# Patient Record
Sex: MALE | Race: WHITE | Employment: OTHER | URBAN - METROPOLITAN AREA
[De-identification: names, ages, dates, MRNs, and addresses within clinical notes are randomized per-mention and may not be internally consistent; named-entity substitution may affect disease eponyms.]

---

## 2017-05-05 PROBLEM — J18.9 PNEUMONIA DUE TO INFECTIOUS ORGANISM: Status: ACTIVE | Noted: 2017-05-05

## 2018-05-01 ENCOUNTER — OFFICE VISIT (OUTPATIENT)
Dept: FAMILY MEDICINE CLINIC | Age: 68
End: 2018-05-01
Payer: MEDICARE

## 2018-05-01 VITALS
OXYGEN SATURATION: 98 % | WEIGHT: 224 LBS | HEART RATE: 70 BPM | SYSTOLIC BLOOD PRESSURE: 136 MMHG | BODY MASS INDEX: 33.95 KG/M2 | HEIGHT: 68 IN | TEMPERATURE: 98.7 F | DIASTOLIC BLOOD PRESSURE: 76 MMHG | RESPIRATION RATE: 20 BRPM

## 2018-05-01 DIAGNOSIS — F41.9 ANXIETY: ICD-10-CM

## 2018-05-01 PROCEDURE — 1123F ACP DISCUSS/DSCN MKR DOCD: CPT | Performed by: FAMILY MEDICINE

## 2018-05-01 PROCEDURE — 4040F PNEUMOC VAC/ADMIN/RCVD: CPT | Performed by: FAMILY MEDICINE

## 2018-05-01 PROCEDURE — 3017F COLORECTAL CA SCREEN DOC REV: CPT | Performed by: FAMILY MEDICINE

## 2018-05-01 PROCEDURE — 99213 OFFICE O/P EST LOW 20 MIN: CPT | Performed by: FAMILY MEDICINE

## 2018-05-01 PROCEDURE — G8417 CALC BMI ABV UP PARAM F/U: HCPCS | Performed by: FAMILY MEDICINE

## 2018-05-01 PROCEDURE — 1036F TOBACCO NON-USER: CPT | Performed by: FAMILY MEDICINE

## 2018-05-01 PROCEDURE — G8427 DOCREV CUR MEDS BY ELIG CLIN: HCPCS | Performed by: FAMILY MEDICINE

## 2018-05-01 RX ORDER — LORAZEPAM 0.5 MG/1
0.5 TABLET ORAL 2 TIMES DAILY PRN
Qty: 60 TABLET | Refills: 2 | Status: SHIPPED | OUTPATIENT
Start: 2018-05-01 | End: 2019-10-08 | Stop reason: SDUPTHER

## 2018-05-01 ASSESSMENT — ENCOUNTER SYMPTOMS
COUGH: 0
DOUBLE VISION: 0
HEMOPTYSIS: 0
HEARTBURN: 0
BLURRED VISION: 0
NAUSEA: 0

## 2018-05-02 ENCOUNTER — TELEPHONE (OUTPATIENT)
Dept: FAMILY MEDICINE CLINIC | Age: 68
End: 2018-05-02

## 2018-05-02 DIAGNOSIS — G47.33 OSA (OBSTRUCTIVE SLEEP APNEA): ICD-10-CM

## 2018-05-02 DIAGNOSIS — G47.33 OBSTRUCTIVE SLEEP APNEA OF ADULT: Primary | ICD-10-CM

## 2018-05-08 ENCOUNTER — TELEPHONE (OUTPATIENT)
Dept: FAMILY MEDICINE CLINIC | Age: 68
End: 2018-05-08

## 2018-05-08 DIAGNOSIS — R18.8 OTHER ASCITES: Primary | ICD-10-CM

## 2018-05-11 ENCOUNTER — TELEPHONE (OUTPATIENT)
Dept: FAMILY MEDICINE CLINIC | Age: 68
End: 2018-05-11

## 2018-05-11 DIAGNOSIS — I99.8 OTHER DISORDER OF CIRCULATORY SYSTEM (CODE): ICD-10-CM

## 2018-05-11 DIAGNOSIS — Z01.818 PREOPERATIVE TESTING: Primary | ICD-10-CM

## 2018-05-15 ENCOUNTER — HOSPITAL ENCOUNTER (OUTPATIENT)
Dept: ULTRASOUND IMAGING | Age: 68
Discharge: HOME | End: 2018-05-15
Payer: MEDICARE

## 2018-05-15 DIAGNOSIS — R14.0 ABDOMINAL DISTENTION: ICD-10-CM

## 2018-05-15 PROCEDURE — 76705 ECHO EXAM OF ABDOMEN: CPT

## 2018-06-30 ENCOUNTER — HOSPITAL ENCOUNTER (EMERGENCY)
Age: 68
Discharge: HOME | End: 2018-06-30
Payer: MEDICARE

## 2018-06-30 ENCOUNTER — APPOINTMENT (OUTPATIENT)
Dept: ULTRASOUND IMAGING | Age: 68
End: 2018-06-30
Payer: MEDICARE

## 2018-06-30 VITALS
HEIGHT: 68 IN | SYSTOLIC BLOOD PRESSURE: 153 MMHG | OXYGEN SATURATION: 97 % | WEIGHT: 220 LBS | BODY MASS INDEX: 33.34 KG/M2 | DIASTOLIC BLOOD PRESSURE: 72 MMHG | HEART RATE: 85 BPM | RESPIRATION RATE: 16 BRPM | TEMPERATURE: 98.5 F

## 2018-06-30 DIAGNOSIS — I83.892 VARICOSE VEINS OF LEG WITH SWELLING, LEFT: ICD-10-CM

## 2018-06-30 DIAGNOSIS — L03.116 CELLULITIS OF LEFT LOWER EXTREMITY: Primary | ICD-10-CM

## 2018-06-30 PROCEDURE — 93971 EXTREMITY STUDY: CPT

## 2018-06-30 PROCEDURE — 99283 EMERGENCY DEPT VISIT LOW MDM: CPT

## 2018-06-30 PROCEDURE — 6370000000 HC RX 637 (ALT 250 FOR IP): Performed by: PHYSICIAN ASSISTANT

## 2018-06-30 RX ORDER — CEPHALEXIN 250 MG/1
500 CAPSULE ORAL ONCE
Status: COMPLETED | OUTPATIENT
Start: 2018-06-30 | End: 2018-06-30

## 2018-06-30 RX ORDER — CEPHALEXIN 500 MG/1
500 CAPSULE ORAL 4 TIMES DAILY
Qty: 40 CAPSULE | Refills: 0 | Status: SHIPPED | OUTPATIENT
Start: 2018-06-30 | End: 2019-02-19

## 2018-06-30 RX ADMIN — CEPHALEXIN 500 MG: 250 CAPSULE ORAL at 22:47

## 2018-07-01 NOTE — ED PROVIDER NOTES
Independent:    HPI:  6/30/18, Time: 8:32PM.       Nic Powers is a 79 y.o. male presenting to the ED for left lower leg pain and swelling with some mild local erythema to the anterior tibial region, beginning over the last 24 hours. The patient reports that he has enlarged varicosities to his left lower leg that usually do not bother him. He states that over the last 24 hours, he has had soreness overlying the varicosities of his left medial knee area as well as to his anterior left thigh. He states he has no pain to his left posterior calf or left posterior knee. He denies any falls or injuries. He states that he is having some pain to his left leg with ambulation. 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 He denies any recent chills or fever. He denies any open sores or lesions to his left lower leg. ROS:   Pertinent positives and negatives are stated within the HPI, all other systems reviewed and are negative.    --------------------------------------------- PAST HISTORY ---------------------------------------------  Past Medical History:  has a past medical history of AML (acute myeloblastic leukemia) ([facility] AML (acute myeloblastic leukemia) ([facility] Cancer ([facility] Chronic kidney disease; Diabetes mellitus ([facility] GERD (gastroesophageal reflux disease); Hx of blood clots; Hyperlipidemia; Liver disease; MI, old; Other disorders of kidney and ureter; Other sleep disturbances; Thyroid disease; and Type II or unspecified type diabetes mellitus without mention of complication, not stated as uncontrolled. Past Surgical History:  has a past surgical history that includes Appendectomy; Cholecystectomy; Foot neuroma surgery; Colonoscopy; Endoscopy, colon, diagnostic; Upper gastrointestinal endoscopy; and bone marrow transplant. Social History:  reports that he quit smoking about 42 years ago.  He has a 5.00 pack-year smoking history. He quit smokeless tobacco use about 20 years ago. He reports that he drinks about 0.6 oz of alcohol per week . He reports that he does not use drugs. Family History: family history includes Cancer in his maternal grandmother and other family members; Heart Disease in his father and mother; High Blood Pressure in his father. The patients home medications have been reviewed. Allergies: Pneumococcal vaccines; Ciprofloxacin; Diatrizoate; Dye [iodides]; Epinephrine; Penicillin g; Morphine; Other; and Oxycodone    -------------------------------------------------- RESULTS -------------------------------------------------  All laboratory and radiology results have been personally reviewed by myself   LABS:  No results found for this visit on 06/30/18. RADIOLOGY:  Interpreted by Radiologist.  US DUP LOWER EXTREMITY LEFT KATHERYN   Final Result   No evidence of acute deep venous thrombosis.          ------------------------- NURSING NOTES AND VITALS REVIEWED ---------------------------   The nursing notes within the ED encounter and vital signs as below have been reviewed. BP (!) 153/72   Pulse 85   Temp 98.5 °F (36.9 °C)   Resp 16   Ht 5' 8\" (1.727 m)   Wt 220 lb (99.8 kg)   SpO2 97%   BMI 33.45 kg/m²   Oxygen Saturation Interpretation: Normal    ---------------------------------------------------PHYSICAL EXAM--------------------------------------      Constitutional/General: Alert and oriented x3, well appearing, non toxic in NAD  Head: NC/AT  Eyes: PERRL, EOMI  Neck: Supple, full ROM  Pulmonary: Lungs clear to auscultation bilaterally, no wheezes, rales, or rhonchi. Not in respiratory distress  Cardiovascular:  Regular rate and rhythm,  2+ distal pulses  Extremities: Moves all extremities x 4. Mild local erythema noted to anterior tibial region bilaterally, left >right. Local tenderness to left medial knee overlying area of large high pressure varicosity.  No significant erythema, appears somewhat warm to touch. Some local tenderness to left anterior thigh as well, no palpable cord or visible erythema. Patient able to ambulate, but with discomfort to left anterior lower leg with weight bearing. No posterior left calf tenderness and no tenderness or fullness noted to left posterior knee. Skin: warm and dry without rash  Neurologic: GCS 15  Psych: Normal Affect    ------------------------------ ED COURSE/MEDICAL DECISION MAKING----------------------  Medications   cephALEXin (KEFLEX) capsule 500 mg (not administered)         Medical Decision Making:    Patient to ER with complaints of left lower leg pain and redness and swelling, worse today. Patient advised would recommend US of LLE to rule out DVT. Patient resting comfortably, awaiting US results. Patient Advised of stable ultrasound findings with no evidence of DVT. Patient was given a dose of Keflex orally in the ER. He was given a prescription as well. He does have follow-up scheduled with his PCP next week. He will keep this appointment. I advised patient that he can discuss possible referral to vascular doctor with his PCP for possible treatment of his varicose veins since they are becoming more symptomatic. Recommend to take some Aleve OTC for his inflammation as well as the oral Keflex and elevate his legs as much as able. If he develops worsening pain, high fever, red streaking, worsening swelling, shortness of breath or any changes, recommend reevaluation immediately. Counseling: The emergency provider has spoken with the patient and spouse/SO and discussed todays results, in addition to providing specific details for the plan of care and counseling regarding the diagnosis and prognosis. Questions are answered at this time and they are agreeable with the plan.    --------------------------------- IMPRESSION AND DISPOSITION ---------------------------------    IMPRESSION  1.  Cellulitis of left lower

## 2018-07-05 ENCOUNTER — HOSPITAL ENCOUNTER (OUTPATIENT)
Age: 68
Discharge: HOME | End: 2018-07-07
Payer: MEDICARE

## 2018-07-05 ENCOUNTER — OFFICE VISIT (OUTPATIENT)
Dept: FAMILY MEDICINE CLINIC | Age: 68
End: 2018-07-05
Payer: MEDICARE

## 2018-07-05 VITALS
BODY MASS INDEX: 33.65 KG/M2 | OXYGEN SATURATION: 97 % | TEMPERATURE: 98.8 F | HEART RATE: 99 BPM | WEIGHT: 222 LBS | SYSTOLIC BLOOD PRESSURE: 98 MMHG | RESPIRATION RATE: 16 BRPM | DIASTOLIC BLOOD PRESSURE: 62 MMHG | HEIGHT: 68 IN

## 2018-07-05 DIAGNOSIS — L03.116 CELLULITIS OF LEFT LOWER EXTREMITY: ICD-10-CM

## 2018-07-05 DIAGNOSIS — E11.9 TYPE 2 DIABETES MELLITUS WITHOUT COMPLICATION, WITH LONG-TERM CURRENT USE OF INSULIN (HCC): ICD-10-CM

## 2018-07-05 DIAGNOSIS — Z79.4 TYPE 2 DIABETES MELLITUS WITHOUT COMPLICATION, WITH LONG-TERM CURRENT USE OF INSULIN (HCC): ICD-10-CM

## 2018-07-05 DIAGNOSIS — L03.116 CELLULITIS OF LEFT LOWER EXTREMITY: Primary | ICD-10-CM

## 2018-07-05 DIAGNOSIS — Z00.00 ROUTINE GENERAL MEDICAL EXAMINATION AT A HEALTH CARE FACILITY: ICD-10-CM

## 2018-07-05 DIAGNOSIS — R60.9 EDEMA, UNSPECIFIED TYPE: ICD-10-CM

## 2018-07-05 LAB
ALBUMIN SERPL-MCNC: 4.1 G/DL (ref 3.5–5.2)
ALP BLD-CCNC: 122 U/L (ref 40–129)
ALT SERPL-CCNC: 39 U/L (ref 0–40)
ANION GAP SERPL CALCULATED.3IONS-SCNC: 14 MMOL/L (ref 7–16)
ANISOCYTOSIS: ABNORMAL
AST SERPL-CCNC: 29 U/L (ref 0–39)
BASOPHILS ABSOLUTE: 0.07 E9/L (ref 0–0.2)
BASOPHILS RELATIVE PERCENT: 0.9 % (ref 0–2)
BILIRUB SERPL-MCNC: 0.5 MG/DL (ref 0–1.2)
BUN BLDV-MCNC: 19 MG/DL (ref 8–23)
BURR CELLS: ABNORMAL
CALCIUM SERPL-MCNC: 9.4 MG/DL (ref 8.6–10.2)
CHLORIDE BLD-SCNC: 96 MMOL/L (ref 98–107)
CHOLESTEROL, TOTAL: 130 MG/DL (ref 0–199)
CO2: 28 MMOL/L (ref 22–29)
CREAT SERPL-MCNC: 1 MG/DL (ref 0.7–1.2)
EOSINOPHILS ABSOLUTE: 0.29 E9/L (ref 0.05–0.5)
EOSINOPHILS RELATIVE PERCENT: 3.6 % (ref 0–6)
GFR AFRICAN AMERICAN: >60
GFR NON-AFRICAN AMERICAN: >60 ML/MIN/1.73
GLUCOSE BLD-MCNC: 314 MG/DL (ref 74–109)
HBA1C MFR BLD: 9.6 %
HCT VFR BLD CALC: 39.4 % (ref 37–54)
HDLC SERPL-MCNC: 17 MG/DL
HEMOGLOBIN: 12 G/DL (ref 12.5–16.5)
LDL CHOLESTEROL CALCULATED: 48 MG/DL (ref 0–99)
LYMPHOCYTES ABSOLUTE: 6.48 E9/L (ref 1.5–4)
LYMPHOCYTES RELATIVE PERCENT: 81.3 % (ref 20–42)
MCH RBC QN AUTO: 26.9 PG (ref 26–35)
MCHC RBC AUTO-ENTMCNC: 30.5 % (ref 32–34.5)
MCV RBC AUTO: 88.3 FL (ref 80–99.9)
MONOCYTES ABSOLUTE: 0.32 E9/L (ref 0.1–0.95)
MONOCYTES RELATIVE PERCENT: 3.6 % (ref 2–12)
NEUTROPHILS ABSOLUTE: 0.88 E9/L (ref 1.8–7.3)
NEUTROPHILS RELATIVE PERCENT: 10.7 % (ref 43–80)
OVALOCYTES: ABNORMAL
PDW BLD-RTO: 15.4 FL (ref 11.5–15)
PLATELET # BLD: 66 E9/L (ref 130–450)
PLATELET CONFIRMATION: NORMAL
PMV BLD AUTO: 12.6 FL (ref 7–12)
POIKILOCYTES: ABNORMAL
POLYCHROMASIA: ABNORMAL
POTASSIUM SERPL-SCNC: 5.1 MMOL/L (ref 3.5–5)
RBC # BLD: 4.46 E12/L (ref 3.8–5.8)
SODIUM BLD-SCNC: 138 MMOL/L (ref 132–146)
TOTAL PROTEIN: 6.5 G/DL (ref 6.4–8.3)
TRIGL SERPL-MCNC: 327 MG/DL (ref 0–149)
VLDLC SERPL CALC-MCNC: 65 MG/DL
WBC # BLD: 8 E9/L (ref 4.5–11.5)

## 2018-07-05 PROCEDURE — 85025 COMPLETE CBC W/AUTO DIFF WBC: CPT

## 2018-07-05 PROCEDURE — G0438 PPPS, INITIAL VISIT: HCPCS | Performed by: FAMILY MEDICINE

## 2018-07-05 PROCEDURE — 4040F PNEUMOC VAC/ADMIN/RCVD: CPT | Performed by: FAMILY MEDICINE

## 2018-07-05 PROCEDURE — 83036 HEMOGLOBIN GLYCOSYLATED A1C: CPT | Performed by: FAMILY MEDICINE

## 2018-07-05 PROCEDURE — 80061 LIPID PANEL: CPT

## 2018-07-05 PROCEDURE — 80053 COMPREHEN METABOLIC PANEL: CPT

## 2018-07-05 PROCEDURE — 36415 COLL VENOUS BLD VENIPUNCTURE: CPT | Performed by: FAMILY MEDICINE

## 2018-07-05 PROCEDURE — 3046F HEMOGLOBIN A1C LEVEL >9.0%: CPT | Performed by: FAMILY MEDICINE

## 2018-07-05 RX ORDER — SPIRONOLACTONE 25 MG/1
25 TABLET ORAL DAILY
Qty: 90 TABLET | Refills: 3 | Status: SHIPPED | OUTPATIENT
Start: 2018-07-05 | End: 2019-03-30 | Stop reason: SDUPTHER

## 2018-07-05 RX ORDER — GABAPENTIN 300 MG/1
300 CAPSULE ORAL 3 TIMES DAILY
COMMUNITY
End: 2019-05-01 | Stop reason: ALTCHOICE

## 2018-07-05 RX ORDER — ALFUZOSIN HYDROCHLORIDE 10 MG/1
TABLET, EXTENDED RELEASE ORAL
COMMUNITY
Start: 2018-04-09 | End: 2019-05-01

## 2018-07-05 ASSESSMENT — LIFESTYLE VARIABLES
AUDIT TOTAL SCORE: 2
AUDIT-C TOTAL SCORE: 2
HOW OFTEN DURING THE LAST YEAR HAVE YOU FAILED TO DO WHAT WAS NORMALLY EXPECTED FROM YOU BECAUSE OF DRINKING: 0
HAS A RELATIVE, FRIEND, DOCTOR, OR ANOTHER HEALTH PROFESSIONAL EXPRESSED CONCERN ABOUT YOUR DRINKING OR SUGGESTED YOU CUT DOWN: 0
HOW OFTEN DURING THE LAST YEAR HAVE YOU HAD A FEELING OF GUILT OR REMORSE AFTER DRINKING: 0
HOW OFTEN DURING THE LAST YEAR HAVE YOU BEEN UNABLE TO REMEMBER WHAT HAPPENED THE NIGHT BEFORE BECAUSE YOU HAD BEEN DRINKING: 0
HOW OFTEN DO YOU HAVE SIX OR MORE DRINKS ON ONE OCCASION: 0
HAVE YOU OR SOMEONE ELSE BEEN INJURED AS A RESULT OF YOUR DRINKING: 0
HOW OFTEN DURING THE LAST YEAR HAVE YOU FOUND THAT YOU WERE NOT ABLE TO STOP DRINKING ONCE YOU HAD STARTED: 0
HOW MANY STANDARD DRINKS CONTAINING ALCOHOL DO YOU HAVE ON A TYPICAL DAY: 0
HOW OFTEN DURING THE LAST YEAR HAVE YOU NEEDED AN ALCOHOLIC DRINK FIRST THING IN THE MORNING TO GET YOURSELF GOING AFTER A NIGHT OF HEAVY DRINKING: 0
HOW OFTEN DO YOU HAVE A DRINK CONTAINING ALCOHOL: 2

## 2018-07-05 ASSESSMENT — PATIENT HEALTH QUESTIONNAIRE - PHQ9: SUM OF ALL RESPONSES TO PHQ QUESTIONS 1-9: 0

## 2018-07-05 ASSESSMENT — ANXIETY QUESTIONNAIRES: GAD7 TOTAL SCORE: 0

## 2018-07-05 NOTE — PROGRESS NOTES
Risk:  Timed Up and Go Test > 12 seconds?: no  2 or more falls in past year?: no  Fall with injury in past year?: (!) yes  Fall Risk Assessment[de-identified] (!) Positive Screening for Falls  Fall Risk Interventions:    · None indicated    General Health:  General  In general, how would you say your health is?: Good  In the past 7 days, have you experienced any of the following?: (!) New or Increased Pain (from cellulitis)  Do you get the social and emotional support that you need?: Yes  Do you have a Living Will?: (!) No  General Health Risk Interventions:  · None indicated    Health Habits/Nutrition:  Health Habits/Nutrition  Do you exercise for at least 20 minutes 2-3 times per week?: (!) No  Have you lost any weight without trying in the past 3 months?: No  Do you eat fewer than 2 meals per day?: No  Have you seen a dentist within the past year?: Yes  Body mass index is 33.75 kg/m².   Health Habits/Nutrition Interventions:  · None indicated    Hearing/Vision:  Hearing/Vision  Do you or your family notice any trouble with your hearing?: (!) Yes  Do you have difficulty driving, watching TV, or doing any of your daily activities because of your eyesight?: No  Have you had an eye exam within the past year?: Yes  Hearing/Vision Interventions:  · None indicated    Personalized Preventive Plan   Current Health Maintenance Status  Immunization History   Administered Date(s) Administered    DTaP 01/03/2013, 04/25/2013, 08/22/2013    Hepatitis B (Recombivax HB) 01/03/2013, 04/25/2013, 08/22/2013    Hib, unspecified formulation 01/03/2013, 04/25/2013, 08/22/2013    IPV (Ipol) 01/03/2013, 04/25/2013, 08/22/2013    Influenza, High Dose 09/25/2017    Influenza, Quadv, 3 Years and older, IM 09/18/2014    MMR 09/18/2014, 06/02/2016    Pneumococcal 13-valent Conjugate (Lizzie [place]) 06/18/2017        Health Maintenance   Topic Date Due    Hepatitis C screen  1950    Diabetic foot exam  05/09/1962    Diabetic retinal exam 05/09/1962    Lipid screen  05/09/1962    Shingles Vaccine (1 of 2 - 2 Dose Series) 05/09/2002    Diabetic microalbuminuria test  04/21/2018    Flu vaccine (1) 09/01/2018    A1C test (Diabetic or Prediabetic)  12/27/2018    DTaP/Tdap/Td vaccine (2 - Tdap) 08/22/2023    Colon cancer screen colonoscopy  04/25/2025    AAA screen  Completed     Recommendations for Preventive Services Due: see orders.   Recommended screening schedule for the next 5-10 years is provided to the patient in written form: see Patient Instructions/AVS.

## 2018-07-06 ENCOUNTER — TELEPHONE (OUTPATIENT)
Dept: FAMILY MEDICINE CLINIC | Age: 68
End: 2018-07-06

## 2018-07-09 ENCOUNTER — TELEPHONE (OUTPATIENT)
Dept: FAMILY MEDICINE CLINIC | Age: 68
End: 2018-07-09

## 2018-09-20 RX ORDER — PANTOPRAZOLE SODIUM 40 MG/1
TABLET, DELAYED RELEASE ORAL
Qty: 90 TABLET | Refills: 3 | Status: SHIPPED | OUTPATIENT
Start: 2018-09-20 | End: 2019-12-02 | Stop reason: SDUPTHER

## 2018-11-08 ENCOUNTER — TELEPHONE (OUTPATIENT)
Dept: FAMILY MEDICINE CLINIC | Age: 68
End: 2018-11-08

## 2018-11-08 DIAGNOSIS — E11.9 TYPE 2 DIABETES MELLITUS WITHOUT COMPLICATION, WITH LONG-TERM CURRENT USE OF INSULIN (HCC): ICD-10-CM

## 2018-11-08 DIAGNOSIS — Z79.4 TYPE 2 DIABETES MELLITUS WITHOUT COMPLICATION, WITH LONG-TERM CURRENT USE OF INSULIN (HCC): ICD-10-CM

## 2018-11-08 RX ORDER — INSULIN LISPRO 100 [IU]/ML
INJECTION, SOLUTION INTRAVENOUS; SUBCUTANEOUS
Qty: 15 ML | Refills: 0 | Status: SHIPPED | OUTPATIENT
Start: 2018-11-08 | End: 2019-02-01 | Stop reason: SDUPTHER

## 2018-11-08 RX ORDER — INSULIN GLARGINE 300 U/ML
31 INJECTION, SOLUTION SUBCUTANEOUS NIGHTLY
Qty: 5 PEN | Refills: 0 | Status: SHIPPED | OUTPATIENT
Start: 2018-11-08 | End: 2019-05-16 | Stop reason: DRUGHIGH

## 2019-02-01 DIAGNOSIS — E11.9 TYPE 2 DIABETES MELLITUS WITHOUT COMPLICATION, UNSPECIFIED WHETHER LONG TERM INSULIN USE (HCC): Primary | ICD-10-CM

## 2019-02-01 RX ORDER — INSULIN LISPRO 100 [IU]/ML
INJECTION, SOLUTION INTRAVENOUS; SUBCUTANEOUS
Qty: 27 ML | Refills: 0 | Status: SHIPPED | OUTPATIENT
Start: 2019-02-01 | End: 2019-02-19 | Stop reason: SDUPTHER

## 2019-02-19 ENCOUNTER — OFFICE VISIT (OUTPATIENT)
Dept: FAMILY MEDICINE CLINIC | Age: 69
End: 2019-02-19
Payer: MEDICARE

## 2019-02-19 ENCOUNTER — HOSPITAL ENCOUNTER (OUTPATIENT)
Age: 69
Discharge: HOME | End: 2019-02-21
Payer: MEDICARE

## 2019-02-19 VITALS
OXYGEN SATURATION: 98 % | WEIGHT: 209 LBS | SYSTOLIC BLOOD PRESSURE: 104 MMHG | HEART RATE: 72 BPM | DIASTOLIC BLOOD PRESSURE: 50 MMHG | RESPIRATION RATE: 16 BRPM | TEMPERATURE: 98.3 F | HEIGHT: 68 IN | BODY MASS INDEX: 31.67 KG/M2

## 2019-02-19 DIAGNOSIS — J01.90 ACUTE BACTERIAL SINUSITIS: ICD-10-CM

## 2019-02-19 DIAGNOSIS — E11.9 TYPE 2 DIABETES MELLITUS WITHOUT COMPLICATION, WITH LONG-TERM CURRENT USE OF INSULIN (HCC): ICD-10-CM

## 2019-02-19 DIAGNOSIS — Z79.4 TYPE 2 DIABETES MELLITUS WITHOUT COMPLICATION, WITH LONG-TERM CURRENT USE OF INSULIN (HCC): ICD-10-CM

## 2019-02-19 DIAGNOSIS — Z79.4 TYPE 2 DIABETES MELLITUS WITHOUT COMPLICATION, WITH LONG-TERM CURRENT USE OF INSULIN (HCC): Primary | ICD-10-CM

## 2019-02-19 DIAGNOSIS — B96.89 ACUTE BACTERIAL SINUSITIS: ICD-10-CM

## 2019-02-19 DIAGNOSIS — R06.02 SOB (SHORTNESS OF BREATH): ICD-10-CM

## 2019-02-19 DIAGNOSIS — R05.9 COUGH: ICD-10-CM

## 2019-02-19 DIAGNOSIS — R09.89 BIBASILAR CRACKLES: ICD-10-CM

## 2019-02-19 DIAGNOSIS — E11.9 TYPE 2 DIABETES MELLITUS WITHOUT COMPLICATION, WITH LONG-TERM CURRENT USE OF INSULIN (HCC): Primary | ICD-10-CM

## 2019-02-19 LAB
CREATININE URINE: 137 MG/DL (ref 40–278)
HBA1C MFR BLD: 9.8 %
MICROALBUMIN UR-MCNC: 36 MG/L
MICROALBUMIN/CREAT UR-RTO: 26.3 (ref 0–30)

## 2019-02-19 PROCEDURE — 82044 UR ALBUMIN SEMIQUANTITATIVE: CPT

## 2019-02-19 PROCEDURE — 1101F PT FALLS ASSESS-DOCD LE1/YR: CPT | Performed by: NURSE PRACTITIONER

## 2019-02-19 PROCEDURE — G8417 CALC BMI ABV UP PARAM F/U: HCPCS | Performed by: NURSE PRACTITIONER

## 2019-02-19 PROCEDURE — 83036 HEMOGLOBIN GLYCOSYLATED A1C: CPT | Performed by: NURSE PRACTITIONER

## 2019-02-19 PROCEDURE — 96372 THER/PROPH/DIAG INJ SC/IM: CPT | Performed by: NURSE PRACTITIONER

## 2019-02-19 PROCEDURE — G8510 SCR DEP NEG, NO PLAN REQD: HCPCS | Performed by: NURSE PRACTITIONER

## 2019-02-19 PROCEDURE — 3046F HEMOGLOBIN A1C LEVEL >9.0%: CPT | Performed by: NURSE PRACTITIONER

## 2019-02-19 PROCEDURE — G8427 DOCREV CUR MEDS BY ELIG CLIN: HCPCS | Performed by: NURSE PRACTITIONER

## 2019-02-19 PROCEDURE — 3017F COLORECTAL CA SCREEN DOC REV: CPT | Performed by: NURSE PRACTITIONER

## 2019-02-19 PROCEDURE — G8484 FLU IMMUNIZE NO ADMIN: HCPCS | Performed by: NURSE PRACTITIONER

## 2019-02-19 PROCEDURE — 1036F TOBACCO NON-USER: CPT | Performed by: NURSE PRACTITIONER

## 2019-02-19 PROCEDURE — 1123F ACP DISCUSS/DSCN MKR DOCD: CPT | Performed by: NURSE PRACTITIONER

## 2019-02-19 PROCEDURE — 4040F PNEUMOC VAC/ADMIN/RCVD: CPT | Performed by: NURSE PRACTITIONER

## 2019-02-19 PROCEDURE — 99214 OFFICE O/P EST MOD 30 MIN: CPT | Performed by: NURSE PRACTITIONER

## 2019-02-19 PROCEDURE — 2022F DILAT RTA XM EVC RTNOPTHY: CPT | Performed by: NURSE PRACTITIONER

## 2019-02-19 PROCEDURE — 82570 ASSAY OF URINE CREATININE: CPT

## 2019-02-19 RX ORDER — CEFTRIAXONE 1 G/1
1 INJECTION, POWDER, FOR SOLUTION INTRAMUSCULAR; INTRAVENOUS ONCE
Status: COMPLETED | OUTPATIENT
Start: 2019-02-19 | End: 2019-02-19

## 2019-02-19 RX ORDER — PREDNISONE 10 MG/1
TABLET ORAL
Qty: 21 TABLET | Refills: 0 | Status: SHIPPED | OUTPATIENT
Start: 2019-02-19 | End: 2019-03-01

## 2019-02-19 RX ORDER — ALBUTEROL SULFATE 90 UG/1
2 AEROSOL, METERED RESPIRATORY (INHALATION) EVERY 6 HOURS PRN
Qty: 1 INHALER | Refills: 3 | Status: SHIPPED
Start: 2019-02-19 | End: 2020-03-02

## 2019-02-19 RX ADMIN — CEFTRIAXONE 1 G: 1 INJECTION, POWDER, FOR SOLUTION INTRAMUSCULAR; INTRAVENOUS at 14:37

## 2019-02-19 ASSESSMENT — PATIENT HEALTH QUESTIONNAIRE - PHQ9
2. FEELING DOWN, DEPRESSED OR HOPELESS: 0
1. LITTLE INTEREST OR PLEASURE IN DOING THINGS: 0
SUM OF ALL RESPONSES TO PHQ9 QUESTIONS 1 & 2: 0
SUM OF ALL RESPONSES TO PHQ QUESTIONS 1-9: 0
SUM OF ALL RESPONSES TO PHQ QUESTIONS 1-9: 0

## 2019-02-19 ASSESSMENT — ENCOUNTER SYMPTOMS
SHORTNESS OF BREATH: 1
VOMITING: 0
COUGH: 1
SORE THROAT: 1
WHEEZING: 1
SINUS PRESSURE: 1
NAUSEA: 0
DIARRHEA: 1

## 2019-03-01 ENCOUNTER — OFFICE VISIT (OUTPATIENT)
Dept: FAMILY MEDICINE CLINIC | Age: 69
End: 2019-03-01
Payer: MEDICARE

## 2019-03-01 VITALS
BODY MASS INDEX: 31.31 KG/M2 | TEMPERATURE: 98.3 F | OXYGEN SATURATION: 98 % | WEIGHT: 206.6 LBS | HEIGHT: 68 IN | SYSTOLIC BLOOD PRESSURE: 108 MMHG | DIASTOLIC BLOOD PRESSURE: 62 MMHG | RESPIRATION RATE: 16 BRPM | HEART RATE: 77 BPM

## 2019-03-01 DIAGNOSIS — R53.83 FATIGUE, UNSPECIFIED TYPE: ICD-10-CM

## 2019-03-01 DIAGNOSIS — J40 SINOBRONCHITIS: Primary | ICD-10-CM

## 2019-03-01 DIAGNOSIS — J32.9 SINOBRONCHITIS: Primary | ICD-10-CM

## 2019-03-01 PROCEDURE — 3017F COLORECTAL CA SCREEN DOC REV: CPT | Performed by: NURSE PRACTITIONER

## 2019-03-01 PROCEDURE — G8417 CALC BMI ABV UP PARAM F/U: HCPCS | Performed by: NURSE PRACTITIONER

## 2019-03-01 PROCEDURE — 1101F PT FALLS ASSESS-DOCD LE1/YR: CPT | Performed by: NURSE PRACTITIONER

## 2019-03-01 PROCEDURE — G8484 FLU IMMUNIZE NO ADMIN: HCPCS | Performed by: NURSE PRACTITIONER

## 2019-03-01 PROCEDURE — 1036F TOBACCO NON-USER: CPT | Performed by: NURSE PRACTITIONER

## 2019-03-01 PROCEDURE — 1123F ACP DISCUSS/DSCN MKR DOCD: CPT | Performed by: NURSE PRACTITIONER

## 2019-03-01 PROCEDURE — 4040F PNEUMOC VAC/ADMIN/RCVD: CPT | Performed by: NURSE PRACTITIONER

## 2019-03-01 PROCEDURE — 96372 THER/PROPH/DIAG INJ SC/IM: CPT | Performed by: NURSE PRACTITIONER

## 2019-03-01 PROCEDURE — G8427 DOCREV CUR MEDS BY ELIG CLIN: HCPCS | Performed by: NURSE PRACTITIONER

## 2019-03-01 PROCEDURE — 99213 OFFICE O/P EST LOW 20 MIN: CPT | Performed by: NURSE PRACTITIONER

## 2019-03-01 RX ORDER — CYANOCOBALAMIN 1000 UG/ML
1000 INJECTION INTRAMUSCULAR; SUBCUTANEOUS ONCE
Status: COMPLETED | OUTPATIENT
Start: 2019-03-01 | End: 2019-03-01

## 2019-03-01 RX ORDER — SULFAMETHOXAZOLE AND TRIMETHOPRIM 800; 160 MG/1; MG/1
1 TABLET ORAL 2 TIMES DAILY
Qty: 20 TABLET | Refills: 0 | Status: SHIPPED | OUTPATIENT
Start: 2019-03-01 | End: 2019-03-11

## 2019-03-01 RX ADMIN — CYANOCOBALAMIN 1000 MCG: 1000 INJECTION INTRAMUSCULAR; SUBCUTANEOUS at 15:23

## 2019-03-01 ASSESSMENT — ENCOUNTER SYMPTOMS
CONSTIPATION: 0
SINUS PRESSURE: 1
SHORTNESS OF BREATH: 0
WHEEZING: 0
SORE THROAT: 1
VOMITING: 0
NAUSEA: 0
COUGH: 1
CHEST TIGHTNESS: 0
SINUS PAIN: 1
DIARRHEA: 0

## 2019-03-30 DIAGNOSIS — R60.9 EDEMA, UNSPECIFIED TYPE: ICD-10-CM

## 2019-04-01 RX ORDER — SPIRONOLACTONE 25 MG/1
TABLET ORAL
Qty: 90 TABLET | Refills: 0 | Status: SHIPPED | OUTPATIENT
Start: 2019-04-01 | End: 2019-05-01 | Stop reason: DRUGHIGH

## 2019-04-01 RX ORDER — LEVOTHYROXINE SODIUM 0.2 MG/1
TABLET ORAL
Qty: 90 TABLET | Refills: 0 | Status: SHIPPED | OUTPATIENT
Start: 2019-04-01 | End: 2019-07-02 | Stop reason: SDUPTHER

## 2019-05-01 ENCOUNTER — APPOINTMENT (OUTPATIENT)
Dept: GENERAL RADIOLOGY | Age: 69
End: 2019-05-01
Payer: MEDICARE

## 2019-05-01 ENCOUNTER — HOSPITAL ENCOUNTER (EMERGENCY)
Age: 69
Discharge: HOME | End: 2019-05-01
Attending: EMERGENCY MEDICINE
Payer: MEDICARE

## 2019-05-01 ENCOUNTER — APPOINTMENT (OUTPATIENT)
Dept: CT IMAGING | Age: 69
End: 2019-05-01
Payer: MEDICARE

## 2019-05-01 VITALS
HEIGHT: 68 IN | OXYGEN SATURATION: 97 % | DIASTOLIC BLOOD PRESSURE: 76 MMHG | SYSTOLIC BLOOD PRESSURE: 120 MMHG | HEART RATE: 80 BPM | TEMPERATURE: 98.5 F | WEIGHT: 208 LBS | RESPIRATION RATE: 20 BRPM | BODY MASS INDEX: 31.52 KG/M2

## 2019-05-01 DIAGNOSIS — E87.20 LACTIC ACIDOSIS: ICD-10-CM

## 2019-05-01 DIAGNOSIS — N20.0 NEPHROLITHIASIS: Primary | ICD-10-CM

## 2019-05-01 DIAGNOSIS — R11.2 NON-INTRACTABLE VOMITING WITH NAUSEA, UNSPECIFIED VOMITING TYPE: ICD-10-CM

## 2019-05-01 DIAGNOSIS — E87.5 HYPERKALEMIA: ICD-10-CM

## 2019-05-01 LAB
ALBUMIN SERPL-MCNC: 4.5 G/DL (ref 3.5–5.2)
ALP BLD-CCNC: 140 U/L (ref 40–129)
ALT SERPL-CCNC: 47 U/L (ref 0–40)
ANION GAP SERPL CALCULATED.3IONS-SCNC: 13 MMOL/L (ref 7–16)
ANION GAP SERPL CALCULATED.3IONS-SCNC: 15 MMOL/L (ref 7–16)
AST SERPL-CCNC: 35 U/L (ref 0–39)
BACTERIA: ABNORMAL /HPF
BASOPHILS ABSOLUTE: 0 E9/L (ref 0–0.2)
BASOPHILS RELATIVE PERCENT: 0.2 % (ref 0–2)
BILIRUB SERPL-MCNC: 0.9 MG/DL (ref 0–1.2)
BILIRUBIN URINE: ABNORMAL
BLOOD, URINE: NEGATIVE
BUN BLDV-MCNC: 26 MG/DL (ref 8–23)
BUN BLDV-MCNC: 28 MG/DL (ref 8–23)
BURR CELLS: ABNORMAL
CALCIUM SERPL-MCNC: 7.6 MG/DL (ref 8.6–10.2)
CALCIUM SERPL-MCNC: 9.4 MG/DL (ref 8.6–10.2)
CASTS 2: ABNORMAL /LPF
CHLORIDE BLD-SCNC: 102 MMOL/L (ref 98–107)
CHLORIDE BLD-SCNC: 94 MMOL/L (ref 98–107)
CLARITY: CLEAR
CO2: 19 MMOL/L (ref 22–29)
CO2: 21 MMOL/L (ref 22–29)
CO2: 24 MMOL/L (ref 22–29)
COLOR: YELLOW
CREAT SERPL-MCNC: 1.1 MG/DL (ref 0.7–1.2)
CREAT SERPL-MCNC: 1.2 MG/DL (ref 0.7–1.2)
EOSINOPHILS ABSOLUTE: 0 E9/L (ref 0.05–0.5)
EOSINOPHILS RELATIVE PERCENT: 0.5 % (ref 0–6)
EPITHELIAL CELLS, UA: ABNORMAL /HPF
GFR AFRICAN AMERICAN: >60
GFR NON-AFRICAN AMERICAN: >60 ML/MIN/1.73
GLUCOSE BLD-MCNC: 256 MG/DL (ref 74–99)
GLUCOSE BLD-MCNC: 263 MG/DL (ref 74–99)
GLUCOSE BLD-MCNC: 281 MG/DL (ref 74–99)
GLUCOSE URINE: NEGATIVE MG/DL
HCT VFR BLD CALC: 46.8 % (ref 37–54)
HEMOGLOBIN: 14.9 G/DL (ref 12.5–16.5)
KETONES, URINE: ABNORMAL MG/DL
LACTIC ACID: 2.5 MMOL/L (ref 0.5–2.2)
LACTIC ACID: 2.6 MMOL/L (ref 0.5–2.2)
LACTIC ACID: 2.9 MMOL/L (ref 0.5–2.2)
LEUKOCYTE ESTERASE, URINE: ABNORMAL
LIPASE: 17 U/L (ref 13–60)
LYMPHOCYTES ABSOLUTE: 7.56 E9/L (ref 1.5–4)
LYMPHOCYTES RELATIVE PERCENT: 44.7 % (ref 20–42)
MCH RBC QN AUTO: 27.1 PG (ref 26–35)
MCHC RBC AUTO-ENTMCNC: 31.8 % (ref 32–34.5)
MCV RBC AUTO: 85.2 FL (ref 80–99.9)
MONOCYTES ABSOLUTE: 0.67 E9/L (ref 0.1–0.95)
MONOCYTES RELATIVE PERCENT: 4.4 % (ref 2–12)
NEUTROPHILS ABSOLUTE: 8.57 E9/L (ref 1.8–7.3)
NEUTROPHILS RELATIVE PERCENT: 50.9 % (ref 43–80)
NITRITE, URINE: NEGATIVE
OVALOCYTES: ABNORMAL
PDW BLD-RTO: 15 FL (ref 11.5–15)
PH UA: 5 (ref 5–9)
PLATELET # BLD: 90 E9/L (ref 130–450)
PLATELET CONFIRMATION: NORMAL
PMV BLD AUTO: 11.8 FL (ref 7–12)
POC ANION GAP: 10 MMOL/L (ref 7–16)
POC BUN: 27 MG/DL (ref 8–23)
POC CHLORIDE: 102 MMOL/L (ref 100–108)
POC CREATININE: 1.1 MG/DL (ref 0.7–1.2)
POC POTASSIUM: 6.3 MMOL/L (ref 3.5–5)
POC SODIUM: 133 MMOL/L (ref 132–146)
POIKILOCYTES: ABNORMAL
POTASSIUM SERPL-SCNC: 5.3 MMOL/L (ref 3.5–5)
POTASSIUM SERPL-SCNC: 6.1 MMOL/L (ref 3.5–5)
PROTEIN UA: ABNORMAL MG/DL
RBC # BLD: 5.49 E12/L (ref 3.8–5.8)
RBC UA: ABNORMAL /HPF (ref 0–2)
SODIUM BLD-SCNC: 133 MMOL/L (ref 132–146)
SODIUM BLD-SCNC: 134 MMOL/L (ref 132–146)
SPECIFIC GRAVITY UA: >=1.03 (ref 1–1.03)
TOTAL CK: 40 U/L (ref 20–200)
TOTAL PROTEIN: 6.7 G/DL (ref 6.4–8.3)
TROPONIN: <0.01 NG/ML (ref 0–0.03)
UROBILINOGEN, URINE: 0.2 E.U./DL
WBC # BLD: 16.8 E9/L (ref 4.5–11.5)
WBC UA: ABNORMAL /HPF (ref 0–5)

## 2019-05-01 PROCEDURE — 36415 COLL VENOUS BLD VENIPUNCTURE: CPT

## 2019-05-01 PROCEDURE — 80051 ELECTROLYTE PANEL: CPT

## 2019-05-01 PROCEDURE — 2580000003 HC RX 258: Performed by: EMERGENCY MEDICINE

## 2019-05-01 PROCEDURE — 84484 ASSAY OF TROPONIN QUANT: CPT

## 2019-05-01 PROCEDURE — 84520 ASSAY OF UREA NITROGEN: CPT

## 2019-05-01 PROCEDURE — 80048 BASIC METABOLIC PNL TOTAL CA: CPT

## 2019-05-01 PROCEDURE — 2580000003 HC RX 258: Performed by: STUDENT IN AN ORGANIZED HEALTH CARE EDUCATION/TRAINING PROGRAM

## 2019-05-01 PROCEDURE — 71045 X-RAY EXAM CHEST 1 VIEW: CPT

## 2019-05-01 PROCEDURE — 6370000000 HC RX 637 (ALT 250 FOR IP): Performed by: STUDENT IN AN ORGANIZED HEALTH CARE EDUCATION/TRAINING PROGRAM

## 2019-05-01 PROCEDURE — 99285 EMERGENCY DEPT VISIT HI MDM: CPT

## 2019-05-01 PROCEDURE — 83605 ASSAY OF LACTIC ACID: CPT

## 2019-05-01 PROCEDURE — 83690 ASSAY OF LIPASE: CPT

## 2019-05-01 PROCEDURE — 93005 ELECTROCARDIOGRAM TRACING: CPT | Performed by: EMERGENCY MEDICINE

## 2019-05-01 PROCEDURE — 96375 TX/PRO/DX INJ NEW DRUG ADDON: CPT

## 2019-05-01 PROCEDURE — 81001 URINALYSIS AUTO W/SCOPE: CPT

## 2019-05-01 PROCEDURE — 85025 COMPLETE CBC W/AUTO DIFF WBC: CPT

## 2019-05-01 PROCEDURE — 80053 COMPREHEN METABOLIC PANEL: CPT

## 2019-05-01 PROCEDURE — 74176 CT ABD & PELVIS W/O CONTRAST: CPT

## 2019-05-01 PROCEDURE — 96374 THER/PROPH/DIAG INJ IV PUSH: CPT

## 2019-05-01 PROCEDURE — 82550 ASSAY OF CK (CPK): CPT

## 2019-05-01 PROCEDURE — 82947 ASSAY GLUCOSE BLOOD QUANT: CPT

## 2019-05-01 PROCEDURE — 6360000002 HC RX W HCPCS: Performed by: STUDENT IN AN ORGANIZED HEALTH CARE EDUCATION/TRAINING PROGRAM

## 2019-05-01 PROCEDURE — 82565 ASSAY OF CREATININE: CPT

## 2019-05-01 RX ORDER — 0.9 % SODIUM CHLORIDE 0.9 %
1000 INTRAVENOUS SOLUTION INTRAVENOUS ONCE
Status: COMPLETED | OUTPATIENT
Start: 2019-05-01 | End: 2019-05-01

## 2019-05-01 RX ORDER — MORPHINE SULFATE 4 MG/ML
4 INJECTION, SOLUTION INTRAMUSCULAR; INTRAVENOUS ONCE
Status: DISCONTINUED | OUTPATIENT
Start: 2019-05-01 | End: 2019-05-01

## 2019-05-01 RX ORDER — ONDANSETRON 2 MG/ML
4 INJECTION INTRAMUSCULAR; INTRAVENOUS ONCE
Status: COMPLETED | OUTPATIENT
Start: 2019-05-01 | End: 2019-05-01

## 2019-05-01 RX ORDER — KETOROLAC TROMETHAMINE 15 MG/ML
15 INJECTION, SOLUTION INTRAMUSCULAR; INTRAVENOUS ONCE
Status: COMPLETED | OUTPATIENT
Start: 2019-05-01 | End: 2019-05-01

## 2019-05-01 RX ORDER — FUROSEMIDE 10 MG/ML
20 INJECTION INTRAMUSCULAR; INTRAVENOUS ONCE
Status: COMPLETED | OUTPATIENT
Start: 2019-05-01 | End: 2019-05-01

## 2019-05-01 RX ORDER — 0.9 % SODIUM CHLORIDE 0.9 %
500 INTRAVENOUS SOLUTION INTRAVENOUS ONCE
Status: COMPLETED | OUTPATIENT
Start: 2019-05-01 | End: 2019-05-01

## 2019-05-01 RX ORDER — SPIRONOLACTONE 25 MG/1
25 TABLET ORAL 2 TIMES DAILY
COMMUNITY
End: 2019-05-30 | Stop reason: SDUPTHER

## 2019-05-01 RX ADMIN — FUROSEMIDE 20 MG: 10 INJECTION, SOLUTION INTRAMUSCULAR; INTRAVENOUS at 19:11

## 2019-05-01 RX ADMIN — SODIUM CHLORIDE 500 ML: 9 INJECTION, SOLUTION INTRAVENOUS at 04:00

## 2019-05-01 RX ADMIN — SODIUM CHLORIDE 1000 ML: 9 INJECTION, SOLUTION INTRAVENOUS at 16:47

## 2019-05-01 RX ADMIN — KETOROLAC TROMETHAMINE 15 MG: 15 INJECTION, SOLUTION INTRAMUSCULAR; INTRAVENOUS at 15:28

## 2019-05-01 RX ADMIN — PATIROMER 8.4 G: 8.4 POWDER, FOR SUSPENSION ORAL at 17:19

## 2019-05-01 RX ADMIN — SODIUM CHLORIDE 1000 ML: 9 INJECTION, SOLUTION INTRAVENOUS at 15:23

## 2019-05-01 RX ADMIN — ONDANSETRON 4 MG: 2 INJECTION INTRAMUSCULAR; INTRAVENOUS at 15:24

## 2019-05-01 ASSESSMENT — ENCOUNTER SYMPTOMS
VOMITING: 1
COLOR CHANGE: 0
CONSTIPATION: 0
COUGH: 0
SHORTNESS OF BREATH: 0
WHEEZING: 0
ABDOMINAL PAIN: 1
NAUSEA: 1
DIARRHEA: 1

## 2019-05-01 ASSESSMENT — PAIN DESCRIPTION - PROGRESSION: CLINICAL_PROGRESSION: GRADUALLY IMPROVING

## 2019-05-01 ASSESSMENT — PAIN DESCRIPTION - LOCATION
LOCATION: ABDOMEN
LOCATION: ABDOMEN

## 2019-05-01 ASSESSMENT — PAIN SCALES - GENERAL
PAINLEVEL_OUTOF10: 5
PAINLEVEL_OUTOF10: 10
PAINLEVEL_OUTOF10: 10

## 2019-05-01 ASSESSMENT — PAIN DESCRIPTION - PAIN TYPE: TYPE: ACUTE PAIN

## 2019-05-01 ASSESSMENT — PAIN DESCRIPTION - DESCRIPTORS: DESCRIPTORS: CRAMPING

## 2019-05-01 NOTE — ED PROVIDER NOTES
Patient is a 80-year-old male with history of type II diabetes, AML, with last chemo treatment 6 years ago, who presents to the ED for abdominal pain. Patient states that he began having 10 episodes of vomiting that was nonbloody and nonbilious. The began a few hours ago. He states that he was feeling good this morning, had a piece of toast, and then began having severe abdominal pain that was located some in the epigastric but most just generalized abdominal pain. He states that he was also having several episodes of diarrhea, that was nonbloody. Patient denies any other sick contacts. He states that he had some salad in [place] couple of days ago, nobody else was sick from that. He also stated that he had [place] over the weekend. Cannot think of any other people sick yet. The history is provided by the patient. No  was used. Review of Systems   Constitutional: Negative for chills and fever. Respiratory: Negative for cough, shortness of breath and wheezing. Cardiovascular: Negative for chest pain and palpitations. Gastrointestinal: Positive for abdominal pain, diarrhea, nausea and vomiting. Negative for constipation. Genitourinary: Negative for dysuria and hematuria. Musculoskeletal: Negative for neck pain and neck stiffness. Skin: Negative for color change, pallor, rash and wound. Neurological: Negative for dizziness, syncope, light-headedness, numbness and headaches. Psychiatric/Behavioral: Negative for confusion and decreased concentration. The patient is not nervous/anxious. Physical Exam   Constitutional: He is oriented to person, place, and time. He appears well-developed and well-nourished. No distress. HENT:   Head: Normocephalic and atraumatic. Right Ear: External ear normal.   Left Ear: External ear normal.   Mouth/Throat: No oropharyngeal exudate. Eyes: Pupils are equal, round, and reactive to light.  EOM are normal.   Neck: Normal range of motion. Cardiovascular: Normal rate, regular rhythm, normal heart sounds and intact distal pulses. Exam reveals no gallop and no friction rub. No murmur heard. Pulmonary/Chest: Effort normal and breath sounds normal. No stridor. No respiratory distress. He has no wheezes. He has no rales. He exhibits no tenderness. Abdominal: Soft. Bowel sounds are normal. He exhibits no distension and no mass. There is tenderness (Mild epigastric abdominal tenderness to palpation). There is no rebound and no guarding. No hernia. Musculoskeletal: Normal range of motion. He exhibits no edema, tenderness or deformity. Lymphadenopathy:     He has no cervical adenopathy. Neurological: He is alert and oriented to person, place, and time. No cranial nerve deficit or sensory deficit. He exhibits normal muscle tone. Skin: Skin is warm and dry. Capillary refill takes less than 2 seconds. No rash noted. He is not diaphoretic. No erythema. No pallor. Psychiatric: He has a normal mood and affect. His behavior is normal. Judgment and thought content normal.   Nursing note and vitals reviewed. Procedures  --------------------------------------------- PAST HISTORY ---------------------------------------------  Past Medical History:  has a past medical history of [facility] AML (acute myeloblastic leukemia) ([facility] Cancer ([facility] Chronic kidney disease, Diabetes mellitus ([facility] GERD (gastroesophageal reflux disease), Hx of blood clots, Hyperlipidemia, Liver disease, MI, old, Other disorders of kidney and ureter, Other sleep disturbances, Thyroid disease, and Type II or unspecified type diabetes mellitus without mention of complication, not stated as uncontrolled. Past Surgical History:  has a past surgical history that includes Appendectomy; Cholecystectomy; Foot neuroma surgery; Colonoscopy; Endoscopy, colon, diagnostic; Upper gastrointestinal endoscopy; and bone marrow transplant.     Social History:  reports that he quit smoking about 43 years ago. He has a 5.00 pack-year smoking history. He quit smokeless tobacco use about 21 years ago. He reports that he drinks about 0.6 oz of alcohol per week. He reports that he does not use drugs. Family History: family history includes Cancer in his maternal grandmother and other family members; Heart Disease in his father and mother; High Blood Pressure in his father. The patients home medications have been reviewed. Allergies: Pneumococcal vaccines; Ciprofloxacin; Diatrizoate; Dye [iodides]; Epinephrine; Penicillin g; Morphine;  Other; and Oxycodone    -------------------------------------------------- RESULTS -------------------------------------------------  Labs:  Results for orders placed or performed during the hospital encounter of 05/01/19   CBC auto differential   Result Value Ref Range    WBC 16.8 (H) 4.5 - 11.5 E9/L    RBC 5.49 3.80 - 5.80 E12/L    Hemoglobin 14.9 12.5 - 16.5 g/dL    Hematocrit 46.8 37.0 - 54.0 %    MCV 85.2 80.0 - 99.9 fL    MCH 27.1 26.0 - 35.0 pg    MCHC 31.8 (L) 32.0 - 34.5 %    RDW 15.0 11.5 - 15.0 fL    Platelets 90 (L) 319 - 450 E9/L    MPV 11.8 7.0 - 12.0 fL    Neutrophils % 50.9 43.0 - 80.0 %    Lymphocytes % 44.7 (H) 20.0 - 42.0 %    Monocytes % 4.4 2.0 - 12.0 %    Eosinophils % 0.5 0.0 - 6.0 %    Basophils % 0.2 0.0 - 2.0 %    Neutrophils # 8.57 (H) 1.80 - 7.30 E9/L    Lymphocytes # 7.56 (H) 1.50 - 4.00 E9/L    Monocytes # 0.67 0.10 - 0.95 E9/L    Eosinophils # 0.00 (L) 0.05 - 0.50 E9/L    Basophils # 0.00 0.00 - 0.20 E9/L    Poikilocytes 1+     [place] Cells 1+     Ovalocytes 1+    Comprehensive Metabolic Panel   Result Value Ref Range    Sodium 133 132 - 146 mmol/L    Potassium 6.1 (H) 3.5 - 5.0 mmol/L    Chloride 94 (L) 98 - 107 mmol/L    CO2 24 22 - 29 mmol/L    Anion Gap 15 7 - 16 mmol/L    Glucose 281 (H) 74 - 99 mg/dL    BUN 26 (H) 8 - 23 mg/dL    CREATININE 1.2 0.7 - 1.2 mg/dL    GFR Non-African American >60 >=60 mL/min/1.73    GFR African American >60     Calcium 9.4 8.6 - 10.2 mg/dL    Total Protein 6.7 6.4 - 8.3 g/dL    Alb 4.5 3.5 - 5.2 g/dL    Total Bilirubin 0.9 0.0 - 1.2 mg/dL    Alkaline Phosphatase 140 (H) 40 - 129 U/L    ALT 47 (H) 0 - 40 U/L    AST 35 0 - 39 U/L   Lactic Acid, Plasma   Result Value Ref Range    Lactic Acid 2.6 (H) 0.5 - 2.2 mmol/L   Lactic Acid, Plasma   Result Value Ref Range    Lactic Acid 2.5 (H) 0.5 - 2.2 mmol/L   Lipase   Result Value Ref Range    Lipase 17 13 - 60 U/L   Urinalysis with Microscopic   Result Value Ref Range    Color, UA Yellow Straw/Yellow    Clarity, UA Clear Clear    Glucose, Ur Negative Negative mg/dL    Bilirubin Urine SMALL (A) Negative    Ketones, Urine TRACE (A) Negative mg/dL    Specific Gravity, UA >=1.030 1.005 - 1.030    Blood, Urine Negative Negative    pH, UA 5.0 5.0 - 9.0    Protein, UA TRACE Negative mg/dL    Urobilinogen, Urine 0.2 <2.0 E.U./dL    Nitrite, Urine Negative Negative    Leukocyte Esterase, Urine TRACE (A) Negative    CASTS 2 MODERATE /LPF    WBC, UA 1-3 0 - 5 /HPF    RBC, UA 0-1 0 - 2 /HPF    Epi Cells RARE /HPF    Bacteria, UA RARE (A) /HPF   Troponin   Result Value Ref Range    Troponin <0.01 0.00 - 0.03 ng/mL   Platelet Confirmation   Result Value Ref Range    Platelet Confirmation CONFIRMED    CK   Result Value Ref Range    Total CK 40 20 - 200 U/L   Basic Metabolic Panel   Result Value Ref Range    Sodium 134 132 - 146 mmol/L    Potassium 5.3 (H) 3.5 - 5.0 mmol/L    Chloride 102 98 - 107 mmol/L    CO2 19 (L) 22 - 29 mmol/L    Anion Gap 13 7 - 16 mmol/L    Glucose 263 (H) 74 - 99 mg/dL    BUN 28 (H) 8 - 23 mg/dL    CREATININE 1.1 0.7 - 1.2 mg/dL    GFR Non-African American >60 >=60 mL/min/1.73    GFR African American >60     Calcium 7.6 (L) 8.6 - 10.2 mg/dL   Lactic Acid, Plasma   Result Value Ref Range    Lactic Acid 2.9 (H) 0.5 - 2.2 mmol/L   POCT Venous   Result Value Ref Range    POC Sodium 133 132 - 146 mmol/L    POC Potassium 6.3 (H) 3.5 - 5.0 mmol/L    POC Chloride 102 100 - 108 mmol/L    CO2 21 (L) 22 - 29 mmol/L    POC Anion Gap 10 7 - 16 mmol/L    POC Glucose 256 (H) 74 - 99 mg/dl    POC BUN 27 (H) 8 - 23 mg/dL    POC Creatinine 1.1 0.7 - 1.2 mg/dL    GFR Non-African American >60 >=60 mL/min/1.73    GFR African American >60    EKG 12 Lead   Result Value Ref Range    Ventricular Rate 82 BPM    Atrial Rate 82 BPM    P-R Interval 174 ms    QRS Duration 134 ms    Q-T Interval 400 ms    QTc Calculation (Bazett) 467 ms    P Axis 42 degrees    R Axis -2 degrees    T Axis 45 degrees       Radiology:  CT ABDOMEN PELVIS WO CONTRAST Additional Contrast? None   Final Result   1. Nephrolithiasis. No acute findings      XR CHEST PORTABLE   Final Result   Normal portable chest.             ------------------------- NURSING NOTES AND VITALS REVIEWED ---------------------------  Date / Time Roomed:  5/1/2019  2:39 PM  ED Bed Assignment:  06/06    The nursing notes within the ED encounter and vital signs as below have been reviewed. /76   Pulse 80   Temp 98.5 °F (36.9 °C) (Oral)   Resp 20   Ht 5' 8\" (1.727 m)   Wt 208 lb (94.3 kg)   SpO2 97%   BMI 31.63 kg/m²   Oxygen Saturation Interpretation: Normal      ------------------------------------------ PROGRESS NOTES ------------------------------------------  ED Course as of May 01 2256   Wed May 01, 2019   1646 Patient feeling much better at this time. Patient states that his abdominal pain and nausea and vomiting are much better. Patient has some hyperkalemia. Will be given IV fluids, and repeat POC chem. [KS]   1930 Patient resting comfortably at this time. [KS]      ED Course User Index  [KS] Hal Samson DO         10:56 PM  I have spoken with the patient and discussed todays results, in addition to providing specific details for the plan of care and counseling regarding the diagnosis and prognosis. Their questions are answered at this time and they are agreeable with the plan.  I instructed to come back to the ED if her symptoms worsen. Amount and/or Complexity of Data Reviewed  Clinical lab tests: ordered and reviewed  Tests in the radiology section of CPT®: ordered and reviewed        ED Course as of May 01 2256   Wed May 01, 2019   1646 Patient feeling much better at this time. Patient states that his abdominal pain and nausea and vomiting are much better. Patient has some hyperkalemia. Will be given IV fluids, and repeat POC chem. [KS]   1930 Patient resting comfortably at this time.      [KS]      ED Course User Index  [KS] DO Ronel Hilliard DO  Resident  05/01/19 0261

## 2019-05-02 LAB
EKG ATRIAL RATE: 82 BPM
EKG P AXIS: 42 DEGREES
EKG P-R INTERVAL: 174 MS
EKG Q-T INTERVAL: 400 MS
EKG QRS DURATION: 134 MS
EKG QTC CALCULATION (BAZETT): 467 MS
EKG R AXIS: -2 DEGREES
EKG T AXIS: 45 DEGREES
EKG VENTRICULAR RATE: 82 BPM

## 2019-05-02 PROCEDURE — 93010 ELECTROCARDIOGRAM REPORT: CPT | Performed by: INTERNAL MEDICINE

## 2019-05-05 ENCOUNTER — APPOINTMENT (OUTPATIENT)
Dept: GENERAL RADIOLOGY | Age: 69
DRG: 153 | End: 2019-05-05
Payer: MEDICARE

## 2019-05-05 ENCOUNTER — HOSPITAL ENCOUNTER (INPATIENT)
Age: 69
LOS: 1 days | Discharge: HOME | DRG: 153 | End: 2019-05-06
Attending: EMERGENCY MEDICINE | Admitting: INTERNAL MEDICINE
Payer: MEDICARE

## 2019-05-05 DIAGNOSIS — R10.84 GENERALIZED ABDOMINAL PAIN: ICD-10-CM

## 2019-05-05 DIAGNOSIS — R19.7 DIARRHEA, UNSPECIFIED TYPE: ICD-10-CM

## 2019-05-05 DIAGNOSIS — E87.20 LACTIC ACIDOSIS: ICD-10-CM

## 2019-05-05 DIAGNOSIS — J02.9 ACUTE PHARYNGITIS, UNSPECIFIED ETIOLOGY: ICD-10-CM

## 2019-05-05 DIAGNOSIS — R52 BODY ACHES: Primary | ICD-10-CM

## 2019-05-05 PROBLEM — L03.90 CELLULITIS: Status: ACTIVE | Noted: 2019-05-05

## 2019-05-05 LAB
ALBUMIN SERPL-MCNC: 3.8 G/DL (ref 3.5–5.2)
ALP BLD-CCNC: 128 U/L (ref 40–129)
ALT SERPL-CCNC: 31 U/L (ref 0–40)
ANION GAP SERPL CALCULATED.3IONS-SCNC: 16 MMOL/L (ref 7–16)
ANISOCYTOSIS: ABNORMAL
AST SERPL-CCNC: 22 U/L (ref 0–39)
BACTERIA: ABNORMAL /HPF
BASOPHILS ABSOLUTE: 0 E9/L (ref 0–0.2)
BASOPHILS RELATIVE PERCENT: 0.2 % (ref 0–2)
BILIRUB SERPL-MCNC: 0.8 MG/DL (ref 0–1.2)
BILIRUBIN URINE: NEGATIVE
BLOOD, URINE: NEGATIVE
BUN BLDV-MCNC: 11 MG/DL (ref 8–23)
BURR CELLS: ABNORMAL
CALCIUM SERPL-MCNC: 7.7 MG/DL (ref 8.6–10.2)
CHLORIDE BLD-SCNC: 99 MMOL/L (ref 98–107)
CLARITY: CLEAR
CO2: 18 MMOL/L (ref 22–29)
CO2: 19 MMOL/L (ref 22–29)
COLOR: YELLOW
CREAT SERPL-MCNC: 0.9 MG/DL (ref 0.7–1.2)
EOSINOPHILS ABSOLUTE: 0 E9/L (ref 0.05–0.5)
EOSINOPHILS RELATIVE PERCENT: 0.5 % (ref 0–6)
EPITHELIAL CELLS, UA: ABNORMAL /HPF
GFR AFRICAN AMERICAN: >60
GFR AFRICAN AMERICAN: >60
GFR NON-AFRICAN AMERICAN: >60 ML/MIN/1.73
GFR NON-AFRICAN AMERICAN: >60 ML/MIN/1.73
GLUCOSE BLD-MCNC: 270 MG/DL (ref 74–99)
GLUCOSE BLD-MCNC: 286 MG/DL (ref 74–99)
GLUCOSE URINE: 100 MG/DL
HCT VFR BLD CALC: 42.1 % (ref 37–54)
HEMOGLOBIN: 13.7 G/DL (ref 12.5–16.5)
INFLUENZA A BY PCR: NOT DETECTED
INFLUENZA B BY PCR: NOT DETECTED
KETONES, URINE: NEGATIVE MG/DL
LACTIC ACID: 3.5 MMOL/L (ref 0.5–2.2)
LEUKOCYTE ESTERASE, URINE: NEGATIVE
LYMPHOCYTES ABSOLUTE: 12.28 E9/L (ref 1.5–4)
LYMPHOCYTES RELATIVE PERCENT: 73.9 % (ref 20–42)
MCH RBC QN AUTO: 26.9 PG (ref 26–35)
MCHC RBC AUTO-ENTMCNC: 32.5 % (ref 32–34.5)
MCV RBC AUTO: 82.7 FL (ref 80–99.9)
METER GLUCOSE: 199 MG/DL (ref 74–99)
METER GLUCOSE: 235 MG/DL (ref 74–99)
MONO TEST: NEGATIVE
MONOCYTES ABSOLUTE: 0.66 E9/L (ref 0.1–0.95)
MONOCYTES RELATIVE PERCENT: 4.3 % (ref 2–12)
NEUTROPHILS ABSOLUTE: 3.65 E9/L (ref 1.8–7.3)
NEUTROPHILS RELATIVE PERCENT: 21.7 % (ref 43–80)
NITRITE, URINE: NEGATIVE
OVALOCYTES: ABNORMAL
PDW BLD-RTO: 14.6 FL (ref 11.5–15)
PH UA: 6 (ref 5–9)
PLATELET # BLD: 65 E9/L (ref 130–450)
PLATELET CONFIRMATION: NORMAL
PMV BLD AUTO: 11.2 FL (ref 7–12)
POC ANION GAP: 17 MMOL/L (ref 7–16)
POC BUN: 10 MG/DL (ref 8–23)
POC CHLORIDE: 98 MMOL/L (ref 100–108)
POC CREATININE: 0.9 MG/DL (ref 0.7–1.2)
POC POTASSIUM: 3.5 MMOL/L (ref 3.5–5)
POC SODIUM: 134 MMOL/L (ref 132–146)
POIKILOCYTES: ABNORMAL
POLYCHROMASIA: ABNORMAL
POTASSIUM SERPL-SCNC: 3.6 MMOL/L (ref 3.5–5)
PROTEIN UA: 30 MG/DL
RBC # BLD: 5.09 E12/L (ref 3.8–5.8)
RBC UA: ABNORMAL /HPF (ref 0–2)
SODIUM BLD-SCNC: 133 MMOL/L (ref 132–146)
SPECIFIC GRAVITY UA: 1.02 (ref 1–1.03)
STREP GRP A PCR: NEGATIVE
TOTAL PROTEIN: 6 G/DL (ref 6.4–8.3)
TSH SERPL DL<=0.05 MIU/L-ACNC: 5.31 UIU/ML (ref 0.27–4.2)
UROBILINOGEN, URINE: 1 E.U./DL
WBC # BLD: 16.6 E9/L (ref 4.5–11.5)
WBC UA: ABNORMAL /HPF (ref 0–5)

## 2019-05-05 PROCEDURE — 87581 M.PNEUMON DNA AMP PROBE: CPT

## 2019-05-05 PROCEDURE — 80053 COMPREHEN METABOLIC PANEL: CPT

## 2019-05-05 PROCEDURE — 96375 TX/PRO/DX INJ NEW DRUG ADDON: CPT

## 2019-05-05 PROCEDURE — 87040 BLOOD CULTURE FOR BACTERIA: CPT

## 2019-05-05 PROCEDURE — 84443 ASSAY THYROID STIM HORMONE: CPT

## 2019-05-05 PROCEDURE — 81001 URINALYSIS AUTO W/SCOPE: CPT

## 2019-05-05 PROCEDURE — 93005 ELECTROCARDIOGRAM TRACING: CPT | Performed by: PHYSICIAN ASSISTANT

## 2019-05-05 PROCEDURE — 87088 URINE BACTERIA CULTURE: CPT

## 2019-05-05 PROCEDURE — 87502 INFLUENZA DNA AMP PROBE: CPT

## 2019-05-05 PROCEDURE — 86308 HETEROPHILE ANTIBODY SCREEN: CPT

## 2019-05-05 PROCEDURE — 71045 X-RAY EXAM CHEST 1 VIEW: CPT

## 2019-05-05 PROCEDURE — 99285 EMERGENCY DEPT VISIT HI MDM: CPT

## 2019-05-05 PROCEDURE — 87633 RESP VIRUS 12-25 TARGETS: CPT

## 2019-05-05 PROCEDURE — 84520 ASSAY OF UREA NITROGEN: CPT

## 2019-05-05 PROCEDURE — 82947 ASSAY GLUCOSE BLOOD QUANT: CPT

## 2019-05-05 PROCEDURE — 6360000002 HC RX W HCPCS: Performed by: PHYSICIAN ASSISTANT

## 2019-05-05 PROCEDURE — 93010 ELECTROCARDIOGRAM REPORT: CPT | Performed by: INTERNAL MEDICINE

## 2019-05-05 PROCEDURE — 96374 THER/PROPH/DIAG INJ IV PUSH: CPT

## 2019-05-05 PROCEDURE — 80074 ACUTE HEPATITIS PANEL: CPT

## 2019-05-05 PROCEDURE — 2500000003 HC RX 250 WO HCPCS: Performed by: INTERNAL MEDICINE

## 2019-05-05 PROCEDURE — 82565 ASSAY OF CREATININE: CPT

## 2019-05-05 PROCEDURE — 6370000000 HC RX 637 (ALT 250 FOR IP): Performed by: INTERNAL MEDICINE

## 2019-05-05 PROCEDURE — 87070 CULTURE OTHR SPECIMN AEROBIC: CPT

## 2019-05-05 PROCEDURE — 36415 COLL VENOUS BLD VENIPUNCTURE: CPT

## 2019-05-05 PROCEDURE — 2580000003 HC RX 258: Performed by: INTERNAL MEDICINE

## 2019-05-05 PROCEDURE — 83605 ASSAY OF LACTIC ACID: CPT

## 2019-05-05 PROCEDURE — 2580000003 HC RX 258: Performed by: PHYSICIAN ASSISTANT

## 2019-05-05 PROCEDURE — 85025 COMPLETE CBC W/AUTO DIFF WBC: CPT

## 2019-05-05 PROCEDURE — 82962 GLUCOSE BLOOD TEST: CPT

## 2019-05-05 PROCEDURE — 80051 ELECTROLYTE PANEL: CPT

## 2019-05-05 PROCEDURE — 87798 DETECT AGENT NOS DNA AMP: CPT

## 2019-05-05 PROCEDURE — 87486 CHLMYD PNEUM DNA AMP PROBE: CPT

## 2019-05-05 PROCEDURE — 6370000000 HC RX 637 (ALT 250 FOR IP): Performed by: PHYSICIAN ASSISTANT

## 2019-05-05 PROCEDURE — 87880 STREP A ASSAY W/OPTIC: CPT

## 2019-05-05 PROCEDURE — 1200000000 HC SEMI PRIVATE

## 2019-05-05 RX ORDER — PANTOPRAZOLE SODIUM 40 MG/1
40 TABLET, DELAYED RELEASE ORAL
Status: DISCONTINUED | OUTPATIENT
Start: 2019-05-06 | End: 2019-05-06 | Stop reason: HOSPADM

## 2019-05-05 RX ORDER — LOPERAMIDE HYDROCHLORIDE 2 MG/1
2 CAPSULE ORAL 4 TIMES DAILY PRN
COMMUNITY
End: 2019-10-08

## 2019-05-05 RX ORDER — 0.9 % SODIUM CHLORIDE 0.9 %
1000 INTRAVENOUS SOLUTION INTRAVENOUS ONCE
Status: COMPLETED | OUTPATIENT
Start: 2019-05-05 | End: 2019-05-05

## 2019-05-05 RX ORDER — SODIUM CHLORIDE 9 MG/ML
INJECTION, SOLUTION INTRAVENOUS CONTINUOUS
Status: DISCONTINUED | OUTPATIENT
Start: 2019-05-05 | End: 2019-05-06 | Stop reason: HOSPADM

## 2019-05-05 RX ORDER — LACTOBACILLUS RHAMNOSUS GG 10B CELL
1 CAPSULE ORAL DAILY
Status: DISCONTINUED | OUTPATIENT
Start: 2019-05-05 | End: 2019-05-06 | Stop reason: HOSPADM

## 2019-05-05 RX ORDER — HYDROCODONE BITARTRATE AND ACETAMINOPHEN 5; 325 MG/1; MG/1
1 TABLET ORAL EVERY 6 HOURS PRN
Status: DISCONTINUED | OUTPATIENT
Start: 2019-05-05 | End: 2019-05-06 | Stop reason: HOSPADM

## 2019-05-05 RX ORDER — MORPHINE SULFATE 10 MG/ML
6 INJECTION, SOLUTION INTRAMUSCULAR; INTRAVENOUS ONCE
Status: COMPLETED | OUTPATIENT
Start: 2019-05-05 | End: 2019-05-05

## 2019-05-05 RX ORDER — INSULIN GLARGINE 100 [IU]/ML
25 INJECTION, SOLUTION SUBCUTANEOUS NIGHTLY
Status: DISCONTINUED | OUTPATIENT
Start: 2019-05-05 | End: 2019-05-06 | Stop reason: HOSPADM

## 2019-05-05 RX ORDER — SPIRONOLACTONE 25 MG/1
25 TABLET ORAL 2 TIMES DAILY
Status: DISCONTINUED | OUTPATIENT
Start: 2019-05-05 | End: 2019-05-06 | Stop reason: HOSPADM

## 2019-05-05 RX ORDER — SIMVASTATIN 20 MG
20 TABLET ORAL NIGHTLY
Status: DISCONTINUED | OUTPATIENT
Start: 2019-05-05 | End: 2019-05-06 | Stop reason: HOSPADM

## 2019-05-05 RX ORDER — ONDANSETRON 2 MG/ML
4 INJECTION INTRAMUSCULAR; INTRAVENOUS ONCE
Status: COMPLETED | OUTPATIENT
Start: 2019-05-05 | End: 2019-05-05

## 2019-05-05 RX ORDER — DEXTROSE MONOHYDRATE 50 MG/ML
100 INJECTION, SOLUTION INTRAVENOUS PRN
Status: DISCONTINUED | OUTPATIENT
Start: 2019-05-05 | End: 2019-05-06 | Stop reason: HOSPADM

## 2019-05-05 RX ORDER — PROPRANOLOL HYDROCHLORIDE 40 MG/1
40 TABLET ORAL 2 TIMES DAILY
Status: DISCONTINUED | OUTPATIENT
Start: 2019-05-05 | End: 2019-05-06 | Stop reason: HOSPADM

## 2019-05-05 RX ORDER — NICOTINE POLACRILEX 4 MG
15 LOZENGE BUCCAL PRN
Status: DISCONTINUED | OUTPATIENT
Start: 2019-05-05 | End: 2019-05-06 | Stop reason: HOSPADM

## 2019-05-05 RX ORDER — ASPIRIN 81 MG/1
81 TABLET, CHEWABLE ORAL DAILY
Status: DISCONTINUED | OUTPATIENT
Start: 2019-05-05 | End: 2019-05-06 | Stop reason: HOSPADM

## 2019-05-05 RX ORDER — CLINDAMYCIN PHOSPHATE 600 MG/50ML
600 INJECTION INTRAVENOUS EVERY 8 HOURS
Status: DISCONTINUED | OUTPATIENT
Start: 2019-05-05 | End: 2019-05-06 | Stop reason: HOSPADM

## 2019-05-05 RX ORDER — IBUPROFEN 200 MG
600 TABLET ORAL EVERY 6 HOURS PRN
COMMUNITY
End: 2019-10-08

## 2019-05-05 RX ORDER — ISOSORBIDE MONONITRATE 30 MG/1
15 TABLET, EXTENDED RELEASE ORAL DAILY
Status: DISCONTINUED | OUTPATIENT
Start: 2019-05-05 | End: 2019-05-06 | Stop reason: HOSPADM

## 2019-05-05 RX ORDER — DEXTROSE MONOHYDRATE 25 G/50ML
12.5 INJECTION, SOLUTION INTRAVENOUS PRN
Status: DISCONTINUED | OUTPATIENT
Start: 2019-05-05 | End: 2019-05-06 | Stop reason: HOSPADM

## 2019-05-05 RX ORDER — LEVOTHYROXINE SODIUM 0.1 MG/1
200 TABLET ORAL DAILY
Status: DISCONTINUED | OUTPATIENT
Start: 2019-05-06 | End: 2019-05-06 | Stop reason: HOSPADM

## 2019-05-05 RX ORDER — KETOROLAC TROMETHAMINE 30 MG/ML
30 INJECTION, SOLUTION INTRAMUSCULAR; INTRAVENOUS ONCE
Status: COMPLETED | OUTPATIENT
Start: 2019-05-05 | End: 2019-05-05

## 2019-05-05 RX ORDER — L. ACIDOPHILUS/L.BULGARICUS 1MM CELL
4 TABLET ORAL 3 TIMES DAILY
Status: DISCONTINUED | OUTPATIENT
Start: 2019-05-05 | End: 2019-05-05 | Stop reason: CLARIF

## 2019-05-05 RX ADMIN — NYSTATIN 500000 UNITS: 100000 SUSPENSION ORAL at 13:19

## 2019-05-05 RX ADMIN — MORPHINE SULFATE 6 MG: 10 INJECTION INTRAVENOUS at 11:34

## 2019-05-05 RX ADMIN — SODIUM CHLORIDE: 9 INJECTION, SOLUTION INTRAVENOUS at 17:33

## 2019-05-05 RX ADMIN — INSULIN LISPRO 2 UNITS: 100 INJECTION, SOLUTION INTRAVENOUS; SUBCUTANEOUS at 21:16

## 2019-05-05 RX ADMIN — ISOSORBIDE MONONITRATE 15 MG: 30 TABLET, EXTENDED RELEASE ORAL at 18:52

## 2019-05-05 RX ADMIN — SERTRALINE 25 MG: 50 TABLET, FILM COATED ORAL at 18:52

## 2019-05-05 RX ADMIN — LIDOCAINE HYDROCHLORIDE 15 ML: 20 SOLUTION ORAL; TOPICAL at 23:07

## 2019-05-05 RX ADMIN — INSULIN GLARGINE 25 UNITS: 100 INJECTION, SOLUTION SUBCUTANEOUS at 21:17

## 2019-05-05 RX ADMIN — SPIRONOLACTONE 25 MG: 25 TABLET ORAL at 21:15

## 2019-05-05 RX ADMIN — ONDANSETRON 4 MG: 2 INJECTION INTRAMUSCULAR; INTRAVENOUS at 11:34

## 2019-05-05 RX ADMIN — ASPIRIN 81 MG 81 MG: 81 TABLET ORAL at 18:52

## 2019-05-05 RX ADMIN — SODIUM CHLORIDE 1000 ML: 9 INJECTION, SOLUTION INTRAVENOUS at 11:33

## 2019-05-05 RX ADMIN — Medication 1 CAPSULE: at 18:52

## 2019-05-05 RX ADMIN — KETOROLAC TROMETHAMINE 30 MG: 30 INJECTION, SOLUTION INTRAMUSCULAR; INTRAVENOUS at 13:21

## 2019-05-05 RX ADMIN — SODIUM CHLORIDE 1000 ML: 9 INJECTION, SOLUTION INTRAVENOUS at 12:50

## 2019-05-05 RX ADMIN — LIDOCAINE HYDROCHLORIDE 15 ML: 20 SOLUTION ORAL; TOPICAL at 20:15

## 2019-05-05 RX ADMIN — INSULIN LISPRO 2 UNITS: 100 INJECTION, SOLUTION INTRAVENOUS; SUBCUTANEOUS at 18:37

## 2019-05-05 RX ADMIN — LIDOCAINE HYDROCHLORIDE 15 ML: 20 SOLUTION ORAL; TOPICAL at 17:10

## 2019-05-05 RX ADMIN — CLINDAMYCIN PHOSPHATE 600 MG: 600 INJECTION, SOLUTION INTRAVENOUS at 18:05

## 2019-05-05 RX ADMIN — PROPRANOLOL HYDROCHLORIDE 40 MG: 40 TABLET ORAL at 21:14

## 2019-05-05 ASSESSMENT — PAIN SCALES - GENERAL
PAINLEVEL_OUTOF10: 10
PAINLEVEL_OUTOF10: 7
PAINLEVEL_OUTOF10: 7
PAINLEVEL_OUTOF10: 10
PAINLEVEL_OUTOF10: 7

## 2019-05-05 ASSESSMENT — PAIN DESCRIPTION - DESCRIPTORS
DESCRIPTORS: SHARP;STABBING;BURNING
DESCRIPTORS: ACHING;SORE
DESCRIPTORS: ACHING

## 2019-05-05 ASSESSMENT — PAIN DESCRIPTION - PAIN TYPE: TYPE: ACUTE PAIN

## 2019-05-05 ASSESSMENT — PAIN DESCRIPTION - FREQUENCY: FREQUENCY: CONTINUOUS

## 2019-05-05 ASSESSMENT — PAIN DESCRIPTION - LOCATION
LOCATION: THROAT
LOCATION: GENERALIZED
LOCATION: THROAT

## 2019-05-05 NOTE — H&P
Internal Medicine Resident History & Physical     Chief Complaint: Body aches   Reason for Admission:   Primary Care Physician: Raji Michaels, APRN - CNP  Consultants:None  Code status:Full     History of Present Illness  Veena Rivera is a 70-year-old male with a past history significant for essential hypertension, insulin-dependent diabetes mellitus, hypothyroidism, GERD and depression. Patient since [facility] emergency room with main complaint of body aches and sore throat. Patient's main complaint is of severe sore throat that began 2 days prior. Patient denies sick contacts at home. Patient does state he is a nursing home discharge is father. ER patient was found to be tachycardic with heart rate in the 130s. Patient given IV fluid hydration. Rapid strep was negative along with rapid influenza. Patient admitted to the 4th floor            Last hospital admission:  Reviewed None on file     Last 2D echo:  REviewed None on file     Emergency Department Course  On presentation to the emergency department, the patient had laboratory work as well as imaging studies performed. Vitals in ED-   ED TRIAGE VITALS  BP: 129/74, Temp: 100.4 °F (38 °C), Pulse: 115, Resp: 24, SpO2: 94 %  Labs-   Lab Results   Component Value Date    WBC 16.6 (H) 05/05/2019    HGB 13.7 05/05/2019    HCT 42.1 05/05/2019    PLT 65 (L) 05/05/2019     05/01/2019    K 5.3 (H) 05/01/2019     05/01/2019    CREATININE 0.9 05/05/2019    BUN 28 (H) 05/01/2019    CO2 19 (L) 05/05/2019    GLUCOSE 263 (H) 05/01/2019    ALT 47 (H) 05/01/2019    AST 35 05/01/2019     Lab Results   Component Value Date    CKTOTAL 40 05/01/2019    CKMB 0.4 03/31/2012    TROPONINI <0.01 05/01/2019     No results for input(s): BNP in the last 72 hours.         Radiology-  Ct Abdomen Pelvis Wo Contrast Additional Contrast? None    Result Date: 5/1/2019  LOCATION:200 EXAM: CT ABDOMEN PELVIS WO CONTRAST COMPARISON: 2015 HISTORY: Emesis nausea TECHNIQUE:Noncontrast helical abdomen and pelvis CT was performed. Coronal and sagittal reconstructions also obtained. Automated dose control was used for this exam. CONTRAST: No IV contrast administered. No oral contrast administered. FINDINGS: SUPPORT DEVICES: None LOWER THORAX: Limited evaluation of the lower chest demonstrates clear lung bases bilaterally. SOLID ORGANS: Nodular appearance of liver with splenomegaly consistent with cirrhosis. Richa Jones BILIARY SYSTEM: Status post cholecystectomy. GENITOURINARY: Benign stable low-density cyst left kidney. 5 mm nonobstructing stone mid pole right kidney with additional stones lower pole right kidney relatively similar prior examination. . The bladder is unremarkable. Richa Jones GASTROINTESTINAL: The stomach, small and large bowel are normal in caliber without evidence of focal wall thickening. There is no evidence of bowel obstruction. APPENDIX: Not seen. FLUID COLLECTIONS: None. LYMPH NODES: No adenopathy by size criteria. VASCULAR STRUCTURES: Visualized vascular structures appear normal. ABDOMINAL WALL: Normal with no herniations. OSSEOUS AND SOFT TISSUE STRUCTURES: Bone windows demonstrate no suspicious osteolytic or osteoblastic lesions. No fracture identified. 1. Nephrolithiasis. No acute findings    Xr Chest Portable    Result Date: 5/5/2019  LOCATION: 100 CHEST Clinical indication: Previous smoker, diabetes, weakness, cough, with history of leukemia. Comparison was made with previous study from May 1, 2019. TECHNIQUE; The portable single frontal view of the chest was obtained in upright position. FINDINGS; The image shows lung fields and CP angles are clear with no acute infiltrate or focal consolidation. No congestion or effusion. The trachea is midline. No pneumothorax. No significant focal pleural pathology. The cardiomediastinal shadows show borderline cardiomegaly with no significant hilar adenopathy. 1. No acute cardiopulmonary process radiographically.  2. Borderline cardiomegaly. Xr Chest Portable    Result Date: 5/1/2019  Location:200 Exam: XR CHEST PORTABLE Comparison: February 19, 2019 History:   Fever nausea vomiting diarrhea, AML Findings: A single frontal portable view of the chest shows the mediastinum, great vessels and heart to be unremarkable. No acute pulmonary parenchymal opacity. Normal portable chest.       EKG-   Sinus tachycardia     Therapy in ED-   Medications   0.9 % sodium chloride bolus (0 mLs Intravenous Stopped 5/5/19 1216)   morphine injection 6 mg (6 mg Intravenous Given 5/5/19 1134)   ondansetron (ZOFRAN) injection 4 mg (4 mg Intravenous Given 5/5/19 1134)         Past Medical History:   Diagnosis Date    [facility]     chemotherapy    AML (acute myeloblastic leukemia) ([facility] 2010    Cancer ([facility]     Chronic kidney disease     Diabetes mellitus ([facility]     GERD (gastroesophageal reflux disease)     Hx of blood clots 2012    legs    Hyperlipidemia     Liver disease     MI, old     Other disorders of kidney and ureter     Other sleep disturbances     Thyroid disease     Type II or unspecified type diabetes mellitus without mention of complication, not stated as uncontrolled        Past Surgical History:   Procedure Laterality Date    APPENDECTOMY      BONE MARROW TRANSPLANT      double core transplant    CHOLECYSTECTOMY      COLONOSCOPY      ENDOSCOPY, COLON, DIAGNOSTIC      FOOT NEUROMA SURGERY      UPPER GASTROINTESTINAL ENDOSCOPY         Family History  Family History   Problem Relation Age of Onset    Heart Disease Mother     High Blood Pressure Father     Heart Disease Father     Cancer Maternal Grandmother     Cancer Other     Cancer Other        Social History  Patient lives at home  With wife. Employment: no  Illicit drug use- no  TOBACCO:   reports that he quit smoking about 43 years ago. He has a 5.00 pack-year smoking history.  He quit smokeless tobacco use about 21 years ago.  ETOH:   reports that he drinks about 0.6 oz of alcohol per week. Home Medications  No current facility-administered medications on file prior to encounter.       Current Outpatient Medications on File Prior to Encounter   Medication Sig Dispense Refill    ibuprofen (ADVIL;MOTRIN) 200 MG tablet Take 600 mg by mouth every 6 hours as needed for Pain      loperamide (RA ANTI-DIARRHEAL) 2 MG capsule Take 2 mg by mouth 4 times daily as needed for Diarrhea      spironolactone (ALDACTONE) 25 MG tablet Take 25 mg by mouth 2 times daily      vitamin D (CHOLECALCIFEROL) 1000 UNIT TABS tablet Take 1,000 Units by mouth daily      ONE TOUCH ULTRA TEST strip TEST BLOOD SUGAR EVERY OTHER  strip 0    levothyroxine (SYNTHROID) 200 MCG tablet TAKE 1 TABLET BY MOUTH DAILY 90 tablet 0    simvastatin (ZOCOR) 10 MG tablet TAKE 1 TABLET BY MOUTH EVERY NIGHT 90 tablet 0    albuterol sulfate HFA (VENTOLIN HFA) 108 (90 Base) MCG/ACT inhaler Inhale 2 puffs into the lungs every 6 hours as needed for Wheezing 1 Inhaler 3    insulin lispro (HUMALOG KWIKPEN) 100 UNIT/ML pen Inject 10 Units into the skin 3 times daily (before meals) (Patient taking differently: Inject 0-18 Units into the skin 3 times daily (before meals) *Per Sliding Scale*) 27 mL 0    metFORMIN (GLUCOPHAGE) 500 MG tablet TAKE 1 TABLET BY MOUTH TWICE DAILY WITH MEALS 180 tablet 0    BD PEN NEEDLE JENNIFER U/F 32G X 4 MM MISC USE AS DIRECTED 100 each 0    propranolol (INDERAL) 40 MG tablet TAKE 1 TABLET BY MOUTH THREE TIMES DAILY 270 tablet 1    TOUJEO SOLOSTAR 300 UNIT/ML injection pen INJECT 31 UNITS INTO THE SKIN NIGHTLY 5 pen 0    pantoprazole (PROTONIX) 40 MG tablet TAKE 1 TABLET DAILY 90 tablet 3    sertraline (ZOLOFT) 50 MG tablet TAKE 1 1/2 TABLETS BY MOUTH DAILY (Patient taking differently: TAKE 1 TABLETS BY MOUTH DAILY) 180 tablet 0    isosorbide mononitrate (IMDUR) 30 MG extended release tablet Take 15 mg by mouth daily       aspirin 81 MG chewable tablet Take 81 mg by mouth daily         Allergies  Allergies   Allergen Reactions    Pneumococcal Vaccines Dermatitis    Ciprofloxacin      Shuts kidneys down    Diatrizoate      IS able to have CT scans with premedication    Dye [Iodides]      statrted to go into cardiac arrest    Epinephrine     Penicillin G      Unsure if he is still allergic to PCN, or if he ever was    Morphine Nausea And Vomiting    Other Nausea And Vomiting     fish    Oxycodone Nausea And Vomiting       Review of Systems  Please see HPI above. All bolded are positive. All un-bolded are negative. Gen: fever, chills, fatigue, weakness, diaphoresis, increase in thirst, unintentional weight change, loss of appetite  Head: headache, vision change, hearing loss  Chest: chest pain, chest heaviness, palpitations  Lungs: shortness of breath, wheezing, coughing  Abdomen: abdominal pain, nausea, vomiting, diarrhea, constipation, melena, hematochezia, hematemesis  Extremities: lower extremity edema, myalgias, arthralgias  Urinary: dysuria, hematuria, or increase in frequency  Neurologic: lightheadedness, dizziness, confusion, syncope  Psychiatric: depression, suicidal ideation, or anxiety    Objective  Vitals:    05/05/19 1215   BP: 129/74   Pulse: 115   Resp: 24   Temp: 100.4 °F (38 °C)   SpO2: 94%       Physical Exam:  General: Awake, alert, oriented to person, place, time, and purpose, appears stated age, cooperative, no acute distress, pleasant   Head: Normocephalic, atraumatic  Eyes: Conjunctivae/corneas clear, Sclera non icteric  Mouth: Mucous membranes dry patient with erythema of the throat   Neck: No JVD, no adenopathy, no carotid bruit, neck is supple, trachea is midline  Back: ROM normal, No CVA tenderness. Lungs: Clear to auscultation bilaterally. No retractions or use of accessory muscles. No vocal fremitus. No rhonchi, crackle or rale.   Heart: Regular rate and regular rhythm, no murmur, normal S1, S2  Abdomen: Soft, non-tender; bowel sounds normal; no masses, no organomegaly  Extremities:No lower extremity edema, extremities atraumatic, no cyanosis, no clubbing, 2+ pedal pulses palpated  Skin: Normal color, normal texture, normal turgor, no rashes, no lesions  Neurologic:cranial nerves II_XII grossly inrtact   Osteopathic/Structural Exam: Examined in seated and supine position. Normal thoracic kyphosis. Normal lumbar lordosis. No acute changes. Mircobiology  Viral and blood  cultures have been sent. Assessment  Pharyngitis  Insulin-dependent diabetes mellitus  Hypothyroidism  History of AML          Plan  We will check a throat culture. We'll check a film array. Will continue patient on aggressive IV fluid hydration. We'll start patient on antibiotics to cover for gram positives and gram negatives. Routine laboratory morning. · Continue to manage other medical conditions listed above as previously managed. · Routine labs in am  · DVT prophylaxis. · Please see orders for further management and care. · This patient was discussed with Dr. Jany More. Sean Mcqueen DO, PGY3  5/5/2019  12:16 PM    NOTE: This report was transcribed using voice recognition software. Every effort was made to ensure accuracy; however, inadvertent computerized transcription errors may be present        I discussed the patient's management with the resident. I reviewed the resident's note and agree with the documented findings and plan of care.     Zacarias Siddiqui D.O., [facility]  3:43 PM  5/5/2019

## 2019-05-05 NOTE — ED PROVIDER NOTES
ED Attending  CC: No        HPI:  5/5/19,   Time: 11:45 AM         Rosetta Mack is a 79 y.o. male presenting to the ED for body aches, diarrhea, abdominal pain and sore throat, beginning 5 days ago. The complaint has been persistent, moderate in severity, and worsened by nothing. Patient presents today back to the emergency room with his wife from home. He was seen here on May 1 with complaints of nausea vomiting diarrhea and abdominal pain. The patient was given fluids and meds here in the emergency room and felt much better. CAT scan done without contrast showed no acute abnormalities. He felt well enough to be discharged home but his waist states by the following morning he started again with vomiting and diarrhea. He comes in today with complaints of sore throat, body aches, diffuse abdominal pain, headache and weakness. He does have a mild nonproductive cough. He still has pretty significant diarrhea though he notes that the vomiting is better at this point. He has not been able to eat or drink however because of the severe pain in his throat. The patient is an insulin-dependent diabetic. His sugars have been up slightly. He also has a history of acute myeloblastic leukemia. 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 The patient has done quite well and has been stable. He is not on any current immunosuppression meds.            ROS:     Constitutional: Negative for fever and chills  HENT: See HPI  Eyes: Negative for pain, discharge and redness  Respiratory: See HPI  Cardiovascular: Negative for CP, edema or palpitations  Gastrointestinal: See HPI  Genitourinary: Negative for dysuria and frequency  Musculoskeletal: See HPI  Skin: Negative for rash and wound  Neurological: Negative for weakness and headaches  Hematological: Negative for adenopathy    All other systems reviewed and are negative      -------------------------------- PAST HISTORY ----------------------------------  Past Medical History:  has a past medical history of AML (acute myeloblastic leukemia) ([facility] AML (acute myeloblastic leukemia) ([facility] Cancer ([facility] Chronic kidney disease, Diabetes mellitus ([facility] GERD (gastroesophageal reflux disease), Hx of blood clots, Hyperlipidemia, Liver disease, MI, old, Other disorders of kidney and ureter, Other sleep disturbances, Thyroid disease, and Type II or unspecified type diabetes mellitus without mention of complication, not stated as uncontrolled. Past Surgical History:  has a past surgical history that includes Appendectomy; Cholecystectomy; Foot neuroma surgery; Colonoscopy; Endoscopy, colon, diagnostic; Upper gastrointestinal endoscopy; and bone marrow transplant. Social History:  reports that he quit smoking about 43 years ago. He has a 5.00 pack-year smoking history. He quit smokeless tobacco use about 21 years ago. He reports that he drinks about 0.6 oz of alcohol per week. He reports that he does not use drugs. Family History: family history includes Cancer in his maternal grandmother and other family members; Heart Disease in his father and mother; High Blood Pressure in his father. The patients home medications have been reviewed. Allergies: Pneumococcal vaccines; Ciprofloxacin; Diatrizoate; Dye [iodides]; Epinephrine; Penicillin g; Morphine;  Other; and Oxycodone    --------------------------------- RESULTS ------------------------------------------  All laboratory and radiology results have been personally reviewed by myself   LABS:  Results for orders placed or performed during the hospital encounter of 05/05/19   Strep Screen Group A Throat   Result Value Ref Range    Strep Grp A PCR Negative Negative   Rapid influenza A/B antigens   Result Value Ref Range    Influenza A by PCR Not Detected Not Detected    Influenza B by PCR Not Detected Not Detected   CBC Auto Differential   Result Value Ref Range    WBC 16.6 (H) 4.5 - 11.5 E9/L    RBC 5.09 3.80 - 5.80 E12/L    Hemoglobin 13.7 12.5 - 16.5 g/dL    Hematocrit 42.1 37.0 - 54.0 %    MCV 82.7 80.0 - 99.9 fL    MCH 26.9 26.0 - 35.0 pg    MCHC 32.5 32.0 - 34.5 %    RDW 14.6 11.5 - 15.0 fL    Platelets 65 (L) 513 - 450 E9/L    MPV 11.2 7.0 - 12.0 fL    Neutrophils % 21.7 (L) 43.0 - 80.0 %    Lymphocytes % 73.9 (H) 20.0 - 42.0 %    Monocytes % 4.3 2.0 - 12.0 %    Eosinophils % 0.5 0.0 - 6.0 %    Basophils % 0.2 0.0 - 2.0 %    Neutrophils # 3.65 1.80 - 7.30 E9/L    Lymphocytes # 12.28 (H) 1.50 - 4.00 E9/L    Monocytes # 0.66 0.10 - 0.95 E9/L    Eosinophils # 0.00 (L) 0.05 - 0.50 E9/L    Basophils # 0.00 0.00 - 0.20 E9/L    Anisocytosis 1+     Polychromasia 1+     Poikilocytes 1+     [place] Cells 1+     Ovalocytes 1+    Comprehensive Metabolic Panel   Result Value Ref Range    Sodium 133 132 - 146 mmol/L    Potassium 3.6 3.5 - 5.0 mmol/L    Chloride 99 98 - 107 mmol/L    CO2 18 (L) 22 - 29 mmol/L    Anion Gap 16 7 - 16 mmol/L    Glucose 286 (H) 74 - 99 mg/dL    BUN 11 8 - 23 mg/dL    CREATININE 0.9 0.7 - 1.2 mg/dL    GFR Non-African American >60 >=60 mL/min/1.73    GFR African American >60     Calcium 7.7 (L) 8.6 - 10.2 mg/dL    Total Protein 6.0 (L) 6.4 - 8.3 g/dL    Alb 3.8 3.5 - 5.2 g/dL    Total Bilirubin 0.8 0.0 - 1.2 mg/dL    Alkaline Phosphatase 128 40 - 129 U/L    ALT 31 0 - 40 U/L    AST 22 0 - 39 U/L   Lactic Acid, Plasma   Result Value Ref Range    Lactic Acid 3.5 (H) 0.5 - 2.2 mmol/L   TSH without Reflex   Result Value Ref Range    TSH 5.310 (H) 0.270 - 4.200 uIU/mL   Platelet Confirmation   Result Value Ref Range    Platelet Confirmation CONFIRMED    POCT Venous   Result Value Ref Range    POC Sodium 134 132 - 146 mmol/L    POC Potassium 3.5 3.5 - 5.0 mmol/L    POC Chloride 98 (L) 100 - 108 mmol/L    CO2 19 (L) 22 - 29 mmol/L    POC Anion Gap 17 (H) 7 - 16 mmol/L    POC Glucose 270 (H) 74 - 99 mg/dl    POC BUN 10 8 - 23 mg/dL    POC Creatinine 0.9 0.7 - 1.2 mg/dL    GFR Non-African American >60 >=60 mL/min/1.73    GFR African American >60    EKG 12 Lead   Result Value Ref Range    Ventricular Rate 121 BPM    Atrial Rate 121 BPM    P-R Interval 152 ms    QRS Duration 136 ms    Q-T Interval 348 ms    QTc Calculation (Bazett) 494 ms    R Axis -58 degrees    T Axis 91 degrees       RADIOLOGY:  Interpreted by Radiologist.  XR CHEST PORTABLE   Final Result      1. No acute cardiopulmonary process radiographically. 2. Borderline cardiomegaly. ----------------- NURSING NOTES AND VITALS REVIEWED ---------------   The nursing notes within the ED encounter and vital signs as below have been reviewed. /74   Pulse 115   Temp 100.4 °F (38 °C) (Oral)   Resp 24   Ht 5' 8\" (1.727 m)   Wt 208 lb (94.3 kg)   SpO2 94%   BMI 31.63 kg/m²   Oxygen Saturation Interpretation: Normal      --------------------------------PHYSICAL EXAM------------------------------------      Constitutional/General: Alert and oriented x3. Weak and uncomfortable. C/O sore throat. Head: NC/AT  Eyes: PERRL, EOMI  TM's intact and clear. Posterior pharynx with marked erythema and mild swelling, diffuse. Mouth: Oropharynx clear, handling secretions, no trismus  Neck: Supple, full ROM, no meningeal signs  Pulmonary: Lungs clear to auscultation bilaterally, no wheezes, rales, or rhonchi. Not in respiratory distress  Cardiovascular:  Regular rate and rhythm, no murmurs, gallops, or rubs. 2+ distal pulses  Abdomen: Soft, + BS. No distension. Diffusely tender. No focal pain. No palpable rigidity, rebound or guarding  Extremities: Moves all extremities x 4. Warm and well perfused  Skin: warm and dry without rash  Neurologic: GCS 15,  Intact.   No focal deficits  Psych: Normal Affect      ------------------------ ED COURSE/MEDICAL DECISION MAKING----------------------  Medications   0.9 % sodium chloride bolus (0 mLs Intravenous Stopped 5/5/19 5645)   morphine injection 6 mg (6 mg Intravenous Given 5/5/19 1134)   ondansetron (ZOFRAN) injection 4 mg (4 mg Intravenous Given 5/5/19 1134)   0.9 % sodium chloride bolus (0 mLs Intravenous Stopped 5/5/19 1321)   nystatin (MYCOSTATIN) 920405 UNIT/ML suspension 500,000 Units (500,000 Units Oral Given 5/5/19 1319)   ketorolac (TORADOL) injection 30 mg (30 mg Intravenous Given 5/5/19 1321)         Medical Decision Making:    Pt comes back in with ongoing diarrhea and pain. He is tachycardic with lactic acidosis. LFT's down. WBC is stable. Significant inflammatory changes seen in throat. Negative strep. ? Thrush. Given Nystatin here. Admit for fluids and further work-up. Discussed with Dr. Remy Arnold. Seen and evaluated at [facility] with Dr. Andrew Barr as well. Resident has been down to see patient in ED as well       Counseling: The emergency provider has spoken with the patient and spouse/SO and discussed todays results, in addition to providing specific details for the plan of care and counseling regarding the diagnosis and prognosis. Questions are answered at this time and they are agreeable with the plan.      ------------------------ IMPRESSION AND DISPOSITION -------------------------------    IMPRESSION  1. Body aches    2. Generalized abdominal pain    3. Acute pharyngitis, unspecified etiology    4. Diarrhea, unspecified type    5.  Lactic acidosis        DISPOSITION  Disposition: Admit to telemetry  Patient condition is stable                    Jun Quinn PA-C  05/05/19 6552

## 2019-05-05 NOTE — ED NOTES
Bed: 05  Expected date:   Expected time:   Means of arrival:   Comments:  Hip fctr     Jason Treviño RN  05/05/19 6982

## 2019-05-06 VITALS
OXYGEN SATURATION: 96 % | RESPIRATION RATE: 18 BRPM | BODY MASS INDEX: 31.52 KG/M2 | SYSTOLIC BLOOD PRESSURE: 102 MMHG | HEIGHT: 68 IN | DIASTOLIC BLOOD PRESSURE: 68 MMHG | TEMPERATURE: 98.9 F | WEIGHT: 208 LBS | HEART RATE: 97 BPM

## 2019-05-06 LAB
EKG ATRIAL RATE: 121 BPM
EKG P-R INTERVAL: 152 MS
EKG Q-T INTERVAL: 348 MS
EKG QRS DURATION: 136 MS
EKG QTC CALCULATION (BAZETT): 494 MS
EKG R AXIS: -58 DEGREES
EKG T AXIS: 91 DEGREES
EKG VENTRICULAR RATE: 121 BPM
FILM ARRAY ADENOVIRUS: NORMAL
FILM ARRAY BORDETELLA PERTUSSIS: NORMAL
FILM ARRAY CHLAMYDOPHILIA PNEUMONIAE: NORMAL
FILM ARRAY CORONAVIRUS 229E: NORMAL
FILM ARRAY CORONAVIRUS HKU1: NORMAL
FILM ARRAY CORONAVIRUS NL63: NORMAL
FILM ARRAY CORONAVIRUS OC43: NORMAL
FILM ARRAY INFLUENZA A VIRUS 09H1: NORMAL
FILM ARRAY INFLUENZA A VIRUS H1: NORMAL
FILM ARRAY INFLUENZA A VIRUS H3: NORMAL
FILM ARRAY INFLUENZA A VIRUS: NORMAL
FILM ARRAY INFLUENZA B: NORMAL
FILM ARRAY METAPNEUMOVIRUS: NORMAL
FILM ARRAY MYCOPLASMA PNEUMONIAE: NORMAL
FILM ARRAY PARAINFLUENZA VIRUS 1: NORMAL
FILM ARRAY PARAINFLUENZA VIRUS 2: NORMAL
FILM ARRAY PARAINFLUENZA VIRUS 3: NORMAL
FILM ARRAY PARAINFLUENZA VIRUS 4: NORMAL
FILM ARRAY RESPIRATORY SYNCITIAL VIRUS: NORMAL
FILM ARRAY RHINOVIRUS/ENTEROVIRUS: NORMAL
METER GLUCOSE: 128 MG/DL (ref 74–99)
METER GLUCOSE: 174 MG/DL (ref 74–99)
PATHOLOGIST REVIEW: NORMAL

## 2019-05-06 PROCEDURE — 6370000000 HC RX 637 (ALT 250 FOR IP): Performed by: INTERNAL MEDICINE

## 2019-05-06 PROCEDURE — 2500000003 HC RX 250 WO HCPCS: Performed by: INTERNAL MEDICINE

## 2019-05-06 PROCEDURE — 2580000003 HC RX 258: Performed by: INTERNAL MEDICINE

## 2019-05-06 PROCEDURE — 82962 GLUCOSE BLOOD TEST: CPT

## 2019-05-06 RX ORDER — AZITHROMYCIN 250 MG/1
250 TABLET, FILM COATED ORAL SEE ADMIN INSTRUCTIONS
Qty: 6 TABLET | Refills: 0 | Status: SHIPPED | OUTPATIENT
Start: 2019-05-06 | End: 2019-05-11

## 2019-05-06 RX ORDER — METHYLPREDNISOLONE 4 MG/1
TABLET ORAL
Qty: 1 KIT | Refills: 0 | Status: SHIPPED | OUTPATIENT
Start: 2019-05-06 | End: 2019-05-12

## 2019-05-06 RX ADMIN — Medication 1 CAPSULE: at 08:35

## 2019-05-06 RX ADMIN — ISOSORBIDE MONONITRATE 15 MG: 30 TABLET, EXTENDED RELEASE ORAL at 08:36

## 2019-05-06 RX ADMIN — SERTRALINE 25 MG: 50 TABLET, FILM COATED ORAL at 10:15

## 2019-05-06 RX ADMIN — ASPIRIN 81 MG 81 MG: 81 TABLET ORAL at 08:35

## 2019-05-06 RX ADMIN — SODIUM CHLORIDE: 9 INJECTION, SOLUTION INTRAVENOUS at 02:21

## 2019-05-06 RX ADMIN — CLINDAMYCIN PHOSPHATE 600 MG: 600 INJECTION, SOLUTION INTRAVENOUS at 08:34

## 2019-05-06 RX ADMIN — LEVOTHYROXINE SODIUM 200 MCG: 100 TABLET ORAL at 06:29

## 2019-05-06 RX ADMIN — LIDOCAINE HYDROCHLORIDE 15 ML: 20 SOLUTION ORAL; TOPICAL at 10:11

## 2019-05-06 RX ADMIN — INSULIN LISPRO 1 UNITS: 100 INJECTION, SOLUTION INTRAVENOUS; SUBCUTANEOUS at 12:32

## 2019-05-06 RX ADMIN — HYDROCODONE BITARTRATE AND ACETAMINOPHEN 1 TABLET: 5; 325 TABLET ORAL at 00:19

## 2019-05-06 RX ADMIN — SPIRONOLACTONE 25 MG: 25 TABLET ORAL at 08:35

## 2019-05-06 RX ADMIN — PANTOPRAZOLE SODIUM 40 MG: 40 TABLET, DELAYED RELEASE ORAL at 06:29

## 2019-05-06 RX ADMIN — PROPRANOLOL HYDROCHLORIDE 40 MG: 40 TABLET ORAL at 08:36

## 2019-05-06 RX ADMIN — CLINDAMYCIN PHOSPHATE 600 MG: 600 INJECTION, SOLUTION INTRAVENOUS at 00:20

## 2019-05-06 ASSESSMENT — PAIN SCALES - GENERAL
PAINLEVEL_OUTOF10: 5
PAINLEVEL_OUTOF10: 5
PAINLEVEL_OUTOF10: 0
PAINLEVEL_OUTOF10: 7

## 2019-05-06 NOTE — PLAN OF CARE
Problem: Pain:  Goal: Pain level will decrease  Description  Pain level will decrease  5/6/2019 0255 by Lyle Monson, RN  Outcome: Met This Shift  5/5/2019 1938 by Bob Ortiz RN  Outcome: Met This Shift   Norco effective with throat pain

## 2019-05-06 NOTE — PROGRESS NOTES
P Quality Flow/Interdisciplinary Rounds Progress Note        Quality Flow Rounds held on May 6, 2019    Disciplines Attending:  Bedside Nurse, ,  and Nursing Unit Leadership    Emma Jeffers was admitted on 5/5/2019 11:09 AM    Anticipated Discharge Date:  Expected Discharge Date: 05/08/19    Disposition:    Joaquin Score:  Joaquin Scale Score: 20    Readmission Risk              Risk of Unplanned Readmission:        11           Discussed patient goal for the day, patient clinical progression, and barriers to discharge.   The following Goal(s) of the Day/Commitment(s) have been identified:  Need code, IV F, From H, NIGHAT Mclain  May 6, 2019

## 2019-05-06 NOTE — DISCHARGE SUMMARY
Internal Medicine  Discharge Summary    NAME: Keyur Vela  :  1950  MRN:  [id number]  [address] , APRN - CNP  ADMITTED: 2019      DISCHARGED: 19    ADMITTING PHYSICIAN: Mitali Menendez DO    CONSULTANT(S):   None     ADMITTING DIAGNOSIS:   Cellulitis [L03.90]  Pharyngitis, acute [J02.9]     DISCHARGE DIAGNOSES:   1. Acute pharyngitis  2. Insulin-dependent diabetes mellitus type II  3. Hypothyroidism  4. History of AML    BRIEF HISTORY OF PRESENT ILLNESS:   Honey Castleman is a 72-year-old male with a past history significant for essential hypertension, insulin-dependent diabetes mellitus, hypothyroidism, GERD and depression. Patient since [address] [facility] emergency room with main complaint of body aches and sore throat. Patient's main complaint is of severe sore throat that began 2 days prior. Patient denies sick contacts at home. Patient does state he is a nursing home discharge is father.     ER patient was found to be tachycardic with heart rate in the 130s. Patient given IV fluid hydration. Rapid strep was negative along with rapid influenza.  Patient admitted to the 4th floor    LABS[de-identified]  Lab Results   Component Value Date    WBC 16.6 (H) 2019    HGB 13.7 2019    HCT 42.1 2019    PLT 65 (L) 2019     2019    K 3.6 2019    CL 99 2019    CREATININE 0.9 2019    BUN 11 2019    CO2 19 (L) 2019    GLUCOSE 286 (H) 2019    ALT 31 2019    AST 22 2019     No results found for: INR, PROTIME   Lab Results   Component Value Date    TSH 5.310 (H) 2019     Lab Results   Component Value Date    TRIG 327 (H) 2018     Lab Results   Component Value Date    HDL 17 2018     Lab Results   Component Value Date    LDLCALC 48 2018     Lab Results   Component Value Date    LABA1C 9.8 2019       IMAGING:  Ct Abdomen Pelvis Wo Contrast Additional Contrast? None    Result Date: 2019  LOCATION:[facility] objective evidence of an acute process requiring continuing hospitalization or inpatient management. They are stable for discharge with outpatient follow-up. I have spoken with the patient and discussed the results of the current hospitalization, in addition to providing specific details for the plan of care and counseling regarding the diagnosis and prognosis. The plan has been discussed in detail and they are aware of the specific conditions for emergent return, as well as the importance of follow-up. Their questions are answered at this time and they are agreeable with the plan for discharge to home    DISCHARGE MEDICATIONS:    Master Liu   Home Medication Instructions PTO:[id number]    Printed on:05/06/19 8528   Medication Information                      albuterol sulfate HFA (VENTOLIN HFA) 108 (90 Base) MCG/ACT inhaler  Inhale 2 puffs into the lungs every 6 hours as needed for Wheezing             aspirin 81 MG chewable tablet  Take 81 mg by mouth daily             azithromycin (ZITHROMAX) 250 MG tablet  Take 1 tablet by mouth See Admin Instructions for 5 days 500mg on day 1 followed by 250mg on days 2 - 5             BD PEN NEEDLE JENNIFER U/F 32G X 4 MM MISC  USE AS DIRECTED             ibuprofen (ADVIL;MOTRIN) 200 MG tablet  Take 600 mg by mouth every 6 hours as needed for Pain             insulin lispro (HUMALOG KWIKPEN) 100 UNIT/ML pen  Inject 10 Units into the skin 3 times daily (before meals)             isosorbide mononitrate (IMDUR) 30 MG extended release tablet  Take 15 mg by mouth daily              levothyroxine (SYNTHROID) 200 MCG tablet  TAKE 1 TABLET BY MOUTH DAILY             loperamide (RA ANTI-DIARRHEAL) 2 MG capsule  Take 2 mg by mouth 4 times daily as needed for Diarrhea             metFORMIN (GLUCOPHAGE) 500 MG tablet  TAKE 1 TABLET BY MOUTH TWICE DAILY WITH MEALS             methylPREDNISolone (MEDROL DOSEPACK) 4 MG tablet  Take by mouth.              ONE TOUCH ULTRA TEST strip  TEST BLOOD SUGAR EVERY OTHER DAY             pantoprazole (PROTONIX) 40 MG tablet  TAKE 1 TABLET DAILY             propranolol (INDERAL) 40 MG tablet  TAKE 1 TABLET BY MOUTH THREE TIMES DAILY             sertraline (ZOLOFT) 50 MG tablet  TAKE 1 1/2 TABLETS BY MOUTH DAILY             simvastatin (ZOCOR) 10 MG tablet  TAKE 1 TABLET BY MOUTH EVERY NIGHT             spironolactone (ALDACTONE) 25 MG tablet  Take 25 mg by mouth 2 times daily             TOUJEO SOLOSTAR 300 UNIT/ML injection pen  INJECT 31 UNITS INTO THE SKIN NIGHTLY             vitamin D (CHOLECALCIFEROL) 1000 UNIT TABS tablet  Take 1,000 Units by mouth daily                 FOLLOW UP/INSTRUCTIONS:  · This patient is instructed to follow-up with his primary care physician. · Patient is instructed to follow-up with the consults listed above as directed by them. · he is instructed to resume home medications and take new medications as indicated in the list above. · If the patient has a recurrence of symptoms, he is instructed to go to the ED. Preparing for this patient's discharge, including paperwork, orders, instructions, and meeting with patient did require > 30 minutes.     Norberto Warren DO     5/6/2019  11:15 AM

## 2019-05-07 ENCOUNTER — APPOINTMENT (OUTPATIENT)
Dept: GENERAL RADIOLOGY | Age: 69
End: 2019-05-07
Payer: MEDICARE

## 2019-05-07 ENCOUNTER — HOSPITAL ENCOUNTER (EMERGENCY)
Age: 69
Discharge: HOME | End: 2019-05-07
Attending: EMERGENCY MEDICINE
Payer: MEDICARE

## 2019-05-07 ENCOUNTER — CARE COORDINATION (OUTPATIENT)
Dept: CASE MANAGEMENT | Age: 69
End: 2019-05-07

## 2019-05-07 VITALS
WEIGHT: 208 LBS | OXYGEN SATURATION: 96 % | HEART RATE: 75 BPM | DIASTOLIC BLOOD PRESSURE: 69 MMHG | HEIGHT: 68 IN | BODY MASS INDEX: 31.52 KG/M2 | SYSTOLIC BLOOD PRESSURE: 107 MMHG | TEMPERATURE: 98 F | RESPIRATION RATE: 16 BRPM

## 2019-05-07 DIAGNOSIS — D69.6 THROMBOCYTOPENIA (HCC): ICD-10-CM

## 2019-05-07 DIAGNOSIS — R07.9 CHEST PAIN, UNSPECIFIED TYPE: Primary | ICD-10-CM

## 2019-05-07 LAB
ALBUMIN SERPL-MCNC: 3.3 G/DL (ref 3.5–5.2)
ALP BLD-CCNC: 107 U/L (ref 40–129)
ALT SERPL-CCNC: 22 U/L (ref 0–40)
ANION GAP SERPL CALCULATED.3IONS-SCNC: 15 MMOL/L (ref 7–16)
ANISOCYTOSIS: ABNORMAL
AST SERPL-CCNC: 15 U/L (ref 0–39)
BASOPHILS ABSOLUTE: 0.01 E9/L (ref 0–0.2)
BASOPHILS RELATIVE PERCENT: 0.2 % (ref 0–2)
BILIRUB SERPL-MCNC: 0.4 MG/DL (ref 0–1.2)
BUN BLDV-MCNC: 19 MG/DL (ref 8–23)
BURR CELLS: ABNORMAL
CALCIUM SERPL-MCNC: 7 MG/DL (ref 8.6–10.2)
CHLORIDE BLD-SCNC: 104 MMOL/L (ref 98–107)
CO2: 18 MMOL/L (ref 22–29)
CREAT SERPL-MCNC: 0.9 MG/DL (ref 0.7–1.2)
EKG ATRIAL RATE: 76 BPM
EKG P AXIS: 62 DEGREES
EKG P-R INTERVAL: 174 MS
EKG Q-T INTERVAL: 440 MS
EKG QRS DURATION: 142 MS
EKG QTC CALCULATION (BAZETT): 495 MS
EKG R AXIS: 39 DEGREES
EKG T AXIS: 50 DEGREES
EKG VENTRICULAR RATE: 76 BPM
EOSINOPHILS ABSOLUTE: 0 E9/L (ref 0.05–0.5)
EOSINOPHILS RELATIVE PERCENT: 0 % (ref 0–6)
GFR AFRICAN AMERICAN: >60
GFR NON-AFRICAN AMERICAN: >60 ML/MIN/1.73
GLUCOSE BLD-MCNC: 287 MG/DL (ref 74–99)
HAV IGM SER IA-ACNC: NORMAL
HCT VFR BLD CALC: 36.2 % (ref 37–54)
HEMOGLOBIN: 11.7 G/DL (ref 12.5–16.5)
HEPATITIS B CORE IGM ANTIBODY: NORMAL
HEPATITIS B SURFACE ANTIGEN INTERPRETATION: NORMAL
HEPATITIS C ANTIBODY INTERPRETATION: NORMAL
IMMATURE GRANULOCYTES #: 0.11 E9/L
IMMATURE GRANULOCYTES %: 1.8 % (ref 0–5)
INFLUENZA A BY PCR: NOT DETECTED
INFLUENZA B BY PCR: NOT DETECTED
LYMPHOCYTES ABSOLUTE: 3.56 E9/L (ref 1.5–4)
LYMPHOCYTES RELATIVE PERCENT: 56.9 % (ref 20–42)
MCH RBC QN AUTO: 26.8 PG (ref 26–35)
MCHC RBC AUTO-ENTMCNC: 32.3 % (ref 32–34.5)
MCV RBC AUTO: 82.8 FL (ref 80–99.9)
MONOCYTES ABSOLUTE: 0.42 E9/L (ref 0.1–0.95)
MONOCYTES RELATIVE PERCENT: 6.7 % (ref 2–12)
NEUTROPHILS ABSOLUTE: 2.16 E9/L (ref 1.8–7.3)
NEUTROPHILS RELATIVE PERCENT: 34.4 % (ref 43–80)
OVALOCYTES: ABNORMAL
PDW BLD-RTO: 15 FL (ref 11.5–15)
PLATELET # BLD: 68 E9/L (ref 130–450)
PLATELET CONFIRMATION: NORMAL
PMV BLD AUTO: 12.1 FL (ref 7–12)
POIKILOCYTES: ABNORMAL
POTASSIUM REFLEX MAGNESIUM: 3.8 MMOL/L (ref 3.5–5)
PRO-BNP: 961 PG/ML (ref 0–125)
RBC # BLD: 4.37 E12/L (ref 3.8–5.8)
SODIUM BLD-SCNC: 137 MMOL/L (ref 132–146)
TOTAL PROTEIN: 5.4 G/DL (ref 6.4–8.3)
TROPONIN: <0.01 NG/ML (ref 0–0.03)
WBC # BLD: 6.3 E9/L (ref 4.5–11.5)

## 2019-05-07 PROCEDURE — 93005 ELECTROCARDIOGRAM TRACING: CPT | Performed by: STUDENT IN AN ORGANIZED HEALTH CARE EDUCATION/TRAINING PROGRAM

## 2019-05-07 PROCEDURE — 2580000003 HC RX 258: Performed by: EMERGENCY MEDICINE

## 2019-05-07 PROCEDURE — 6370000000 HC RX 637 (ALT 250 FOR IP): Performed by: STUDENT IN AN ORGANIZED HEALTH CARE EDUCATION/TRAINING PROGRAM

## 2019-05-07 PROCEDURE — 85025 COMPLETE CBC W/AUTO DIFF WBC: CPT

## 2019-05-07 PROCEDURE — 36415 COLL VENOUS BLD VENIPUNCTURE: CPT

## 2019-05-07 PROCEDURE — 84484 ASSAY OF TROPONIN QUANT: CPT

## 2019-05-07 PROCEDURE — 93010 ELECTROCARDIOGRAM REPORT: CPT | Performed by: INTERNAL MEDICINE

## 2019-05-07 PROCEDURE — 99284 EMERGENCY DEPT VISIT MOD MDM: CPT

## 2019-05-07 PROCEDURE — 71046 X-RAY EXAM CHEST 2 VIEWS: CPT

## 2019-05-07 PROCEDURE — 87502 INFLUENZA DNA AMP PROBE: CPT

## 2019-05-07 PROCEDURE — 80053 COMPREHEN METABOLIC PANEL: CPT

## 2019-05-07 PROCEDURE — 83880 ASSAY OF NATRIURETIC PEPTIDE: CPT

## 2019-05-07 RX ORDER — BENZONATATE 100 MG/1
100 CAPSULE ORAL ONCE
Status: COMPLETED | OUTPATIENT
Start: 2019-05-07 | End: 2019-05-07

## 2019-05-07 RX ORDER — 0.9 % SODIUM CHLORIDE 0.9 %
1000 INTRAVENOUS SOLUTION INTRAVENOUS ONCE
Status: COMPLETED | OUTPATIENT
Start: 2019-05-07 | End: 2019-05-07

## 2019-05-07 RX ORDER — BENZONATATE 100 MG/1
100 CAPSULE ORAL 3 TIMES DAILY PRN
Qty: 21 CAPSULE | Refills: 0 | Status: SHIPPED | OUTPATIENT
Start: 2019-05-07 | End: 2019-05-14

## 2019-05-07 RX ORDER — ASPIRIN 81 MG/1
324 TABLET, CHEWABLE ORAL ONCE
Status: COMPLETED | OUTPATIENT
Start: 2019-05-07 | End: 2019-05-07

## 2019-05-07 RX ADMIN — ASPIRIN 81 MG 324 MG: 81 TABLET ORAL at 13:30

## 2019-05-07 RX ADMIN — SODIUM CHLORIDE 1000 ML: 9 INJECTION, SOLUTION INTRAVENOUS at 16:37

## 2019-05-07 RX ADMIN — BENZONATATE 100 MG: 100 CAPSULE ORAL at 13:53

## 2019-05-07 ASSESSMENT — ENCOUNTER SYMPTOMS
EYE REDNESS: 0
SINUS PRESSURE: 0
COUGH: 1
SHORTNESS OF BREATH: 0
EYE DISCHARGE: 0
EYE PAIN: 0
ABDOMINAL PAIN: 0
DIARRHEA: 0
NAUSEA: 0
BACK PAIN: 0
VOMITING: 0
SORE THROAT: 0
WHEEZING: 0

## 2019-05-07 NOTE — CARE COORDINATION
No call made today 5/7/19 for initial TCM/hospital discharge follow up for acute pharyngitis as patient is currently being evaluated at [facility] ED for fatigue. CTC will continue to follow for Care Transition.

## 2019-05-07 NOTE — CARE COORDINATION
Emergency Department Social Work Assessment:    Pt presents to the ED with fatigue. Per progress notes, pt discharged home from the hospital yesterday. SW met with pt in room to discuss transition of care. Pt stated that he returned to the hospital today because he thought he was \"having a heart attack\". He stated that he picked up all of his prescriptions yesterday after he was discharged and \"felt good until this morning\". Pt lives with his wife and was independent PTA, does not own/use any DME. Denies hx of [facility] or [facility]. PCP is YEIMY Brewster and he uses [facility] on [phone] and [id number] [facility]. Pt stated that his wife is supportive and he plans on returning home at discharge with no needs.     Electronically signed by LEON Peraza, JORGE LUISW on 5/7/2019 at 3:14 PM

## 2019-05-07 NOTE — ED PROVIDER NOTES
The patient is a 45-year-old male with a history of AML who presents the emergency department complaining of chest heaviness and fatigue. The patient was admitted here 2 days ago for dysphagia, and had another admission earlier in the week. The patient reports that he woke up at approximately 2:00 AM with heaviness in his chest, he then fell back asleep. The patient then states earlier this morning he felt a heaviness in his chest again, took a sublingual nitroglycerin, which provided him with some relief. The patient also complains of an ongoing dry cough over the past couple weeks. The patient states that his history of a diagnostic cardiac catheterization approximately 2 years ago by his cardiologist, , which was evident of 30% stenosis of the vessel, but the patient does not remember which vessel it was. The patient denies any fever, chills, nausea, vomiting, diarrhea, syncope, leg swelling, history of blood clots, history of bleeding disorders, palpitations, lightheadedness, or other acute symptoms or concerns. He is currently not having any pain at this time. The history is provided by the patient.    Chest Pain   Pain location:  Substernal area  Pain quality: crushing    Pain radiates to:  Does not radiate  Pain severity:  No pain  Onset quality:  Sudden  Timing:  Sporadic  Progression:  Improving  Chronicity:  New  Context: at rest    Context: not breathing, not lifting, not movement, not stress and not trauma    Relieved by:  Nitroglycerin  Worsened by:  Nothing  Ineffective treatments:  None tried  Associated symptoms: cough and fatigue    Associated symptoms: no abdominal pain, no back pain, no fever, no headache, no nausea, no shortness of breath, no vomiting and no weakness    Risk factors: coronary artery disease, diabetes mellitus, high cholesterol, hypertension, male sex and obesity    Risk factors: no prior DVT/PE and no surgery        Review of Systems   Constitutional: Positive for fatigue. Negative for chills and fever. HENT: Negative for ear pain, sinus pressure and sore throat. Eyes: Negative for pain, discharge and redness. Respiratory: Positive for cough. Negative for shortness of breath and wheezing. Cardiovascular: Positive for chest pain. Gastrointestinal: Negative for abdominal pain, diarrhea, nausea and vomiting. Genitourinary: Negative for dysuria and frequency. Musculoskeletal: Negative for arthralgias and back pain. Skin: Negative for rash and wound. Neurological: Negative for weakness and headaches. Hematological: Negative for adenopathy. All other systems reviewed and are negative. Physical Exam   Constitutional: He is oriented to person, place, and time. He appears well-developed and well-nourished. HENT:   Head: Normocephalic and atraumatic. Eyes: Conjunctivae and EOM are normal.   Neck: Normal range of motion. Neck supple. Cardiovascular: Normal rate, regular rhythm and normal heart sounds. No murmur heard. Pulmonary/Chest: Effort normal. No respiratory distress. He has decreased breath sounds (Bilateral bases). He has no wheezes. He has no rhonchi. He has no rales. He exhibits no tenderness. Dry raspy cough   Abdominal: Soft. Bowel sounds are normal. There is no tenderness. There is no rebound and no guarding. A hernia (Reducible umbilical hernia) is present. Musculoskeletal: He exhibits no edema. Neurological: He is alert and oriented to person, place, and time. No cranial nerve deficit. Coordination normal.   Skin: Skin is warm and dry. Capillary refill takes less than 2 seconds. Psychiatric: He has a normal mood and affect. His behavior is normal.   Nursing note and vitals reviewed. Procedures    MDM  Number of Diagnoses or Management Options  Chest pain, unspecified type: Thrombocytopenia [facility]):   Diagnosis management comments:  The patient is a 79year old male presents to the emergency department complaining of chest heaviness, fatigue, and cough. Patient's EKG is not evident of any acute changes, serial troponins were negative, CBC evident of thrombocytopenia, rapid influenza negative, BNP slightly elevated at 961, and no leukocytosis. The patient was also given 324 mg of chewable aspirin in the emergency department along with IV fluid bolus. The patient did not have any chest pain during his stay in the emergency department, nitroglycerin was not given. Consulted with Dr. Luke Senior, who was concerned about readmitting the patient after he was just discharged yesterday, and he wanted us to talk with the patient's cardiologist.  Jacey Dillon with Dr. Colleen Haro, cardiology, who agrees to see the patient on an outpatient basis in the office. They do not feel patient needs to be admitted at this time. Discussed in depth the patient's risk factors with the consultants, but with the patient's EKG not having any changes and the troponin being negative I feel the patient's chest pain can be managed on an outpatient basis. Discussed plan in depth with the patient's wife in the patient, and recommended him to return to the emergency department immediately if the patient develops any chest pain, shortness of breath, palpitations, or other acute symptoms or concerns. Also recommended the wife to provide sublingual nitroglycerin and 324 mg of chewable aspirin if patient develops the symptoms. The patient and the patient's wife verbalize understanding agreement to plan of care discharge instructions. They will call Dr. Miller Salvage office tomorrow to set up an appointment for Thursday. All questions and concerns were addressed and answered. ED Course as of May 09 0830   Tue May 07, 2019   9052 Consulted with Dr. Luke Senior, hospitalist, who would like us to touch base with patient's cardiologist, Dr. Colleen Haro. He has concerns that the patient has been admitted twice in the past week for different complaints.  However, we are concerned with patient's cardiac symptoms and would like him to be admitted for a cardiac work up. He has multiple risk factors, and his last catherization was over 2 years ago, which was evident of 30% stenosis per patient. [KG]   3063 I spoke with Dr. Denisa Castro discussed the case. He does have concerns with the patient's recent admission and repeat visits. I explained that this is a new complaint for the patient. Patient does have multiple risk factors for coronary artery disease. He requests I do speak with the patient's cardiologist.    [MT]   9468 Dr. Danilo Weiner consulted with Dr. Maryam Kemp, cardiologist, who would like the patient to follow up in the office on Thursday. He states he is comfortably with the patient being discharged home and following up on an outpatient basis. Will repeat the patient's troponin and reassess. [KG]      ED Course User Index  [KG] Luke Tena,   [MT] Sana Narvaez DO         EKG Interpretation. EKG: This EKG is signed and interpreted by me. Rate: 76  Rhythm: Sinus  Interpretation: left bundle branch block   Comparison: stable as compared to patient's most recent EKG 05/05/19      ED Course as of May 09 0830   Tue May 07, 2019   1446 Consulted with Dr. Denisa Castro, hospitalist, who would like us to touch base with patient's cardiologist, Dr. Maryam Kemp. He has concerns that the patient has been admitted twice in the past week for different complaints. However, we are concerned with patient's cardiac symptoms and would like him to be admitted for a cardiac work up. He has multiple risk factors, and his last catherization was over 2 years ago, which was evident of 30% stenosis per patient. [KG]   7378 I spoke with Dr. Denisa Castro discussed the case. He does have concerns with the patient's recent admission and repeat visits. I explained that this is a new complaint for the patient. Patient does have multiple risk factors for coronary artery disease.  He requests I do speak -------------------------------------------------  Labs:  Results for orders placed or performed during the hospital encounter of 05/07/19   Rapid influenza A/B antigens   Result Value Ref Range    Influenza A by PCR Not Detected Not Detected    Influenza B by PCR Not Detected Not Detected   Troponin   Result Value Ref Range    Troponin <0.01 0.00 - 0.03 ng/mL   CBC auto differential   Result Value Ref Range    WBC 6.3 4.5 - 11.5 E9/L    RBC 4.37 3.80 - 5.80 E12/L    Hemoglobin 11.7 (L) 12.5 - 16.5 g/dL    Hematocrit 36.2 (L) 37.0 - 54.0 %    MCV 82.8 80.0 - 99.9 fL    MCH 26.8 26.0 - 35.0 pg    MCHC 32.3 32.0 - 34.5 %    RDW 15.0 11.5 - 15.0 fL    Platelets 68 (L) 982 - 450 E9/L    MPV 12.1 (H) 7.0 - 12.0 fL    Neutrophils % 34.4 (L) 43.0 - 80.0 %    Immature Granulocytes % 1.8 0.0 - 5.0 %    Lymphocytes % 56.9 (H) 20.0 - 42.0 %    Monocytes % 6.7 2.0 - 12.0 %    Eosinophils % 0.0 0.0 - 6.0 %    Basophils % 0.2 0.0 - 2.0 %    Neutrophils # 2.16 1.80 - 7.30 E9/L    Immature Granulocytes # 0.11 E9/L    Lymphocytes # 3.56 1.50 - 4.00 E9/L    Monocytes # 0.42 0.10 - 0.95 E9/L    Eosinophils # 0.00 (L) 0.05 - 0.50 E9/L    Basophils # 0.01 0.00 - 0.20 E9/L    Anisocytosis 1+     Poikilocytes 1+     Metter Cells 1+     Ovalocytes 1+    Comprehensive Metabolic Panel w/ Reflex to MG   Result Value Ref Range    Sodium 137 132 - 146 mmol/L    Potassium reflex Magnesium 3.8 3.5 - 5.0 mmol/L    Chloride 104 98 - 107 mmol/L    CO2 18 (L) 22 - 29 mmol/L    Anion Gap 15 7 - 16 mmol/L    Glucose 287 (H) 74 - 99 mg/dL    BUN 19 8 - 23 mg/dL    CREATININE 0.9 0.7 - 1.2 mg/dL    GFR Non-African American >60 >=60 mL/min/1.73    GFR African American >60     Calcium 7.0 (L) 8.6 - 10.2 mg/dL    Total Protein 5.4 (L) 6.4 - 8.3 g/dL    Alb 3.3 (L) 3.5 - 5.2 g/dL    Total Bilirubin 0.4 0.0 - 1.2 mg/dL    Alkaline Phosphatase 107 40 - 129 U/L    ALT 22 0 - 40 U/L    AST 15 0 - 39 U/L   Brain Natriuretic Peptide   Result Value Ref Range Pro- (H) 0 - 125 pg/mL   Platelet Confirmation   Result Value Ref Range    Platelet Confirmation CONFIRMED    Troponin   Result Value Ref Range    Troponin <0.01 0.00 - 0.03 ng/mL   Troponin   Result Value Ref Range    Troponin <0.01 0.00 - 0.03 ng/mL   EKG 12 Lead   Result Value Ref Range    Ventricular Rate 76 BPM    Atrial Rate 76 BPM    P-R Interval 174 ms    QRS Duration 142 ms    Q-T Interval 440 ms    QTc Calculation (Bazett) 495 ms    P Axis 62 degrees    R Axis 39 degrees    T Axis 50 degrees       Radiology:  XR CHEST STANDARD (2 VW)   Final Result   No acute cardiopulmonary disease.             ------------------------- NURSING NOTES AND VITALS REVIEWED ---------------------------  Date / Time Roomed:  5/7/2019 12:53 PM  ED Bed Assignment:  02/02    The nursing notes within the ED encounter and vital signs as below have been reviewed. /69   Pulse 75   Temp 98 °F (36.7 °C)   Resp 16   Ht 5' 8\" (1.727 m)   Wt 208 lb (94.3 kg)   SpO2 96%   BMI 31.63 kg/m²   Oxygen Saturation Interpretation: Normal      ------------------------------------------ PROGRESS NOTES ------------------------------------------  5:26 PM  I have spoken with the patient and discussed todays results, in addition to providing specific details for the plan of care and counseling regarding the diagnosis and prognosis. Their questions are answered at this time and they are agreeable with the plan. I discussed at length with them reasons for immediate return here for re evaluation. They will followup with their primary care physician by calling their office tomorrow. --------------------------------- ADDITIONAL PROVIDER NOTES ---------------------------------  At this time the patient is without objective evidence of an acute process requiring hospitalization or inpatient management. They have remained hemodynamically stable throughout their entire ED visit and are stable for discharge with outpatient follow-up. The plan has been discussed in detail and they are aware of the specific conditions for emergent return, as well as the importance of follow-up. Discharge Medication List as of 5/7/2019  5:27 PM      START taking these medications    Details   benzonatate (TESSALON PERLES) 100 MG capsule Take 1 capsule by mouth 3 times daily as needed for Cough, Disp-21 capsule, R-0Print             Diagnosis:  1. Chest pain, unspecified type    2. Thrombocytopenia ([address]        Disposition:  Patient's disposition: Discharge to home  Patient's condition is stable. William Arzate DO  Resident  05/07/19 0626    ATTENDING PROVIDER ATTESTATION:     Adrianne Lane presented to the emergency department for evaluation of Fatigue and Other (pressure into his neck, chest heaviness, patient was admitted here on sunday)    I have reviewed and discussed the case, including pertinent history (medical, surgical, family and social) and exam findings with the Resident and the Nurse assigned to Adrianne Domingo. I have personally performed and/or participated in the history, exam, medical decision making, and procedures and agree with all pertinent clinical information. I have reviewed my findings and recommendations with Adrianne Lane and members of family present at the time of disposition. MDM: The patient is a 29-year-old male presenting to the emergency department with a chief complaint of chest pain. Patient did have labs and imaging which were reviewed. Patient does have multiple risk factors for coronary artery disease. Call was placed to Dr. Fierro for admission. He does have concern that the patient is potentially malingering secondary to his repeat visits. I did explain this is a new complaint. Dr. Fierro at this time will not accept admission and would like me to call the patient's cardiologist and he feels the patient is appropriate for outpatient follow-up.  I did discuss the concern and this is a new complaint for the patient. I did speak with the patient's cardiologist who feels he is comfortable with the patient being discharged and following up outpatient. I did repeat the patient's troponin which was negative. Patient with  no chest pain while in the emergency department. The patient has no new changes on his EKG. After the multiple consultations with internal medicine as well as the patient's cardiologist the patient will be discharged per their recommendation and the patient not having any chest pain in the emergency department. Patient was given strict return precautions should his symptoms return to come back to the emergency department. The patient will be discharged home and will follow up outpatient. My findings/plan: The primary encounter diagnosis was Chest pain, unspecified type. A diagnosis of Thrombocytopenia (HCC) was also pertinent to this visit.   Discharge Medication List as of 5/7/2019  5:27 PM      START taking these medications    Details   benzonatate (TESSALON PERLES) 100 MG capsule Take 1 capsule by mouth 3 times daily as needed for Cough, Disp-21 capsule, R-0Print           [facility], [address] [place] [address],   05/09/19 6862

## 2019-05-08 LAB
THROAT CULTURE: NORMAL
URINE CULTURE, ROUTINE: NORMAL

## 2019-05-08 NOTE — CARE COORDINATION
[facility] Transitions Initial Follow Up Call    Call within 2 business days of discharge: Yes    Patient: Yecenia Prado Patient : 1950   MRN: [id number]  Reason for Admission: Acute Pharyngitis  Discharge Date: 19 RARS: Readmission Risk Score: 0      Last Discharge [address] [address]       Complaint Diagnosis Description Type Department Provider    19 Fatigue; Other Chest pain, unspecified type . .. ED (DISCHARGE) [address] [facility] ED Susana Correia DO        Attempted to contact patient today 19 for initial TCM/hospital discharge for acute pharyngitis. Left messsage on both home/mobile numbers requesting a return call back to [facility] and provided contact information. [facility] will continue with patient outreach for Care Transition. Follow Up  No future appointments.     Angel Monsalve, APRN

## 2019-05-09 NOTE — CARE COORDINATION
[facility] Transitions Initial Follow Up Call    Call within 2 business days of discharge: No    Patient: Sarmad Cui Patient : 1950   MRN: [id number]  Reason for Admission: Acute Pharyngitis  Discharge Date: 19 RARS: Readmission Risk Score: 0      Last Discharge  [address] [address]       Complaint Diagnosis Description Type Department Provider    19 Fatigue; Other Chest pain, unspecified type . .. ED (DISCHARGE) [address] [facility] ED Anais Hernandez DO        Third and final attempt made today 19 to contact patient for initial TCM/hospital discharge follow up for Acute Pharyngitis. Left message on both home/mobile numbers advising of last call and requesting a return call back to CTC for any concerns or issues. Provided CTC contact number. CTC signing off for Care Transition. Follow Up  No future appointments.     Micha Stahl, APRN

## 2019-05-10 LAB
BLOOD CULTURE, ROUTINE: NORMAL
CULTURE, BLOOD 2: NORMAL

## 2019-05-16 ENCOUNTER — OFFICE VISIT (OUTPATIENT)
Dept: FAMILY MEDICINE CLINIC | Age: 69
End: 2019-05-16
Payer: MEDICARE

## 2019-05-16 VITALS
RESPIRATION RATE: 16 BRPM | OXYGEN SATURATION: 98 % | HEIGHT: 68 IN | DIASTOLIC BLOOD PRESSURE: 62 MMHG | BODY MASS INDEX: 30.31 KG/M2 | TEMPERATURE: 98.3 F | WEIGHT: 200 LBS | SYSTOLIC BLOOD PRESSURE: 110 MMHG | HEART RATE: 84 BPM

## 2019-05-16 DIAGNOSIS — J32.9 SINOBRONCHITIS: ICD-10-CM

## 2019-05-16 DIAGNOSIS — J40 SINOBRONCHITIS: ICD-10-CM

## 2019-05-16 DIAGNOSIS — E11.9 TYPE 2 DIABETES MELLITUS WITHOUT COMPLICATION, WITH LONG-TERM CURRENT USE OF INSULIN (HCC): Primary | ICD-10-CM

## 2019-05-16 DIAGNOSIS — F41.9 ANXIETY: ICD-10-CM

## 2019-05-16 DIAGNOSIS — Z79.4 TYPE 2 DIABETES MELLITUS WITHOUT COMPLICATION, WITH LONG-TERM CURRENT USE OF INSULIN (HCC): Primary | ICD-10-CM

## 2019-05-16 LAB — HBA1C MFR BLD: 11 %

## 2019-05-16 PROCEDURE — 99214 OFFICE O/P EST MOD 30 MIN: CPT | Performed by: FAMILY MEDICINE

## 2019-05-16 PROCEDURE — 3017F COLORECTAL CA SCREEN DOC REV: CPT | Performed by: FAMILY MEDICINE

## 2019-05-16 PROCEDURE — 1111F DSCHRG MED/CURRENT MED MERGE: CPT | Performed by: FAMILY MEDICINE

## 2019-05-16 PROCEDURE — 1036F TOBACCO NON-USER: CPT | Performed by: FAMILY MEDICINE

## 2019-05-16 PROCEDURE — 2022F DILAT RTA XM EVC RTNOPTHY: CPT | Performed by: FAMILY MEDICINE

## 2019-05-16 PROCEDURE — 83036 HEMOGLOBIN GLYCOSYLATED A1C: CPT | Performed by: FAMILY MEDICINE

## 2019-05-16 PROCEDURE — G8417 CALC BMI ABV UP PARAM F/U: HCPCS | Performed by: FAMILY MEDICINE

## 2019-05-16 PROCEDURE — 4040F PNEUMOC VAC/ADMIN/RCVD: CPT | Performed by: FAMILY MEDICINE

## 2019-05-16 PROCEDURE — 1123F ACP DISCUSS/DSCN MKR DOCD: CPT | Performed by: FAMILY MEDICINE

## 2019-05-16 PROCEDURE — 3046F HEMOGLOBIN A1C LEVEL >9.0%: CPT | Performed by: FAMILY MEDICINE

## 2019-05-16 PROCEDURE — G8427 DOCREV CUR MEDS BY ELIG CLIN: HCPCS | Performed by: FAMILY MEDICINE

## 2019-05-16 RX ORDER — CEFTRIAXONE 1 G/1
1 INJECTION, POWDER, FOR SOLUTION INTRAMUSCULAR; INTRAVENOUS ONCE
Qty: 1 G | Refills: 0
Start: 2019-05-16 | End: 2019-05-16

## 2019-05-16 ASSESSMENT — ENCOUNTER SYMPTOMS
BACK PAIN: 1
BLOOD IN STOOL: 0
CONSTIPATION: 0
CHEST TIGHTNESS: 0
EYE DISCHARGE: 0
WHEEZING: 0
DIARRHEA: 0
APNEA: 0

## 2019-05-16 NOTE — PROGRESS NOTES
Exam reveals no gallop. No murmur heard. Pulmonary/Chest: Effort normal. No respiratory distress. He has no wheezes. He exhibits no tenderness. Abdominal: Soft. He exhibits no mass. There is no tenderness. There is no rebound. Musculoskeletal: Normal range of motion. He exhibits no edema or tenderness. Neurological: He is alert. He displays normal reflexes. No cranial nerve deficit. Coordination normal.          Skin: Skin is warm. Capillary refill takes less than 2 seconds. No rash noted. No erythema. Psychiatric: He has a normal mood and affect. ASSESSMENT/PLAN:    1. Type 2 diabetes mellitus without complication, with long-term current use of insulin (HCC)  - insulin glargine (TOUJEO SOLOSTAR) 300 UNIT/ML injection pen; Inject 35 Units into the skin nightly  Dispense: 5 pen; Refill: 0  - POCT glycosylated hemoglobin (Hb A1C)    2. Sinobronchitis  - cefTRIAXone (ROCEPHIN) 1 g injection; Inject 1 g into the muscle once for 1 dose  Dispense: 1 g; Refill: 0    3. Anxiety  - sertraline (ZOLOFT) 50 MG tablet; TAKE 1 1/2 TABLETS BY MOUTH DAILY  Dispense: 180 tablet; Refill: ROMY Marquez

## 2019-05-29 ENCOUNTER — TELEPHONE (OUTPATIENT)
Dept: FAMILY MEDICINE CLINIC | Age: 69
End: 2019-05-29

## 2019-05-29 NOTE — TELEPHONE ENCOUNTER
Veena Rivera called because he's very tired, has no energy. He's asking if a B12 shot would be beneficial to him ?     # [phone]

## 2019-05-30 ENCOUNTER — NURSE ONLY (OUTPATIENT)
Dept: FAMILY MEDICINE CLINIC | Age: 69
End: 2019-05-30
Payer: MEDICARE

## 2019-05-30 DIAGNOSIS — R53.83 FATIGUE, UNSPECIFIED TYPE: Primary | ICD-10-CM

## 2019-05-30 PROCEDURE — 96372 THER/PROPH/DIAG INJ SC/IM: CPT | Performed by: NURSE PRACTITIONER

## 2019-05-30 RX ORDER — CYANOCOBALAMIN 1000 UG/ML
1000 INJECTION INTRAMUSCULAR; SUBCUTANEOUS ONCE
Qty: 1 ML | Refills: 0 | Status: SHIPPED | OUTPATIENT
Start: 2019-05-30 | End: 2019-05-30 | Stop reason: CLARIF

## 2019-05-30 RX ORDER — SPIRONOLACTONE 25 MG/1
25 TABLET ORAL 2 TIMES DAILY
Qty: 60 TABLET | Refills: 2 | Status: SHIPPED | OUTPATIENT
Start: 2019-05-30 | End: 2019-05-30 | Stop reason: SDUPTHER

## 2019-05-30 RX ORDER — CYANOCOBALAMIN 1000 UG/ML
1000 INJECTION INTRAMUSCULAR; SUBCUTANEOUS ONCE
Status: COMPLETED | OUTPATIENT
Start: 2019-05-30 | End: 2019-05-30

## 2019-05-30 RX ADMIN — CYANOCOBALAMIN 1000 MCG: 1000 INJECTION INTRAMUSCULAR; SUBCUTANEOUS at 13:51

## 2019-05-30 NOTE — TELEPHONE ENCOUNTER
Requested Prescriptions     Pending Prescriptions Disp Refills    spironolactone (ALDACTONE) 25 MG tablet 60 tablet 2     Sig: Take 1 tablet by mouth 2 times daily       Next appt is   Last appt was 5/16/2019

## 2019-05-31 RX ORDER — SPIRONOLACTONE 25 MG/1
TABLET ORAL
Qty: 180 TABLET | Refills: 2 | Status: SHIPPED | OUTPATIENT
Start: 2019-05-31 | End: 2019-09-12 | Stop reason: SDUPTHER

## 2019-06-24 ENCOUNTER — OFFICE VISIT (OUTPATIENT)
Dept: FAMILY MEDICINE CLINIC | Age: 69
End: 2019-06-24
Payer: MEDICARE

## 2019-06-24 VITALS
SYSTOLIC BLOOD PRESSURE: 132 MMHG | DIASTOLIC BLOOD PRESSURE: 70 MMHG | HEIGHT: 68 IN | RESPIRATION RATE: 14 BRPM | TEMPERATURE: 98.4 F | HEART RATE: 77 BPM | BODY MASS INDEX: 30.31 KG/M2 | WEIGHT: 200 LBS | OXYGEN SATURATION: 94 %

## 2019-06-24 DIAGNOSIS — B07.0 PLANTAR WART OF RIGHT FOOT: Primary | ICD-10-CM

## 2019-06-24 PROCEDURE — 17004 DESTROY PREMAL LESIONS 15/>: CPT | Performed by: FAMILY MEDICINE

## 2019-07-02 RX ORDER — LEVOTHYROXINE SODIUM 0.2 MG/1
TABLET ORAL
Qty: 90 TABLET | Refills: 0 | Status: SHIPPED | OUTPATIENT
Start: 2019-07-02 | End: 2019-09-30 | Stop reason: SDUPTHER

## 2019-07-03 DIAGNOSIS — E11.9 TYPE 2 DIABETES MELLITUS WITHOUT COMPLICATION, WITH LONG-TERM CURRENT USE OF INSULIN (HCC): ICD-10-CM

## 2019-07-03 DIAGNOSIS — Z79.4 TYPE 2 DIABETES MELLITUS WITHOUT COMPLICATION, WITH LONG-TERM CURRENT USE OF INSULIN (HCC): ICD-10-CM

## 2019-07-03 NOTE — TELEPHONE ENCOUNTER
Requested Prescriptions     Pending Prescriptions Disp Refills    insulin lispro (HUMALOG KWIKPEN) 100 UNIT/ML pen 27 mL 1     Sig: Inject 0-18 Units into the skin 3 times daily (before meals)       Next appt is Visit date not found  Last appt was 5/16/2019

## 2019-07-05 RX ORDER — INSULIN LISPRO 100 [IU]/ML
INJECTION, SOLUTION INTRAVENOUS; SUBCUTANEOUS
Qty: 48 ML | Refills: 1 | Status: SHIPPED | OUTPATIENT
Start: 2019-07-05 | End: 2020-01-23 | Stop reason: SDUPTHER

## 2019-07-10 DIAGNOSIS — Z79.4 TYPE 2 DIABETES MELLITUS WITHOUT COMPLICATION, WITH LONG-TERM CURRENT USE OF INSULIN (HCC): ICD-10-CM

## 2019-07-10 DIAGNOSIS — E11.9 TYPE 2 DIABETES MELLITUS WITHOUT COMPLICATION, WITH LONG-TERM CURRENT USE OF INSULIN (HCC): ICD-10-CM

## 2019-09-12 RX ORDER — SPIRONOLACTONE 25 MG/1
TABLET ORAL
Qty: 180 TABLET | Refills: 2 | Status: SHIPPED
Start: 2019-09-12 | End: 2020-11-24 | Stop reason: SDUPTHER

## 2019-09-30 DIAGNOSIS — E11.9 TYPE 2 DIABETES MELLITUS WITHOUT COMPLICATION, WITH LONG-TERM CURRENT USE OF INSULIN (HCC): ICD-10-CM

## 2019-09-30 DIAGNOSIS — Z79.4 TYPE 2 DIABETES MELLITUS WITHOUT COMPLICATION, WITH LONG-TERM CURRENT USE OF INSULIN (HCC): ICD-10-CM

## 2019-09-30 RX ORDER — LEVOTHYROXINE SODIUM 0.2 MG/1
TABLET ORAL
Qty: 7 TABLET | Refills: 0 | Status: SHIPPED | OUTPATIENT
Start: 2019-09-30 | End: 2019-10-08 | Stop reason: SDUPTHER

## 2019-10-08 ENCOUNTER — OFFICE VISIT (OUTPATIENT)
Dept: FAMILY MEDICINE CLINIC | Age: 69
End: 2019-10-08
Payer: MEDICARE

## 2019-10-08 VITALS
SYSTOLIC BLOOD PRESSURE: 122 MMHG | HEIGHT: 68 IN | RESPIRATION RATE: 14 BRPM | DIASTOLIC BLOOD PRESSURE: 82 MMHG | TEMPERATURE: 98.2 F | HEART RATE: 94 BPM | WEIGHT: 212 LBS | OXYGEN SATURATION: 99 % | BODY MASS INDEX: 32.13 KG/M2

## 2019-10-08 DIAGNOSIS — J01.90 ACUTE BACTERIAL SINUSITIS: Primary | ICD-10-CM

## 2019-10-08 DIAGNOSIS — E11.9 TYPE 2 DIABETES MELLITUS WITHOUT COMPLICATION, WITH LONG-TERM CURRENT USE OF INSULIN (HCC): ICD-10-CM

## 2019-10-08 DIAGNOSIS — E03.9 HYPOTHYROIDISM, UNSPECIFIED TYPE: ICD-10-CM

## 2019-10-08 DIAGNOSIS — F41.9 ANXIETY: ICD-10-CM

## 2019-10-08 DIAGNOSIS — Z79.4 TYPE 2 DIABETES MELLITUS WITHOUT COMPLICATION, WITH LONG-TERM CURRENT USE OF INSULIN (HCC): ICD-10-CM

## 2019-10-08 DIAGNOSIS — B96.89 ACUTE BACTERIAL SINUSITIS: Primary | ICD-10-CM

## 2019-10-08 DIAGNOSIS — R25.1 TREMOR: ICD-10-CM

## 2019-10-08 LAB — HBA1C MFR BLD: 10.5 %

## 2019-10-08 PROCEDURE — G8417 CALC BMI ABV UP PARAM F/U: HCPCS | Performed by: NURSE PRACTITIONER

## 2019-10-08 PROCEDURE — 3017F COLORECTAL CA SCREEN DOC REV: CPT | Performed by: NURSE PRACTITIONER

## 2019-10-08 PROCEDURE — 3046F HEMOGLOBIN A1C LEVEL >9.0%: CPT | Performed by: NURSE PRACTITIONER

## 2019-10-08 PROCEDURE — 83036 HEMOGLOBIN GLYCOSYLATED A1C: CPT | Performed by: NURSE PRACTITIONER

## 2019-10-08 PROCEDURE — 1123F ACP DISCUSS/DSCN MKR DOCD: CPT | Performed by: NURSE PRACTITIONER

## 2019-10-08 PROCEDURE — G8484 FLU IMMUNIZE NO ADMIN: HCPCS | Performed by: NURSE PRACTITIONER

## 2019-10-08 PROCEDURE — 2022F DILAT RTA XM EVC RTNOPTHY: CPT | Performed by: NURSE PRACTITIONER

## 2019-10-08 PROCEDURE — 4040F PNEUMOC VAC/ADMIN/RCVD: CPT | Performed by: NURSE PRACTITIONER

## 2019-10-08 PROCEDURE — 1036F TOBACCO NON-USER: CPT | Performed by: NURSE PRACTITIONER

## 2019-10-08 PROCEDURE — 99214 OFFICE O/P EST MOD 30 MIN: CPT | Performed by: NURSE PRACTITIONER

## 2019-10-08 PROCEDURE — G8427 DOCREV CUR MEDS BY ELIG CLIN: HCPCS | Performed by: NURSE PRACTITIONER

## 2019-10-08 RX ORDER — BENZONATATE 100 MG/1
100 CAPSULE ORAL 3 TIMES DAILY PRN
Qty: 30 CAPSULE | Refills: 0 | Status: SHIPPED | OUTPATIENT
Start: 2019-10-08 | End: 2019-10-15

## 2019-10-08 RX ORDER — LEVOTHYROXINE SODIUM 0.2 MG/1
TABLET ORAL
Qty: 90 TABLET | Refills: 0 | Status: SHIPPED
Start: 2019-10-08 | End: 2020-04-02

## 2019-10-08 RX ORDER — LORAZEPAM 0.5 MG/1
0.5 TABLET ORAL 2 TIMES DAILY PRN
Qty: 60 TABLET | Refills: 0 | Status: SHIPPED | OUTPATIENT
Start: 2019-10-08 | End: 2020-04-28 | Stop reason: SDUPTHER

## 2019-10-08 RX ORDER — LORAZEPAM 0.5 MG/1
0.5 TABLET ORAL 2 TIMES DAILY PRN
COMMUNITY
End: 2019-10-08

## 2019-10-08 RX ORDER — SULFAMETHOXAZOLE AND TRIMETHOPRIM 800; 160 MG/1; MG/1
1 TABLET ORAL 2 TIMES DAILY
Qty: 20 TABLET | Refills: 0 | Status: SHIPPED | OUTPATIENT
Start: 2019-10-08 | End: 2019-10-18

## 2019-10-08 RX ORDER — PROPRANOLOL HYDROCHLORIDE 40 MG/1
TABLET ORAL
Qty: 90 TABLET | Refills: 1 | Status: SHIPPED | OUTPATIENT
Start: 2019-10-08 | End: 2020-01-13

## 2019-10-08 ASSESSMENT — ENCOUNTER SYMPTOMS
COUGH: 1
SHORTNESS OF BREATH: 1
CONSTIPATION: 0
DIARRHEA: 0
NAUSEA: 0
WHEEZING: 0
VOMITING: 0
SINUS PRESSURE: 1
SORE THROAT: 0

## 2019-12-02 DIAGNOSIS — R25.1 TREMOR: ICD-10-CM

## 2019-12-02 RX ORDER — PANTOPRAZOLE SODIUM 40 MG/1
TABLET, DELAYED RELEASE ORAL
Qty: 90 TABLET | Refills: 3 | Status: SHIPPED
Start: 2019-12-02 | End: 2021-05-27 | Stop reason: SDUPTHER

## 2019-12-30 DIAGNOSIS — Z79.4 TYPE 2 DIABETES MELLITUS WITHOUT COMPLICATION, WITH LONG-TERM CURRENT USE OF INSULIN (HCC): ICD-10-CM

## 2019-12-30 DIAGNOSIS — E11.9 TYPE 2 DIABETES MELLITUS WITHOUT COMPLICATION, WITH LONG-TERM CURRENT USE OF INSULIN (HCC): ICD-10-CM

## 2019-12-30 DIAGNOSIS — F41.9 ANXIETY: ICD-10-CM

## 2019-12-30 RX ORDER — SIMVASTATIN 10 MG
TABLET ORAL
Qty: 90 TABLET | Refills: 0 | Status: SHIPPED
Start: 2019-12-30 | End: 2020-04-02

## 2020-01-13 RX ORDER — PROPRANOLOL HYDROCHLORIDE 40 MG/1
TABLET ORAL
Qty: 90 TABLET | Refills: 0 | Status: SHIPPED
Start: 2020-01-13 | End: 2020-03-02

## 2020-01-13 NOTE — TELEPHONE ENCOUNTER
[facility] called regarding his One Touch Ultra test strips. He is testing 5 times a day. Is that the correct amount of testing ? If so, can you send a new script for that amount of testing ?     Thank you

## 2020-01-23 RX ORDER — INSULIN LISPRO 100 [IU]/ML
INJECTION, SOLUTION INTRAVENOUS; SUBCUTANEOUS
Qty: 48 ML | Refills: 1 | Status: SHIPPED | OUTPATIENT
Start: 2020-01-23 | End: 2020-01-28 | Stop reason: SDUPTHER

## 2020-01-28 ENCOUNTER — OFFICE VISIT (OUTPATIENT)
Dept: FAMILY MEDICINE CLINIC | Age: 70
End: 2020-01-28
Payer: MEDICARE

## 2020-01-28 VITALS
WEIGHT: 215 LBS | OXYGEN SATURATION: 95 % | SYSTOLIC BLOOD PRESSURE: 136 MMHG | DIASTOLIC BLOOD PRESSURE: 80 MMHG | TEMPERATURE: 97.3 F | BODY MASS INDEX: 32.58 KG/M2 | HEART RATE: 80 BPM | HEIGHT: 68 IN

## 2020-01-28 LAB — HBA1C MFR BLD: 11.7 %

## 2020-01-28 PROCEDURE — G8484 FLU IMMUNIZE NO ADMIN: HCPCS | Performed by: NURSE PRACTITIONER

## 2020-01-28 PROCEDURE — 99214 OFFICE O/P EST MOD 30 MIN: CPT | Performed by: NURSE PRACTITIONER

## 2020-01-28 PROCEDURE — G8427 DOCREV CUR MEDS BY ELIG CLIN: HCPCS | Performed by: NURSE PRACTITIONER

## 2020-01-28 PROCEDURE — 1036F TOBACCO NON-USER: CPT | Performed by: NURSE PRACTITIONER

## 2020-01-28 PROCEDURE — 4040F PNEUMOC VAC/ADMIN/RCVD: CPT | Performed by: NURSE PRACTITIONER

## 2020-01-28 PROCEDURE — 2022F DILAT RTA XM EVC RTNOPTHY: CPT | Performed by: NURSE PRACTITIONER

## 2020-01-28 PROCEDURE — 3046F HEMOGLOBIN A1C LEVEL >9.0%: CPT | Performed by: NURSE PRACTITIONER

## 2020-01-28 PROCEDURE — G8417 CALC BMI ABV UP PARAM F/U: HCPCS | Performed by: NURSE PRACTITIONER

## 2020-01-28 PROCEDURE — 3017F COLORECTAL CA SCREEN DOC REV: CPT | Performed by: NURSE PRACTITIONER

## 2020-01-28 PROCEDURE — G8510 SCR DEP NEG, NO PLAN REQD: HCPCS | Performed by: NURSE PRACTITIONER

## 2020-01-28 PROCEDURE — 96372 THER/PROPH/DIAG INJ SC/IM: CPT | Performed by: NURSE PRACTITIONER

## 2020-01-28 PROCEDURE — 1123F ACP DISCUSS/DSCN MKR DOCD: CPT | Performed by: NURSE PRACTITIONER

## 2020-01-28 PROCEDURE — 83036 HEMOGLOBIN GLYCOSYLATED A1C: CPT | Performed by: NURSE PRACTITIONER

## 2020-01-28 RX ORDER — INSULIN LISPRO 100 [IU]/ML
INJECTION, SOLUTION INTRAVENOUS; SUBCUTANEOUS
Qty: 54 ML | Refills: 1 | Status: SHIPPED
Start: 2020-01-28 | End: 2020-03-02

## 2020-01-28 RX ORDER — BENZONATATE 100 MG/1
100 CAPSULE ORAL 3 TIMES DAILY PRN
Qty: 30 CAPSULE | Refills: 0 | Status: SHIPPED | OUTPATIENT
Start: 2020-01-28 | End: 2020-02-04

## 2020-01-28 RX ORDER — INSULIN LISPRO 100 [IU]/ML
INJECTION, SOLUTION INTRAVENOUS; SUBCUTANEOUS
Qty: 48 ML | Refills: 1 | Status: SHIPPED | OUTPATIENT
Start: 2020-01-28 | End: 2020-01-28

## 2020-01-28 RX ORDER — AZITHROMYCIN 250 MG/1
TABLET, FILM COATED ORAL
Qty: 1 PACKET | Refills: 0 | Status: SHIPPED
Start: 2020-01-28 | End: 2020-08-14 | Stop reason: ALTCHOICE

## 2020-01-28 RX ORDER — CYANOCOBALAMIN 1000 UG/ML
1000 INJECTION INTRAMUSCULAR; SUBCUTANEOUS ONCE
Status: COMPLETED | OUTPATIENT
Start: 2020-01-28 | End: 2020-01-28

## 2020-01-28 RX ADMIN — CYANOCOBALAMIN 1000 MCG: 1000 INJECTION INTRAMUSCULAR; SUBCUTANEOUS at 10:24

## 2020-01-28 ASSESSMENT — ENCOUNTER SYMPTOMS
SHORTNESS OF BREATH: 0
SINUS PRESSURE: 1
VOMITING: 0
NAUSEA: 0
CONSTIPATION: 0
WHEEZING: 0
COUGH: 1
DIARRHEA: 0
SORE THROAT: 0

## 2020-01-28 ASSESSMENT — PATIENT HEALTH QUESTIONNAIRE - PHQ9
SUM OF ALL RESPONSES TO PHQ QUESTIONS 1-9: 0
SUM OF ALL RESPONSES TO PHQ9 QUESTIONS 1 & 2: 0
2. FEELING DOWN, DEPRESSED OR HOPELESS: 0
SUM OF ALL RESPONSES TO PHQ QUESTIONS 1-9: 0
1. LITTLE INTEREST OR PLEASURE IN DOING THINGS: 0

## 2020-02-07 ENCOUNTER — TELEPHONE (OUTPATIENT)
Dept: FAMILY MEDICINE CLINIC | Age: 70
End: 2020-02-07

## 2020-03-02 RX ORDER — PROPRANOLOL HYDROCHLORIDE 40 MG/1
TABLET ORAL
Qty: 90 TABLET | Refills: 0 | Status: SHIPPED
Start: 2020-03-02 | End: 2020-07-31 | Stop reason: SDUPTHER

## 2020-03-17 ENCOUNTER — TELEPHONE (OUTPATIENT)
Dept: FAMILY MEDICINE CLINIC | Age: 70
End: 2020-03-17

## 2020-04-02 RX ORDER — SIMVASTATIN 10 MG
TABLET ORAL
Qty: 90 TABLET | Refills: 0 | Status: SHIPPED
Start: 2020-04-02 | End: 2020-07-13

## 2020-04-02 RX ORDER — LEVOTHYROXINE SODIUM 0.2 MG/1
TABLET ORAL
Qty: 90 TABLET | Refills: 0 | Status: SHIPPED
Start: 2020-04-02 | End: 2020-06-30

## 2020-04-08 RX ORDER — INSULIN GLARGINE 300 U/ML
35 INJECTION, SOLUTION SUBCUTANEOUS NIGHTLY
COMMUNITY
Start: 2020-03-18 | End: 2020-04-08 | Stop reason: SDUPTHER

## 2020-04-08 RX ORDER — INSULIN GLARGINE 300 U/ML
35 INJECTION, SOLUTION SUBCUTANEOUS NIGHTLY
Qty: 5 PEN | Refills: 1 | Status: SHIPPED
Start: 2020-04-08 | End: 2020-04-28 | Stop reason: DRUGHIGH

## 2020-04-28 ENCOUNTER — HOSPITAL ENCOUNTER (OUTPATIENT)
Age: 70
Discharge: HOME | End: 2020-04-30
Payer: MEDICARE

## 2020-04-28 ENCOUNTER — OFFICE VISIT (OUTPATIENT)
Dept: FAMILY MEDICINE CLINIC | Age: 70
End: 2020-04-28
Payer: MEDICARE

## 2020-04-28 VITALS
OXYGEN SATURATION: 98 % | DIASTOLIC BLOOD PRESSURE: 80 MMHG | HEART RATE: 73 BPM | SYSTOLIC BLOOD PRESSURE: 128 MMHG | HEIGHT: 68 IN | WEIGHT: 217.9 LBS | BODY MASS INDEX: 33.02 KG/M2 | RESPIRATION RATE: 14 BRPM | TEMPERATURE: 98.3 F

## 2020-04-28 LAB
ALBUMIN SERPL-MCNC: 4.3 G/DL (ref 3.5–5.2)
ALP BLD-CCNC: 112 U/L (ref 40–129)
ALT SERPL-CCNC: 36 U/L (ref 0–40)
ANION GAP SERPL CALCULATED.3IONS-SCNC: 14 MMOL/L (ref 7–16)
ANISOCYTOSIS: ABNORMAL
AST SERPL-CCNC: 30 U/L (ref 0–39)
BASOPHILS ABSOLUTE: 0.01 E9/L (ref 0–0.2)
BASOPHILS RELATIVE PERCENT: 0.2 % (ref 0–2)
BILIRUB SERPL-MCNC: 0.5 MG/DL (ref 0–1.2)
BUN BLDV-MCNC: 28 MG/DL (ref 8–23)
BURR CELLS: ABNORMAL
CALCIUM SERPL-MCNC: 9.3 MG/DL (ref 8.6–10.2)
CHLORIDE BLD-SCNC: 99 MMOL/L (ref 98–107)
CHOLESTEROL, TOTAL: 131 MG/DL (ref 0–199)
CO2: 26 MMOL/L (ref 22–29)
CREAT SERPL-MCNC: 1 MG/DL (ref 0.7–1.2)
CREATININE URINE POCT: 200
EOSINOPHILS ABSOLUTE: 0.17 E9/L (ref 0.05–0.5)
EOSINOPHILS RELATIVE PERCENT: 2.8 % (ref 0–6)
GFR AFRICAN AMERICAN: >60
GFR NON-AFRICAN AMERICAN: >60 ML/MIN/1.73
GLUCOSE BLD-MCNC: 269 MG/DL (ref 74–99)
HBA1C MFR BLD: 10.7 %
HCT VFR BLD CALC: 39.8 % (ref 37–54)
HDLC SERPL-MCNC: 22 MG/DL
HEMOGLOBIN: 12.1 G/DL (ref 12.5–16.5)
IMMATURE GRANULOCYTES #: 0.01 E9/L
IMMATURE GRANULOCYTES %: 0.2 % (ref 0–5)
LDL CHOLESTEROL CALCULATED: 63 MG/DL (ref 0–99)
LYMPHOCYTES ABSOLUTE: 4.52 E9/L (ref 1.5–4)
LYMPHOCYTES RELATIVE PERCENT: 74 % (ref 20–42)
MCH RBC QN AUTO: 26.1 PG (ref 26–35)
MCHC RBC AUTO-ENTMCNC: 30.4 % (ref 32–34.5)
MCV RBC AUTO: 86 FL (ref 80–99.9)
MICROALBUMIN/CREAT 24H UR: 30 MG/G{CREAT}
MICROALBUMIN/CREAT UR-RTO: <30
MONOCYTES ABSOLUTE: 0.42 E9/L (ref 0.1–0.95)
MONOCYTES RELATIVE PERCENT: 6.9 % (ref 2–12)
NEUTROPHILS ABSOLUTE: 0.98 E9/L (ref 1.8–7.3)
NEUTROPHILS RELATIVE PERCENT: 15.9 % (ref 43–80)
OVALOCYTES: ABNORMAL
PDW BLD-RTO: 15.9 FL (ref 11.5–15)
PLATELET # BLD: 89 E9/L (ref 130–450)
PLATELET CONFIRMATION: NORMAL
PMV BLD AUTO: 12 FL (ref 7–12)
POIKILOCYTES: ABNORMAL
POLYCHROMASIA: ABNORMAL
POTASSIUM SERPL-SCNC: 5.4 MMOL/L (ref 3.5–5)
PROSTATE SPECIFIC ANTIGEN: 0.6 NG/ML (ref 0–4)
RBC # BLD: 4.63 E12/L (ref 3.8–5.8)
SODIUM BLD-SCNC: 139 MMOL/L (ref 132–146)
TOTAL PROTEIN: 6.4 G/DL (ref 6.4–8.3)
TRIGL SERPL-MCNC: 230 MG/DL (ref 0–149)
VLDLC SERPL CALC-MCNC: 46 MG/DL
WBC # BLD: 6.1 E9/L (ref 4.5–11.5)

## 2020-04-28 PROCEDURE — 83036 HEMOGLOBIN GLYCOSYLATED A1C: CPT | Performed by: FAMILY MEDICINE

## 2020-04-28 PROCEDURE — 80061 LIPID PANEL: CPT

## 2020-04-28 PROCEDURE — 3046F HEMOGLOBIN A1C LEVEL >9.0%: CPT | Performed by: FAMILY MEDICINE

## 2020-04-28 PROCEDURE — 4040F PNEUMOC VAC/ADMIN/RCVD: CPT | Performed by: FAMILY MEDICINE

## 2020-04-28 PROCEDURE — 85025 COMPLETE CBC W/AUTO DIFF WBC: CPT

## 2020-04-28 PROCEDURE — 80053 COMPREHEN METABOLIC PANEL: CPT

## 2020-04-28 PROCEDURE — G0103 PSA SCREENING: HCPCS

## 2020-04-28 PROCEDURE — 1123F ACP DISCUSS/DSCN MKR DOCD: CPT | Performed by: FAMILY MEDICINE

## 2020-04-28 PROCEDURE — G0439 PPPS, SUBSEQ VISIT: HCPCS | Performed by: FAMILY MEDICINE

## 2020-04-28 PROCEDURE — 3017F COLORECTAL CA SCREEN DOC REV: CPT | Performed by: FAMILY MEDICINE

## 2020-04-28 PROCEDURE — 36415 COLL VENOUS BLD VENIPUNCTURE: CPT

## 2020-04-28 PROCEDURE — 82044 UR ALBUMIN SEMIQUANTITATIVE: CPT | Performed by: FAMILY MEDICINE

## 2020-04-28 RX ORDER — LORAZEPAM 0.5 MG/1
0.5 TABLET ORAL 2 TIMES DAILY PRN
Qty: 60 TABLET | Refills: 0 | Status: SHIPPED | OUTPATIENT
Start: 2020-04-28 | End: 2020-11-05 | Stop reason: SDUPTHER

## 2020-04-28 RX ORDER — INSULIN LISPRO 100 [IU]/ML
INJECTION, SOLUTION INTRAVENOUS; SUBCUTANEOUS
Qty: 48 ML | Refills: 1 | Status: SHIPPED
Start: 2020-04-28 | End: 2021-02-01 | Stop reason: SDUPTHER

## 2020-04-28 RX ORDER — INSULIN GLARGINE 300 U/ML
45 INJECTION, SOLUTION SUBCUTANEOUS NIGHTLY
Qty: 5 PEN | Refills: 2 | Status: SHIPPED
Start: 2020-04-28 | End: 2020-09-08

## 2020-04-28 ASSESSMENT — PATIENT HEALTH QUESTIONNAIRE - PHQ9
SUM OF ALL RESPONSES TO PHQ QUESTIONS 1-9: 0
SUM OF ALL RESPONSES TO PHQ QUESTIONS 1-9: 0

## 2020-04-28 NOTE — PROGRESS NOTES
Medicare Annual Wellness Visit  Name: Tamica Moraes Date: 2020   MRN: [id number] Sex: Male   Age: 81 y.o. Ethnicity: Non-/Non    : 1950 Race: Brianna Kitchen is here for Medicare AWV (here for AWV) and Diabetes (FBS this morning 250. \"always 250, 300, i just keep giving myself the humalog\". Due for a1c, microalbumin)    Screenings for behavioral, psychosocial and functional/safety risks, and cognitive dysfunction are all negative except as indicated below. These results, as well as other patient data from the [address] form, are documented in Flowsheets linked to this Encounter. Allergies   Allergen Reactions    Pneumococcal Vaccines Dermatitis    Ciprofloxacin      Shuts kidneys down    Diatrizoate      IS able to have CT scans with premedication    Dye [Iodides]      statrted to go into cardiac arrest    Epinephrine     Iodine     Penicillin G      Unsure if he is still allergic to PCN, or if he ever was    Morphine Nausea And Vomiting    Other Nausea And Vomiting     fish    Oxycodone Nausea And Vomiting         Prior to Visit Medications    Medication Sig Taking? Authorizing Provider   LORazepam (ATIVAN) 0.5 MG tablet Take 1 tablet by mouth 2 times daily as needed for Anxiety.  Yes Liza Bennett,    sertraline (ZOLOFT) 50 MG tablet TAKE 1 1/2 TABLETS BY MOUTH DAILY Yes Liza Bennett DO   Dulaglutide 0.75 MG/0.5ML SOPN Inject 0.75 mg into the skin once a week Yes Liza Bennett DO   insulin lispro, 1 Unit Dial, (HUMALOG KWIKPEN) 100 UNIT/ML SOPN INJECT 0-20 UNITS INTO THE SKIN THREE TIMES DAILY(BEFORE MEALS) Yes Liza Bennett DO   Insulin Glargine, 1 Unit Dial, (TOUJEO SOLOSTAR) 300 UNIT/ML SOPN Inject 45 Units into the skin nightly Yes Liza Bennett DO   simvastatin (ZOCOR) 10 MG tablet TAKE 1 TABLET BY MOUTH EVERY NIGHT Yes JYOTI Monteiro CNP   levothyroxine (SYNTHROID) 200 MCG tablet TAKE 1 01/03/2013, 04/25/2013, 08/22/2013    Hib, unspecified 01/03/2013, 04/25/2013, 08/22/2013    Influenza, High Dose (Fluzone 65 yrs and older) 09/25/2017    Influenza, Epi Ashley, IM, (6 mo and older Fluzone, Flulaval, Fluarix and 3 yrs and older Afluria) 09/18/2014    MMR 09/18/2014, 06/02/2016    Pneumococcal Conjugate 13-valent (Jessica Kasal) 06/18/2017    Polio IPV (IPOL) 01/03/2013, 04/25/2013, 08/22/2013        Health Maintenance   Topic Date Due    Diabetic foot exam  05/09/1962    Diabetic retinal exam  05/09/1962    Shingles Vaccine (1 of 2) 05/09/2002    Annual Wellness Visit (AWV)  05/29/2019    Lipid screen  07/05/2019    Diabetic microalbuminuria test  02/19/2020    A1C test (Diabetic or Prediabetic)  04/28/2020    Potassium monitoring  05/07/2020    Creatinine monitoring  05/07/2020    DTaP/Tdap/Td vaccine (4 - Tdap) 08/22/2023    Colon cancer screen colonoscopy  04/25/2025    Flu vaccine  Completed    AAA screen  Completed    Hepatitis C screen  Completed    Hepatitis A vaccine  Aged Out    Hib vaccine  Aged Out    Meningococcal (ACWY) vaccine  Aged Out     Recommendations for [employer] Due: see orders and patient instructions/AVS.  . Recommended screening schedule for the next 5-10 years is provided to the patient in written form: see Patient Álvaro Swan was seen today for medicare awv and diabetes. Diagnoses and all orders for this visit:    Anxiety  -     LORazepam (ATIVAN) 0.5 MG tablet; Take 1 tablet by mouth 2 times daily as needed for Anxiety.   -     sertraline (ZOLOFT) 50 MG tablet; TAKE 1 1/2 TABLETS BY MOUTH DAILY    Type 2 diabetes mellitus without complication, with long-term current use of insulin (HCC)  -     POCT glycosylated hemoglobin (Hb A1C)  -     POCT Microalbumin  -     Dulaglutide 0.75 MG/0.5ML SOPN; Inject 0.75 mg into the skin once a week    Uncontrolled type 2 diabetes mellitus with hyperglycemia (HCC)  -     insulin lispro, 1 Unit Dial, (HUMALOG KWIKPEN) 100 UNIT/ML SOPN; INJECT 0-20 UNITS INTO THE SKIN THREE TIMES DAILY(BEFORE MEALS)    Other orders  -     Insulin Glargine, 1 Unit Dial, (TOUJEO SOLOSTAR) 300 UNIT/ML SOPN; Inject 45 Units into the skin nightly          The current medical regimen is effective;  continue present plan and medications. Controlled substances monitoring: no signs of potential drug abuse or diversion identified and [place] report reviewed today- activity consistent with treatment plan.

## 2020-05-28 ENCOUNTER — TELEPHONE (OUTPATIENT)
Dept: FAMILY MEDICINE CLINIC | Age: 70
End: 2020-05-28

## 2020-05-28 RX ORDER — ICOSAPENT ETHYL 1000 MG/1
2 CAPSULE ORAL 2 TIMES DAILY
Qty: 120 CAPSULE | Refills: 3 | Status: SHIPPED
Start: 2020-05-28 | End: 2022-02-24

## 2020-05-28 NOTE — TELEPHONE ENCOUNTER
Had labs drawn 4/28/20 dr. Efrain Park reviewed but no notes put in for patient.  Pt wants to know how labs were

## 2020-06-19 ENCOUNTER — TELEPHONE (OUTPATIENT)
Dept: FAMILY MEDICINE CLINIC | Age: 70
End: 2020-06-19

## 2020-06-30 RX ORDER — LEVOTHYROXINE SODIUM 0.2 MG/1
TABLET ORAL
Qty: 90 TABLET | Refills: 0 | Status: SHIPPED
Start: 2020-06-30 | End: 2020-09-22 | Stop reason: SDUPTHER

## 2020-06-30 RX ORDER — PEN NEEDLE, DIABETIC 32GX 5/32"
NEEDLE, DISPOSABLE MISCELLANEOUS
Qty: 100 EACH | Refills: 3 | Status: SHIPPED
Start: 2020-06-30 | End: 2021-04-05 | Stop reason: SDUPTHER

## 2020-07-07 ENCOUNTER — HOSPITAL ENCOUNTER (OUTPATIENT)
Age: 70
Discharge: HOME | End: 2020-07-09
Payer: MEDICARE

## 2020-07-07 LAB — TSH SERPL DL<=0.05 MIU/L-ACNC: 3.21 UIU/ML (ref 0.27–4.2)

## 2020-07-07 PROCEDURE — 36415 COLL VENOUS BLD VENIPUNCTURE: CPT

## 2020-07-07 PROCEDURE — 84443 ASSAY THYROID STIM HORMONE: CPT

## 2020-07-13 RX ORDER — SIMVASTATIN 10 MG
TABLET ORAL
Qty: 90 TABLET | Refills: 0 | Status: SHIPPED
Start: 2020-07-13 | End: 2020-11-02 | Stop reason: SDUPTHER

## 2020-07-31 RX ORDER — PROPRANOLOL HYDROCHLORIDE 40 MG/1
TABLET ORAL
Qty: 90 TABLET | Refills: 2 | Status: SHIPPED
Start: 2020-07-31 | End: 2020-11-05 | Stop reason: SDUPTHER

## 2020-08-14 ENCOUNTER — OFFICE VISIT (OUTPATIENT)
Dept: FAMILY MEDICINE CLINIC | Age: 70
End: 2020-08-14
Payer: MEDICARE

## 2020-08-14 VITALS
RESPIRATION RATE: 14 BRPM | OXYGEN SATURATION: 97 % | HEART RATE: 73 BPM | SYSTOLIC BLOOD PRESSURE: 116 MMHG | HEIGHT: 68 IN | DIASTOLIC BLOOD PRESSURE: 74 MMHG | WEIGHT: 219 LBS | BODY MASS INDEX: 33.19 KG/M2 | TEMPERATURE: 97.3 F

## 2020-08-14 PROBLEM — D69.6 THROMBOCYTOPENIA (HCC): Status: ACTIVE | Noted: 2020-08-14

## 2020-08-14 LAB — HBA1C MFR BLD: 9.4 %

## 2020-08-14 PROCEDURE — 99213 OFFICE O/P EST LOW 20 MIN: CPT | Performed by: NURSE PRACTITIONER

## 2020-08-14 PROCEDURE — 3046F HEMOGLOBIN A1C LEVEL >9.0%: CPT | Performed by: NURSE PRACTITIONER

## 2020-08-14 PROCEDURE — 83036 HEMOGLOBIN GLYCOSYLATED A1C: CPT | Performed by: NURSE PRACTITIONER

## 2020-08-14 PROCEDURE — 1036F TOBACCO NON-USER: CPT | Performed by: NURSE PRACTITIONER

## 2020-08-14 PROCEDURE — G8417 CALC BMI ABV UP PARAM F/U: HCPCS | Performed by: NURSE PRACTITIONER

## 2020-08-14 PROCEDURE — 4040F PNEUMOC VAC/ADMIN/RCVD: CPT | Performed by: NURSE PRACTITIONER

## 2020-08-14 PROCEDURE — 1123F ACP DISCUSS/DSCN MKR DOCD: CPT | Performed by: NURSE PRACTITIONER

## 2020-08-14 PROCEDURE — G8427 DOCREV CUR MEDS BY ELIG CLIN: HCPCS | Performed by: NURSE PRACTITIONER

## 2020-08-14 PROCEDURE — 3017F COLORECTAL CA SCREEN DOC REV: CPT | Performed by: NURSE PRACTITIONER

## 2020-08-14 PROCEDURE — 2022F DILAT RTA XM EVC RTNOPTHY: CPT | Performed by: NURSE PRACTITIONER

## 2020-08-14 ASSESSMENT — ENCOUNTER SYMPTOMS
NAUSEA: 0
COUGH: 0
CONSTIPATION: 0
DIARRHEA: 0
SHORTNESS OF BREATH: 1
WHEEZING: 0
VOMITING: 0

## 2020-08-14 NOTE — PROGRESS NOTES
HPI:  Patient comes intoday for   Chief Complaint   Patient presents with    Diabetes     a1c was 10.7 in april. pt does check blood sugars. pt states they \"were good\" at 150s/180s and now states they have been at 280s-300s and it was 400 today after a glass of milk.  Discuss Medications     not taking ozempic due to price. pt admits to not notifying office to try and find alternative/coupon   . Patient is compliant with medications but not with diet and has no regular exercise regimen. He does try to limit bread, pasta and crackers. He does monitor his FBS and states his readings range from 100-200s. Has not had diabetic eye exam this year. Patient follows with [facility] for history of leukemia and thrombocytopenia. Appointments have been rescheduled due to Ad[place]    Prior to Visit Medications    Medication Sig Taking? Authorizing Provider   propranolol (INDERAL) 40 MG tablet TAKE 1 TABLET BY MOUTH THREE TIMES DAILY Yes Brett Aragon APRN - CNP   simvastatin (ZOCOR) 10 MG tablet TAKE 1 TABLET BY MOUTH EVERY NIGHT Yes Antonio Page, DO   Insulin Pen Needle (BD PEN NEEDLE JENNIFER U/F) 32G X 4 MM MISC USE AS DIRECTED Yes JYOTI Stone - CNP   levothyroxine (SYNTHROID) 200 MCG tablet TAKE 1 TABLET BY MOUTH DAILY Yes JYOTI Stone CNP   blood glucose test strips (ONE TOUCH ULTRA TEST) strip Test up to 5 times daily As needed. Yes JYOTI Stone - CNP   metFORMIN (GLUCOPHAGE) 500 MG tablet Take 1 tablet by mouth 2 times daily (with meals) Yes Antonio Page DO   Icosapent Ethyl (VASCEPA) 1 g CAPS capsule Take 2 capsules by mouth 2 times daily Yes Lary Lutz, APRN - CNP   LORazepam (ATIVAN) 0.5 MG tablet Take 1 tablet by mouth 2 times daily as needed for Anxiety.  Yes Antonio Page DO   sertraline (ZOLOFT) 50 MG tablet TAKE 1 1/2 TABLETS BY MOUTH DAILY Yes Atnonio Page, DO   insulin lispro, 1 Unit Dial, (HUMALOG KWIKPEN) 100 UNIT/ML SOPN INJECT 0-20 UNITS INTO THE SKIN THREE TIMES DAILY(BEFORE MEALS) Yes Vega Tucker, DO   Insulin Glargine, 1 Unit Dial, (TOUJEO SOLOSTAR) 300 UNIT/ML SOPN Inject 45 Units into the skin nightly Yes Vega Tucker, DO   pantoprazole (PROTONIX) 40 MG tablet TAKE 1 TABLET DAILY Yes Ulysses Kempf, APRN - CNP   spironolactone (ALDACTONE) 25 MG tablet TAKE 1 TABLET BY MOUTH TWICE DAILY Yes Lary Lutz APRN - CNP   vitamin D (CHOLECALCIFEROL) 1000 UNIT TABS tablet Take 1,000 Units by mouth daily Yes Historical Provider, MD   isosorbide mononitrate (IMDUR) 30 MG extended release tablet Take 15 mg by mouth daily  Yes Historical Provider, MD   aspirin 81 MG chewable tablet Take 81 mg by mouth daily Yes Historical Provider, MD         Allergies   Allergen Reactions    Pneumococcal Vaccines Dermatitis    Ciprofloxacin      Shuts kidneys down    Diatrizoate      IS able to have CT scans with premedication    Dye [Iodides]      statrted to go into cardiac arrest    Epinephrine     Iodine     Penicillin G      Unsure if he is still allergic to PCN, or if he ever was    Morphine Nausea And Vomiting    Other Nausea And Vomiting     fish    Oxycodone Nausea And Vomiting         Review of Systems  Review of Systems   Constitutional: Negative for activity change, appetite change and unexpected weight change. Respiratory: Positive for shortness of breath. Negative for cough and wheezing. Cardiovascular: Negative for chest pain, palpitations and leg swelling. Gastrointestinal: Negative for constipation, diarrhea, nausea and vomiting. Endocrine: Positive for polydipsia and polyuria. Neurological: Negative for weakness, light-headedness, numbness and headaches. VS:  /74   Pulse 73   Temp 97.3 °F (36.3 °C) (Temporal)   Resp 14   Ht 5' 8\" (1.727 m)   Wt 219 lb (99.3 kg)   SpO2 97%   BMI 33.30 kg/m²     Physical Exam  Physical Exam  Constitutional:       General: He is not in acute distress.      Appearance: He is well-developed. HENT:      Head: Normocephalic and atraumatic. Neck:      Thyroid: No thyromegaly. Trachea: No tracheal deviation. Cardiovascular:      Rate and Rhythm: Normal rate and regular rhythm. Pulses: Normal pulses. Heart sounds: No murmur. Comments: Varicose veins on left leg  Pulmonary:      Effort: Pulmonary effort is normal. No respiratory distress. Breath sounds: Normal breath sounds. No wheezing or rales. Chest:      Chest wall: No tenderness. Abdominal:      General: Bowel sounds are normal.      Palpations: Abdomen is soft. Tenderness: There is no abdominal tenderness. Musculoskeletal:      Right lower leg: No edema. Left lower leg: No edema. Lymphadenopathy:      Cervical: No cervical adenopathy. Skin:     General: Skin is warm and dry. Neurological:      Mental Status: He is alert and oriented to person, place, and time. Psychiatric:         Mood and Affect: Mood normal.         Behavior: Behavior normal.           Assessment/Plan:  Susan Landin was seen today for diabetes and discuss medications. Diagnoses and all orders for this visit:    Type 2 diabetes mellitus without complication, with long-term current use of insulin ([facility])  -     POCT glycosylated hemoglobin (Hb A1C)  -uncontrolled, improved from last visit but still not controlled  -instructed on titration of basal insulin, increase one unit for 3 consecutive fasting BS > 150  -encouraged to forward readings to office weekly    Thrombocytopenia ([facility] [place] [address]  -stable, The current medical regimen is effective;  continue present plan and medications.   -recheck in one year        Greater than 15  Minutes was spent with patient and more than 50% of the time was spent face to facecounseling and educating regarding diagnoses

## 2020-09-08 RX ORDER — INSULIN GLARGINE 300 U/ML
INJECTION, SOLUTION SUBCUTANEOUS
Qty: 7.5 ML | OUTPATIENT
Start: 2020-09-08

## 2020-09-08 RX ORDER — INSULIN GLARGINE 300 U/ML
INJECTION, SOLUTION SUBCUTANEOUS
Qty: 7.5 ML | Refills: 2 | Status: SHIPPED
Start: 2020-09-08 | End: 2020-09-21 | Stop reason: SDUPTHER

## 2020-09-21 RX ORDER — INSULIN GLARGINE 300 U/ML
INJECTION, SOLUTION SUBCUTANEOUS
Qty: 7.5 ML | Refills: 2 | Status: SHIPPED
Start: 2020-09-21 | End: 2020-10-07

## 2020-09-22 RX ORDER — LEVOTHYROXINE SODIUM 0.2 MG/1
TABLET ORAL
Qty: 90 TABLET | Refills: 0 | Status: SHIPPED
Start: 2020-09-22 | End: 2020-12-30 | Stop reason: SDUPTHER

## 2020-10-07 ENCOUNTER — OFFICE VISIT (OUTPATIENT)
Dept: FAMILY MEDICINE CLINIC | Age: 70
End: 2020-10-07
Payer: MEDICARE

## 2020-10-07 VITALS
HEIGHT: 68 IN | TEMPERATURE: 97 F | HEART RATE: 70 BPM | WEIGHT: 223 LBS | SYSTOLIC BLOOD PRESSURE: 130 MMHG | DIASTOLIC BLOOD PRESSURE: 78 MMHG | BODY MASS INDEX: 33.8 KG/M2 | OXYGEN SATURATION: 97 %

## 2020-10-07 PROCEDURE — 3046F HEMOGLOBIN A1C LEVEL >9.0%: CPT | Performed by: FAMILY MEDICINE

## 2020-10-07 PROCEDURE — 1036F TOBACCO NON-USER: CPT | Performed by: FAMILY MEDICINE

## 2020-10-07 PROCEDURE — 99213 OFFICE O/P EST LOW 20 MIN: CPT | Performed by: FAMILY MEDICINE

## 2020-10-07 PROCEDURE — G8427 DOCREV CUR MEDS BY ELIG CLIN: HCPCS | Performed by: FAMILY MEDICINE

## 2020-10-07 PROCEDURE — G8484 FLU IMMUNIZE NO ADMIN: HCPCS | Performed by: FAMILY MEDICINE

## 2020-10-07 PROCEDURE — G8417 CALC BMI ABV UP PARAM F/U: HCPCS | Performed by: FAMILY MEDICINE

## 2020-10-07 PROCEDURE — 1123F ACP DISCUSS/DSCN MKR DOCD: CPT | Performed by: FAMILY MEDICINE

## 2020-10-07 PROCEDURE — 3017F COLORECTAL CA SCREEN DOC REV: CPT | Performed by: FAMILY MEDICINE

## 2020-10-07 PROCEDURE — 4040F PNEUMOC VAC/ADMIN/RCVD: CPT | Performed by: FAMILY MEDICINE

## 2020-10-07 PROCEDURE — 2022F DILAT RTA XM EVC RTNOPTHY: CPT | Performed by: FAMILY MEDICINE

## 2020-10-07 ASSESSMENT — ENCOUNTER SYMPTOMS
DIARRHEA: 0
CONSTIPATION: 0
WHEEZING: 0
BLOOD IN STOOL: 0

## 2020-10-07 NOTE — PROGRESS NOTES
HPI:  Patient comes in today for   Chief Complaint   Patient presents with    Diabetes     sugar still running high stopped taking [address] Maintenance     had flu shot           Review of Systems  Review of Systems   Constitutional: Negative for chills and diaphoresis. HENT: Negative for ear discharge, ear pain, hearing loss, nosebleeds and tinnitus. Respiratory: Negative for wheezing. Cardiovascular: Negative for chest pain. Gastrointestinal: Negative for blood in stool, constipation and diarrhea. Genitourinary: Negative for dysuria, flank pain and hematuria. Skin: Negative for rash. Neurological: Negative for headaches. Hematological: Does not bruise/bleed easily. PE[de-identified]  /78   Pulse 70   Temp 97 °F (36.1 °C)   Ht 5' 8\" (1.727 m)   Wt 223 lb (101.2 kg)   SpO2 97%   BMI 33.91 kg/m²       Physical Exam  Constitutional:       Appearance: Normal appearance. He is well-developed. HENT:      Head: Normocephalic and atraumatic. Eyes:      General: No scleral icterus. Conjunctiva/sclera: Conjunctivae normal.      Pupils: Pupils are equal, round, and reactive to light. Neck:      Musculoskeletal: Neck supple. Thyroid: No thyromegaly. Vascular: No JVD. Trachea: No tracheal deviation. Cardiovascular:      Rate and Rhythm: Normal rate and regular rhythm. Heart sounds: Normal heart sounds. No murmur. No gallop. Pulmonary:      Effort: Pulmonary effort is normal. No respiratory distress. Breath sounds: Normal breath sounds. No wheezing. Abdominal:      Palpations: Abdomen is soft. Musculoskeletal: Normal range of motion. General: No tenderness. Skin:     General: Skin is warm. Capillary Refill: Capillary refill takes less than 2 seconds. Findings: No erythema or rash. Neurological:      General: No focal deficit present. Mental Status: He is alert and oriented to person, place, and time.       Deep Tendon Reflexes: Reflexes normal.      Comments:                 ASSESSMENT/PLAN:    1. Type 2 diabetes mellitus without complication, with long-term current use of insulin (HCC)  - Braulio Baldwin MD, Endocrinology, [place]  - metFORMIN (GLUCOPHAGE) 1000 MG tablet; Take 1 tablet by mouth 2 times daily (with meals)  Dispense: 60 tablet; Refill: 3         Greater than 15 minutes was spent during this visit counseling this patient. ROMY London.

## 2020-11-02 RX ORDER — SIMVASTATIN 10 MG
TABLET ORAL
Qty: 90 TABLET | Refills: 0 | Status: SHIPPED
Start: 2020-11-02 | End: 2020-12-21

## 2020-11-05 ENCOUNTER — OFFICE VISIT (OUTPATIENT)
Dept: FAMILY MEDICINE CLINIC | Age: 70
End: 2020-11-05
Payer: MEDICARE

## 2020-11-05 VITALS
DIASTOLIC BLOOD PRESSURE: 60 MMHG | HEART RATE: 76 BPM | BODY MASS INDEX: 31.55 KG/M2 | TEMPERATURE: 98 F | SYSTOLIC BLOOD PRESSURE: 100 MMHG | HEIGHT: 69 IN | OXYGEN SATURATION: 96 % | WEIGHT: 213 LBS

## 2020-11-05 PROCEDURE — G8427 DOCREV CUR MEDS BY ELIG CLIN: HCPCS | Performed by: FAMILY MEDICINE

## 2020-11-05 PROCEDURE — 96372 THER/PROPH/DIAG INJ SC/IM: CPT | Performed by: FAMILY MEDICINE

## 2020-11-05 PROCEDURE — 1036F TOBACCO NON-USER: CPT | Performed by: FAMILY MEDICINE

## 2020-11-05 PROCEDURE — G8484 FLU IMMUNIZE NO ADMIN: HCPCS | Performed by: FAMILY MEDICINE

## 2020-11-05 PROCEDURE — 1123F ACP DISCUSS/DSCN MKR DOCD: CPT | Performed by: FAMILY MEDICINE

## 2020-11-05 PROCEDURE — 99213 OFFICE O/P EST LOW 20 MIN: CPT | Performed by: FAMILY MEDICINE

## 2020-11-05 PROCEDURE — G8417 CALC BMI ABV UP PARAM F/U: HCPCS | Performed by: FAMILY MEDICINE

## 2020-11-05 PROCEDURE — 4040F PNEUMOC VAC/ADMIN/RCVD: CPT | Performed by: FAMILY MEDICINE

## 2020-11-05 PROCEDURE — 3017F COLORECTAL CA SCREEN DOC REV: CPT | Performed by: FAMILY MEDICINE

## 2020-11-05 RX ORDER — PROPRANOLOL HYDROCHLORIDE 40 MG/1
40 TABLET ORAL 2 TIMES DAILY
Qty: 30 TABLET | Refills: 3 | Status: SHIPPED
Start: 2020-11-05 | End: 2020-11-09 | Stop reason: SDUPTHER

## 2020-11-05 RX ORDER — CEFTRIAXONE 500 MG/1
500 INJECTION, POWDER, FOR SOLUTION INTRAMUSCULAR; INTRAVENOUS ONCE
Qty: 500 MG | Refills: 0
Start: 2020-11-05 | End: 2020-11-05 | Stop reason: CLARIF

## 2020-11-05 RX ORDER — CEFTRIAXONE 500 MG/1
500 INJECTION, POWDER, FOR SOLUTION INTRAMUSCULAR; INTRAVENOUS ONCE
Qty: 500 MG | Refills: 0 | Status: CANCELLED
Start: 2020-11-05 | End: 2020-11-05

## 2020-11-05 RX ORDER — LORAZEPAM 0.5 MG/1
0.5 TABLET ORAL 2 TIMES DAILY PRN
Qty: 60 TABLET | Refills: 0 | Status: SHIPPED | OUTPATIENT
Start: 2020-11-05 | End: 2021-11-05

## 2020-11-05 RX ORDER — CEFTRIAXONE 500 MG/1
500 INJECTION, POWDER, FOR SOLUTION INTRAMUSCULAR; INTRAVENOUS ONCE
Status: COMPLETED | OUTPATIENT
Start: 2020-11-05 | End: 2020-11-05

## 2020-11-05 RX ADMIN — CEFTRIAXONE 500 MG: 500 INJECTION, POWDER, FOR SOLUTION INTRAMUSCULAR; INTRAVENOUS at 16:43

## 2020-11-05 ASSESSMENT — ENCOUNTER SYMPTOMS
BLOOD IN STOOL: 0
CONSTIPATION: 0
DIARRHEA: 0
WHEEZING: 0

## 2020-11-05 NOTE — PROGRESS NOTES
HPI:  Patient comes in today for   Chief Complaint   Patient presents with    Cough    Chest Congestion           Review of Systems  Review of Systems   Constitutional: Negative for chills and diaphoresis. HENT: Negative for ear discharge, ear pain, hearing loss, nosebleeds and tinnitus. Respiratory: Negative for wheezing. Cardiovascular: Negative for chest pain. Gastrointestinal: Negative for blood in stool, constipation and diarrhea. Genitourinary: Negative for dysuria, flank pain and hematuria. Skin: Negative for rash. Neurological: Negative for headaches. Hematological: Does not bruise/bleed easily. PE[de-identified]  /60   Pulse 76   Temp 98 °F (36.7 °C)   Ht 5' 9\" (1.753 m)   Wt 213 lb (96.6 kg)   SpO2 96%   BMI 31.45 kg/m²       Physical Exam  Constitutional:       Appearance: Normal appearance. He is well-developed. HENT:      Head: Normocephalic and atraumatic. Eyes:      General: No scleral icterus. Conjunctiva/sclera: Conjunctivae normal.      Pupils: Pupils are equal, round, and reactive to light. Neck:      Musculoskeletal: Neck supple. Thyroid: No thyromegaly. Vascular: No JVD. Trachea: No tracheal deviation. Cardiovascular:      Rate and Rhythm: Normal rate and regular rhythm. Heart sounds: Normal heart sounds. No murmur. No gallop. Pulmonary:      Effort: Pulmonary effort is normal. No respiratory distress. Breath sounds: Normal breath sounds. No wheezing. Abdominal:      Palpations: Abdomen is soft. Musculoskeletal: Normal range of motion. General: No tenderness. Skin:     General: Skin is warm. Capillary Refill: Capillary refill takes less than 2 seconds. Findings: No erythema or rash. Neurological:      General: No focal deficit present. Mental Status: He is alert and oriented to person, place, and time.       Deep Tendon Reflexes: Reflexes normal.      Comments:        Psychiatric:         Mood and Affect: Mood normal.           Assessment/Plan:  Jonah Daughters was seen today for cough and chest congestion. Diagnoses and all orders for this visit:    Bronchitis with bronchospasm    Anxiety  -     LORazepam (ATIVAN) 0.5 MG tablet; Take 1 tablet by mouth 2 times daily as needed for Anxiety. Tremor  -     propranolol (INDERAL) 40 MG tablet; Take 1 tablet by mouth 2 times daily TAKE 1 TABLET BY MOUTH THREE TIMES DAILY          ROMY Cummings.

## 2020-11-06 ENCOUNTER — TELEPHONE (OUTPATIENT)
Dept: FAMILY MEDICINE CLINIC | Age: 70
End: 2020-11-06

## 2020-11-09 RX ORDER — PROPRANOLOL HYDROCHLORIDE 40 MG/1
40 TABLET ORAL 3 TIMES DAILY
Qty: 270 TABLET | Refills: 0 | Status: SHIPPED
Start: 2020-11-09 | End: 2021-01-18

## 2020-11-12 ENCOUNTER — OFFICE VISIT (OUTPATIENT)
Dept: FAMILY MEDICINE CLINIC | Age: 70
End: 2020-11-12
Payer: MEDICARE

## 2020-11-12 VITALS
TEMPERATURE: 97.2 F | RESPIRATION RATE: 16 BRPM | HEART RATE: 76 BPM | OXYGEN SATURATION: 97 % | WEIGHT: 221 LBS | BODY MASS INDEX: 32.64 KG/M2 | DIASTOLIC BLOOD PRESSURE: 60 MMHG | SYSTOLIC BLOOD PRESSURE: 104 MMHG

## 2020-11-12 PROCEDURE — 4040F PNEUMOC VAC/ADMIN/RCVD: CPT | Performed by: FAMILY MEDICINE

## 2020-11-12 PROCEDURE — 99213 OFFICE O/P EST LOW 20 MIN: CPT | Performed by: FAMILY MEDICINE

## 2020-11-12 PROCEDURE — G8484 FLU IMMUNIZE NO ADMIN: HCPCS | Performed by: FAMILY MEDICINE

## 2020-11-12 PROCEDURE — G8417 CALC BMI ABV UP PARAM F/U: HCPCS | Performed by: FAMILY MEDICINE

## 2020-11-12 PROCEDURE — G8427 DOCREV CUR MEDS BY ELIG CLIN: HCPCS | Performed by: FAMILY MEDICINE

## 2020-11-12 PROCEDURE — 1123F ACP DISCUSS/DSCN MKR DOCD: CPT | Performed by: FAMILY MEDICINE

## 2020-11-12 PROCEDURE — 1036F TOBACCO NON-USER: CPT | Performed by: FAMILY MEDICINE

## 2020-11-12 PROCEDURE — 3017F COLORECTAL CA SCREEN DOC REV: CPT | Performed by: FAMILY MEDICINE

## 2020-11-12 RX ORDER — PREDNISONE 5 MG/1
TABLET ORAL
Qty: 21 EACH | Refills: 0 | Status: SHIPPED
Start: 2020-11-12 | End: 2021-02-25

## 2020-11-12 RX ORDER — BENZONATATE 100 MG/1
100 CAPSULE ORAL 2 TIMES DAILY PRN
Qty: 20 CAPSULE | Refills: 1 | Status: SHIPPED
Start: 2020-11-12 | End: 2020-11-12

## 2020-11-12 RX ORDER — BENZONATATE 100 MG/1
100 CAPSULE ORAL 2 TIMES DAILY PRN
Qty: 20 CAPSULE | Refills: 0 | Status: SHIPPED | OUTPATIENT
Start: 2020-11-12 | End: 2020-11-19

## 2020-11-12 ASSESSMENT — ENCOUNTER SYMPTOMS
WHEEZING: 0
DIARRHEA: 0
CONSTIPATION: 0
BLOOD IN STOOL: 0

## 2020-11-12 NOTE — PROGRESS NOTES
HPI:  Patient comes in today for   Chief Complaint   Patient presents with    Cough     symptoms for 1 week.  Nasal Congestion    Chest Congestion           Review of Systems  Review of Systems   Constitutional: Negative for chills and diaphoresis. HENT: Negative for ear discharge, ear pain, hearing loss, nosebleeds and tinnitus. Respiratory: Negative for wheezing. Cardiovascular: Negative for chest pain. Gastrointestinal: Negative for blood in stool, constipation and diarrhea. Genitourinary: Negative for dysuria, flank pain and hematuria. Skin: Negative for rash. Neurological: Negative for headaches. Hematological: Does not bruise/bleed easily. PE[de-identified]  /60   Pulse 76   Temp 97.2 °F (36.2 °C) (Temporal)   Resp 16   Wt 221 lb (100.2 kg)   SpO2 97%   BMI 32.64 kg/m²       Physical Exam  Constitutional:       Appearance: Normal appearance. He is well-developed. HENT:      Head: Normocephalic and atraumatic. Eyes:      General: No scleral icterus. Conjunctiva/sclera: Conjunctivae normal.      Pupils: Pupils are equal, round, and reactive to light. Neck:      Musculoskeletal: Neck supple. Thyroid: No thyromegaly. Vascular: No JVD. Trachea: No tracheal deviation. Cardiovascular:      Rate and Rhythm: Normal rate and regular rhythm. Heart sounds: Normal heart sounds. No murmur. No gallop. Pulmonary:      Effort: Pulmonary effort is normal. No respiratory distress. Breath sounds: Normal breath sounds. No wheezing. Abdominal:      Palpations: Abdomen is soft. Musculoskeletal:         General: No tenderness. Skin:     Findings: No erythema or rash. Neurological:      General: No focal deficit present. Mental Status: He is alert and oriented to person, place, and time. Deep Tendon Reflexes: Reflexes normal.      Comments:                    ASSESSMENT/PLAN:    1.  Bronchitis with bronchospasm  - benzonatate (TESSALON) 100 MG capsule; Take 1 capsule by mouth 2 times daily as needed for Cough  Dispense: 20 capsule; Refill: 0  - predniSONE 5 MG (21) TBPK; Take 6 pills on day 1, 5 pills on day 2,4 pills on day 3, 3 pills on day 4, 2 pills on day 5, 1 pill on day 6. Dispense: 21 each; Refill: [address] ROMY Frank

## 2020-11-24 ENCOUNTER — OFFICE VISIT (OUTPATIENT)
Dept: FAMILY MEDICINE CLINIC | Age: 70
End: 2020-11-24
Payer: MEDICARE

## 2020-11-24 VITALS
RESPIRATION RATE: 16 BRPM | TEMPERATURE: 96.5 F | BODY MASS INDEX: 33.18 KG/M2 | HEIGHT: 69 IN | SYSTOLIC BLOOD PRESSURE: 112 MMHG | DIASTOLIC BLOOD PRESSURE: 60 MMHG | OXYGEN SATURATION: 98 % | WEIGHT: 224 LBS | HEART RATE: 81 BPM

## 2020-11-24 PROBLEM — D89.813 GVHD (GRAFT VERSUS HOST DISEASE) (HCC): Status: ACTIVE | Noted: 2020-11-24

## 2020-11-24 LAB — HBA1C MFR BLD: 10.3 %

## 2020-11-24 PROCEDURE — 4040F PNEUMOC VAC/ADMIN/RCVD: CPT | Performed by: FAMILY MEDICINE

## 2020-11-24 PROCEDURE — 1123F ACP DISCUSS/DSCN MKR DOCD: CPT | Performed by: FAMILY MEDICINE

## 2020-11-24 PROCEDURE — 1036F TOBACCO NON-USER: CPT | Performed by: FAMILY MEDICINE

## 2020-11-24 PROCEDURE — G8427 DOCREV CUR MEDS BY ELIG CLIN: HCPCS | Performed by: FAMILY MEDICINE

## 2020-11-24 PROCEDURE — 3017F COLORECTAL CA SCREEN DOC REV: CPT | Performed by: FAMILY MEDICINE

## 2020-11-24 PROCEDURE — 83036 HEMOGLOBIN GLYCOSYLATED A1C: CPT | Performed by: FAMILY MEDICINE

## 2020-11-24 PROCEDURE — G8417 CALC BMI ABV UP PARAM F/U: HCPCS | Performed by: FAMILY MEDICINE

## 2020-11-24 PROCEDURE — G8484 FLU IMMUNIZE NO ADMIN: HCPCS | Performed by: FAMILY MEDICINE

## 2020-11-24 PROCEDURE — 3046F HEMOGLOBIN A1C LEVEL >9.0%: CPT | Performed by: FAMILY MEDICINE

## 2020-11-24 PROCEDURE — 2022F DILAT RTA XM EVC RTNOPTHY: CPT | Performed by: FAMILY MEDICINE

## 2020-11-24 PROCEDURE — 99214 OFFICE O/P EST MOD 30 MIN: CPT | Performed by: FAMILY MEDICINE

## 2020-11-24 RX ORDER — SPIRONOLACTONE 25 MG/1
TABLET ORAL
Qty: 180 TABLET | Refills: 2 | Status: SHIPPED
Start: 2020-11-24 | End: 2022-02-24

## 2020-11-24 ASSESSMENT — ENCOUNTER SYMPTOMS
DIARRHEA: 0
WHEEZING: 0
CONSTIPATION: 0
BLOOD IN STOOL: 0

## 2020-11-24 NOTE — PROGRESS NOTES
HPI:  Patient comes in today for   Chief Complaint   Patient presents with    Varicose Veins     Patient reports stinging in vericose veins.  Hernia     Pt. reports hernia in abdomen that he'd like to talk with  about.  Diabetes     a1c is 10.3 today           Review of Systems  Review of Systems   Constitutional: Negative for chills and diaphoresis. HENT: Negative for ear discharge, ear pain, hearing loss, nosebleeds and tinnitus. Respiratory: Negative for wheezing. Cardiovascular: Negative for chest pain. Gastrointestinal: Negative for blood in stool, constipation and diarrhea. Genitourinary: Negative for dysuria, flank pain and hematuria. Skin: Negative for rash. Neurological: Negative for headaches. Hematological: Does not bruise/bleed easily. PE[de-identified]  /60   Pulse 81   Temp 96.5 °F (35.8 °C) (Temporal)   Resp 16   Ht 5' 9\" (1.753 m)   Wt 224 lb (101.6 kg)   SpO2 98%   BMI 33.08 kg/m²       Physical Exam  Constitutional:       Appearance: Normal appearance. He is well-developed. Comments: SOB and gross ascites   HENT:      Head: Normocephalic and atraumatic. Nose: Nose normal.   Eyes:      General: No scleral icterus. Conjunctiva/sclera: Conjunctivae normal.      Pupils: Pupils are equal, round, and reactive to light. Neck:      Musculoskeletal: Neck supple. Thyroid: No thyromegaly. Vascular: No JVD. Trachea: No tracheal deviation. Cardiovascular:      Rate and Rhythm: Normal rate and regular rhythm. Heart sounds: Normal heart sounds. No murmur. No gallop. Pulmonary:      Effort: No respiratory distress. Breath sounds: Normal breath sounds. No wheezing. Comments: Effort excessive  Abdominal:      Palpations: Abdomen is soft. Comments: Marked ascites     Genitourinary:     Comments: Prostate normal size and contour. No nodules no tumor  Musculoskeletal:         General: No tenderness.    Skin:     General: Skin is warm.      Capillary Refill: Capillary refill takes less than 2 seconds. Findings: No erythema or rash. Comments: No jaundice. Neurological:      General: No focal deficit present. Mental Status: He is alert and oriented to person, place, and time. Deep Tendon Reflexes: Reflexes normal.      Comments:            Greater than 20 minutes was spent during this visit counseling this patient. Assessment/Plan:  Ray Masterson was seen today for varicose veins, hernia and diabetes. Diagnoses and all orders for this visit:    Type 2 diabetes mellitus without complication, with long-term current use of insulin (HCC)  -     POCT glycosylated hemoglobin (Hb A1C)    Malignant ascites  -     Comprehensive Metabolic Panel; Future  -     US ABDOMEN LIMITED; Future    GVHD (graft versus host disease) ([facility] [place] [address]  -     US ABDOMEN LIMITED; Future    Acute myeloid leukemia in remission ([facility] [place] [address]    Screening for prostate cancer  -     Psa screening; Future    Encounter for prostate cancer screening  -     Psa screening; Future          ROMY Lamb.

## 2020-12-10 ENCOUNTER — HOSPITAL ENCOUNTER (OUTPATIENT)
Dept: ULTRASOUND IMAGING | Age: 70
Discharge: HOME | End: 2020-12-10
Payer: MEDICARE

## 2020-12-10 ENCOUNTER — HOSPITAL ENCOUNTER (OUTPATIENT)
Age: 70
Discharge: HOME | End: 2020-12-10
Payer: MEDICARE

## 2020-12-10 LAB
ALBUMIN SERPL-MCNC: 4 G/DL (ref 3.5–5.2)
ALP BLD-CCNC: 138 U/L (ref 40–129)
ALT SERPL-CCNC: 36 U/L (ref 0–40)
ANION GAP SERPL CALCULATED.3IONS-SCNC: 10 MMOL/L (ref 7–16)
AST SERPL-CCNC: 28 U/L (ref 0–39)
BILIRUB SERPL-MCNC: 0.3 MG/DL (ref 0–1.2)
BUN BLDV-MCNC: 21 MG/DL (ref 8–23)
CALCIUM SERPL-MCNC: 9.6 MG/DL (ref 8.6–10.2)
CHLORIDE BLD-SCNC: 99 MMOL/L (ref 98–107)
CO2: 27 MMOL/L (ref 22–29)
CREAT SERPL-MCNC: 1.1 MG/DL (ref 0.7–1.2)
GFR AFRICAN AMERICAN: >60
GFR NON-AFRICAN AMERICAN: >60 ML/MIN/1.73
GLUCOSE BLD-MCNC: 317 MG/DL (ref 74–99)
POTASSIUM SERPL-SCNC: 5.1 MMOL/L (ref 3.5–5)
PROSTATE SPECIFIC ANTIGEN: 0.72 NG/ML (ref 0–4)
SODIUM BLD-SCNC: 136 MMOL/L (ref 132–146)
TOTAL PROTEIN: 6.3 G/DL (ref 6.4–8.3)

## 2020-12-10 PROCEDURE — G0103 PSA SCREENING: HCPCS

## 2020-12-10 PROCEDURE — 76705 ECHO EXAM OF ABDOMEN: CPT

## 2020-12-10 PROCEDURE — 36415 COLL VENOUS BLD VENIPUNCTURE: CPT

## 2020-12-10 PROCEDURE — 80053 COMPREHEN METABOLIC PANEL: CPT

## 2020-12-17 ENCOUNTER — OFFICE VISIT (OUTPATIENT)
Dept: FAMILY MEDICINE CLINIC | Age: 70
End: 2020-12-17
Payer: MEDICARE

## 2020-12-17 VITALS
WEIGHT: 227 LBS | DIASTOLIC BLOOD PRESSURE: 62 MMHG | HEIGHT: 69 IN | SYSTOLIC BLOOD PRESSURE: 100 MMHG | BODY MASS INDEX: 33.62 KG/M2 | OXYGEN SATURATION: 93 % | TEMPERATURE: 97.1 F | RESPIRATION RATE: 16 BRPM | HEART RATE: 77 BPM

## 2020-12-17 PROCEDURE — G8417 CALC BMI ABV UP PARAM F/U: HCPCS | Performed by: FAMILY MEDICINE

## 2020-12-17 PROCEDURE — 3017F COLORECTAL CA SCREEN DOC REV: CPT | Performed by: FAMILY MEDICINE

## 2020-12-17 PROCEDURE — G8484 FLU IMMUNIZE NO ADMIN: HCPCS | Performed by: FAMILY MEDICINE

## 2020-12-17 PROCEDURE — 4040F PNEUMOC VAC/ADMIN/RCVD: CPT | Performed by: FAMILY MEDICINE

## 2020-12-17 PROCEDURE — 1036F TOBACCO NON-USER: CPT | Performed by: FAMILY MEDICINE

## 2020-12-17 PROCEDURE — 1123F ACP DISCUSS/DSCN MKR DOCD: CPT | Performed by: FAMILY MEDICINE

## 2020-12-17 PROCEDURE — 99213 OFFICE O/P EST LOW 20 MIN: CPT | Performed by: FAMILY MEDICINE

## 2020-12-17 PROCEDURE — G8427 DOCREV CUR MEDS BY ELIG CLIN: HCPCS | Performed by: FAMILY MEDICINE

## 2020-12-17 RX ORDER — PREDNISONE 5 MG/1
TABLET ORAL
Qty: 21 EACH | Refills: 0 | Status: SHIPPED
Start: 2020-12-17 | End: 2021-02-25

## 2020-12-17 ASSESSMENT — ENCOUNTER SYMPTOMS
DIARRHEA: 0
BACK PAIN: 0
COUGH: 1
WHEEZING: 0
CONSTIPATION: 0
BLOOD IN STOOL: 0

## 2020-12-17 NOTE — PROGRESS NOTES
Capillary Refill: Capillary refill takes less than 2 seconds. Findings: No erythema or rash. Neurological:      General: No focal deficit present. Mental Status: He is alert and oriented to person, place, and time. Deep Tendon Reflexes: Reflexes normal.      Comments:              ASSESSMENT/PLAN:    1. Bronchitis with bronchospasm  - predniSONE 5 MG (21) TBPK; Take 6 pills on day 1, 5 pills on day 2,4 pills on day 3, 3 pills on day 4, 2 pills on day 5, 1 pill on day 6. Dispense: 21 each; Refill: 0    2. Oral phase dysphagia  - Amb External Referral To Gastroenterology  - predniSONE 5 MG (21) TBPK; Take 6 pills on day 1, 5 pills on day 2,4 pills on day 3, 3 pills on day 4, 2 pills on day 5, 1 pill on day 6. Dispense: 21 each; Refill: [address] [employer] ROMY Frank

## 2020-12-21 RX ORDER — SIMVASTATIN 10 MG
TABLET ORAL
Qty: 90 TABLET | Refills: 0 | Status: SHIPPED
Start: 2020-12-21 | End: 2021-07-14

## 2020-12-30 RX ORDER — LEVOTHYROXINE SODIUM 0.2 MG/1
TABLET ORAL
Qty: 90 TABLET | Refills: 0 | Status: SHIPPED
Start: 2020-12-30 | End: 2021-04-05 | Stop reason: SDUPTHER

## 2021-01-18 DIAGNOSIS — R25.1 TREMOR: ICD-10-CM

## 2021-01-18 RX ORDER — PROPRANOLOL HYDROCHLORIDE 40 MG/1
40 TABLET ORAL 3 TIMES DAILY
Qty: 270 TABLET | Refills: 0 | Status: SHIPPED
Start: 2021-01-18 | End: 2021-04-28

## 2021-01-30 DIAGNOSIS — E11.65 UNCONTROLLED TYPE 2 DIABETES MELLITUS WITH HYPERGLYCEMIA (HCC): ICD-10-CM

## 2021-02-01 DIAGNOSIS — E11.65 UNCONTROLLED TYPE 2 DIABETES MELLITUS WITH HYPERGLYCEMIA (HCC): ICD-10-CM

## 2021-02-01 RX ORDER — INSULIN LISPRO 100 [IU]/ML
INJECTION, SOLUTION INTRAVENOUS; SUBCUTANEOUS
Qty: 48 ML | Refills: 0 | Status: ON HOLD
Start: 2021-02-01 | End: 2022-03-17 | Stop reason: HOSPADM

## 2021-02-01 RX ORDER — INSULIN LISPRO 100 [IU]/ML
INJECTION, SOLUTION INTRAVENOUS; SUBCUTANEOUS
Qty: 48 ML | Refills: 1 | OUTPATIENT
Start: 2021-02-01

## 2021-02-04 ENCOUNTER — HOSPITAL ENCOUNTER (OUTPATIENT)
Age: 71
Discharge: HOME | End: 2021-02-04
Payer: MEDICARE

## 2021-02-04 LAB
ANISOCYTOSIS: ABNORMAL
BASOPHILS ABSOLUTE: 0 E9/L (ref 0–0.2)
BASOPHILS RELATIVE PERCENT: 0.2 % (ref 0–2)
EOSINOPHILS ABSOLUTE: 0.27 E9/L (ref 0.05–0.5)
EOSINOPHILS RELATIVE PERCENT: 4.3 % (ref 0–6)
HCT VFR BLD CALC: 38.5 % (ref 37–54)
HEMOGLOBIN: 11.4 G/DL (ref 12.5–16.5)
INR BLD: 1.1
LYMPHOCYTES ABSOLUTE: 3.78 E9/L (ref 1.5–4)
LYMPHOCYTES RELATIVE PERCENT: 60.9 % (ref 20–42)
MCH RBC QN AUTO: 24.4 PG (ref 26–35)
MCHC RBC AUTO-ENTMCNC: 29.6 % (ref 32–34.5)
MCV RBC AUTO: 82.4 FL (ref 80–99.9)
METAMYELOCYTES RELATIVE PERCENT: 4.3 % (ref 0–1)
MONOCYTES ABSOLUTE: 0.31 E9/L (ref 0.1–0.95)
MONOCYTES RELATIVE PERCENT: 5.2 % (ref 2–12)
MYELOCYTE PERCENT: 0.9 % (ref 0–0)
NEUTROPHILS ABSOLUTE: 1.86 E9/L (ref 1.8–7.3)
NEUTROPHILS RELATIVE PERCENT: 24.3 % (ref 43–80)
OVALOCYTES: ABNORMAL
PDW BLD-RTO: 17.2 FL (ref 11.5–15)
PLATELET # BLD: 99 E9/L (ref 130–450)
PLATELET CONFIRMATION: NORMAL
PMV BLD AUTO: 11.3 FL (ref 7–12)
POIKILOCYTES: ABNORMAL
POLYCHROMASIA: ABNORMAL
PROTHROMBIN TIME: 12.5 SEC (ref 9.3–12.4)
RBC # BLD: 4.67 E12/L (ref 3.8–5.8)
WBC # BLD: 6.2 E9/L (ref 4.5–11.5)

## 2021-02-04 PROCEDURE — 85025 COMPLETE CBC W/AUTO DIFF WBC: CPT

## 2021-02-04 PROCEDURE — 85610 PROTHROMBIN TIME: CPT

## 2021-02-04 PROCEDURE — 36415 COLL VENOUS BLD VENIPUNCTURE: CPT

## 2021-02-10 ENCOUNTER — OFFICE VISIT (OUTPATIENT)
Dept: FAMILY MEDICINE CLINIC | Age: 71
End: 2021-02-10
Payer: MEDICARE

## 2021-02-10 VITALS
DIASTOLIC BLOOD PRESSURE: 72 MMHG | HEART RATE: 78 BPM | WEIGHT: 221 LBS | TEMPERATURE: 96.6 F | OXYGEN SATURATION: 96 % | HEIGHT: 69 IN | BODY MASS INDEX: 32.73 KG/M2 | RESPIRATION RATE: 16 BRPM | SYSTOLIC BLOOD PRESSURE: 130 MMHG

## 2021-02-10 DIAGNOSIS — E11.9 TYPE 2 DIABETES MELLITUS WITHOUT COMPLICATION, WITH LONG-TERM CURRENT USE OF INSULIN (HCC): Primary | ICD-10-CM

## 2021-02-10 DIAGNOSIS — K76.6 PORTAL HYPERTENSION (HCC): ICD-10-CM

## 2021-02-10 DIAGNOSIS — Z79.4 TYPE 2 DIABETES MELLITUS WITHOUT COMPLICATION, WITH LONG-TERM CURRENT USE OF INSULIN (HCC): Primary | ICD-10-CM

## 2021-02-10 DIAGNOSIS — E11.65 UNCONTROLLED TYPE 2 DIABETES MELLITUS WITH HYPERGLYCEMIA (HCC): ICD-10-CM

## 2021-02-10 PROCEDURE — 99214 OFFICE O/P EST MOD 30 MIN: CPT | Performed by: FAMILY MEDICINE

## 2021-02-10 PROCEDURE — G8427 DOCREV CUR MEDS BY ELIG CLIN: HCPCS | Performed by: FAMILY MEDICINE

## 2021-02-10 PROCEDURE — 2022F DILAT RTA XM EVC RTNOPTHY: CPT | Performed by: FAMILY MEDICINE

## 2021-02-10 PROCEDURE — 4040F PNEUMOC VAC/ADMIN/RCVD: CPT | Performed by: FAMILY MEDICINE

## 2021-02-10 PROCEDURE — 1036F TOBACCO NON-USER: CPT | Performed by: FAMILY MEDICINE

## 2021-02-10 PROCEDURE — G8417 CALC BMI ABV UP PARAM F/U: HCPCS | Performed by: FAMILY MEDICINE

## 2021-02-10 PROCEDURE — 3017F COLORECTAL CA SCREEN DOC REV: CPT | Performed by: FAMILY MEDICINE

## 2021-02-10 PROCEDURE — 3046F HEMOGLOBIN A1C LEVEL >9.0%: CPT | Performed by: FAMILY MEDICINE

## 2021-02-10 PROCEDURE — 1123F ACP DISCUSS/DSCN MKR DOCD: CPT | Performed by: FAMILY MEDICINE

## 2021-02-10 PROCEDURE — G8484 FLU IMMUNIZE NO ADMIN: HCPCS | Performed by: FAMILY MEDICINE

## 2021-02-10 ASSESSMENT — ENCOUNTER SYMPTOMS
CONSTIPATION: 0
BACK PAIN: 1
APNEA: 0
WHEEZING: 0
ABDOMINAL PAIN: 1
EYE DISCHARGE: 0
DIARRHEA: 0
BLOOD IN STOOL: 0
SHORTNESS OF BREATH: 1

## 2021-02-10 ASSESSMENT — PATIENT HEALTH QUESTIONNAIRE - PHQ9
SUM OF ALL RESPONSES TO PHQ QUESTIONS 1-9: 0
SUM OF ALL RESPONSES TO PHQ QUESTIONS 1-9: 0

## 2021-02-10 NOTE — PROGRESS NOTES
Findings: No bruising. Neurological:      Mental Status: He is alert. Psychiatric:         Mood and Affect: Mood normal.       ASSESSMENT/PLAN:    1. Uncontrolled type 2 diabetes mellitus with hyperglycemia ([facility] [address]    2. Type 2 diabetes mellitus without complication, with long-term current use of insulin ([facility] [address]    3. Portal hypertension ([facility] [address]        On this date 02/10/21 I have spent 20 minutes reviewing previous notes, test results and face to face with the patient discussing the diagnosis and importance of compliance with the treatment plan as well as documenting on the day of the visit. An electronic signature was used to authenticate this note.     --Carmella Bennett, DO

## 2021-02-15 ENCOUNTER — HOSPITAL ENCOUNTER (EMERGENCY)
Age: 71
Discharge: HOME | End: 2021-02-15
Payer: MEDICARE

## 2021-02-15 ENCOUNTER — APPOINTMENT (OUTPATIENT)
Dept: GENERAL RADIOLOGY | Age: 71
End: 2021-02-15
Payer: MEDICARE

## 2021-02-15 VITALS
WEIGHT: 220 LBS | DIASTOLIC BLOOD PRESSURE: 75 MMHG | TEMPERATURE: 98 F | RESPIRATION RATE: 18 BRPM | SYSTOLIC BLOOD PRESSURE: 129 MMHG | BODY MASS INDEX: 32.49 KG/M2 | OXYGEN SATURATION: 98 % | HEART RATE: 88 BPM

## 2021-02-15 DIAGNOSIS — S60.229A CONTUSION OF HAND, UNSPECIFIED LATERALITY, INITIAL ENCOUNTER: ICD-10-CM

## 2021-02-15 DIAGNOSIS — J01.90 ACUTE SINUSITIS, RECURRENCE NOT SPECIFIED, UNSPECIFIED LOCATION: Primary | ICD-10-CM

## 2021-02-15 PROCEDURE — 6360000002 HC RX W HCPCS: Performed by: NURSE PRACTITIONER

## 2021-02-15 PROCEDURE — 73130 X-RAY EXAM OF HAND: CPT

## 2021-02-15 PROCEDURE — 99211 OFF/OP EST MAY X REQ PHY/QHP: CPT

## 2021-02-15 RX ORDER — DEXAMETHASONE SODIUM PHOSPHATE 10 MG/ML
10 INJECTION, SOLUTION INTRAMUSCULAR; INTRAVENOUS ONCE
Status: COMPLETED | OUTPATIENT
Start: 2021-02-15 | End: 2021-02-15

## 2021-02-15 RX ORDER — AZITHROMYCIN 250 MG/1
TABLET, FILM COATED ORAL
Qty: 6 TABLET | Refills: 0 | Status: SHIPPED | OUTPATIENT
Start: 2021-02-15 | End: 2021-02-25

## 2021-02-15 RX ADMIN — DEXAMETHASONE SODIUM PHOSPHATE 10 MG: 10 INJECTION, SOLUTION INTRAMUSCULAR; INTRAVENOUS at 11:05

## 2021-02-15 NOTE — ED PROVIDER NOTES
Department of Emergency Medicine   [address] [place] [address]  Provider Note  Admit Date/RoomTime: 2/15/2021 10:11 AM  Room:   NAME: José Elizalde  : 1950  MRN: [id number]     Chief Complaint:  Sinusitis (Pt c/o sinus congestion and drainage, coughing up green sputum for 3 days, pt also c/o L hand swelling and bruising, from falling a few days ago)    History of Present Illness       José Elizalde is a 78 y.o. old male who presents to the emergency department by private vehicle, for sinus congestion and pain and pressure. He also has a green postnasal drip green sputum that he is bringing up. Denies chest pain or shortness of breath denies fever or chills or body aches denies loss of taste or smell. He also fell 3 days ago his left hand is still swollen and bruised. ROS    Pertinent positives and negatives are stated within HPI, all other systems reviewed and are negative. Past Surgical History:   Procedure Laterality Date    APPENDECTOMY      BONE MARROW TRANSPLANT      double core transplant    CHOLECYSTECTOMY      COLONOSCOPY      ENDOSCOPY, COLON, DIAGNOSTIC      FOOT NEUROMA SURGERY      UPPER GASTROINTESTINAL ENDOSCOPY     Social History:  reports that he quit smoking about 44 years ago. He has a 5.00 pack-year smoking history. He quit smokeless tobacco use about 22 years ago. He reports current alcohol use of about 1.0 standard drinks of alcohol per week. He reports that he does not use drugs. Family History: family history includes Cancer in his maternal grandmother and other family members; Heart Disease in his father and mother; High Blood Pressure in his father.    Allergies: Pneumococcal vaccines, Ciprofloxacin, Diatrizoate, Dye [iodides], Epinephrine, Iodine, Penicillin g, Morphine, Other, and Oxycodone    Physical Exam            ED Triage Vitals [02/15/21 1012]   BP Temp Temp Source Pulse Resp SpO2 Height Weight   129/75 98 °F (36.7 °C) Infrared 88 18 98 % -- 220 lb (99.8 kg)      Oxygen Saturation Interpretation: Normal.    Constitutional:  Alert, development consistent with age. Ears:  External Ears: Bilateral normal.               TM's & External Canals: normal appearance, normal TMs bilaterally. Nose:   There is no discharge, swelling or lesions noted. Sinuses: mild Bilateral maxillary sinus tenderness. mild Bilateral frontal sinus tenderness. Neck/Lymphatics:  Neck Supple. Respiratory:   Breath sounds: Bilateral normal.  Lung sounds: normal.   CV:  Regular rate and rhythm, normal heart sounds, without pathological murmurs, ectopy, gallops, or rubs. GI:  Abdomen Soft, nontender, good bowel sounds. No firm or pulsatile mass. Musculoskelatal: He has edema of the left hand and ecchymosis he has full range of motion of his fingers and wrist without difficulty has a good radial pulse and good capillary refill. Integument:  Normal turgor. Warm, dry, without visible rash. Neurological:  Oriented. Motor functions intact. Lab / Imaging Results   (All laboratory and radiology results have been personally reviewed by myself)  Labs:  No results found for this visit on 02/15/21. Imaging: All Radiology results interpreted by Radiologist unless otherwise noted. XR HAND LEFT (MIN 3 VIEWS)   Final Result   Mild diffuse left hand edema. No acute fractures or dislocations. ED Course / Medical Decision Making     Medications   dexamethasone (PF) (DECADRON) injection 10 mg (10 mg Intramuscular Given 2/15/21 1105)          MDM:     Patient has sinusitis. He said he usually gets a shot of steroids and a Z-Ambrocio and he can help seeking that he gets this about once a year he was given Decadron IM and I did put him on a Z-Ambrocio. He fell 3 days ago and injured his left hand is swollen and ecchymotic I did obtain an x-ray and it was negative for fracture. I advised him to follow-up with his doctor if he has any further problems.     Plan of Care: Normal progression of disease discussed. All questions answered. Explained the rationale for symptomatic treatment  Instruction provided in the use of fluids, vaporizer, acetaminophen, and other OTC medication for symptom control. Extra fluids  Analgesics as needed, dose reviewed. Follow up as needed should symptoms fail to improve. Assessment      1. Acute sinusitis, recurrence not specified, unspecified location    2. Contusion of hand, unspecified laterality, initial encounter      Plan   Discharge to home and advised to contact JYOTI Knowles CNP  [address] [place] [address] [phone]      As needed   Patient condition is good    New Medications     New Prescriptions    AZITHROMYCIN (ZITHROMAX Z-ELIECER) 250 MG TABLET    Take 2 tabs on day one, followed by 1 tablet daily for 4 days. Electronically signed by JYOTI De La Vega CNP   DD: 2/15/21  **This report was transcribed using voice recognition software. Every effort was made to ensure accuracy; however, inadvertent computerized transcription errors may be present.   END OF ED PROVIDER NOTE     JYOTI De La Vega CNP  02/15/21 [id number]

## 2021-02-17 ENCOUNTER — OFFICE VISIT (OUTPATIENT)
Dept: PRIMARY CARE CLINIC | Age: 71
End: 2021-02-17
Payer: MEDICARE

## 2021-02-17 VITALS
HEIGHT: 69 IN | TEMPERATURE: 97.8 F | SYSTOLIC BLOOD PRESSURE: 128 MMHG | OXYGEN SATURATION: 96 % | DIASTOLIC BLOOD PRESSURE: 78 MMHG | WEIGHT: 220 LBS | BODY MASS INDEX: 32.58 KG/M2 | HEART RATE: 81 BPM

## 2021-02-17 DIAGNOSIS — R11.0 NAUSEA: ICD-10-CM

## 2021-02-17 DIAGNOSIS — R53.83 FATIGUE, UNSPECIFIED TYPE: ICD-10-CM

## 2021-02-17 DIAGNOSIS — R19.7 DIARRHEA, UNSPECIFIED TYPE: Primary | ICD-10-CM

## 2021-02-17 DIAGNOSIS — R68.83 CHILLS: ICD-10-CM

## 2021-02-17 DIAGNOSIS — R19.7 DIARRHEA, UNSPECIFIED TYPE: ICD-10-CM

## 2021-02-17 LAB
Lab: NORMAL
QC PASS/FAIL: NORMAL
SARS-COV-2, POC: NORMAL

## 2021-02-17 PROCEDURE — G8427 DOCREV CUR MEDS BY ELIG CLIN: HCPCS | Performed by: NURSE PRACTITIONER

## 2021-02-17 PROCEDURE — G8484 FLU IMMUNIZE NO ADMIN: HCPCS | Performed by: NURSE PRACTITIONER

## 2021-02-17 PROCEDURE — 1123F ACP DISCUSS/DSCN MKR DOCD: CPT | Performed by: NURSE PRACTITIONER

## 2021-02-17 PROCEDURE — G8417 CALC BMI ABV UP PARAM F/U: HCPCS | Performed by: NURSE PRACTITIONER

## 2021-02-17 PROCEDURE — 87426 SARSCOV CORONAVIRUS AG IA: CPT | Performed by: NURSE PRACTITIONER

## 2021-02-17 PROCEDURE — 1036F TOBACCO NON-USER: CPT | Performed by: NURSE PRACTITIONER

## 2021-02-17 PROCEDURE — 99213 OFFICE O/P EST LOW 20 MIN: CPT | Performed by: NURSE PRACTITIONER

## 2021-02-17 PROCEDURE — 4040F PNEUMOC VAC/ADMIN/RCVD: CPT | Performed by: NURSE PRACTITIONER

## 2021-02-17 PROCEDURE — 3017F COLORECTAL CA SCREEN DOC REV: CPT | Performed by: NURSE PRACTITIONER

## 2021-02-17 RX ORDER — ONDANSETRON 4 MG/1
4 TABLET, FILM COATED ORAL 3 TIMES DAILY PRN
Qty: 15 TABLET | Refills: 0 | Status: SHIPPED
Start: 2021-02-17 | End: 2021-02-25

## 2021-02-17 NOTE — PROGRESS NOTES
Chief Complaint   Emesis (x 2 days), Diarrhea, and Generalized Body Aches      History of Present Illness   Source of history provided by:  patient. Arabella Gross is a 78 y.o. old male who presents to the flu clinic with complaints of Vomiting and Diarrhea  x 2 days. States symptoms have stayed the same since onset. Has been taking antibiotic: Zithromax  without symptomatic relief. Denies any Shortness of breath. Denies any hx of no history of pneumonia or bronchitis. ROS   Pertinent positives and negatives are stated within HPI, all other systems reviewed and are negative. Past Medical History:  has a past medical history of AML (acute myeloblastic leukemia) ([facility] [address] AML (acute myeloblastic leukemia) ([facility] [address] Cancer ([facility] [address] Chronic kidney disease, Diabetes mellitus ([facility] [address] GERD (gastroesophageal reflux disease), Hx of blood clots, Hyperlipidemia, Liver disease, MI, old, Other disorders of kidney and ureter, Other sleep disturbances, Thyroid disease, and Type II or unspecified type diabetes mellitus without mention of complication, not stated as uncontrolled. Past Surgical History:  has a past surgical history that includes Appendectomy; Cholecystectomy; Foot neuroma surgery; Colonoscopy; Endoscopy, colon, diagnostic; Upper gastrointestinal endoscopy; and bone marrow transplant. Social History:  reports that he quit smoking about 44 years ago. He has a 5.00 pack-year smoking history. He quit smokeless tobacco use about 22 years ago. He reports current alcohol use of about 1.0 standard drinks of alcohol per week. He reports that he does not use drugs. Family History: family history includes Cancer in his maternal grandmother and other family members; Heart Disease in his father and mother; High Blood Pressure in his father.   Allergies: Pneumococcal vaccines, Ciprofloxacin, Diatrizoate, Dye [iodides], Epinephrine, Iodine, Penicillin g, Morphine, Other, and Oxycodone    Physical Exam Vital Signs:  /78   Pulse 81   Temp 97.8 °F (36.6 °C)   Ht 5' 9\" (1.753 m)   Wt 220 lb (99.8 kg)   SpO2 96%   BMI 32.49 kg/m²    Oxygen Saturation Interpretation: Normal.    Constitutional:  Alert, development consistent with age. NAD. Head:  NC/NT. Airway patent. Ears: TMs Clear bilaterally. Canals without exudate or swelling bilaterally. Mouth: Posterior pharynx with mild erythema and clear postnasal drip. no tonsillar hypertrophy or exudate. Neck:  Normal ROM. Supple. no anterior cervical adenopathy noted. Lungs: CTAB without wheezes, rales, or rhonchi. CV:  Regular rate and rhythm, normal heart sounds, without pathological murmurs, ectopy, gallops, or rubs. Skin:  Normal turgor. Warm, dry, without visible rash. Lymphatic: No lymphangitis or adenopathy noted. Neurological:  Oriented. Motor functions intact. Lab / Imaging Results   (All laboratory and radiology results have been personally reviewed by myself)  Labs:  No results found for this visit on 02/17/21. Imaging: All Radiology results interpreted by Radiologist unless otherwise noted. No results found. Medical Decision Making   Pt non-toxic, in no apparent distress and stable at time of discharge. Assessment/Plan   Edward De La Rosa was seen today for emesis, diarrhea and generalized body aches.     Diagnoses and all orders for this visit:    Diarrhea, unspecified type  -     POCT COVID-19, Antigen    Nausea    Fatigue, unspecified type    Chills    Patient is on Antibiotics Rapid COVID-19 negative in office, patient advised results. Confirmatory PCR COVID-19 swab obtained and pending, will call with results once available. Advised self-quarantine at home in the interim. Increase fluids and rest. Symptomatic relief discussed including Tylenol prn pain/fever. Schedule f/u with PCP in 7-10 days if symptoms persist. ED sooner if symptoms worsen or change. ED immediately with high or refractory fever, progressive SOB, dyspnea, CP, calf pain/swelling, shaking chills, vomiting, abdominal pain, lethargy, flank pain, or decreased urinary output. Pt verbalizes understanding and is in agreement with plan of care. All questions answered. Mamadou Lindsey, APRN - CNP    This visit was provided as a focused evaluation during the COVID -19 pandemic/national emergency. A comprehensive review of all previous patient history and testing was not conducted. Pertinent findings were elicited during the visit. *NOTE: This report was transcribed using voice recognition software. Every effort was made to ensure accuracy; however, inadvertent computerized transcription errors may be present.

## 2021-02-17 NOTE — PATIENT INSTRUCTIONS
Patient Education        Diarrhea: Care Instructions  Your Care Instructions     Diarrhea is loose, watery stools (bowel movements). The exact cause is often hard to find. Sometimes diarrhea is your body's way of getting rid of what caused an upset stomach. Viruses, food poisoning, and many medicines can cause diarrhea. Some people get diarrhea in response to emotional stress, anxiety, or certain foods. Almost everyone has diarrhea now and then. It usually isn't serious, and your stools will return to normal soon. The important thing to do is replace the fluids you have lost, so you can prevent dehydration. The doctor has checked you carefully, but problems can develop later. If you notice any problems or new symptoms, get medical treatment right away. Follow-up care is a key part of your treatment and safety. Be sure to make and go to all appointments, and call your doctor if you are having problems. It's also a good idea to know your test results and keep a list of the medicines you take. How can you care for yourself at home? · Watch for signs of dehydration, which means your body has lost too much water. Dehydration is a serious condition and should be treated right away. Signs of dehydration are:  ? Increasing thirst and dry eyes and mouth. ? Feeling faint or lightheaded. ? A smaller amount of urine than normal.  · To prevent dehydration, drink plenty of fluids. Choose water and other caffeine-free clear liquids until you feel better. If you have kidney, heart, or liver disease and have to limit fluids, talk with your doctor before you increase the amount of fluids you drink. · Begin eating small amounts of mild foods the next day, if you feel like it. ? Try yogurt that has live cultures of Lactobacillus. (Check the label.)  ? Avoid spicy foods, fruits, alcohol, and caffeine until 48 hours after all symptoms are gone. ? Avoid chewing gum that contains sorbitol. ? Avoid dairy products (except for yogurt with Lactobacillus) while you have diarrhea and for 3 days after symptoms are gone. · The doctor may recommend that you take over-the-counter medicine, such as loperamide (Imodium), if you still have diarrhea after 6 hours. Read and follow all instructions on the label. Do not use this medicine if you have bloody diarrhea, a high fever, or other signs of serious illness. Call your doctor if you think you are having a problem with your medicine. When should you call for help? Call 911 anytime you think you may need emergency care. For example, call if:    · You passed out (lost consciousness).     · Your stools are maroon or very bloody. Call your doctor now or seek immediate medical care if:    · You are dizzy or lightheaded, or you feel like you may faint.     · Your stools are black and look like tar, or they have streaks of blood.     · You have new or worse belly pain.     · You have symptoms of dehydration, such as:  ? Dry eyes and a dry mouth. ? Passing only a little dark urine. ? Feeling thirstier than usual.     · You have a new or higher fever. Watch closely for changes in your health, and be sure to contact your doctor if:    · Your diarrhea is getting worse.     · You see pus in the diarrhea.     · You are not getting better after 2 days (48 hours). Where can you learn more? Go to https://Teamer.netpesamanthaMoneyManeb.[employer]. org and sign in to your Opax account. Enter T616 in the [facility] box to learn more about \"Diarrhea: Care Instructions. \"     If you do not have an account, please click on the \"Sign Up Now\" link. Current as of: June 26, 2019               Content Version: 12.6  © 1714-9223 [employer], [employer]. Care instructions adapted under license by [facility] ([facility]). If you have questions about a medical condition or this instruction, always ask your healthcare professional. [address] any warranty or liability for your use of this information. Patient Education        Fatigue: Care Instructions  Your Care Instructions     Fatigue is a feeling of tiredness, exhaustion, or lack of energy. You may feel fatigue because of too much or not enough activity. It can also come from stress, lack of sleep, boredom, and poor diet. Many medical problems, such as viral infections, can cause fatigue. Emotional problems, especially depression, are often the cause of fatigue. Fatigue is most often a symptom of another problem. Treatment for fatigue depends on the cause. For example, if you have fatigue because you have a certain health problem, treating this problem also treats your fatigue. If depression or anxiety is the cause, treatment may help. Follow-up care is a key part of your treatment and safety. Be sure to make and go to all appointments, and call your doctor if you are having problems. It's also a good idea to know your test results and keep a list of the medicines you take. How can you care for yourself at home? · Get regular exercise. But don't overdo it. Go back and forth between rest and exercise. · Get plenty of rest.  · Eat a healthy diet. Do not skip meals, especially breakfast.  · Reduce your use of caffeine, tobacco, and alcohol. Caffeine is most often found in coffee, tea, cola drinks, and chocolate. · Limit medicines that can cause fatigue. This includes tranquilizers and cold and allergy medicines. When should you call for help? Watch closely for changes in your health, and be sure to contact your doctor if:    · You have new symptoms such as fever or a rash.     · Your fatigue gets worse.   · You have been feeling down, depressed, or hopeless. Or you may have lost interest in things that you usually enjoy.     · You are not getting better as expected. Where can you learn more? Go to https://CE Info Systemsjoseluis.[employer]. org and sign in to your Bravofly account. Enter J772 in the [facility] box to learn more about \"Fatigue: Care Instructions. \"     If you do not have an account, please click on the \"Sign Up Now\" link. Current as of: June 26, 2019               Content Version: 12.6  © 7887-0351 compareit4me, [employer]. Care instructions adapted under license by [facility] ([facility]). If you have questions about a medical condition or this instruction, always ask your healthcare professional. [address] any warranty or liability for your use of this information. Patient Education        Nausea and Vomiting: Care Instructions  Your Care Instructions     When you are nauseated, you may feel weak and sweaty and notice a lot of saliva in your mouth. Nausea often leads to vomiting. Most of the time you do not need to worry about nausea and vomiting, but they can be signs of other illnesses. Two common causes of nausea and vomiting are stomach flu and food poisoning. Nausea and vomiting from viral stomach flu will usually start to improve within 24 hours. Nausea and vomiting from food poisoning may last from 12 to 48 hours. The doctor has checked you carefully, but problems can develop later. If you notice any problems or new symptoms, get medical treatment right away. Follow-up care is a key part of your treatment and safety. Be sure to make and go to all appointments, and call your doctor if you are having problems. It's also a good idea to know your test results and keep a list of the medicines you take. How can you care for yourself at home? · To prevent dehydration, drink plenty of fluids, enough so that your urine is light yellow or clear like water. Choose water and other caffeine-free clear liquids until you feel better. If you have kidney, heart, or liver disease and have to limit fluids, talk with your doctor before you increase the amount of fluids you drink. · Rest in bed until you feel better. · When you are able to eat, try clear soups, mild foods, and liquids until all symptoms are gone for 12 to 48 hours. Other good choices include dry toast, crackers, cooked cereal, and gelatin dessert, such as Jell-O. When should you call for help? Call 911 anytime you think you may need emergency care. For example, call if:    · You passed out (lost consciousness). Call your doctor now or seek immediate medical care if:    · You have symptoms of dehydration, such as:  ? Dry eyes and a dry mouth. ? Passing only a little dark urine. ? Feeling thirstier than usual.     · You have new or worsening belly pain.     · You have a new or higher fever.     · You vomit blood or what looks like coffee grounds. Watch closely for changes in your health, and be sure to contact your doctor if:    · You have ongoing nausea and vomiting.     · Your vomiting is getting worse.     · Your vomiting lasts longer than 2 days.     · You are not getting better as expected. Where can you learn more? Go to https://BioSigniaarnoleb.[employer]. org and sign in to your YouFastUnlock account. Enter [id number] in the [facility] box to learn more about \"Nausea and Vomiting: Care Instructions. \"     If you do not have an account, please click on the \"Sign Up Now\" link. Current as of: June 26, 2019               Content Version: 12.6  © 9529-9497 [employer], Incorporated. Care instructions adapted under license by [facility] ([facility]). If you have questions about a medical condition or this instruction, always ask your healthcare professional. [address] any warranty or liability for your use of this information.

## 2021-02-19 LAB
SARS-COV-2: NOT DETECTED
SOURCE: NORMAL

## 2021-02-25 ENCOUNTER — OFFICE VISIT (OUTPATIENT)
Dept: FAMILY MEDICINE CLINIC | Age: 71
End: 2021-02-25
Payer: MEDICARE

## 2021-02-25 VITALS
SYSTOLIC BLOOD PRESSURE: 112 MMHG | OXYGEN SATURATION: 98 % | HEIGHT: 69 IN | TEMPERATURE: 96.4 F | WEIGHT: 216.8 LBS | RESPIRATION RATE: 20 BRPM | HEART RATE: 94 BPM | BODY MASS INDEX: 32.11 KG/M2 | DIASTOLIC BLOOD PRESSURE: 62 MMHG

## 2021-02-25 DIAGNOSIS — E11.9 TYPE 2 DIABETES MELLITUS WITHOUT COMPLICATION, WITH LONG-TERM CURRENT USE OF INSULIN (HCC): Primary | ICD-10-CM

## 2021-02-25 DIAGNOSIS — J20.9 BRONCHITIS WITH BRONCHOSPASM: ICD-10-CM

## 2021-02-25 DIAGNOSIS — H61.23 BILATERAL IMPACTED CERUMEN: ICD-10-CM

## 2021-02-25 DIAGNOSIS — Z79.4 TYPE 2 DIABETES MELLITUS WITHOUT COMPLICATION, WITH LONG-TERM CURRENT USE OF INSULIN (HCC): Primary | ICD-10-CM

## 2021-02-25 LAB — HBA1C MFR BLD: 10.3 %

## 2021-02-25 PROCEDURE — 4040F PNEUMOC VAC/ADMIN/RCVD: CPT | Performed by: FAMILY MEDICINE

## 2021-02-25 PROCEDURE — 3046F HEMOGLOBIN A1C LEVEL >9.0%: CPT | Performed by: FAMILY MEDICINE

## 2021-02-25 PROCEDURE — G8427 DOCREV CUR MEDS BY ELIG CLIN: HCPCS | Performed by: FAMILY MEDICINE

## 2021-02-25 PROCEDURE — 69210 REMOVE IMPACTED EAR WAX UNI: CPT | Performed by: FAMILY MEDICINE

## 2021-02-25 PROCEDURE — G8417 CALC BMI ABV UP PARAM F/U: HCPCS | Performed by: FAMILY MEDICINE

## 2021-02-25 PROCEDURE — 3017F COLORECTAL CA SCREEN DOC REV: CPT | Performed by: FAMILY MEDICINE

## 2021-02-25 PROCEDURE — 1123F ACP DISCUSS/DSCN MKR DOCD: CPT | Performed by: FAMILY MEDICINE

## 2021-02-25 PROCEDURE — G8484 FLU IMMUNIZE NO ADMIN: HCPCS | Performed by: FAMILY MEDICINE

## 2021-02-25 PROCEDURE — 1036F TOBACCO NON-USER: CPT | Performed by: FAMILY MEDICINE

## 2021-02-25 PROCEDURE — 2022F DILAT RTA XM EVC RTNOPTHY: CPT | Performed by: FAMILY MEDICINE

## 2021-02-25 PROCEDURE — 83036 HEMOGLOBIN GLYCOSYLATED A1C: CPT | Performed by: FAMILY MEDICINE

## 2021-02-25 PROCEDURE — 99213 OFFICE O/P EST LOW 20 MIN: CPT | Performed by: FAMILY MEDICINE

## 2021-02-25 RX ORDER — PREDNISONE 5 MG/1
TABLET ORAL
Qty: 21 EACH | Refills: 0 | Status: SHIPPED
Start: 2021-02-25 | End: 2021-02-25

## 2021-02-25 RX ORDER — PREDNISONE 5 MG/1
TABLET ORAL
Qty: 21 EACH | Refills: 0 | Status: SHIPPED
Start: 2021-02-25 | End: 2021-09-28 | Stop reason: ALTCHOICE

## 2021-02-25 RX ORDER — DIMENHYDRINATE 50 MG
TABLET ORAL
Status: ON HOLD | COMMUNITY
End: 2022-02-28 | Stop reason: HOSPADM

## 2021-02-25 ASSESSMENT — ENCOUNTER SYMPTOMS
BLOOD IN STOOL: 0
WHEEZING: 0
DIARRHEA: 0
CHOKING: 1
COUGH: 1
CONSTIPATION: 0

## 2021-02-25 NOTE — PROGRESS NOTES
Gosia Landa (:  1950) is a 78 y.o. male,Established patient, here for evaluation of the following chief complaint(s):  Cough, Chest Congestion (yellow mucus), Diarrhea, Head Congestion, and Diabetes  He has a tremor of intention, occasionally    ASSESSMENT/PLAN:  1. Type 2 diabetes mellitus without complication, with long-term current use of insulin (HCC)  -     POCT glycosylated hemoglobin (Hb A1C)  2. Bronchitis with bronchospasm  -     XR CHEST (2 VW); Future  -     predniSONE 5 MG (21) TBPK; Take 6 pills on day 1, 5 pills on day 2,4 pills on day 3, 3 pills on day 4, 2 pills on day 5, 1 pill on day 6., Disp-21 each, R-0Normal  3. Bilateral impacted cerumen  -     24400 - NH REMOVE IMPACTED EAR WAX        SUBJECTIVE/OBJECTIVE:  here for evaluation of the following chief complaint(s):  Cough, Chest Congestion (yellow mucus), Diarrhea, Head Congestion, and Diabetes  He has a tremor of intention, occasionally        Review of Systems   Constitutional: Negative for chills and diaphoresis. HENT: Negative for ear discharge, ear pain, hearing loss, nosebleeds and tinnitus. Ears blocked with wax   Respiratory: Positive for cough and choking (some times when he cough). Negative for wheezing. Cardiovascular: Negative for chest pain. Gastrointestinal: Negative for blood in stool, constipation and diarrhea. Genitourinary: Negative for dysuria, flank pain and hematuria. Musculoskeletal: Positive for arthralgias. Skin: Negative for rash. Neurological: Negative for dizziness and headaches. Hematological: Does not bruise/bleed easily. Psychiatric/Behavioral: Negative for agitation. Physical Exam  HENT:      Head: Normocephalic. Ears:      Comments: EAC's blocked with wax  Eyes:      General:         Right eye: No discharge. Left eye: No discharge. Neck:      Musculoskeletal: No neck rigidity. Cardiovascular:      Rate and Rhythm: Normal rate and regular rhythm. Heart sounds: No murmur. Pulmonary:      Effort: No respiratory distress. Breath sounds: No rhonchi or rales. Abdominal:      Tenderness: There is no abdominal tenderness. Skin:     General: Skin is warm. Capillary Refill: Capillary refill takes less than 2 seconds. Coloration: Skin is not pale. Findings: No bruising. Neurological:      Mental Status: He is alert. Psychiatric:         Mood and Affect: Mood normal.       (Patient is seated position bilateral ear examination was performed and identified ceruminous impaction. The canals were lavaged multiple times using a PISTON SYRINGE  both sides were lavaged free of impacted wax. Confirmatory examination preformed. The Cerumenous debris was discarded. EACs were patent after procedure and tympanic membranes were intact. Patient tolerated the procedure well and hearing was evaluated at the end of the procedure. Gurwinder Sandoval D.O. On this date 02/25/21 I have spent 15 minutes reviewing previous notes, test results and face to face with the patient discussing the diagnosis and importance of compliance with the treatment plan as well as documenting on the day of the visit. An electronic signature was used to authenticate this note.     --Edna Salas DO

## 2021-03-05 ENCOUNTER — HOSPITAL ENCOUNTER (OUTPATIENT)
Age: 71
Discharge: HOME | End: 2021-03-05
Payer: MEDICARE

## 2021-03-05 LAB
BUN BLDV-MCNC: 20 MG/DL (ref 8–23)
CREAT SERPL-MCNC: 1 MG/DL (ref 0.7–1.2)
GFR AFRICAN AMERICAN: >60
GFR NON-AFRICAN AMERICAN: >60 ML/MIN/1.73

## 2021-03-05 PROCEDURE — 82565 ASSAY OF CREATININE: CPT

## 2021-03-05 PROCEDURE — 36415 COLL VENOUS BLD VENIPUNCTURE: CPT

## 2021-03-05 PROCEDURE — 84520 ASSAY OF UREA NITROGEN: CPT

## 2021-03-06 LAB
LEFT VENTRICULAR EJECTION FRACTION MODE: NORMAL
LV EF: NORMAL %

## 2021-03-24 DIAGNOSIS — F41.9 ANXIETY: ICD-10-CM

## 2021-03-24 DIAGNOSIS — Z79.4 TYPE 2 DIABETES MELLITUS WITHOUT COMPLICATION, WITH LONG-TERM CURRENT USE OF INSULIN (HCC): ICD-10-CM

## 2021-03-24 DIAGNOSIS — E11.9 TYPE 2 DIABETES MELLITUS WITHOUT COMPLICATION, WITH LONG-TERM CURRENT USE OF INSULIN (HCC): ICD-10-CM

## 2021-03-31 LAB
BUN BLDV-MCNC: 15 MG/DL
CALCIUM SERPL-MCNC: 8.9 MG/DL
CHLORIDE BLD-SCNC: 106 MMOL/L
CO2: 29 MMOL/L
CREAT SERPL-MCNC: 0.87 MG/DL
GFR CALCULATED: >60
GLUCOSE BLD-MCNC: 115 MG/DL
POTASSIUM SERPL-SCNC: 4.3 MMOL/L
SODIUM BLD-SCNC: 140 MMOL/L

## 2021-04-05 DIAGNOSIS — E03.9 HYPOTHYROIDISM, UNSPECIFIED TYPE: ICD-10-CM

## 2021-04-05 RX ORDER — LEVOTHYROXINE SODIUM 0.2 MG/1
TABLET ORAL
Qty: 90 TABLET | Refills: 0 | Status: SHIPPED
Start: 2021-04-05 | End: 2021-06-29

## 2021-04-05 RX ORDER — PEN NEEDLE, DIABETIC 32GX 5/32"
NEEDLE, DISPOSABLE MISCELLANEOUS
Qty: 100 EACH | Refills: 3 | Status: SHIPPED | OUTPATIENT
Start: 2021-04-05

## 2021-04-26 DIAGNOSIS — E11.9 TYPE 2 DIABETES MELLITUS WITHOUT COMPLICATION, WITH LONG-TERM CURRENT USE OF INSULIN (HCC): Primary | ICD-10-CM

## 2021-04-26 DIAGNOSIS — Z79.4 TYPE 2 DIABETES MELLITUS WITHOUT COMPLICATION, WITH LONG-TERM CURRENT USE OF INSULIN (HCC): Primary | ICD-10-CM

## 2021-04-26 RX ORDER — SYRINGE-NEEDLE,INSULIN,0.5 ML 28GX1/2"
SYRINGE, EMPTY DISPOSABLE MISCELLANEOUS
Qty: 100 EACH | Refills: 3 | Status: SHIPPED | OUTPATIENT
Start: 2021-04-26

## 2021-04-28 DIAGNOSIS — R25.1 TREMOR: ICD-10-CM

## 2021-04-28 RX ORDER — PROPRANOLOL HYDROCHLORIDE 40 MG/1
TABLET ORAL
Qty: 270 TABLET | Refills: 0 | Status: SHIPPED
Start: 2021-04-28 | End: 2021-08-23 | Stop reason: SDUPTHER

## 2021-05-27 ENCOUNTER — OFFICE VISIT (OUTPATIENT)
Dept: FAMILY MEDICINE CLINIC | Age: 71
End: 2021-05-27
Payer: MEDICARE

## 2021-05-27 VITALS
DIASTOLIC BLOOD PRESSURE: 72 MMHG | HEART RATE: 88 BPM | SYSTOLIC BLOOD PRESSURE: 122 MMHG | TEMPERATURE: 97.2 F | RESPIRATION RATE: 17 BRPM | OXYGEN SATURATION: 98 % | WEIGHT: 202 LBS | BODY MASS INDEX: 29.92 KG/M2 | HEIGHT: 69 IN

## 2021-05-27 DIAGNOSIS — Z79.4 TYPE 2 DIABETES MELLITUS WITHOUT COMPLICATION, WITH LONG-TERM CURRENT USE OF INSULIN (HCC): Primary | ICD-10-CM

## 2021-05-27 DIAGNOSIS — E87.5 HYPERKALEMIA: ICD-10-CM

## 2021-05-27 DIAGNOSIS — C92.01 ACUTE MYELOID LEUKEMIA IN REMISSION (HCC): ICD-10-CM

## 2021-05-27 DIAGNOSIS — K21.9 GASTROESOPHAGEAL REFLUX DISEASE WITHOUT ESOPHAGITIS: ICD-10-CM

## 2021-05-27 DIAGNOSIS — D89.813 GVHD (GRAFT VERSUS HOST DISEASE) (HCC): ICD-10-CM

## 2021-05-27 DIAGNOSIS — D69.6 THROMBOCYTOPENIA (HCC): ICD-10-CM

## 2021-05-27 DIAGNOSIS — E11.9 TYPE 2 DIABETES MELLITUS WITHOUT COMPLICATION, WITH LONG-TERM CURRENT USE OF INSULIN (HCC): Primary | ICD-10-CM

## 2021-05-27 DIAGNOSIS — Z00.00 ROUTINE GENERAL MEDICAL EXAMINATION AT A HEALTH CARE FACILITY: ICD-10-CM

## 2021-05-27 LAB — HBA1C MFR BLD: 9.2 %

## 2021-05-27 PROCEDURE — G0402 INITIAL PREVENTIVE EXAM: HCPCS | Performed by: FAMILY MEDICINE

## 2021-05-27 PROCEDURE — 3046F HEMOGLOBIN A1C LEVEL >9.0%: CPT | Performed by: FAMILY MEDICINE

## 2021-05-27 PROCEDURE — 4040F PNEUMOC VAC/ADMIN/RCVD: CPT | Performed by: FAMILY MEDICINE

## 2021-05-27 PROCEDURE — 3017F COLORECTAL CA SCREEN DOC REV: CPT | Performed by: FAMILY MEDICINE

## 2021-05-27 PROCEDURE — 83036 HEMOGLOBIN GLYCOSYLATED A1C: CPT | Performed by: FAMILY MEDICINE

## 2021-05-27 PROCEDURE — 1123F ACP DISCUSS/DSCN MKR DOCD: CPT | Performed by: FAMILY MEDICINE

## 2021-05-27 RX ORDER — INSULIN GLARGINE 100 [IU]/ML
20 INJECTION, SOLUTION SUBCUTANEOUS NIGHTLY
Qty: 5 PEN | Refills: 3 | Status: ON HOLD
Start: 2021-05-27 | End: 2022-03-23 | Stop reason: HOSPADM

## 2021-05-27 RX ORDER — PANTOPRAZOLE SODIUM 40 MG/1
TABLET, DELAYED RELEASE ORAL
Qty: 90 TABLET | Refills: 3 | Status: ON HOLD
Start: 2021-05-27 | End: 2022-03-23 | Stop reason: HOSPADM

## 2021-05-27 ASSESSMENT — LIFESTYLE VARIABLES
AUDIT-C TOTAL SCORE: 0
HAS A RELATIVE, FRIEND, DOCTOR, OR ANOTHER HEALTH PROFESSIONAL EXPRESSED CONCERN ABOUT YOUR DRINKING OR SUGGESTED YOU CUT DOWN: NO
HAS A RELATIVE, FRIEND, DOCTOR, OR ANOTHER HEALTH PROFESSIONAL EXPRESSED CONCERN ABOUT YOUR DRINKING OR SUGGESTED YOU CUT DOWN: 0
HOW MANY STANDARD DRINKS CONTAINING ALCOHOL DO YOU HAVE ON A TYPICAL DAY: ONE OR TWO
HAVE YOU OR SOMEONE ELSE BEEN INJURED AS A RESULT OF YOUR DRINKING: NO
HOW OFTEN DO YOU HAVE SIX OR MORE DRINKS ON ONE OCCASION: 1
HOW OFTEN DURING THE LAST YEAR HAVE YOU HAD A FEELING OF GUILT OR REMORSE AFTER DRINKING: NEVER
HOW OFTEN DURING THE LAST YEAR HAVE YOU FOUND THAT YOU WERE NOT ABLE TO STOP DRINKING ONCE YOU HAD STARTED: NEVER
HOW OFTEN DURING THE LAST YEAR HAVE YOU BEEN UNABLE TO REMEMBER WHAT HAPPENED THE NIGHT BEFORE BECAUSE YOU HAD BEEN DRINKING: NEVER
HOW OFTEN DO YOU HAVE A DRINK CONTAINING ALCOHOL: 2
HOW OFTEN DURING THE LAST YEAR HAVE YOU FAILED TO DO WHAT WAS NORMALLY EXPECTED FROM YOU BECAUSE OF DRINKING: NEVER
HOW OFTEN DURING THE LAST YEAR HAVE YOU BEEN UNABLE TO REMEMBER WHAT HAPPENED THE NIGHT BEFORE BECAUSE YOU HAD BEEN DRINKING: 0
HOW OFTEN DO YOU HAVE A DRINK CONTAINING ALCOHOL: TWO TO FOUR TIMES A MONTH
HOW OFTEN DURING THE LAST YEAR HAVE YOU NEEDED AN ALCOHOLIC DRINK FIRST THING IN THE MORNING TO GET YOURSELF GOING AFTER A NIGHT OF HEAVY DRINKING: 0
HOW OFTEN DURING THE LAST YEAR HAVE YOU NEEDED AN ALCOHOLIC DRINK FIRST THING IN THE MORNING TO GET YOURSELF GOING AFTER A NIGHT OF HEAVY DRINKING: NEVER
HOW OFTEN DO YOU HAVE SIX OR MORE DRINKS ON ONE OCCASION: LESS THAN MONTHLY
AUDIT TOTAL SCORE: 0
AUDIT-C TOTAL SCORE: 3

## 2021-05-27 ASSESSMENT — PATIENT HEALTH QUESTIONNAIRE - PHQ9
2. FEELING DOWN, DEPRESSED OR HOPELESS: 0
1. LITTLE INTEREST OR PLEASURE IN DOING THINGS: 0
SUM OF ALL RESPONSES TO PHQ QUESTIONS 1-9: 0

## 2021-05-27 ASSESSMENT — SOCIAL DETERMINANTS OF HEALTH (SDOH): HOW HARD IS IT FOR YOU TO PAY FOR THE VERY BASICS LIKE FOOD, HOUSING, MEDICAL CARE, AND HEATING?: NOT VERY HARD

## 2021-05-27 NOTE — PROGRESS NOTES
day 4, 2 pills on day 5, 1 pill on day 6. Yes Yoly Langley, DO   insulin lispro, 1 Unit Dial, (HUMALOG KWIKPEN) 100 UNIT/ML SOPN INJECT 0-20 UNITS INTO THE SKIN THREE TIMES DAILY(BEFORE MEALS) Yes JYOTI Dixon CNP   simvastatin (ZOCOR) 10 MG tablet TAKE 1 TABLET BY MOUTH EVERY NIGHT Yes JYOTI Dixon CNP   spironolactone (ALDACTONE) 25 MG tablet TAKE 1 TABLET BY MOUTH TWICE DAILY Yes JYOTI Dixon CNP   LORazepam (ATIVAN) 0.5 MG tablet Take 1 tablet by mouth 2 times daily as needed for Anxiety. Yes Yoly Langley, DO   blood glucose test strips (ONE TOUCH ULTRA TEST) strip Test up to 5 times daily As needed.  Yes JYOTI Dixon CNP   Icosapent Ethyl (VASCEPA) 1 g CAPS capsule Take 2 capsules by mouth 2 times daily Yes JYOTI Tony CNP   pantoprazole (PROTONIX) 40 MG tablet TAKE 1 TABLET DAILY Yes JYOTI Dixon CNP   vitamin D (CHOLECALCIFEROL) 1000 UNIT TABS tablet Take 1,000 Units by mouth daily Yes Historical Provider, MD   isosorbide mononitrate (IMDUR) 30 MG extended release tablet Take 15 mg by mouth daily  Yes Historical Provider, MD   aspirin 81 MG chewable tablet Take 81 mg by mouth daily Yes Historical Provider, MD       Past Medical History:   Diagnosis Date    AML (acute myeloblastic leukemia) ([facility] [address]     chemotherapy    AML (acute myeloblastic leukemia) ([facility] [address] 2010    Cancer ([facility] [address]     Chronic kidney disease     Diabetes mellitus ([facility] [address]     GERD (gastroesophageal reflux disease)     Hx of blood clots 2012    legs    Hyperlipidemia     Liver disease     MI, old    Tivis Sheryl Other disorders of kidney and ureter     Other sleep disturbances     Thyroid disease     Type II or unspecified type diabetes mellitus without mention of complication, not stated as uncontrolled        Past Surgical History:   Procedure Laterality Date    APPENDECTOMY      BONE MARROW TRANSPLANT      double core transplant    CHOLECYSTECTOMY      COLONOSCOPY  ENDOSCOPY, COLON, DIAGNOSTIC      FOOT NEUROMA SURGERY      UPPER GASTROINTESTINAL ENDOSCOPY         Family History   Problem Relation Age of Onset    Heart Disease Mother     High Blood Pressure Father     Heart Disease Father     Cancer Maternal Grandmother     Cancer Other     Cancer Other        CareTeam (Including outside providers/suppliers regularly involved in providing care):   Patient Care Team:  Juan Pacheco DO as PCP - General (Family Medicine)  Juan Pacheco DO as PCP - [facility] Empaneled Provider    Wt Readings from Last 3 Encounters:   05/27/21 202 lb (91.6 kg)   02/25/21 216 lb 12.8 oz (98.3 kg)   02/17/21 220 lb (99.8 kg)     Vitals:    05/27/21 0943   BP: 122/72   Pulse: 88   Resp: 17   Temp: 97.2 °F (36.2 °C)   TempSrc: Temporal   SpO2: 98%   Weight: 202 lb (91.6 kg)   Height: 5' 9\" (1.753 m)     Body mass index is 29.83 kg/m². Based upon direct observation of the patient, evaluation of cognition reveals recent and remote memory intact.     General Appearance: alert and oriented to person, place and time, well developed and well- nourished, in no acute distress  Skin: warm and dry, no rash or erythema  Head: normocephalic and atraumatic  Eyes: pupils equal, round, and reactive to light, extraocular eye movements intact, conjunctivae normal  ENT: tympanic membrane, external ear and ear canal normal bilaterally, nose without deformity, nasal mucosa and turbinates normal without polyps  Neck: supple and non-tender without mass, no thyromegaly or thyroid nodules, no cervical lymphadenopathy  Pulmonary/Chest: clear to auscultation bilaterally- no wheezes, rales or rhonchi, normal air movement, no respiratory distress  Cardiovascular: normal rate, regular rhythm, normal S1 and S2, no murmurs, rubs, clicks, or gallops, distal pulses intact, no carotid bruits  Abdomen: soft, non-tender, non-distended, normal bowel sounds, no masses or organomegaly  Extremities: no cyanosis, clubbing or edema  Musculoskeletal: normal range of motion, no joint swelling, deformity or tenderness  Neurologic: reflexes normal and symmetric, no cranial nerve deficit, gait, coordination and speech normal  none    Patient's complete Health Risk Assessment and screening values have been reviewed and are found in Flowsheets. The following problems were reviewed today and where indicated follow up appointments were made and/or referrals ordered. Positive Risk Factor Screenings with Interventions:          General Health and ACP:  General  In general, how would you say your health is?: Fair  In the past 7 days, have you experienced any of the following?  New or Increased Pain, New or Increased Fatigue, Loneliness, Social Isolation, Stress or Anger?: (!) New or Increased Pain  Do you get the social and emotional support that you need?: Yes  Do you have a Living Will?: Yes  Advance Directives     Power of  Living Will ACP-Advance Directive ACP-Power of     Not on File Filed on 04/02/12 Filed Not on File      General Health Risk Interventions:  · none    Health Habits/Nutrition:  Health Habits/Nutrition  Do you exercise for at least 20 minutes 2-3 times per week?: (!) No  Have you lost any weight without trying in the past 3 months?: (!) Yes  Do you eat only one meal per day?: No  Have you seen the dentist within the past year?: Yes  Body mass index: (!) 29.83  Health Habits/Nutrition Interventions:  · none    Hearing/Vision:  No exam data present  Hearing/Vision  Do you or your family notice any trouble with your hearing that hasn't been managed with hearing aids?: (!) Yes  Do you have difficulty driving, watching TV, or doing any of your daily activities because of your eyesight?: No  Have you had an eye exam within the past year?: (!) No  Hearing/Vision Interventions:  · none    Safety:  Safety  Do you have working smoke detectors?: Yes  Have all throw rugs been removed or fastened?: Yes  Do you  Hepatitis C screen  Completed    Hepatitis A vaccine  Aged Out    Hib vaccine  Aged Out    Meningococcal (ACWY) vaccine  Aged Out     Recommendations for [facility] Due: see orders and patient instructions/AVS.  . Recommended screening schedule for the next 5-10 years is provided to the patient in written form: see Patient Rory Wayne was seen today for medicare awv and discuss medications.     Diagnoses and all orders for this visit:    Type 2 diabetes mellitus without complication, with long-term current use of insulin (HCC)  -     POCT glycosylated hemoglobin (Hb A1C)

## 2021-05-27 NOTE — PATIENT INSTRUCTIONS
Personalized Preventive Plan for Dois Severiano - 5/27/2021  Medicare offers a range of preventive health benefits. Some of the tests and screenings are paid in full while other may be subject to a deductible, co-insurance, and/or copay. Some of these benefits include a comprehensive review of your medical history including lifestyle, illnesses that may run in your family, and various assessments and screenings as appropriate. After reviewing your medical record and screening and assessments performed today your provider may have ordered immunizations, labs, imaging, and/or referrals for you. A list of these orders (if applicable) as well as your Preventive Care list are included within your After Visit Summary for your review. Other Preventive Recommendations:    · A preventive eye exam performed by an eye specialist is recommended every 1-2 years to screen for glaucoma; cataracts, macular degeneration, and other eye disorders. · A preventive dental visit is recommended every 6 months. · Try to get at least 150 minutes of exercise per week or 10,000 steps per day on a pedometer . · Order or download the FREE \"Exercise & Physical Activity: Your Everyday Guide\" from [employer]. Call 3-156.674.2168 or search [employer] online. · You need 4941-2187 mg of calcium and 9465-1267 IU of vitamin D per day. It is possible to meet your calcium requirement with diet alone, but a vitamin D supplement is usually necessary to meet this goal.  · When exposed to the sun, use a sunscreen that protects against both UVA and UVB radiation with an SPF of 30 or greater. Reapply every 2 to 3 hours or after sweating, drying off with a towel, or swimming. · Always wear a seat belt when traveling in a car. Always wear a helmet when riding a bicycle or motorcycle.

## 2021-06-07 RX ORDER — GLUCOSAMINE HCL/CHONDROITIN SU 500-400 MG
100 CAPSULE ORAL 4 TIMES DAILY
Qty: 100 STRIP | Refills: 3 | Status: SHIPPED
Start: 2021-06-07 | End: 2022-01-03

## 2021-06-16 DIAGNOSIS — Z79.4 TYPE 2 DIABETES MELLITUS WITHOUT COMPLICATION, WITH LONG-TERM CURRENT USE OF INSULIN (HCC): ICD-10-CM

## 2021-06-16 DIAGNOSIS — E11.9 TYPE 2 DIABETES MELLITUS WITHOUT COMPLICATION, WITH LONG-TERM CURRENT USE OF INSULIN (HCC): ICD-10-CM

## 2021-06-29 DIAGNOSIS — E03.9 HYPOTHYROIDISM, UNSPECIFIED TYPE: ICD-10-CM

## 2021-06-29 RX ORDER — LEVOTHYROXINE SODIUM 0.2 MG/1
TABLET ORAL
Qty: 90 TABLET | Refills: 0 | Status: SHIPPED
Start: 2021-06-29 | End: 2021-09-22

## 2021-07-09 DIAGNOSIS — E87.5 HYPERKALEMIA: ICD-10-CM

## 2021-07-09 LAB
BUN BLDV-MCNC: NORMAL MG/DL
CALCIUM SERPL-MCNC: NORMAL MG/DL
CHLORIDE BLD-SCNC: NORMAL MMOL/L
CO2: NORMAL
CREAT SERPL-MCNC: NORMAL MG/DL
GFR CALCULATED: NORMAL
GLUCOSE BLD-MCNC: NORMAL MG/DL
POTASSIUM SERPL-SCNC: NORMAL MMOL/L
SODIUM BLD-SCNC: NORMAL MMOL/L

## 2021-07-14 DIAGNOSIS — Z79.4 TYPE 2 DIABETES MELLITUS WITHOUT COMPLICATION, WITH LONG-TERM CURRENT USE OF INSULIN (HCC): ICD-10-CM

## 2021-07-14 DIAGNOSIS — E11.9 TYPE 2 DIABETES MELLITUS WITHOUT COMPLICATION, WITH LONG-TERM CURRENT USE OF INSULIN (HCC): ICD-10-CM

## 2021-07-14 RX ORDER — SIMVASTATIN 10 MG
TABLET ORAL
Qty: 90 TABLET | Refills: 0 | Status: SHIPPED
Start: 2021-07-14 | End: 2021-10-12

## 2021-08-03 LAB — DIABETIC RETINOPATHY: POSITIVE

## 2021-08-23 DIAGNOSIS — R25.1 TREMOR: ICD-10-CM

## 2021-08-23 RX ORDER — PROPRANOLOL HYDROCHLORIDE 40 MG/1
TABLET ORAL
Qty: 270 TABLET | Refills: 1 | Status: SHIPPED
Start: 2021-08-23 | End: 2022-02-22

## 2021-09-22 DIAGNOSIS — E03.9 HYPOTHYROIDISM, UNSPECIFIED TYPE: ICD-10-CM

## 2021-09-22 RX ORDER — LEVOTHYROXINE SODIUM 0.2 MG/1
TABLET ORAL
Qty: 90 TABLET | Refills: 0 | Status: SHIPPED
Start: 2021-09-22 | End: 2021-12-28 | Stop reason: SDUPTHER

## 2021-09-23 DIAGNOSIS — F41.9 ANXIETY: ICD-10-CM

## 2021-09-28 ENCOUNTER — OFFICE VISIT (OUTPATIENT)
Dept: FAMILY MEDICINE CLINIC | Age: 71
End: 2021-09-28
Payer: MEDICARE

## 2021-09-28 VITALS
HEART RATE: 80 BPM | SYSTOLIC BLOOD PRESSURE: 138 MMHG | DIASTOLIC BLOOD PRESSURE: 70 MMHG | RESPIRATION RATE: 18 BRPM | OXYGEN SATURATION: 97 % | TEMPERATURE: 98.6 F

## 2021-09-28 DIAGNOSIS — R50.9 FEVER, UNSPECIFIED FEVER CAUSE: ICD-10-CM

## 2021-09-28 DIAGNOSIS — J02.0 ACUTE STREPTOCOCCAL PHARYNGITIS: ICD-10-CM

## 2021-09-28 DIAGNOSIS — R05.9 COUGH: Primary | ICD-10-CM

## 2021-09-28 DIAGNOSIS — R53.83 FATIGUE, UNSPECIFIED TYPE: ICD-10-CM

## 2021-09-28 PROCEDURE — 3017F COLORECTAL CA SCREEN DOC REV: CPT | Performed by: NURSE PRACTITIONER

## 2021-09-28 PROCEDURE — 4040F PNEUMOC VAC/ADMIN/RCVD: CPT | Performed by: NURSE PRACTITIONER

## 2021-09-28 PROCEDURE — 1036F TOBACCO NON-USER: CPT | Performed by: NURSE PRACTITIONER

## 2021-09-28 PROCEDURE — 1123F ACP DISCUSS/DSCN MKR DOCD: CPT | Performed by: NURSE PRACTITIONER

## 2021-09-28 PROCEDURE — G8417 CALC BMI ABV UP PARAM F/U: HCPCS | Performed by: NURSE PRACTITIONER

## 2021-09-28 PROCEDURE — 99213 OFFICE O/P EST LOW 20 MIN: CPT | Performed by: NURSE PRACTITIONER

## 2021-09-28 PROCEDURE — G8427 DOCREV CUR MEDS BY ELIG CLIN: HCPCS | Performed by: NURSE PRACTITIONER

## 2021-09-28 NOTE — PROGRESS NOTES
Chief Complaint   Cough (lack of taste and smell ) and Fatigue      History of Present Illness   Source of history provided by:  patient. Makayla Del Cid is a 73 y.o. old male who presents to the office with complaints of Fever, Chills, Pharyngitis, productive Cough, Chest congestion, Loss of Appetite and Fatigue x 11 days. States symptoms have better since onset. Has been taking Mucinex for the symptoms with some relief. Denies any Vomiting, Chest Pain, LE Edema or Rash. Denies any hx of no history of pneumonia or bronchitis. No tobacco use. ROS   Pertinent positives and negatives are stated within HPI, all other systems reviewed and are negative. Past Medical History:  has a past medical history of AML (acute myeloblastic leukemia) ([facility] [address] AML (acute myeloblastic leukemia) ([facility] [address] Cancer ([facility] [address] Chronic kidney disease, Diabetes mellitus ([facility] [address] GERD (gastroesophageal reflux disease), Hx of blood clots, Hyperlipidemia, Liver disease, MI, old, Other disorders of kidney and ureter, Other sleep disturbances, Thyroid disease, and Type II or unspecified type diabetes mellitus without mention of complication, not stated as uncontrolled. Past Surgical History:  has a past surgical history that includes Appendectomy; Cholecystectomy; Foot neuroma surgery; Colonoscopy; Endoscopy, colon, diagnostic; Upper gastrointestinal endoscopy; and bone marrow transplant. Social History:  reports that he quit smoking about 45 years ago. He has a 5.00 pack-year smoking history. He quit smokeless tobacco use about 23 years ago. He reports current alcohol use of about 1.0 standard drinks of alcohol per week. He reports that he does not use drugs. Family History: family history includes Cancer in his maternal grandmother and other family members; Heart Disease in his father and mother; High Blood Pressure in his father.   Allergies: Pneumococcal vaccines, Ciprofloxacin, Diatrizoate, Dye [iodides], Epinephrine, Iodine, Penicillin g, Morphine, Other, and Oxycodone    Physical Exam   Vital Signs:  /70   Pulse 80   Temp 98.6 °F (37 °C)   Resp 18   SpO2 97%    Oxygen Saturation Interpretation: Normal.    Constitutional:  Alert, development consistent with age. NAD. Head:  NC/NT. Airway patent. Ears: TMs with light reflex bilaterally. Canals without exudate or swelling bilaterally. Mouth: Posterior pharynx with mild erythema and clear postnasal drip. No tonsillar hypertrophy or exudate. Neck:  Normal ROM. Supple. No anterior cervical adenopathy noted. Lungs: crackles heard to bilateral lung bases   CV:  Regular rate and rhythm, normal heart sounds, without pathological murmurs, ectopy, gallops, or rubs. Skin:  Normal turgor. Warm, dry, without visible rash. Neurological:  Oriented. Motor functions intact. Lab / Imaging Results   (All laboratory and radiology results have been personally reviewed by myself)  Labs:  Results for orders placed or performed in visit on 09/28/21   Covid-19 Ambulatory    Specimen: Nasopharyngeal Swab   Result Value Ref Range    SARS-CoV-2 Detected (A) Not Detected    Source OP swab        Imaging: All Radiology results interpreted by Radiologist unless otherwise noted. No results found. Medical Decision Making   Pt non-toxic, in no apparent distress and stable at time of discharge. Assessment/Plan   Tahir Rios was seen today for cough and fatigue. Diagnoses and all orders for this visit:    Cough  -     Covid-19 Ambulatory  -     XR CHEST (2 VW); Future    Acute streptococcal pharyngitis    Fever, unspecified fever cause    Fatigue, unspecified type        COVID-19 swab obtained and pending, will call with results once available. Advised self-quarantine at home in the interim. Increase fluids and rest. Symptomatic relief discussed including Tylenol prn pain/fever. Schedule f/u in 7-10 days if symptoms persist. ED sooner if symptoms worsen or change.  ED immediately with high or

## 2021-09-30 LAB
SARS-COV-2: DETECTED
SOURCE: ABNORMAL

## 2021-10-01 ENCOUNTER — TELEPHONE (OUTPATIENT)
Dept: FAMILY MEDICINE CLINIC | Age: 71
End: 2021-10-01

## 2021-10-01 DIAGNOSIS — J18.9 PNEUMONIA OF BOTH LUNGS DUE TO INFECTIOUS ORGANISM, UNSPECIFIED PART OF LUNG: ICD-10-CM

## 2021-10-01 DIAGNOSIS — R05.9 COUGH: ICD-10-CM

## 2021-10-01 RX ORDER — AZITHROMYCIN 250 MG/1
TABLET, FILM COATED ORAL
Qty: 1 PACKET | Refills: 0 | Status: SHIPPED
Start: 2021-10-01 | End: 2021-12-28 | Stop reason: ALTCHOICE

## 2021-10-01 RX ORDER — AZITHROMYCIN 250 MG/1
250 TABLET, FILM COATED ORAL SEE ADMIN INSTRUCTIONS
Qty: 6 TABLET | Refills: 0 | Status: CANCELLED | OUTPATIENT
Start: 2021-10-01 | End: 2021-10-06

## 2021-10-01 RX ORDER — AZITHROMYCIN 250 MG/1
250 TABLET, FILM COATED ORAL SEE ADMIN INSTRUCTIONS
Qty: 6 TABLET | Refills: 0 | OUTPATIENT
Start: 2021-10-01 | End: 2021-10-06

## 2021-10-12 DIAGNOSIS — E11.9 TYPE 2 DIABETES MELLITUS WITHOUT COMPLICATION, WITH LONG-TERM CURRENT USE OF INSULIN (HCC): ICD-10-CM

## 2021-10-12 DIAGNOSIS — Z79.4 TYPE 2 DIABETES MELLITUS WITHOUT COMPLICATION, WITH LONG-TERM CURRENT USE OF INSULIN (HCC): ICD-10-CM

## 2021-10-12 RX ORDER — SIMVASTATIN 10 MG
TABLET ORAL
Qty: 90 TABLET | Refills: 0 | Status: SHIPPED
Start: 2021-10-12 | End: 2022-02-24

## 2021-11-02 NOTE — TELEPHONE ENCOUNTER
Lyndle Hashimoto states his wife takes care of his meds. He didn't know he was taking it. He was asked to have his wife check the bottle for the prescriber name and date and then call us back.

## 2021-11-02 NOTE — TELEPHONE ENCOUNTER
----- Message from Annalisa Valencia sent at 11/2/2021 11:46 AM EDT -----  Subject: Message to Provider    QUESTIONS  Information for Provider? Patient recently seen in office and today   requests refill on Zoloft Sertraline, but no medication showing (Rx   ordered by Farhad Scott). Please call to confirm. Pharmacy? [facility] (location   [address] Patient gave verbal permission to speak with wife,   who is not on HIPAA. Patient advised to complete form in office. ---------------------------------------------------------------------------  --------------  Pratima MCMANUS  What is the best way for the office to contact you? OK to leave message on   voicemail  Preferred Call Back Phone Number? [phone]  ---------------------------------------------------------------------------  --------------  SCRIPT ANSWERS  Relationship to Patient?  Self

## 2021-11-02 NOTE — TELEPHONE ENCOUNTER
Refill request refused 10/18 - was advised to contact us first  Chart shows we d/c zoloft 50 mg in September  It also shows zoloft 50 mg entered by beatriz but reported by [facility] taking 75 mg daily    Will need to call and see who is prescribing him zoloft

## 2021-11-09 ENCOUNTER — OFFICE VISIT (OUTPATIENT)
Dept: FAMILY MEDICINE CLINIC | Age: 71
End: 2021-11-09
Payer: MEDICARE

## 2021-11-09 ENCOUNTER — TELEPHONE (OUTPATIENT)
Dept: FAMILY MEDICINE CLINIC | Age: 71
End: 2021-11-09

## 2021-11-09 VITALS
BODY MASS INDEX: 29.83 KG/M2 | TEMPERATURE: 96.4 F | DIASTOLIC BLOOD PRESSURE: 70 MMHG | RESPIRATION RATE: 16 BRPM | SYSTOLIC BLOOD PRESSURE: 144 MMHG | OXYGEN SATURATION: 98 % | HEIGHT: 69 IN | HEART RATE: 100 BPM

## 2021-11-09 DIAGNOSIS — R68.89 FLU-LIKE SYMPTOMS: ICD-10-CM

## 2021-11-09 DIAGNOSIS — R05.9 COUGH: Primary | ICD-10-CM

## 2021-11-09 DIAGNOSIS — F41.9 ANXIETY: ICD-10-CM

## 2021-11-09 LAB
INFLUENZA A ANTIBODY: NORMAL
INFLUENZA B ANTIBODY: NORMAL

## 2021-11-09 PROCEDURE — 1036F TOBACCO NON-USER: CPT | Performed by: FAMILY MEDICINE

## 2021-11-09 PROCEDURE — 3017F COLORECTAL CA SCREEN DOC REV: CPT | Performed by: FAMILY MEDICINE

## 2021-11-09 PROCEDURE — 1123F ACP DISCUSS/DSCN MKR DOCD: CPT | Performed by: FAMILY MEDICINE

## 2021-11-09 PROCEDURE — G8427 DOCREV CUR MEDS BY ELIG CLIN: HCPCS | Performed by: FAMILY MEDICINE

## 2021-11-09 PROCEDURE — G8417 CALC BMI ABV UP PARAM F/U: HCPCS | Performed by: FAMILY MEDICINE

## 2021-11-09 PROCEDURE — 99213 OFFICE O/P EST LOW 20 MIN: CPT | Performed by: FAMILY MEDICINE

## 2021-11-09 PROCEDURE — 87804 INFLUENZA ASSAY W/OPTIC: CPT | Performed by: FAMILY MEDICINE

## 2021-11-09 PROCEDURE — 4040F PNEUMOC VAC/ADMIN/RCVD: CPT | Performed by: FAMILY MEDICINE

## 2021-11-09 PROCEDURE — G8484 FLU IMMUNIZE NO ADMIN: HCPCS | Performed by: FAMILY MEDICINE

## 2021-11-09 RX ORDER — CEFTRIAXONE 500 MG/1
500 INJECTION, POWDER, FOR SOLUTION INTRAMUSCULAR; INTRAVENOUS ONCE
Status: DISCONTINUED | OUTPATIENT
Start: 2021-11-09 | End: 2021-11-09

## 2021-11-09 RX ORDER — BENZONATATE 100 MG/1
100 CAPSULE ORAL 2 TIMES DAILY PRN
Qty: 24 CAPSULE | Refills: 1 | Status: SHIPPED | OUTPATIENT
Start: 2021-11-09 | End: 2021-11-16

## 2021-11-09 RX ORDER — LORAZEPAM 0.5 MG/1
0.5 TABLET ORAL 2 TIMES DAILY PRN
Qty: 60 TABLET | Refills: 0 | Status: SHIPPED
Start: 2021-11-09 | End: 2022-02-24 | Stop reason: SDUPTHER

## 2021-11-09 RX ORDER — CEFTRIAXONE 1 G/1
500 INJECTION, POWDER, FOR SOLUTION INTRAMUSCULAR; INTRAVENOUS ONCE
Status: DISCONTINUED | OUTPATIENT
Start: 2021-11-09 | End: 2022-02-28 | Stop reason: HOSPADM

## 2021-11-09 ASSESSMENT — ENCOUNTER SYMPTOMS
DIARRHEA: 0
ABDOMINAL PAIN: 0
NAUSEA: 0
WHEEZING: 0
VOMITING: 0
CONSTIPATION: 0
SHORTNESS OF BREATH: 0
BLOOD IN STOOL: 0
COUGH: 0

## 2021-11-09 NOTE — LETTER
[facility]  [address]  Phone: [phone]  Fax: [phone], [place]        November 9, 2021     Patient: Trey Quinn   YOB: 1950   Date of Visit: 11/9/2021       To Whom It May Concern:    Shaista Gil was seen in my office today. If you have any questions or concerns, please don't hesitate to call.     Sincerely,          Rabia Cruz, DO

## 2021-11-09 NOTE — PROGRESS NOTES
Tresa Fisher (:  1950) is a 73 y.o. male,Established patient, here for evaluation of the following chief complaint(s):  URI (head congestion, cough, mucus, runny nose. sweats. ) and Anxiety (ativan)         ASSESSMENT/PLAN:  1. Cough  -     benzonatate (TESSALON PERLES) 100 MG capsule; Take 1 capsule by mouth 2 times daily as needed for Cough, Disp-24 capsule, R-1Normal  -     cefTRIAXone (ROCEPHIN) injection 500 mg; 500 mg, IntraMUSCular, ONCE, On 21 at 1245, For 1 dose  2. Flu-like symptoms  -     POCT Influenza A/B  3. Anxiety  -     sertraline (ZOLOFT) 50 MG tablet; Take 1.5 tablets by mouth daily, Disp-30 tablet, R-3Normal  -     LORazepam (ATIVAN) 0.5 MG tablet; Take 1 tablet by mouth 2 times daily as needed for Anxiety. , Disp-60 tablet, R-0Normal      No follow-ups on file. Subjective   SUBJECTIVE/OBJECTIVE:  URI (head congestion, cough, mucus, runny nose. sweats. ) and Anxiety (ativan)        Review of Systems   Constitutional: Negative for chills, diaphoresis and fever. HENT: Negative for ear discharge, ear pain, hearing loss, nosebleeds and tinnitus. Respiratory: Negative for cough, shortness of breath and wheezing. Cardiovascular: Negative for chest pain. Gastrointestinal: Negative for abdominal pain, blood in stool, constipation, diarrhea, nausea and vomiting. Genitourinary: Negative for dysuria, flank pain and hematuria. Musculoskeletal: Negative for myalgias. Skin: Negative for rash. Neurological: Negative for headaches. Hematological: Does not bruise/bleed easily. Psychiatric/Behavioral: Negative for hallucinations and suicidal ideas.           Objective   BP (!) 144/70   Pulse 100   Temp 96.4 °F (35.8 °C) (Temporal)   Resp 16   Ht 5' 9\" (1.753 m)   SpO2 98%   BMI 29.83 kg/m²   Lab Results   Component Value Date    LABA1C 9.2 2021    LABA1C 10.3 2021    LABA1C 10.3 2020     Physical Exam  HENT:      Nose: Nose normal.   Eyes: General:         Right eye: No discharge. Left eye: No discharge. Cardiovascular:      Rate and Rhythm: Normal rate and regular rhythm. Heart sounds: No murmur heard. Pulmonary:      Effort: No respiratory distress. Breath sounds: No rhonchi or rales. Abdominal:      Tenderness: There is no abdominal tenderness. Musculoskeletal:      Cervical back: No rigidity. Skin:     General: Skin is warm. Capillary Refill: Capillary refill takes less than 2 seconds. Coloration: Skin is not pale. Findings: No bruising. Neurological:      Mental Status: He is alert. Psychiatric:         Mood and Affect: Mood normal.            On this date 11/9/2021 I have spent 21 minutes reviewing previous notes, test results and face to face with the patient discussing the diagnosis and importance of compliance with the treatment plan as well as documenting on the day of the visit. An electronic signature was used to authenticate this note.     --Sandra Patel,

## 2021-11-09 NOTE — TELEPHONE ENCOUNTER
Please change rocephin order to 1 Gram - we do not have the 500 mg in stock, I did administer 500 mg and wasted the rest. Unable to chart due to incorrect NDC

## 2021-11-17 ENCOUNTER — TELEPHONE (OUTPATIENT)
Dept: FAMILY MEDICINE CLINIC | Age: 71
End: 2021-11-17

## 2021-11-17 NOTE — TELEPHONE ENCOUNTER
Patients spouse called stating patient has been out of work since his last visit on 11/9/2021 he was under the impression he had the flu. Patient is requesting a letter to return to work on 11/22/2021.

## 2021-11-29 ENCOUNTER — TELEPHONE (OUTPATIENT)
Dept: FAMILY MEDICINE CLINIC | Age: 71
End: 2021-11-29

## 2021-11-29 ENCOUNTER — NURSE TRIAGE (OUTPATIENT)
Dept: OTHER | Facility: CLINIC | Age: 71
End: 2021-11-29

## 2021-11-29 NOTE — TELEPHONE ENCOUNTER
Reason for Disposition   Pale skin (pallor)    Answer Assessment - Initial Assessment Questions  1. DESCRIPTION: \"Describe how you are feeling. \"      He's sleeping,  Has had fatigue for about a week,, has weakness, he's pale, coughing, fever, lower back pain    2. SEVERITY: \"How bad is it? \"  \"Can you stand and walk? \"    - MILD - Feels weak or tired, but does not interfere with work, school or normal activities    - [facility] to stand and walk; weakness interferes with work, school, or normal activities    - SEVERE - Unable to stand or walk      Moderate    3. ONSET:  \"When did the weakness begin? \"      About a week ago, maybe longer    4. CAUSE: \"What do you think is causing the weakness? \"      I don't know unless he's not getting over what he had. 5. MEDICINES: Rodena Lasso you recently started a new medicine or had a change in the amount of a medicine? \"      No    6. OTHER SYMPTOMS: \"Do you have any other symptoms? \" (e.g., chest pain, fever, cough, SOB, vomiting, diarrhea, bleeding, other areas of pain)      Coughing, SOB with coughing, head congestion, fever, pale, a little off balance. 7. PREGNANCY: \"Is there any chance you are pregnant? \" \"When was your last menstrual period? \"      No      Wanting to get some blood work done(platelets, WBC, RBC) and kidney functions. Protocols used: WEAKNESS (GENERALIZED) AND FATIGUE-ADULT-OH    Received call from [facility] with [employer]. Brief description of triage: weakness, extreme fatigue, pale, coughing, fever    Triage indicates for patient to be seen today. Walk in clinic if no appts. Care advice provided, patient verbalizes understanding; denies any other questions or concerns; instructed to call back for any new or worsening symptoms. Writer provided warm transfer to [facility] for appointment scheduling. Attention Provider: Thank you for allowing me to participate in the care of your patient.   The patient was connected to triage in response to information provided to the ECC/PSC. Please do not respond through this encounter as the response is not directed to a shared pool.

## 2021-11-29 NOTE — TELEPHONE ENCOUNTER
Spoke with patient after receiving call from [facility] and triage. Patient states he is feeling extremely fatigued and does not want to get out of bed, still has no appetite, and continuing to have head congestion like sinus infections. Denies cough or SOB.

## 2021-11-30 ENCOUNTER — OFFICE VISIT (OUTPATIENT)
Dept: FAMILY MEDICINE CLINIC | Age: 71
End: 2021-11-30
Payer: MEDICARE

## 2021-11-30 VITALS
SYSTOLIC BLOOD PRESSURE: 138 MMHG | HEART RATE: 83 BPM | RESPIRATION RATE: 16 BRPM | DIASTOLIC BLOOD PRESSURE: 80 MMHG | TEMPERATURE: 99.2 F | OXYGEN SATURATION: 97 %

## 2021-11-30 DIAGNOSIS — J12.82 PNEUMONIA DUE TO COVID-19 VIRUS: ICD-10-CM

## 2021-11-30 DIAGNOSIS — U07.1 PNEUMONIA DUE TO COVID-19 VIRUS: ICD-10-CM

## 2021-11-30 DIAGNOSIS — U07.1 COVID-19: ICD-10-CM

## 2021-11-30 DIAGNOSIS — U07.1 COVID-19: Primary | ICD-10-CM

## 2021-11-30 LAB
ALBUMIN SERPL-MCNC: 3.4 G/DL (ref 3.5–5.2)
ALP BLD-CCNC: 359 U/L (ref 40–129)
ALT SERPL-CCNC: 19 U/L (ref 0–40)
ANION GAP SERPL CALCULATED.3IONS-SCNC: 11 MMOL/L (ref 7–16)
ANISOCYTOSIS: ABNORMAL
AST SERPL-CCNC: 25 U/L (ref 0–39)
BASOPHILS ABSOLUTE: 0 E9/L (ref 0–0.2)
BASOPHILS RELATIVE PERCENT: 0.2 % (ref 0–2)
BILIRUB SERPL-MCNC: 0.7 MG/DL (ref 0–1.2)
BUN BLDV-MCNC: 15 MG/DL (ref 6–23)
CALCIUM SERPL-MCNC: 8.7 MG/DL (ref 8.6–10.2)
CHLORIDE BLD-SCNC: 92 MMOL/L (ref 98–107)
CO2: 27 MMOL/L (ref 22–29)
CREAT SERPL-MCNC: 0.7 MG/DL (ref 0.7–1.2)
EOSINOPHILS ABSOLUTE: 0.16 E9/L (ref 0.05–0.5)
EOSINOPHILS RELATIVE PERCENT: 2.6 % (ref 0–6)
GFR AFRICAN AMERICAN: >60
GFR NON-AFRICAN AMERICAN: >60 ML/MIN/1.73
GLUCOSE BLD-MCNC: 181 MG/DL (ref 74–99)
HCT VFR BLD CALC: 32.3 % (ref 37–54)
HEMOGLOBIN: 10 G/DL (ref 12.5–16.5)
HYPOCHROMIA: ABNORMAL
LYMPHOCYTES ABSOLUTE: 2.76 E9/L (ref 1.5–4)
LYMPHOCYTES RELATIVE PERCENT: 46.1 % (ref 20–42)
MCH RBC QN AUTO: 24.4 PG (ref 26–35)
MCHC RBC AUTO-ENTMCNC: 31 % (ref 32–34.5)
MCV RBC AUTO: 78.8 FL (ref 80–99.9)
MONOCYTES ABSOLUTE: 0.78 E9/L (ref 0.1–0.95)
MONOCYTES RELATIVE PERCENT: 13 % (ref 2–12)
NEUTROPHILS ABSOLUTE: 2.28 E9/L (ref 1.8–7.3)
NEUTROPHILS RELATIVE PERCENT: 38.3 % (ref 43–80)
OVALOCYTES: ABNORMAL
PDW BLD-RTO: 17.1 FL (ref 11.5–15)
PLATELET # BLD: 104 E9/L (ref 130–450)
PMV BLD AUTO: 11.3 FL (ref 7–12)
POIKILOCYTES: ABNORMAL
POLYCHROMASIA: ABNORMAL
POTASSIUM SERPL-SCNC: 4.1 MMOL/L (ref 3.5–5)
RBC # BLD: 4.1 E12/L (ref 3.8–5.8)
SODIUM BLD-SCNC: 130 MMOL/L (ref 132–146)
TOTAL PROTEIN: 5.7 G/DL (ref 6.4–8.3)
WBC # BLD: 6 E9/L (ref 4.5–11.5)

## 2021-11-30 PROCEDURE — 1123F ACP DISCUSS/DSCN MKR DOCD: CPT | Performed by: FAMILY MEDICINE

## 2021-11-30 PROCEDURE — 3017F COLORECTAL CA SCREEN DOC REV: CPT | Performed by: FAMILY MEDICINE

## 2021-11-30 PROCEDURE — G8484 FLU IMMUNIZE NO ADMIN: HCPCS | Performed by: FAMILY MEDICINE

## 2021-11-30 PROCEDURE — G8427 DOCREV CUR MEDS BY ELIG CLIN: HCPCS | Performed by: FAMILY MEDICINE

## 2021-11-30 PROCEDURE — 1036F TOBACCO NON-USER: CPT | Performed by: FAMILY MEDICINE

## 2021-11-30 PROCEDURE — G8417 CALC BMI ABV UP PARAM F/U: HCPCS | Performed by: FAMILY MEDICINE

## 2021-11-30 PROCEDURE — 4040F PNEUMOC VAC/ADMIN/RCVD: CPT | Performed by: FAMILY MEDICINE

## 2021-11-30 PROCEDURE — 99213 OFFICE O/P EST LOW 20 MIN: CPT | Performed by: FAMILY MEDICINE

## 2021-11-30 RX ORDER — DOXYCYCLINE HYCLATE 100 MG
100 TABLET ORAL 2 TIMES DAILY
Qty: 14 TABLET | Refills: 0 | Status: SHIPPED
Start: 2021-11-30 | End: 2021-12-28 | Stop reason: ALTCHOICE

## 2021-11-30 ASSESSMENT — ENCOUNTER SYMPTOMS
COUGH: 0
NAUSEA: 0
DIARRHEA: 0
BLOOD IN STOOL: 0
SHORTNESS OF BREATH: 0
CONSTIPATION: 0
WHEEZING: 0
ABDOMINAL PAIN: 0
VOMITING: 0

## 2021-11-30 NOTE — PROGRESS NOTES
Liz Villasenor (:  1950) is a 73 y.o. male,Established patient, here for evaluation of the following chief complaint(s):  Cough (fever 99.2 oral), Nasal Congestion (clear ), and Dizziness (room feels like it is spinning )         ASSESSMENT/PLAN:  1. COVID-19  -     XR CHEST (2 VW); Future  -     doxycycline hyclate (VIBRA-TABS) 100 MG tablet; Take 1 tablet by mouth 2 times daily, Disp-14 tablet, R-0Normal  -     CBC Auto Differential; Future  -     Comprehensive Metabolic Panel; Future  2. Pneumonia due to COVID-19 virus  -     XR CHEST (2 VW); Future  -     doxycycline hyclate (VIBRA-TABS) 100 MG tablet; Take 1 tablet by mouth 2 times daily, Disp-14 tablet, R-0Normal  -     CBC Auto Differential; Future  -     Comprehensive Metabolic Panel; Future      No follow-ups on file. Subjective   SUBJECTIVE/OBJECTIVE:  Cough (fever 99.2 oral), Nasal Congestion (clear ), and Dizziness (room feels like it is spinning )        Review of Systems   Constitutional: Negative for chills, diaphoresis and fever. HENT: Negative for ear discharge, ear pain, hearing loss, nosebleeds and tinnitus. Respiratory: Negative for cough, shortness of breath (at exertion) and wheezing. Cardiovascular: Negative for chest pain. Gastrointestinal: Negative for abdominal pain, blood in stool, constipation, diarrhea, nausea and vomiting. Genitourinary: Negative for dysuria, flank pain and hematuria. Musculoskeletal: Positive for arthralgias. Negative for myalgias. Skin: Negative for rash. Neurological: Positive for weakness (general mailaise). Negative for headaches. Hematological: Does not bruise/bleed easily. Psychiatric/Behavioral: Negative for hallucinations and suicidal ideas.           Objective   /80   Pulse 83   Temp 99.2 °F (37.3 °C)   Resp 16   SpO2 97%   Lab Results   Component Value Date    LABA1C 9.2 2021    LABA1C 10.3 2021    LABA1C 10.3 2020     Physical Exam  Eyes: General:         Right eye: No discharge. Left eye: No discharge. Cardiovascular:      Rate and Rhythm: Normal rate and regular rhythm. Heart sounds: No murmur heard. Pulmonary:      Effort: No respiratory distress. Breath sounds: No rhonchi or rales. Abdominal:      Tenderness: There is no abdominal tenderness. Musculoskeletal:      Cervical back: No rigidity. Skin:     General: Skin is warm. Capillary Refill: Capillary refill takes less than 2 seconds. Coloration: Skin is not pale. Findings: No bruising. Neurological:      Mental Status: He is alert. Psychiatric:         Mood and Affect: Mood normal.            On this date 11/30/2021 I have spent 23 minutes reviewing previous notes, test results and face to face with the patient discussing the diagnosis and importance of compliance with the treatment plan as well as documenting on the day of the visit. An electronic signature was used to authenticate this note.     --Fernando Rowe, DO

## 2021-12-08 ENCOUNTER — OFFICE VISIT (OUTPATIENT)
Dept: FAMILY MEDICINE CLINIC | Age: 71
End: 2021-12-08
Payer: MEDICARE

## 2021-12-08 VITALS
BODY MASS INDEX: 28.73 KG/M2 | TEMPERATURE: 97.6 F | DIASTOLIC BLOOD PRESSURE: 60 MMHG | RESPIRATION RATE: 16 BRPM | OXYGEN SATURATION: 100 % | HEART RATE: 77 BPM | SYSTOLIC BLOOD PRESSURE: 106 MMHG | WEIGHT: 194 LBS | HEIGHT: 69 IN

## 2021-12-08 DIAGNOSIS — Z79.4 TYPE 2 DIABETES MELLITUS WITHOUT COMPLICATION, WITH LONG-TERM CURRENT USE OF INSULIN (HCC): Primary | ICD-10-CM

## 2021-12-08 DIAGNOSIS — R74.8 ELEVATED ALKALINE PHOSPHATASE LEVEL: ICD-10-CM

## 2021-12-08 DIAGNOSIS — E11.9 TYPE 2 DIABETES MELLITUS WITHOUT COMPLICATION, WITH LONG-TERM CURRENT USE OF INSULIN (HCC): Primary | ICD-10-CM

## 2021-12-08 DIAGNOSIS — H81.13 BENIGN PAROXYSMAL POSITIONAL VERTIGO DUE TO BILATERAL VESTIBULAR DISORDER: ICD-10-CM

## 2021-12-08 LAB
ALBUMIN SERPL-MCNC: 3 G/DL (ref 3.5–5.2)
ALP BLD-CCNC: 361 U/L (ref 40–129)
ALT SERPL-CCNC: 20 U/L (ref 0–40)
AST SERPL-CCNC: 25 U/L (ref 0–39)
BILIRUB SERPL-MCNC: 0.6 MG/DL (ref 0–1.2)
BILIRUBIN DIRECT: 0.3 MG/DL (ref 0–0.3)
BILIRUBIN, INDIRECT: 0.3 MG/DL (ref 0–1)
HBA1C MFR BLD: 8.4 %
TOTAL PROTEIN: 5.3 G/DL (ref 6.4–8.3)

## 2021-12-08 PROCEDURE — G8484 FLU IMMUNIZE NO ADMIN: HCPCS | Performed by: FAMILY MEDICINE

## 2021-12-08 PROCEDURE — 99213 OFFICE O/P EST LOW 20 MIN: CPT | Performed by: FAMILY MEDICINE

## 2021-12-08 PROCEDURE — 83036 HEMOGLOBIN GLYCOSYLATED A1C: CPT | Performed by: FAMILY MEDICINE

## 2021-12-08 PROCEDURE — 1123F ACP DISCUSS/DSCN MKR DOCD: CPT | Performed by: FAMILY MEDICINE

## 2021-12-08 PROCEDURE — G8427 DOCREV CUR MEDS BY ELIG CLIN: HCPCS | Performed by: FAMILY MEDICINE

## 2021-12-08 PROCEDURE — 3017F COLORECTAL CA SCREEN DOC REV: CPT | Performed by: FAMILY MEDICINE

## 2021-12-08 PROCEDURE — G8417 CALC BMI ABV UP PARAM F/U: HCPCS | Performed by: FAMILY MEDICINE

## 2021-12-08 PROCEDURE — 4040F PNEUMOC VAC/ADMIN/RCVD: CPT | Performed by: FAMILY MEDICINE

## 2021-12-08 PROCEDURE — 1036F TOBACCO NON-USER: CPT | Performed by: FAMILY MEDICINE

## 2021-12-08 PROCEDURE — 2022F DILAT RTA XM EVC RTNOPTHY: CPT | Performed by: FAMILY MEDICINE

## 2021-12-08 PROCEDURE — 3052F HG A1C>EQUAL 8.0%<EQUAL 9.0%: CPT | Performed by: FAMILY MEDICINE

## 2021-12-08 RX ORDER — MECLIZINE HYDROCHLORIDE 25 MG/1
25 TABLET ORAL 3 TIMES DAILY PRN
Qty: 30 TABLET | Refills: 0 | Status: SHIPPED | OUTPATIENT
Start: 2021-12-08 | End: 2021-12-18

## 2021-12-08 RX ORDER — BENZONATATE 100 MG/1
CAPSULE ORAL
COMMUNITY
Start: 2021-11-16 | End: 2021-12-28 | Stop reason: ALTCHOICE

## 2021-12-08 ASSESSMENT — ENCOUNTER SYMPTOMS
VOMITING: 0
CONSTIPATION: 0
SHORTNESS OF BREATH: 0
NAUSEA: 0
COUGH: 0
ABDOMINAL PAIN: 0
BLOOD IN STOOL: 0
WHEEZING: 0
DIARRHEA: 0

## 2021-12-08 NOTE — PROGRESS NOTES
Tina Prajapati (:  1950) is a 73 y.o. male,Established patient, here for evaluation of the following chief complaint(s):  Follow-up (Patient is here for follow up visit.)         ASSESSMENT/PLAN:  1. Type 2 diabetes mellitus without complication, with long-term current use of insulin (HCC)  -     POCT glycosylated hemoglobin (Hb A1C)  2. Elevated alkaline phosphatase level  -     HEPATIC FUNCTION PANEL; Future  3. Benign paroxysmal positional vertigo due to bilateral vestibular disorder  -     meclizine (ANTIVERT) 25 MG tablet; Take 1 tablet by mouth 3 times daily as needed for Dizziness, Disp-30 tablet, R-0Normal      Return in about 3 months (around 3/8/2022). Subjective   SUBJECTIVE/OBJECTIVE:  Follow-up (Patient is here for follow up visit.)      Review of Systems   Constitutional: Negative for chills, diaphoresis and fever. HENT: Negative for ear discharge, ear pain, hearing loss, nosebleeds and tinnitus. Respiratory: Negative for cough, shortness of breath and wheezing. Cardiovascular: Negative for chest pain. Gastrointestinal: Negative for abdominal pain, blood in stool, constipation, diarrhea, nausea and vomiting. Genitourinary: Negative for dysuria, flank pain and hematuria. Musculoskeletal: Negative for myalgias. Skin: Negative for rash. Neurological: Negative for headaches. Hematological: Does not bruise/bleed easily. Psychiatric/Behavioral: Negative for hallucinations and suicidal ideas. Objective   /60   Pulse 77   Temp 97.6 °F (36.4 °C)   Resp 16   Ht 5' 9\" (1.753 m)   Wt 194 lb (88 kg)   SpO2 100%   BMI 28.65 kg/m²   Lab Results   Component Value Date    LABA1C 8.4 2021    LABA1C 9.2 2021    LABA1C 10.3 2021     Physical Exam  Eyes:      General:         Right eye: No discharge. Left eye: No discharge. Cardiovascular:      Rate and Rhythm: Normal rate and regular rhythm.       Heart sounds: No murmur

## 2021-12-15 ENCOUNTER — TELEPHONE (OUTPATIENT)
Dept: FAMILY MEDICINE CLINIC | Age: 71
End: 2021-12-15

## 2021-12-15 DIAGNOSIS — E11.9 TYPE 2 DIABETES MELLITUS WITHOUT COMPLICATION, WITH LONG-TERM CURRENT USE OF INSULIN (HCC): ICD-10-CM

## 2021-12-15 DIAGNOSIS — Z79.4 TYPE 2 DIABETES MELLITUS WITHOUT COMPLICATION, WITH LONG-TERM CURRENT USE OF INSULIN (HCC): ICD-10-CM

## 2021-12-15 NOTE — TELEPHONE ENCOUNTER
----- Message from Vanessa Hamm sent at 12/15/2021 11:01 AM EST -----  Subject: Refill Request    QUESTIONS  Name of Medication? metFORMIN (GLUCOPHAGE) 1000 MG tablet  Patient-reported dosage and instructions? 1000 MG 2 x daily  How many days do you have left? 7  Preferred Pharmacy? [id number]  Pharmacy phone number (if available)? [phone]  ---------------------------------------------------------------------------  --------------  CALL BACK INFO  What is the best way for the office to contact you? OK to leave message on   voicemail  Preferred Call Back Phone Number?  [phone]

## 2021-12-15 NOTE — TELEPHONE ENCOUNTER
----- Message from Tashi Leal sent at 12/15/2021 10:59 AM EST -----  Subject: Message to Provider    QUESTIONS  Information for Provider? Patient's wife is requesting patient be given a   prescription for Iron 325 MG to be given 2 x daily as per care providers   for patient at [facility].   ---------------------------------------------------------------------------  --------------  [address]  What is the best way for the office to contact you? OK to leave message on   voicemail  Preferred Call Back Phone Number? [phone]  ---------------------------------------------------------------------------  --------------  SCRIPT ANSWERS  Relationship to Patient? Other  Representative Name? Burtis Phoenix  Is the Representative on the appropriate HIPAA document in Epic?  Yes

## 2021-12-28 ENCOUNTER — OFFICE VISIT (OUTPATIENT)
Dept: FAMILY MEDICINE CLINIC | Age: 71
End: 2021-12-28
Payer: MEDICARE

## 2021-12-28 VITALS
BODY MASS INDEX: 29.53 KG/M2 | SYSTOLIC BLOOD PRESSURE: 102 MMHG | RESPIRATION RATE: 16 BRPM | TEMPERATURE: 98 F | WEIGHT: 199.4 LBS | HEIGHT: 69 IN | DIASTOLIC BLOOD PRESSURE: 60 MMHG | OXYGEN SATURATION: 98 % | HEART RATE: 104 BPM

## 2021-12-28 DIAGNOSIS — G93.32 POST-COVID CHRONIC FATIGUE: Primary | ICD-10-CM

## 2021-12-28 DIAGNOSIS — E03.9 HYPOTHYROIDISM, UNSPECIFIED TYPE: ICD-10-CM

## 2021-12-28 DIAGNOSIS — U09.9 POST-COVID CHRONIC FATIGUE: Primary | ICD-10-CM

## 2021-12-28 DIAGNOSIS — Z79.4 TYPE 2 DIABETES MELLITUS WITHOUT COMPLICATION, WITH LONG-TERM CURRENT USE OF INSULIN (HCC): ICD-10-CM

## 2021-12-28 DIAGNOSIS — R11.0 NAUSEA: ICD-10-CM

## 2021-12-28 DIAGNOSIS — E11.9 TYPE 2 DIABETES MELLITUS WITHOUT COMPLICATION, WITH LONG-TERM CURRENT USE OF INSULIN (HCC): ICD-10-CM

## 2021-12-28 DIAGNOSIS — E61.1 IRON DEFICIENCY: ICD-10-CM

## 2021-12-28 PROCEDURE — 3052F HG A1C>EQUAL 8.0%<EQUAL 9.0%: CPT | Performed by: NURSE PRACTITIONER

## 2021-12-28 PROCEDURE — G8427 DOCREV CUR MEDS BY ELIG CLIN: HCPCS | Performed by: NURSE PRACTITIONER

## 2021-12-28 PROCEDURE — G8417 CALC BMI ABV UP PARAM F/U: HCPCS | Performed by: NURSE PRACTITIONER

## 2021-12-28 PROCEDURE — 1036F TOBACCO NON-USER: CPT | Performed by: NURSE PRACTITIONER

## 2021-12-28 PROCEDURE — 3017F COLORECTAL CA SCREEN DOC REV: CPT | Performed by: NURSE PRACTITIONER

## 2021-12-28 PROCEDURE — 2022F DILAT RTA XM EVC RTNOPTHY: CPT | Performed by: NURSE PRACTITIONER

## 2021-12-28 PROCEDURE — 4040F PNEUMOC VAC/ADMIN/RCVD: CPT | Performed by: NURSE PRACTITIONER

## 2021-12-28 PROCEDURE — 1123F ACP DISCUSS/DSCN MKR DOCD: CPT | Performed by: NURSE PRACTITIONER

## 2021-12-28 PROCEDURE — 99213 OFFICE O/P EST LOW 20 MIN: CPT | Performed by: NURSE PRACTITIONER

## 2021-12-28 PROCEDURE — G8484 FLU IMMUNIZE NO ADMIN: HCPCS | Performed by: NURSE PRACTITIONER

## 2021-12-28 RX ORDER — FERROUS SULFATE 325(65) MG
TABLET ORAL
COMMUNITY
Start: 2021-12-22 | End: 2021-12-28 | Stop reason: SDUPTHER

## 2021-12-28 RX ORDER — ONDANSETRON 4 MG/1
4 TABLET, ORALLY DISINTEGRATING ORAL 3 TIMES DAILY PRN
Qty: 21 TABLET | Refills: 0 | Status: SHIPPED
Start: 2021-12-28 | End: 2022-02-24 | Stop reason: SDUPTHER

## 2021-12-28 RX ORDER — FERROUS SULFATE 325(65) MG
325 TABLET ORAL 2 TIMES DAILY WITH MEALS
Qty: 60 TABLET | Refills: 1 | Status: SHIPPED
Start: 2021-12-28 | End: 2022-01-31 | Stop reason: SDUPTHER

## 2021-12-28 RX ORDER — LEVOTHYROXINE SODIUM 0.2 MG/1
200 TABLET ORAL DAILY
Qty: 90 TABLET | Refills: 1 | Status: ON HOLD
Start: 2021-12-28 | End: 2022-03-23 | Stop reason: HOSPADM

## 2021-12-28 NOTE — PATIENT INSTRUCTIONS
Patient Education        Epley Maneuver at Home for Vertigo: Exercises  Introduction  Vertigo is a spinning or whirling sensation when you move your head. Your doctor may have moved you in different positions to help your vertigo get better faster. This is called the Epley maneuver. Your doctor also may have asked you to do these exercises at home. Do the exercises as often as your doctor recommends. If your vertigo is getting worse, your doctor may have you change the exercise or stop it. Step 1  Step 1    1. Sit on the edge of a bed or sofa. Step 2    1. Turn your head 45 degrees in the direction your doctor told you to. This should be toward the ear that causes the most vertigo for you. In this picture, the woman is turning toward her left ear. Step 3    1. Tilt yourself backward until you are lying on your back. Your head should still be at a 45-degree turn. Your head should be about midway between looking straight ahead and looking out to your side. Hold for 30 seconds. If you have vertigo, stay in this position until it stops. Step 4    1. Turn your head 90 degrees toward the ear that has the least vertigo. In this picture, the woman is turning to the right because she has vertigo on her left side. The point of your chin should be raised and over your shoulder. Hold for 30 seconds. Step 5    1. Roll onto the side with the least vertigo. You should now be looking at the floor. Hold for 30 seconds. Follow-up care is a key part of your treatment and safety. Be sure to make and go to all appointments, and call your doctor if you are having problems. It's also a good idea to know your test results and keep a list of the medicines you take. Where can you learn more? Go to [email or web address] and sign in to your MyQuoteApp account. Enter J536 in the [place] box to learn more about \"Epley Maneuver at Home for Vertigo: Exercises. \"     If you do not have an account, please click on the \"Sign Up Now\" link. Current as of: April 8, 2021               Content Version: 13.1  © 2006-2021 Healthwise, Incorporated. Care instructions adapted under license by [facility] ([facility]). If you have questions about a medical condition or this instruction, always ask your healthcare professional. [place] [address] any warranty or liability for your use of this information.

## 2021-12-28 NOTE — PROGRESS NOTES
Renaee Rinne (:  1950) is a 73 y.o. male,Established patient, here for evaluation of the following chief complaint(s):  Dizziness (off and on for the past month and very weak with no energy )         ASSESSMENT/PLAN:  1. Post-COVID chronic fatigue  -  Start Vit D   2. Type 2 diabetes mellitus without complication, with long-term current use of insulin (HCC)  -     metFORMIN (GLUCOPHAGE) 1000 MG tablet; TAKE 1 TABLET BY MOUTH TWICE A DAY WITH MEALS, Disp-180 tablet, R-0Normal  - The current medical regimen is effective;  continue present plan and medications. 3. Hypothyroidism, unspecified type  -     levothyroxine (SYNTHROID) 200 MCG tablet; Take 1 tablet by mouth Daily, Disp-90 tablet, R-1Normal  - The current medical regimen is effective;  continue present plan and medications. 4. Iron deficiency  -     ferrous sulfate (IRON 325) 325 (65 Fe) MG tablet; Take 1 tablet by mouth 2 times daily (with meals), Disp-60 tablet, R-1Normal  - The current medical regimen is effective;  continue present plan and medications. - Will recheck CBC and iron studies in 3 months  5. Nausea  -     ondansetron (ZOFRAN-ODT) 4 MG disintegrating tablet; Take 1 tablet by mouth 3 times daily as needed for Nausea or Vomiting, Disp-21 tablet, R-0Normal  - Reviewed side effects of medication and patient verbalizes understanding.   - Advised to call back directly if there are further questions, or if these symptoms fail to improve as anticipated or worsen. Return if symptoms worsen or fail to improve. Subjective   SUBJECTIVE/OBJECTIVE:  HPI  Here today for dizziness, nausea and fatigue. He was previously diagnosed with BPPV but tried taking the meclizine and it made him too tired to function. He has been dizzy that last few days. He has not tried Epley Maneuvers. He has been nauseous since the dizziness started. He also complains of fatigue.  He was diagnosed with Covid in September and reports brain fog and fatigue since. He recently had BW done, Hgb 9.6, started on iron supplement. Also reports he does not think he has been taking his Vit D.    /60   Pulse 104   Temp 98 °F (36.7 °C)   Resp 16   Ht 5' 9\" (1.753 m)   Wt 199 lb 6.4 oz (90.4 kg)   SpO2 98%   BMI 29.45 kg/m²     Review of Systems   Constitutional: Positive for activity change, appetite change and fatigue. Negative for chills, diaphoresis, fever and unexpected weight change. Respiratory: Positive for shortness of breath (with activity). Negative for cough and wheezing. Cardiovascular: Negative for chest pain and palpitations. Gastrointestinal: Negative for constipation, diarrhea, nausea and vomiting. Genitourinary: Negative for difficulty urinating. Musculoskeletal: Negative for arthralgias, back pain and neck pain. Neurological: Positive for dizziness. Negative for weakness and headaches. Psychiatric/Behavioral: Negative for dysphoric mood and sleep disturbance. The patient is not nervous/anxious. Objective   Physical Exam  Constitutional:       Appearance: He is well-developed. HENT:      Head: Normocephalic and atraumatic. Neck:      Thyroid: No thyromegaly. Trachea: No tracheal deviation. Cardiovascular:      Rate and Rhythm: Normal rate and regular rhythm. Heart sounds: No murmur heard. Pulmonary:      Effort: Pulmonary effort is normal. No respiratory distress. Breath sounds: Normal breath sounds. Abdominal:      General: Bowel sounds are normal.      Palpations: Abdomen is soft. Tenderness: There is no abdominal tenderness. Musculoskeletal:         General: Normal range of motion. Lymphadenopathy:      Cervical: No cervical adenopathy. Skin:     General: Skin is warm and dry. Neurological:      Mental Status: He is alert and oriented to person, place, and time.    Psychiatric:         Behavior: Behavior normal.            MDM Low      An electronic signature was used to authenticate this note.     --Arielle Carbajal, APRN - CNP

## 2021-12-29 PROBLEM — R53.82 CHRONIC FATIGUE: Status: ACTIVE | Noted: 2021-12-29

## 2021-12-29 PROBLEM — G93.32 POST-COVID CHRONIC FATIGUE: Status: ACTIVE | Noted: 2021-12-29

## 2021-12-29 PROBLEM — U09.9 POST-COVID CHRONIC FATIGUE: Status: ACTIVE | Noted: 2021-12-29

## 2021-12-29 ASSESSMENT — ENCOUNTER SYMPTOMS
SHORTNESS OF BREATH: 1
NAUSEA: 0
DIARRHEA: 0
VOMITING: 0
BACK PAIN: 0
WHEEZING: 0
CONSTIPATION: 0
COUGH: 0

## 2022-01-03 RX ORDER — BLOOD SUGAR DIAGNOSTIC
STRIP MISCELLANEOUS
Qty: 100 STRIP | Refills: 0 | Status: SHIPPED
Start: 2022-01-03 | End: 2022-02-02 | Stop reason: SDUPTHER

## 2022-01-06 ENCOUNTER — NURSE TRIAGE (OUTPATIENT)
Dept: OTHER | Facility: CLINIC | Age: 72
End: 2022-01-06

## 2022-01-06 NOTE — TELEPHONE ENCOUNTER
Received call from Joseph at [facility], caller not on line. Complaint: vertigo, lightheaded and dizziness    [address]: [address] Name: [facility] telephone number verified as Wife Milly Pond: [phone]    Connected with caller via phone, please see below triage    ----      Subjective: Caller states \"He had COVID and he developed veritgo awhile back. He was told to do exercises and told if they don't help he would have to see a specialist. It has been weeks it got worse. He took medicine and doesn't work. \"     Current Symptoms: chronic dizziness, chronic no appetite, chronic low appetite. Few days ago had episode where at the end of urinary stream had drop of blood. States able to drink plenty of fluids and stay hydrated. Dark urine. Onset: several weeks ago; unchanged    Associated Symptoms: chronic dizziness    Pain Severity: 0/10; N/A; none    Temperature: denies    What has been tried: Antivert. States made him sleepy. LMP: NA Pregnant: NA    Recommended disposition: go to office now. Pt instructed if unable to get an appointment to go to urgent care, walk in clinic, or Emergency Department. Agreeable. Care advice provided, patient verbalizes understanding; denies any other questions or concerns; instructed to call back for any new or worsening symptoms. Pt disconnected while attempted to connect to ECC. Scheduling dept to call pt and schedule appt. [id number]: Rubén Ibrahim with scheduling acknowledged. Attention Provider: Thank you for allowing me to participate in the care of your patient. The patient was connected to triage in response to information provided to the ECC/PSC. Please do not respond through this encounter as the response is not directed to a shared pool.                   Reason for Disposition   Lightheadedness (dizziness) present now, after 2 hours of rest and fluids    Protocols used: DIZZINESS-ADULT-OH

## 2022-01-07 ENCOUNTER — TELEPHONE (OUTPATIENT)
Dept: FAMILY MEDICINE CLINIC | Age: 72
End: 2022-01-07

## 2022-01-07 DIAGNOSIS — C92.01 ACUTE MYELOID LEUKEMIA IN REMISSION (HCC): Primary | ICD-10-CM

## 2022-01-07 DIAGNOSIS — D69.6 THROMBOCYTOPENIA (HCC): ICD-10-CM

## 2022-01-07 DIAGNOSIS — R10.30 LOWER ABDOMINAL PAIN: ICD-10-CM

## 2022-01-07 NOTE — TELEPHONE ENCOUNTER
Carmel Sternon called regarding phone call with you and would like to get the CAT scan of stomach scheduled. She is also requesting blood work be ordered for him because he noticed a dribble of blood in his urine and he is unable to see the prostate dr until February. Medication you told patient to start taking last night is making him very light headed and dizzy, He has no appetite and is nauseated     Please place orders.

## 2022-01-10 ENCOUNTER — NURSE TRIAGE (OUTPATIENT)
Dept: OTHER | Facility: CLINIC | Age: 72
End: 2022-01-10

## 2022-01-10 NOTE — TELEPHONE ENCOUNTER
Received call from Chetna Lopez at [facility] with [employer]. Subjective: Caller states \"going on weeks. Head spinning dizzy, pale. Shakes real bad. off balance . Very weak. No appetite. Cream of wheat, Pedialyte. No good. \"     hc DM, transplant    Current Symptoms:  Dizziness, head spins,   Pt reports pretty good day yesterday, this morning worse. Pt denies any chest pains. Shortness of breath at baseline. Pt called in to nurse triage 1/6/2021 see triage notes. Plan was go to the office now- pt reports did not get a call to schedule     Onset: couple of weeks  worsening    Associated Symptoms: reduced appetite    Pain Severity: not reported     Temperature: not reported     What has been tried: n/a    LMP: NA Pregnant: NA    Recommended disposition: go to ED/ UCC now (or to office with approval) called Kathy Fang- spoke with Manish Jackson who transferred call to  [facility]" was placed on hold , connected with [facility] to resume care    Care advice provided, patient verbalizes understanding; denies any other questions or concerns; instructed to call back for any new or worsening symptoms. Writer provided warm transfer to [facility] for 2nd level and resume of patient care for further assessment      Attention Provider: Thank you for allowing me to participate in the care of your patient. The patient was connected to triage in response to information provided to the ECC/PSC. Please do not respond through this encounter as the response is not directed to a shared pool.           Reason for Disposition   SEVERE dizziness (e.g., unable to stand, requires support to walk, feels like passing out now)    Protocols used: DIZZINESS-ADULT-OH

## 2022-01-10 NOTE — TELEPHONE ENCOUNTER
Still having dizziness, weakness, no appetite. Having to use walker. Wife holding metformin, he has been drinking Pedialyte, Gatorade. He has been taking meclizine as directed on 1/6 with no relief. He has been taking 1/2 in the morning and 1/2 an hour later. Fasting blood sugar this morning was 285. Thru the weekend he said it was \"not really high\".

## 2022-01-11 NOTE — TELEPHONE ENCOUNTER
Pt's wife called again. Shayna Apple is still weak, dizzy, shaky, incontinence, feet swelling. All worsening. He is voiding while trying to ambulate to the bathroom. His stomach is enlarged and they are waiting for the CT. Blood in urine. I advised her to take Shayna Apple to the emergency room. She's going to call her son to take him to [facility] since he was there before for his heart.

## 2022-01-19 ENCOUNTER — TELEPHONE (OUTPATIENT)
Dept: FAMILY MEDICINE CLINIC | Age: 72
End: 2022-01-19

## 2022-01-19 NOTE — TELEPHONE ENCOUNTER
----- Message from Unique Omega sent at 1/19/2022 10:01 AM EST -----  Subject: Message to Provider    QUESTIONS  Information for Provider? Please call Bharath Vera to discuss patient's   condition. He is in [facility] again. [phone]  ---------------------------------------------------------------------------  --------------  Rj MCMANUS  What is the best way for the office to contact you? OK to leave message on   voicemail  Preferred Call Back Phone Number? [phone]  ---------------------------------------------------------------------------  --------------  SCRIPT ANSWERS  Relationship to Patient? Other  Representative Name? Wife, Bharath Vera  Is the Representative on the appropriate HIPAA document in Epic?  Yes

## 2022-01-28 ENCOUNTER — TELEPHONE (OUTPATIENT)
Dept: FAMILY MEDICINE CLINIC | Age: 72
End: 2022-01-28

## 2022-01-28 NOTE — TELEPHONE ENCOUNTER
Wife is requesting a call from you to discuss Shayna Apple - he is in [facility] and she states they did a biopsy of liver and he has cancer but states they said they don't think it is spreading. She is concerned. Has blanche del cid/ [facility].      [phone]

## 2022-01-28 NOTE — TELEPHONE ENCOUNTER
Tried to call the patient to get him scheduled with Dr. Guru Sheridan but his voice mailbox is full and cannot take messages.

## 2022-01-28 NOTE — TELEPHONE ENCOUNTER
----- Message from Emily Bernal sent at 1/27/2022  3:27 PM EST -----  Subject: Message to Provider    QUESTIONS  Information for Provider? has liver cancer and needs some opinions on a   few things, please call back at   ---------------------------------------------------------------------------  --------------  [address]  What is the best way for the office to contact you? OK to leave message on   voicemail  Preferred Call Back Phone Number? [phone]  ---------------------------------------------------------------------------  --------------  SCRIPT ANSWERS  Relationship to Patient?  Self

## 2022-01-31 DIAGNOSIS — E61.1 IRON DEFICIENCY: ICD-10-CM

## 2022-01-31 RX ORDER — FERROUS SULFATE 325(65) MG
325 TABLET ORAL 2 TIMES DAILY WITH MEALS
Qty: 180 TABLET | Refills: 0 | Status: ON HOLD
Start: 2022-01-31 | End: 2022-03-23 | Stop reason: HOSPADM

## 2022-02-02 ENCOUNTER — OFFICE VISIT (OUTPATIENT)
Dept: FAMILY MEDICINE CLINIC | Age: 72
End: 2022-02-02
Payer: MEDICARE

## 2022-02-02 VITALS
BODY MASS INDEX: 29.8 KG/M2 | OXYGEN SATURATION: 94 % | SYSTOLIC BLOOD PRESSURE: 122 MMHG | WEIGHT: 201.2 LBS | RESPIRATION RATE: 18 BRPM | HEART RATE: 110 BPM | HEIGHT: 69 IN | DIASTOLIC BLOOD PRESSURE: 72 MMHG | TEMPERATURE: 97 F

## 2022-02-02 DIAGNOSIS — E11.65 UNCONTROLLED TYPE 2 DIABETES MELLITUS WITH HYPERGLYCEMIA (HCC): ICD-10-CM

## 2022-02-02 DIAGNOSIS — D69.6 THROMBOCYTOPENIA (HCC): ICD-10-CM

## 2022-02-02 DIAGNOSIS — C92.01 ACUTE MYELOID LEUKEMIA IN REMISSION (HCC): Primary | ICD-10-CM

## 2022-02-02 DIAGNOSIS — E11.9 TYPE 2 DIABETES MELLITUS WITHOUT COMPLICATION, WITH LONG-TERM CURRENT USE OF INSULIN (HCC): ICD-10-CM

## 2022-02-02 DIAGNOSIS — Z79.4 TYPE 2 DIABETES MELLITUS WITHOUT COMPLICATION, WITH LONG-TERM CURRENT USE OF INSULIN (HCC): ICD-10-CM

## 2022-02-02 DIAGNOSIS — D89.813 GVHD (GRAFT VERSUS HOST DISEASE) (HCC): ICD-10-CM

## 2022-02-02 DIAGNOSIS — C81.90 HODGKIN LYMPHOMA, UNSPECIFIED HODGKIN LYMPHOMA TYPE, UNSPECIFIED BODY REGION (HCC): ICD-10-CM

## 2022-02-02 DIAGNOSIS — K76.6 PORTAL HYPERTENSION (HCC): ICD-10-CM

## 2022-02-02 PROBLEM — E11.22 TYPE 2 DIABETES MELLITUS WITH CHRONIC KIDNEY DISEASE (HCC): Status: ACTIVE | Noted: 2022-02-02

## 2022-02-02 PROCEDURE — 99495 TRANSJ CARE MGMT MOD F2F 14D: CPT | Performed by: FAMILY MEDICINE

## 2022-02-02 PROCEDURE — 1111F DSCHRG MED/CURRENT MED MERGE: CPT | Performed by: FAMILY MEDICINE

## 2022-02-02 RX ORDER — BLOOD SUGAR DIAGNOSTIC
STRIP MISCELLANEOUS
Qty: 100 STRIP | Refills: 0 | Status: SHIPPED
Start: 2022-02-02 | End: 2022-02-07 | Stop reason: SDUPTHER

## 2022-02-02 RX ORDER — FLASH GLUCOSE SCANNING READER
EACH MISCELLANEOUS
Qty: 1 EACH | Refills: 0 | Status: SHIPPED
Start: 2022-02-02 | End: 2022-02-21 | Stop reason: SDUPTHER

## 2022-02-02 RX ORDER — FLASH GLUCOSE SENSOR
KIT MISCELLANEOUS
Qty: 6 EACH | Refills: 0 | Status: SHIPPED
Start: 2022-02-02 | End: 2022-02-04 | Stop reason: SDUPTHER

## 2022-02-02 RX ORDER — MECLIZINE HCL 12.5 MG/1
TABLET ORAL
Status: ON HOLD | COMMUNITY
Start: 2022-01-17 | End: 2022-02-28 | Stop reason: HOSPADM

## 2022-02-02 NOTE — PROGRESS NOTES
Post-Discharge Transitional Care Management Services or Hospital Follow Up  Apparently [address]   YOB: 1950    Date of Office Visit:  2/2/2022  Date of Hospital Admission: 1/28/2022  Date of Hospital Discharge: 2/1/2022    Care management risk score Rising risk (score 2-5) and Complex Care (Scores >=6): 5     Non face to face  following discharge, date last encounter closed (first attempt may have been earlier): *No documented post hospital discharge outreach found in the last 14 days *No documented post hospital discharge outreach found in the last 14 days    Call initiated 2 business days of discharge: *No response recorded in the last 14 days    Patient Active Problem List   Diagnosis    Bronchitis with bronchospasm    Anxiety    Post herpetic neuralgia    Type 2 diabetes mellitus without complication, with long-term current use of insulin ([address]    Cellulitis of left lower extremity    Plantar wart    Inguinal hernia    Acute myeloid leukemia in remission ([address]    Pneumonia due to infectious organism    Cellulitis    Pharyngitis, acute    Thrombocytopenia ([address]    GVHD (graft versus host disease) ([address]    Portal hypertension ([address]    Post-COVID chronic fatigue    Uncontrolled type 2 diabetes mellitus with hyperglycemia ([address]    Type 2 diabetes mellitus with hyperglycemia    Type 2 diabetes mellitus with chronic kidney disease       Allergies   Allergen Reactions    Pneumococcal Vaccines Dermatitis    Ciprofloxacin      Shuts kidneys down    Diatrizoate      IS able to have CT scans with premedication    Dye [Iodides]      statrted to go into cardiac arrest    Epinephrine     Iodine     Penicillin G      Unsure if he is still allergic to PCN, or if he ever was    Prednisone     Morphine Nausea And Vomiting    Other Nausea And Vomiting     fish    Oxycodone Nausea And Vomiting       Medications listed as ordered at the time of discharge from hospital  Ascites from malignancy of the liver    Medications marked \"taking\" at this time  Outpatient Medications Marked as Taking for the 2/2/22 encounter (Office Visit) with Vera Okeefe, DO   Medication Sig Dispense Refill    meclizine (ANTIVERT) 12.5 MG tablet       blood glucose test strips (ONETOUCH ULTRA) strip TEST 4 TIMES DAILY 100 strip 0    Continuous Blood Gluc Sensor (FREESTYLE PAOLA 2 SENSOR) MISC Check sugar 4 X daily. AM and AC 6 each 0    Continuous Blood Gluc  (FREESTYLE PAOLA 2 READER) FLORESITA Check sygar FAsting and ac (4 X daiy). 1 each 0    ferrous sulfate (IRON 325) 325 (65 Fe) MG tablet Take 1 tablet by mouth 2 times daily (with meals) 180 tablet 0    levothyroxine (SYNTHROID) 200 MCG tablet Take 1 tablet by mouth Daily 90 tablet 1    ondansetron (ZOFRAN-ODT) 4 MG disintegrating tablet Take 1 tablet by mouth 3 times daily as needed for Nausea or Vomiting 21 tablet 0    sertraline (ZOLOFT) 50 MG tablet Take 1.5 tablets by mouth daily (Patient taking differently: Take 50 mg by mouth daily 1 tablet) 30 tablet 3    LORazepam (ATIVAN) 0.5 MG tablet Take 1 tablet by mouth 2 times daily as needed for Anxiety. 60 tablet 0    simvastatin (ZOCOR) 10 MG tablet TAKE 1 TABLET BY MOUTH AT NIGHT 90 tablet 0    pantoprazole (PROTONIX) 40 MG tablet TAKE 1 TABLET DAILY 90 tablet 3    insulin glargine (LANTUS SOLOSTAR) 100 UNIT/ML injection pen Inject 20 Units into the skin nightly 5 pen 3    INS SYRINGE/NEEDLE .5CC/28G (AIMSCO INS SYR MX-COM .5CC/28G) 28G X 1/2\" 0.5 ML MISC Use three times daily 100 each 3    Insulin Pen Needle (BD PEN NEEDLE JENINFER U/F) 32G X 4 MM MISC USE AS DIRECTED 100 each 3    Coenzyme Q10 (CO Q-10) 100 MG CAPS Take by mouth      insulin lispro, 1 Unit Dial, (HUMALOG KWIKPEN) 100 UNIT/ML SOPN INJECT 0-20 UNITS INTO THE SKIN THREE TIMES DAILY(BEFORE MEALS) 48 mL 0    blood glucose test strips (ONE TOUCH ULTRA TEST) strip Test up to 5 times daily As needed.  0 [facility] strip 2    vitamin D (CHOLECALCIFEROL) 1000 UNIT TABS tablet Take 1,000 Units by mouth daily      aspirin 81 MG chewable tablet Take 81 mg by mouth daily          Medications patient taking as of now reconciled against medications ordered at time of hospital discharge: Yes    Chief Complaint   Patient presents with    Follow-Up from Hospital     He was seen multiple times in [facility] due to the ascities. He is gaining weight rapidly and fatigued. The fluid retention is causing shortness of breath       History of Present illness - Follow up of Hospital diagnosis(es): To have a port placed so they can start his CHemotx    Inpatient course: Discharge summary reviewed- see chart. Interval history/Current status: malignant ascites    Vitals:    02/02/22 1119   BP: 122/72   Pulse: 110   Resp: 18   Temp: 97 °F (36.1 °C)   TempSrc: Temporal   SpO2: 94%   Weight: 201 lb 3.2 oz (91.3 kg)   Height: 5' 9\" (1.753 m)     Body mass index is 29.71 kg/m².    Wt Readings from Last 3 Encounters:   02/02/22 201 lb 3.2 oz (91.3 kg)   12/28/21 199 lb 6.4 oz (90.4 kg)   12/08/21 194 lb (88 kg)     BP Readings from Last 3 Encounters:   02/02/22 122/72   12/28/21 102/60   12/08/21 106/60       ascites interrupting his breathing    Physical Exam:  General Appearance: alert and oriented to person, place and time, well developed and well- nourished, in no acute distress  Skin: warm and dry, no rash or erythema  Head: normocephalic and atraumatic  Eyes: pupils equal, round, and reactive to light, extraocular eye movements intact, conjunctivae normal  ENT: tympanic membrane, external ear and ear canal normal bilaterally, nose without deformity, nasal mucosa and turbinates normal without polyps  Neck: supple and non-tender without mass, no thyromegaly or thyroid nodules, no cervical lymphadenopathy  Pulmonary/Chest: Decreased sounds in the basesCardiovascular: normal rate, regular rhythm, normal S1 and S2, no murmurs, rubs, clicks, or gallops, distal pulses intact, no carotid bruits  Abdomen: LArge ascitesExtremities: no cyanosis, clubbing or edema  Musculoskeletal: normal range of motion, no joint swelling, deformity or tenderness  Neurologic: reflexes normal and symmetric, no cranial nerve deficit, gait, coordination and speech normal  He needs his port    Assessment/Plan:  1. Acute myeloid leukemia in remission ([address]  Stable    2. Type 2 diabetes mellitus without complication, with long-term current use of insulin (HCC)  We will get him a freestyle nicki  - blood glucose test strips (ONETOUCH ULTRA) strip; TEST 4 TIMES DAILY  Dispense: 100 strip; Refill: 0    3. Uncontrolled type 2 diabetes mellitus with hyperglycemia (HCC)  stable    4. Portal hypertension (HCC)  From cirrhosis    5. GVHD (graft versus host disease) ([address]  Past hx    6.  Thrombocytopenia ([address]  From AML        Medical Decision Making: moderate complexity

## 2022-02-04 DIAGNOSIS — Z79.4 TYPE 2 DIABETES MELLITUS WITHOUT COMPLICATION, WITH LONG-TERM CURRENT USE OF INSULIN (HCC): ICD-10-CM

## 2022-02-04 DIAGNOSIS — E11.9 TYPE 2 DIABETES MELLITUS WITHOUT COMPLICATION, WITH LONG-TERM CURRENT USE OF INSULIN (HCC): ICD-10-CM

## 2022-02-04 RX ORDER — FLASH GLUCOSE SENSOR
KIT MISCELLANEOUS
Qty: 6 EACH | Refills: 1 | Status: SHIPPED
Start: 2022-02-04 | End: 2022-02-21 | Stop reason: SDUPTHER

## 2022-02-04 NOTE — TELEPHONE ENCOUNTER
----- Message from Yanira Thomas sent at 2/3/2022  2:08 PM EST -----  Subject: Refill Request    QUESTIONS  Name of Medication? Continuous Blood Gluc Sensor (FREESTYLE PAOLA 2   SENSOR) MIS  Patient-reported dosage and instructions? Test up to 5 times daily As   needed. How many days do you have left? 0  Preferred Pharmacy? [id number]  Pharmacy phone number (if available)? [phone]  ---------------------------------------------------------------------------  --------------,  Name of Medication? blood glucose test strips (ONETOUCH ULTRA) strip  Patient-reported dosage and instructions? Test up to 5 times daily As   needed. How many days do you have left? 0  [facility]erred [facility]? [facility][id number]  Pharmacy phone number (if available)? [phone]  ---------------------------------------------------------------------------  --------------  CALL BACK INFO  What is the best way for the office to contact you? OK to leave message on   voicemail  Preferred Call Back Phone Number?  [phone]

## 2022-02-05 ENCOUNTER — HOSPITAL ENCOUNTER (EMERGENCY)
Age: 72
Discharge: HOME | End: 2022-02-05
Attending: EMERGENCY MEDICINE
Payer: MEDICARE

## 2022-02-05 ENCOUNTER — APPOINTMENT (OUTPATIENT)
Dept: GENERAL RADIOLOGY | Age: 72
End: 2022-02-05
Payer: MEDICARE

## 2022-02-05 ENCOUNTER — APPOINTMENT (OUTPATIENT)
Dept: CT IMAGING | Age: 72
End: 2022-02-05
Payer: MEDICARE

## 2022-02-05 VITALS
BODY MASS INDEX: 30.62 KG/M2 | WEIGHT: 202 LBS | HEART RATE: 90 BPM | SYSTOLIC BLOOD PRESSURE: 122 MMHG | TEMPERATURE: 97.7 F | OXYGEN SATURATION: 99 % | HEIGHT: 68 IN | DIASTOLIC BLOOD PRESSURE: 80 MMHG | RESPIRATION RATE: 16 BRPM

## 2022-02-05 DIAGNOSIS — K74.60 HEPATIC CIRRHOSIS, UNSPECIFIED HEPATIC CIRRHOSIS TYPE, UNSPECIFIED WHETHER ASCITES PRESENT (HCC): ICD-10-CM

## 2022-02-05 DIAGNOSIS — R91.1 PULMONARY NODULE: ICD-10-CM

## 2022-02-05 DIAGNOSIS — C22.9 MALIGNANT NEOPLASM OF LIVER, UNSPECIFIED LIVER MALIGNANCY TYPE (HCC): Primary | ICD-10-CM

## 2022-02-05 DIAGNOSIS — R16.1 SPLENOMEGALY: ICD-10-CM

## 2022-02-05 DIAGNOSIS — R18.0 MALIGNANT ASCITES: ICD-10-CM

## 2022-02-05 DIAGNOSIS — J90 PLEURAL EFFUSION: ICD-10-CM

## 2022-02-05 LAB
ALBUMIN SERPL-MCNC: 2.7 G/DL (ref 3.5–5.2)
ALP BLD-CCNC: 458 U/L (ref 40–129)
ALT SERPL-CCNC: 41 U/L (ref 0–40)
ANION GAP SERPL CALCULATED.3IONS-SCNC: 13 MMOL/L (ref 7–16)
ANISOCYTOSIS: ABNORMAL
APTT: 38.2 SEC (ref 24.5–35.1)
AST SERPL-CCNC: 40 U/L (ref 0–39)
BASOPHILS ABSOLUTE: 0.01 E9/L (ref 0–0.2)
BASOPHILS RELATIVE PERCENT: 0.2 % (ref 0–2)
BILIRUB SERPL-MCNC: 0.9 MG/DL (ref 0–1.2)
BILIRUBIN DIRECT: 0.4 MG/DL (ref 0–0.3)
BILIRUBIN, INDIRECT: 0.5 MG/DL (ref 0–1)
BUN BLDV-MCNC: 13 MG/DL (ref 6–23)
CALCIUM SERPL-MCNC: 8.2 MG/DL (ref 8.6–10.2)
CHLORIDE BLD-SCNC: 98 MMOL/L (ref 98–107)
CO2: 24 MMOL/L (ref 22–29)
CREAT SERPL-MCNC: 0.7 MG/DL (ref 0.7–1.2)
EKG ATRIAL RATE: 94 BPM
EKG P AXIS: 73 DEGREES
EKG P-R INTERVAL: 164 MS
EKG Q-T INTERVAL: 398 MS
EKG QRS DURATION: 130 MS
EKG QTC CALCULATION (BAZETT): 497 MS
EKG R AXIS: 94 DEGREES
EKG T AXIS: 165 DEGREES
EKG VENTRICULAR RATE: 94 BPM
EOSINOPHILS ABSOLUTE: 0.09 E9/L (ref 0.05–0.5)
EOSINOPHILS RELATIVE PERCENT: 2 % (ref 0–6)
GFR AFRICAN AMERICAN: >60
GFR NON-AFRICAN AMERICAN: >60 ML/MIN/1.73
GLUCOSE BLD-MCNC: 206 MG/DL (ref 74–99)
HCT VFR BLD CALC: 32.4 % (ref 37–54)
HEMOGLOBIN: 9.7 G/DL (ref 12.5–16.5)
IMMATURE GRANULOCYTES #: 0.02 E9/L
IMMATURE GRANULOCYTES %: 0.4 % (ref 0–5)
INR BLD: 1.3
LACTIC ACID: 2.5 MMOL/L (ref 0.5–2.2)
LIPASE: 58 U/L (ref 13–60)
LYMPHOCYTES ABSOLUTE: 1.48 E9/L (ref 1.5–4)
LYMPHOCYTES RELATIVE PERCENT: 33.3 % (ref 20–42)
MCH RBC QN AUTO: 25.2 PG (ref 26–35)
MCHC RBC AUTO-ENTMCNC: 29.9 % (ref 32–34.5)
MCV RBC AUTO: 84.2 FL (ref 80–99.9)
MONOCYTES ABSOLUTE: 0.51 E9/L (ref 0.1–0.95)
MONOCYTES RELATIVE PERCENT: 11.5 % (ref 2–12)
NEUTROPHILS ABSOLUTE: 2.34 E9/L (ref 1.8–7.3)
NEUTROPHILS RELATIVE PERCENT: 52.6 % (ref 43–80)
OVALOCYTES: ABNORMAL
PDW BLD-RTO: 24.2 FL (ref 11.5–15)
PLATELET # BLD: 122 E9/L (ref 130–450)
PMV BLD AUTO: 10.4 FL (ref 7–12)
POIKILOCYTES: ABNORMAL
POLYCHROMASIA: ABNORMAL
POTASSIUM REFLEX MAGNESIUM: 3.6 MMOL/L (ref 3.5–5)
PRO-BNP: 758 PG/ML (ref 0–125)
PROTHROMBIN TIME: 13.7 SEC (ref 9.3–12.4)
RBC # BLD: 3.85 E12/L (ref 3.8–5.8)
SODIUM BLD-SCNC: 135 MMOL/L (ref 132–146)
TOTAL PROTEIN: 4.7 G/DL (ref 6.4–8.3)
TROPONIN, HIGH SENSITIVITY: 28 NG/L (ref 0–11)
WBC # BLD: 4.5 E9/L (ref 4.5–11.5)

## 2022-02-05 PROCEDURE — 85025 COMPLETE CBC W/AUTO DIFF WBC: CPT

## 2022-02-05 PROCEDURE — 93010 ELECTROCARDIOGRAM REPORT: CPT | Performed by: INTERNAL MEDICINE

## 2022-02-05 PROCEDURE — 93005 ELECTROCARDIOGRAM TRACING: CPT | Performed by: EMERGENCY MEDICINE

## 2022-02-05 PROCEDURE — 83605 ASSAY OF LACTIC ACID: CPT

## 2022-02-05 PROCEDURE — 36415 COLL VENOUS BLD VENIPUNCTURE: CPT

## 2022-02-05 PROCEDURE — 85730 THROMBOPLASTIN TIME PARTIAL: CPT

## 2022-02-05 PROCEDURE — 85610 PROTHROMBIN TIME: CPT

## 2022-02-05 PROCEDURE — 83690 ASSAY OF LIPASE: CPT

## 2022-02-05 PROCEDURE — 83880 ASSAY OF NATRIURETIC PEPTIDE: CPT

## 2022-02-05 PROCEDURE — 74176 CT ABD & PELVIS W/O CONTRAST: CPT

## 2022-02-05 PROCEDURE — 84484 ASSAY OF TROPONIN QUANT: CPT

## 2022-02-05 PROCEDURE — 80048 BASIC METABOLIC PNL TOTAL CA: CPT

## 2022-02-05 PROCEDURE — 71045 X-RAY EXAM CHEST 1 VIEW: CPT

## 2022-02-05 PROCEDURE — 80076 HEPATIC FUNCTION PANEL: CPT

## 2022-02-05 PROCEDURE — 99284 EMERGENCY DEPT VISIT MOD MDM: CPT

## 2022-02-05 NOTE — ED NOTES
Bed: 19  Expected date: 2/5/22  Expected time: 12:19 PM  Means of arrival:   Comments:  triage     Allie Solomon RN  02/05/22 [id number]

## 2022-02-06 NOTE — ED PROVIDER NOTES
Chief Complaint   Patient presents with    Shortness of Breath     shortness of breath for a few days    Abdominal Pain     bilateral abdominal pain for a few days; abd is hard and distended    Flank Pain     flank pain for a few days    Procedure     sent by PCP for paracentesis; last drain was 1/28       HPI  Monica Quintanilla is a 73 y.o. male with a PM[facility]x [facility] (recently diagnosed with, treatment not yet started) who presents with several days of abdominal distention, pain and associated shortness of breath. The complaints are persistent, moderate in severity, worsened by Activity and improved by nothing. The patient states that over the last several days he has become more short of breath as he feels his abdomen is increased in size. He has been getting paracenteses somewhat frequently over the last few months for recurrent ascites. He states his last one was 7-10 days ago. He describes the abdominal pain as a feeling of fullness mixed with cramping. He states that he feels the pain throughout his abdomen and occasionally in his right flank. The patient denies recent trauma, fever, chills, HA, dizziness, lightheadedness, paresthesias, generalized weakness, confusion, forgetfulness, vision changes, eye redness, congestion, rhinorrhea, sore throat, neck pain, chest pain, palpitations, LE edema, cough, wheezing, nausea, vomiting, diarrhea, constipation, hematochezia, melena, dysuria, hematuria, decreased UOP, myalgias, arthralgias, ROM issues, swelling, rashes and erythema. ROS is otherwise negative. The patient is currently taking no blood thinners. Tobacco Hx:   reports that he quit smoking about 45 years ago. He has a 5.00 pack-year smoking history. He quit smokeless tobacco use about 23 years ago. Alcohol Hx:   reports current alcohol use of about 1.0 standard drink of alcohol per week. Illicit Drug Hx:   reports no history of drug use.     The history is provided by the patient. Last Tetanus (if applicable): n/a    Review of Systems:   A complete review of systems was performed and pertinent positives and negatives are stated within the HPI, all other systems reviewed and are negative. Physical Exam:  GEN: Cooperative, well-developed, well-nourished adult in NAD; pt appears stated age  Head: Normocephalic and atraumatic; no tenderness to palpation anywhere  Eyes: PERRL, EOMI, conjunctiva clear, cornea without gross abnormality, no visual deficits noted b/l; no scleral icterus  Ears: External ear normal-appearing and non-tender b/l; no hearing deficit noted  Nose: Nares patent and free of debris; septum midline; mucosa appears normal; no drainage noted  Throat: Oral mucosa moist and pink with no lesions noted; dentition wnl; no posterior pharyngeal erythema or edema; tonsils are not swollen and there is no exudate noted; no post-nasal drip  Neck: Supple and symmetrical with midline trachea; no cervical or supraclavicular lymphadenopathy noted; thyroid not palpated, but no tenderness or masses noted in the region; normal ROM with no meningeal signs  Lungs: LCTAB with no wheezes, rhonchi or rales noted; no respiratory distress, breathing unlabored   CV: RRR, no murmurs, gallops or rubs noted on auscultation, normal S1/S2; UE and LE distal pulses intact b/l +2/4 with no cyanosis noted; no LE edema noted b/l; capillary refill < 2 seconds  ABD: Soft, distended with notable TTP throughout; no organomegaly, hernias or masses noted; no flank TTP noted.   /Rectal: no suprapubic tenderness; remainder of exam deferred  MSK: UEs and LEs without notable trauma, ROM restriction or tenderness to palpation b/l; normal strength throughout  Skin: Skin warm and dry without notable rash, erythema, bruising or lesion; normal turgor  Neuro: A&O x3; CN II-XII appear grossly intact; no focal deficits appreciated; pt thoughtful in discussion and able to answer all questions without issue  Psych: normal attention with no obvious AVH, normal mood, normal affect    ---------------------------------- PAST HISTORY ---------------------------------------------  Past Medical History:  has a past medical history of AML (acute myeloblastic leukemia) ([facility] AML (acute myeloblastic leukemia) ([facility] Cancer ([facility] Chronic kidney disease, Diabetes mellitus ([facility] GERD (gastroesophageal reflux disease), Hx of blood clots, Hyperlipidemia, Liver disease, MI, old, Other disorders of kidney and ureter, Other sleep disturbances, Thyroid disease, and Type II or unspecified type diabetes mellitus without mention of complication, not stated as uncontrolled. Past Surgical History:  has a past surgical history that includes Appendectomy; Cholecystectomy; Foot neuroma surgery; Colonoscopy; Endoscopy, colon, diagnostic; Upper gastrointestinal endoscopy; bone marrow transplant; and CT NEEDLE BIOPSY LIVER PERCUTANEOUS (1/17/2022). Social History:  reports that he quit smoking about 45 years ago. He has a 5.00 pack-year smoking history. He quit smokeless tobacco use about 23 years ago. He reports current alcohol use of about 1.0 standard drink of alcohol per week. He reports that he does not use drugs. Family History: family history includes Cancer in his maternal grandmother and other family members; Heart Disease in his father and mother; High Blood Pressure in his father. Home Meds: Not in a hospital admission. The patients home medications have been reviewed. Allergies: Pneumococcal vaccines, Ciprofloxacin, Diatrizoate, Dye [iodides], Epinephrine, Iodine, Metformin and related, Penicillin g, Prednisone, Morphine, Other, and Oxycodone    ------------------------- NURSING NOTES AND VITALS REVIEWED ---------------------------  Date / Time Roomed:  2/5/2022 12:29 PM  ED Bed Assignment:  19/19    The nursing notes within the ED encounter and vital signs as below have been reviewed.    /80   Pulse 90   Temp 97.7 °F (36.5 °C) (Oral)   Resp 16   Ht 5' 8\" (1.727 m)   Wt 202 lb (91.6 kg)   SpO2 99%   BMI 30.71 kg/m²   -------------------------------------------------- RESULTS / INTERVENTIONS -------------------------------------------------  All laboratory and radiology tests have been reviewed by this physician.     LABS:  Results for orders placed or performed during the hospital encounter of 02/05/22   CBC Auto Differential   Result Value Ref Range    WBC 4.5 4.5 - 11.5 E9/L    RBC 3.85 3.80 - 5.80 E12/L    Hemoglobin 9.7 (L) 12.5 - 16.5 g/dL    Hematocrit 32.4 (L) 37.0 - 54.0 %    MCV 84.2 80.0 - 99.9 fL    MCH 25.2 (L) 26.0 - 35.0 pg    MCHC 29.9 (L) 32.0 - 34.5 %    RDW 24.2 (H) 11.5 - 15.0 fL    Platelets 830 (L) 169 - 450 E9/L    MPV 10.4 7.0 - 12.0 fL    Neutrophils % 52.6 43.0 - 80.0 %    Immature Granulocytes % 0.4 0.0 - 5.0 %    Lymphocytes % 33.3 20.0 - 42.0 %    Monocytes % 11.5 2.0 - 12.0 %    Eosinophils % 2.0 0.0 - 6.0 %    Basophils % 0.2 0.0 - 2.0 %    Neutrophils Absolute 2.34 1.80 - 7.30 E9/L    Immature Granulocytes # 0.02 E9/L    Lymphocytes Absolute 1.48 (L) 1.50 - 4.00 E9/L    Monocytes Absolute 0.51 0.10 - 0.95 E9/L    Eosinophils Absolute 0.09 0.05 - 0.50 E9/L    Basophils Absolute 0.01 0.00 - 0.20 E9/L    Anisocytosis 2+     Polychromasia 2+     Poikilocytes 2+     Ovalocytes 2+    Basic Metabolic Panel w/ Reflex to MG   Result Value Ref Range    Sodium 135 132 - 146 mmol/L    Potassium reflex Magnesium 3.6 3.5 - 5.0 mmol/L    Chloride 98 98 - 107 mmol/L    CO2 24 22 - 29 mmol/L    Anion Gap 13 7 - 16 mmol/L    Glucose 206 (H) 74 - 99 mg/dL    BUN 13 6 - 23 mg/dL    CREATININE 0.7 0.7 - 1.2 mg/dL    GFR Non-African American >60 >=60 mL/min/1.73    GFR African American >60     Calcium 8.2 (L) 8.6 - 10.2 mg/dL   Hepatic Function Panel   Result Value Ref Range    Total Protein 4.7 (L) 6.4 - 8.3 g/dL    Albumin 2.7 (L) 3.5 - 5.2 g/dL    Alkaline Phosphatase 458 (H) 40 - 129 U/L    ALT 41 (H) 0 - 40 U/L    AST 40 (H) 0 - 39 U/L    Total Bilirubin 0.9 0.0 - 1.2 mg/dL    Bilirubin, Direct 0.4 (H) 0.0 - 0.3 mg/dL    Bilirubin, Indirect 0.5 0.0 - 1.0 mg/dL   Lipase   Result Value Ref Range    Lipase 58 13 - 60 U/L   Troponin   Result Value Ref Range    Troponin, High Sensitivity 28 (H) 0 - 11 ng/L   Brain Natriuretic Peptide   Result Value Ref Range    Pro- (H) 0 - 125 pg/mL   Protime-INR   Result Value Ref Range    Protime 13.7 (H) 9.3 - 12.4 sec    INR 1.3    APTT   Result Value Ref Range    aPTT 38.2 (H) 24.5 - 35.1 sec   Lactic Acid, Plasma   Result Value Ref Range    Lactic Acid 2.5 (H) 0.5 - 2.2 mmol/L   EKG 12 Lead   Result Value Ref Range    Ventricular Rate 94 BPM    Atrial Rate 94 BPM    P-R Interval 164 ms    QRS Duration 130 ms    Q-T Interval 398 ms    QTc Calculation (Bazett) 497 ms    P Axis 73 degrees    R Axis 94 degrees    T Axis 165 degrees       RADIOLOGY: Interpreted by Radiologist unless otherwise noted. CT ABDOMEN PELVIS WO CONTRAST Additional Contrast? None   Final Result   1. Severe hepatic cirrhosis and portal hypertension including large volume   of ascites. 2. Multiple low-attenuation areas within the liver concerning for neoplasm   such as hepatocellular carcinoma. Recommend MRI. 3. Splenomegaly with hypodensity suggestive of acute infarct. 4.  Incidental findings are described above. XR CHEST PORTABLE   Final Result   1. New consolidation seen within the right lung base which could represent an   infiltrate, mass, atelectasis or possible elevation right hemidiaphragm. Dedicated CT of the chest is recommended for further evaluation   2. The left lung is clear. EKG: As interpreted by this ER physician.   Rate: 94 bpm  Rhythm: sinus  Axis: normal   ST Segments: no acute changes  T-waves: No acute changes  Interpretation: sinus with  LBBB and occasional PVCs  Comparison: changes compared to previous EKG    Oxygen Saturation Interpretation: praful    Meds Given: Medications - No data to display    Procedures:  No procedures performed. --------------------------------- PROGRESS NOTES / ADDITIONAL PROVIDER NOTES ---------------------------------  Consultations:  As outlined below. ED Course:       11:23 PM: All results were discussed with the patient and I have provided specific details regarding the plan of care, diagnosis and associated prognosis. The patient tolerated the visit well and, at the time of discharge, was without objective evidence of hemodynamic instability or an acute process requiring hospitalization and inpatient management. The patient was seen by myself and the assigned attending physician, Dr. Wing Rouse, who agreed with my assessment and plan as laid out herein. The importance of follow-up was discussed at the end of the visit and I recommended that the patient be seen by their primary care physician in 2-3 days. The patient verbalized their understanding and agreement with the plan as presented and stated their intention to follow up. Reasons to return to the ER or seek immediate evaluation by a medical provider were discussed at length and all questions were answered to the highest degree of accuracy possible based on the current information available. At this time the patient is stable and safe for discharge. MDM:  Patient presented with worsening shortness of breath, abdominal pain and concerns for malignant ascites. On arrival, all VS were wnl. EKG and physical exam were  as documented above. Labs were remarkable for a hgb of 9.7 which approximates the patients most recent baseline values. Trop was wnl, proBNP was mildly elevated. Transaminitis noted. CXR revealed possible metastatic lesion. CT abd/pel revealed changes consistent with cirrhosis of the liver. Significant malignant ascites, pulmonary nodules and splenomegaly noted. Pleural effusions identified.     Given the lack of findings necessitating further emergent intervention or hospitalization, the decision was made to discharge the patient with recommendations for follow up and symptomatic care. Reasons to return to the ER were discussed and all questions were answered. The patient was stable at the time of their disposition. Discharge Medication List as of 2/5/2022  3:57 PM          Diagnosis:  1. Malignant neoplasm of liver, unspecified liver malignancy type ([facility]    2. Hepatic cirrhosis, unspecified hepatic cirrhosis type, unspecified whether ascites present ([facility]    3. Malignant ascites    4. Pulmonary nodule    5. Pleural effusion    6. Splenomegaly        Disposition:  Patient's disposition: Discharge to home. Patient's condition is stable. This patient was seen, examined and treated with Dr. Gloria Mccormick. All pertinent aspects of the patient's care were discussed with the attending physician.        [facility]  Resident  02/06/22 [id number]

## 2022-02-07 DIAGNOSIS — E11.9 TYPE 2 DIABETES MELLITUS WITHOUT COMPLICATION, WITH LONG-TERM CURRENT USE OF INSULIN (HCC): ICD-10-CM

## 2022-02-07 DIAGNOSIS — Z79.4 TYPE 2 DIABETES MELLITUS WITHOUT COMPLICATION, WITH LONG-TERM CURRENT USE OF INSULIN (HCC): ICD-10-CM

## 2022-02-07 RX ORDER — BLOOD SUGAR DIAGNOSTIC
STRIP MISCELLANEOUS
Qty: 100 STRIP | Refills: 0 | Status: SHIPPED
Start: 2022-02-07 | End: 2022-02-14 | Stop reason: SDUPTHER

## 2022-02-07 NOTE — TELEPHONE ENCOUNTER
Pharmacy claims no dx attached on last script sent  Last script does show a dx attached  Will put in pharmacy note  Please resend.

## 2022-02-10 DIAGNOSIS — G89.3 CANCER ASSOCIATED PAIN: ICD-10-CM

## 2022-02-10 DIAGNOSIS — L03.116 CELLULITIS OF LEFT LOWER EXTREMITY: Primary | ICD-10-CM

## 2022-02-10 DIAGNOSIS — D89.813 GVHD (GRAFT VERSUS HOST DISEASE) (HCC): ICD-10-CM

## 2022-02-10 RX ORDER — TRAMADOL HYDROCHLORIDE 50 MG/1
50 TABLET ORAL EVERY 8 HOURS PRN
Qty: 42 TABLET | Refills: 0 | Status: SHIPPED
Start: 2022-02-10 | End: 2022-02-24 | Stop reason: SDUPTHER

## 2022-02-14 DIAGNOSIS — Z79.4 TYPE 2 DIABETES MELLITUS WITHOUT COMPLICATION, WITH LONG-TERM CURRENT USE OF INSULIN (HCC): ICD-10-CM

## 2022-02-14 DIAGNOSIS — E11.9 TYPE 2 DIABETES MELLITUS WITHOUT COMPLICATION, WITH LONG-TERM CURRENT USE OF INSULIN (HCC): ICD-10-CM

## 2022-02-14 RX ORDER — BLOOD SUGAR DIAGNOSTIC
STRIP MISCELLANEOUS
Qty: 300 STRIP | Refills: 5 | Status: SHIPPED
Start: 2022-02-14 | End: 2022-03-04 | Stop reason: SDUPTHER

## 2022-02-21 DIAGNOSIS — E11.9 TYPE 2 DIABETES MELLITUS WITHOUT COMPLICATION, WITH LONG-TERM CURRENT USE OF INSULIN (HCC): ICD-10-CM

## 2022-02-21 DIAGNOSIS — Z79.4 TYPE 2 DIABETES MELLITUS WITHOUT COMPLICATION, WITH LONG-TERM CURRENT USE OF INSULIN (HCC): ICD-10-CM

## 2022-02-21 RX ORDER — FLASH GLUCOSE SENSOR
KIT MISCELLANEOUS
Qty: 6 EACH | Refills: 1 | Status: SHIPPED
Start: 2022-02-21 | End: 2022-02-24 | Stop reason: SDUPTHER

## 2022-02-21 RX ORDER — FLASH GLUCOSE SCANNING READER
EACH MISCELLANEOUS
Qty: 1 EACH | Refills: 0 | Status: SHIPPED
Start: 2022-02-21 | End: 2022-02-24 | Stop reason: SDUPTHER

## 2022-02-22 DIAGNOSIS — R25.1 TREMOR: ICD-10-CM

## 2022-02-22 RX ORDER — PROPRANOLOL HYDROCHLORIDE 40 MG/1
TABLET ORAL
Qty: 270 TABLET | Refills: 1 | Status: SHIPPED
Start: 2022-02-22 | End: 2022-02-24

## 2022-02-24 ENCOUNTER — APPOINTMENT (OUTPATIENT)
Dept: GENERAL RADIOLOGY | Age: 72
DRG: 424 | End: 2022-02-24
Payer: MEDICARE

## 2022-02-24 ENCOUNTER — APPOINTMENT (OUTPATIENT)
Dept: CT IMAGING | Age: 72
DRG: 424 | End: 2022-02-24
Payer: MEDICARE

## 2022-02-24 ENCOUNTER — HOSPITAL ENCOUNTER (INPATIENT)
Age: 72
LOS: 4 days | Discharge: HOME | DRG: 424 | End: 2022-02-28
Attending: EMERGENCY MEDICINE | Admitting: INTERNAL MEDICINE
Payer: MEDICARE

## 2022-02-24 ENCOUNTER — OFFICE VISIT (OUTPATIENT)
Dept: FAMILY MEDICINE CLINIC | Age: 72
End: 2022-02-24
Payer: MEDICARE

## 2022-02-24 VITALS
HEIGHT: 68 IN | RESPIRATION RATE: 16 BRPM | DIASTOLIC BLOOD PRESSURE: 82 MMHG | SYSTOLIC BLOOD PRESSURE: 122 MMHG | BODY MASS INDEX: 30.71 KG/M2 | TEMPERATURE: 97 F | HEART RATE: 118 BPM | OXYGEN SATURATION: 99 %

## 2022-02-24 DIAGNOSIS — K74.60 DECOMPENSATION OF CIRRHOSIS OF LIVER (HCC): Primary | ICD-10-CM

## 2022-02-24 DIAGNOSIS — S32.16XA: ICD-10-CM

## 2022-02-24 DIAGNOSIS — R05.9 COUGH: ICD-10-CM

## 2022-02-24 DIAGNOSIS — Z79.4 TYPE 2 DIABETES MELLITUS WITHOUT COMPLICATION, WITH LONG-TERM CURRENT USE OF INSULIN (HCC): ICD-10-CM

## 2022-02-24 DIAGNOSIS — E11.9 TYPE 2 DIABETES MELLITUS WITHOUT COMPLICATION, WITH LONG-TERM CURRENT USE OF INSULIN (HCC): ICD-10-CM

## 2022-02-24 DIAGNOSIS — F41.9 ANXIETY: ICD-10-CM

## 2022-02-24 DIAGNOSIS — C81.93 HODGKIN LYMPHOMA OF INTRA-ABDOMINAL LYMPH NODES, UNSPECIFIED HODGKIN LYMPHOMA TYPE (HCC): Primary | ICD-10-CM

## 2022-02-24 DIAGNOSIS — E87.1 HYPONATREMIA: ICD-10-CM

## 2022-02-24 DIAGNOSIS — D89.813 GVHD (GRAFT VERSUS HOST DISEASE) (HCC): ICD-10-CM

## 2022-02-24 DIAGNOSIS — R11.0 NAUSEA: ICD-10-CM

## 2022-02-24 DIAGNOSIS — K72.90 DECOMPENSATION OF CIRRHOSIS OF LIVER (HCC): Primary | ICD-10-CM

## 2022-02-24 DIAGNOSIS — G89.3 CANCER ASSOCIATED PAIN: ICD-10-CM

## 2022-02-24 DIAGNOSIS — E83.42 HYPOMAGNESEMIA: ICD-10-CM

## 2022-02-24 LAB
ALBUMIN SERPL-MCNC: 2.5 G/DL (ref 3.5–5.2)
ALP BLD-CCNC: 309 U/L (ref 40–129)
ALT SERPL-CCNC: 23 U/L (ref 0–40)
AMMONIA: 32 UMOL/L (ref 16–60)
ANION GAP SERPL CALCULATED.3IONS-SCNC: 14 MMOL/L (ref 7–16)
ANISOCYTOSIS: ABNORMAL
AST SERPL-CCNC: 34 U/L (ref 0–39)
BASOPHILS ABSOLUTE: 0 E9/L (ref 0–0.2)
BASOPHILS RELATIVE PERCENT: 0 % (ref 0–2)
BILIRUB SERPL-MCNC: 1.8 MG/DL (ref 0–1.2)
BILIRUBIN DIRECT: 0.9 MG/DL (ref 0–0.3)
BILIRUBIN, INDIRECT: 0.9 MG/DL (ref 0–1)
BUN BLDV-MCNC: 25 MG/DL (ref 6–23)
CALCIUM SERPL-MCNC: 8.2 MG/DL (ref 8.6–10.2)
CHLORIDE BLD-SCNC: 86 MMOL/L (ref 98–107)
CO2: 26 MMOL/L (ref 22–29)
CREAT SERPL-MCNC: 0.9 MG/DL (ref 0.7–1.2)
EOSINOPHILS ABSOLUTE: 0 E9/L (ref 0.05–0.5)
EOSINOPHILS RELATIVE PERCENT: 0 % (ref 0–6)
GFR AFRICAN AMERICAN: >60
GFR NON-AFRICAN AMERICAN: >60 ML/MIN/1.73
GLUCOSE BLD-MCNC: 308 MG/DL (ref 74–99)
HCT VFR BLD CALC: 30.4 % (ref 37–54)
HEMOGLOBIN: 9.5 G/DL (ref 12.5–16.5)
LYMPHOCYTES ABSOLUTE: 0.9 E9/L (ref 1.5–4)
LYMPHOCYTES RELATIVE PERCENT: 22 % (ref 20–42)
MAGNESIUM: 1.1 MG/DL (ref 1.6–2.6)
MCH RBC QN AUTO: 27.1 PG (ref 26–35)
MCHC RBC AUTO-ENTMCNC: 31.3 % (ref 32–34.5)
MCV RBC AUTO: 86.6 FL (ref 80–99.9)
METER GLUCOSE: 263 MG/DL (ref 74–99)
METER GLUCOSE: 310 MG/DL (ref 74–99)
MONOCYTES ABSOLUTE: 0.16 E9/L (ref 0.1–0.95)
MONOCYTES RELATIVE PERCENT: 4 % (ref 2–12)
NEUTROPHILS ABSOLUTE: 3.03 E9/L (ref 1.8–7.3)
NEUTROPHILS RELATIVE PERCENT: 74 % (ref 43–80)
OVALOCYTES: ABNORMAL
PDW BLD-RTO: 20.8 FL (ref 11.5–15)
PLATELET # BLD: 109 E9/L (ref 130–450)
PMV BLD AUTO: 10.6 FL (ref 7–12)
POIKILOCYTES: ABNORMAL
POLYCHROMASIA: ABNORMAL
POTASSIUM SERPL-SCNC: 4 MMOL/L (ref 3.5–5)
PRO-BNP: 908 PG/ML (ref 0–125)
RBC # BLD: 3.51 E12/L (ref 3.8–5.8)
SODIUM BLD-SCNC: 126 MMOL/L (ref 132–146)
TOTAL PROTEIN: 4.3 G/DL (ref 6.4–8.3)
TROPONIN, HIGH SENSITIVITY: 25 NG/L (ref 0–11)
WBC # BLD: 4.1 E9/L (ref 4.5–11.5)

## 2022-02-24 PROCEDURE — 71046 X-RAY EXAM CHEST 2 VIEWS: CPT

## 2022-02-24 PROCEDURE — G8427 DOCREV CUR MEDS BY ELIG CLIN: HCPCS | Performed by: FAMILY MEDICINE

## 2022-02-24 PROCEDURE — 6370000000 HC RX 637 (ALT 250 FOR IP): Performed by: INTERNAL MEDICINE

## 2022-02-24 PROCEDURE — 3046F HEMOGLOBIN A1C LEVEL >9.0%: CPT | Performed by: FAMILY MEDICINE

## 2022-02-24 PROCEDURE — 1036F TOBACCO NON-USER: CPT | Performed by: FAMILY MEDICINE

## 2022-02-24 PROCEDURE — 83735 ASSAY OF MAGNESIUM: CPT

## 2022-02-24 PROCEDURE — 80048 BASIC METABOLIC PNL TOTAL CA: CPT

## 2022-02-24 PROCEDURE — 6360000002 HC RX W HCPCS: Performed by: EMERGENCY MEDICINE

## 2022-02-24 PROCEDURE — G8484 FLU IMMUNIZE NO ADMIN: HCPCS | Performed by: FAMILY MEDICINE

## 2022-02-24 PROCEDURE — 99214 OFFICE O/P EST MOD 30 MIN: CPT | Performed by: FAMILY MEDICINE

## 2022-02-24 PROCEDURE — 89051 BODY FLUID CELL COUNT: CPT

## 2022-02-24 PROCEDURE — 74176 CT ABD & PELVIS W/O CONTRAST: CPT

## 2022-02-24 PROCEDURE — 82140 ASSAY OF AMMONIA: CPT

## 2022-02-24 PROCEDURE — 3017F COLORECTAL CA SCREEN DOC REV: CPT | Performed by: FAMILY MEDICINE

## 2022-02-24 PROCEDURE — G8417 CALC BMI ABV UP PARAM F/U: HCPCS | Performed by: FAMILY MEDICINE

## 2022-02-24 PROCEDURE — 82962 GLUCOSE BLOOD TEST: CPT

## 2022-02-24 PROCEDURE — 6360000002 HC RX W HCPCS: Performed by: INTERNAL MEDICINE

## 2022-02-24 PROCEDURE — 84484 ASSAY OF TROPONIN QUANT: CPT

## 2022-02-24 PROCEDURE — 1200000000 HC SEMI PRIVATE

## 2022-02-24 PROCEDURE — 2580000003 HC RX 258: Performed by: INTERNAL MEDICINE

## 2022-02-24 PROCEDURE — 4040F PNEUMOC VAC/ADMIN/RCVD: CPT | Performed by: FAMILY MEDICINE

## 2022-02-24 PROCEDURE — 83880 ASSAY OF NATRIURETIC PEPTIDE: CPT

## 2022-02-24 PROCEDURE — 99284 EMERGENCY DEPT VISIT MOD MDM: CPT

## 2022-02-24 PROCEDURE — 1123F ACP DISCUSS/DSCN MKR DOCD: CPT | Performed by: FAMILY MEDICINE

## 2022-02-24 PROCEDURE — 80076 HEPATIC FUNCTION PANEL: CPT

## 2022-02-24 PROCEDURE — 85025 COMPLETE CBC W/AUTO DIFF WBC: CPT

## 2022-02-24 PROCEDURE — 2022F DILAT RTA XM EVC RTNOPTHY: CPT | Performed by: FAMILY MEDICINE

## 2022-02-24 RX ORDER — MAGNESIUM SULFATE IN WATER 40 MG/ML
2000 INJECTION, SOLUTION INTRAVENOUS ONCE
Status: COMPLETED | OUTPATIENT
Start: 2022-02-24 | End: 2022-02-24

## 2022-02-24 RX ORDER — FUROSEMIDE 20 MG/1
40 TABLET ORAL 2 TIMES DAILY
Status: ON HOLD | COMMUNITY
End: 2022-03-09 | Stop reason: SDUPTHER

## 2022-02-24 RX ORDER — ASPIRIN 81 MG/1
81 TABLET, CHEWABLE ORAL DAILY
Status: DISCONTINUED | OUTPATIENT
Start: 2022-02-24 | End: 2022-02-28 | Stop reason: HOSPADM

## 2022-02-24 RX ORDER — LORAZEPAM 0.5 MG/1
0.5 TABLET ORAL 2 TIMES DAILY PRN
Status: DISCONTINUED | OUTPATIENT
Start: 2022-02-24 | End: 2022-02-28 | Stop reason: HOSPADM

## 2022-02-24 RX ORDER — SODIUM CHLORIDE 0.9 % (FLUSH) 0.9 %
5-40 SYRINGE (ML) INJECTION PRN
Status: DISCONTINUED | OUTPATIENT
Start: 2022-02-24 | End: 2022-02-27 | Stop reason: SDUPTHER

## 2022-02-24 RX ORDER — DEXTROSE MONOHYDRATE 25 G/50ML
12.5 INJECTION, SOLUTION INTRAVENOUS PRN
Status: DISCONTINUED | OUTPATIENT
Start: 2022-02-24 | End: 2022-02-28 | Stop reason: HOSPADM

## 2022-02-24 RX ORDER — NICOTINE POLACRILEX 4 MG
15 LOZENGE BUCCAL PRN
Status: DISCONTINUED | OUTPATIENT
Start: 2022-02-24 | End: 2022-02-28 | Stop reason: HOSPADM

## 2022-02-24 RX ORDER — PANTOPRAZOLE SODIUM 40 MG/1
40 TABLET, DELAYED RELEASE ORAL DAILY
Status: DISCONTINUED | OUTPATIENT
Start: 2022-02-25 | End: 2022-02-28 | Stop reason: HOSPADM

## 2022-02-24 RX ORDER — FENTANYL CITRATE 0.05 MG/ML
50 INJECTION, SOLUTION INTRAMUSCULAR; INTRAVENOUS ONCE
Status: COMPLETED | OUTPATIENT
Start: 2022-02-24 | End: 2022-02-24

## 2022-02-24 RX ORDER — SODIUM CHLORIDE 0.9 % (FLUSH) 0.9 %
5-40 SYRINGE (ML) INJECTION EVERY 12 HOURS SCHEDULED
Status: DISCONTINUED | OUTPATIENT
Start: 2022-02-24 | End: 2022-02-27 | Stop reason: SDUPTHER

## 2022-02-24 RX ORDER — FLASH GLUCOSE SENSOR
KIT MISCELLANEOUS
Qty: 6 EACH | Refills: 1 | Status: SHIPPED
Start: 2022-02-24 | End: 2022-03-03 | Stop reason: SDUPTHER

## 2022-02-24 RX ORDER — DEXTROSE MONOHYDRATE 50 MG/ML
100 INJECTION, SOLUTION INTRAVENOUS PRN
Status: DISCONTINUED | OUTPATIENT
Start: 2022-02-24 | End: 2022-02-28 | Stop reason: HOSPADM

## 2022-02-24 RX ORDER — POLYETHYLENE GLYCOL 3350 17 G/17G
17 POWDER, FOR SOLUTION ORAL DAILY PRN
Status: DISCONTINUED | OUTPATIENT
Start: 2022-02-24 | End: 2022-02-28 | Stop reason: HOSPADM

## 2022-02-24 RX ORDER — LEVOTHYROXINE SODIUM 0.1 MG/1
200 TABLET ORAL DAILY
Status: DISCONTINUED | OUTPATIENT
Start: 2022-02-25 | End: 2022-02-28 | Stop reason: HOSPADM

## 2022-02-24 RX ORDER — LORAZEPAM 0.5 MG/1
0.5 TABLET ORAL 2 TIMES DAILY PRN
Qty: 60 TABLET | Refills: 0 | Status: ON HOLD
Start: 2022-02-24 | End: 2022-03-09 | Stop reason: HOSPADM

## 2022-02-24 RX ORDER — ACETAMINOPHEN 650 MG/1
650 SUPPOSITORY RECTAL EVERY 6 HOURS PRN
Status: DISCONTINUED | OUTPATIENT
Start: 2022-02-24 | End: 2022-02-28 | Stop reason: HOSPADM

## 2022-02-24 RX ORDER — SODIUM CHLORIDE 9 MG/ML
25 INJECTION, SOLUTION INTRAVENOUS PRN
Status: DISCONTINUED | OUTPATIENT
Start: 2022-02-24 | End: 2022-02-27 | Stop reason: SDUPTHER

## 2022-02-24 RX ORDER — TRAMADOL HYDROCHLORIDE 50 MG/1
50 TABLET ORAL EVERY 8 HOURS PRN
Qty: 42 TABLET | Refills: 0 | Status: SHIPPED | OUTPATIENT
Start: 2022-02-24 | End: 2022-03-10

## 2022-02-24 RX ORDER — FLASH GLUCOSE SCANNING READER
EACH MISCELLANEOUS
Qty: 1 EACH | Refills: 0 | Status: SHIPPED
Start: 2022-02-24 | End: 2022-03-03 | Stop reason: SDUPTHER

## 2022-02-24 RX ORDER — INSULIN GLARGINE 100 [IU]/ML
20 INJECTION, SOLUTION SUBCUTANEOUS NIGHTLY
Status: DISCONTINUED | OUTPATIENT
Start: 2022-02-24 | End: 2022-02-28 | Stop reason: HOSPADM

## 2022-02-24 RX ORDER — ONDANSETRON 4 MG/1
4 TABLET, ORALLY DISINTEGRATING ORAL 3 TIMES DAILY PRN
Qty: 21 TABLET | Refills: 0 | Status: ON HOLD
Start: 2022-02-24 | End: 2022-03-23 | Stop reason: SDUPTHER

## 2022-02-24 RX ORDER — ACETAMINOPHEN 325 MG/1
650 TABLET ORAL EVERY 6 HOURS PRN
Status: DISCONTINUED | OUTPATIENT
Start: 2022-02-24 | End: 2022-02-28 | Stop reason: HOSPADM

## 2022-02-24 RX ADMIN — INSULIN GLARGINE 20 UNITS: 100 INJECTION, SOLUTION SUBCUTANEOUS at 21:38

## 2022-02-24 RX ADMIN — FENTANYL CITRATE 50 MCG: 50 INJECTION INTRAMUSCULAR; INTRAVENOUS at 14:21

## 2022-02-24 RX ADMIN — MAGNESIUM SULFATE HEPTAHYDRATE 2000 MG: 2 INJECTION, SOLUTION INTRAVENOUS at 21:36

## 2022-02-24 RX ADMIN — Medication 10 ML: at 21:36

## 2022-02-24 RX ADMIN — ENOXAPARIN SODIUM 40 MG: 100 INJECTION SUBCUTANEOUS at 21:36

## 2022-02-24 RX ADMIN — ASPIRIN 81 MG CHEWABLE TABLET 81 MG: 81 TABLET CHEWABLE at 21:39

## 2022-02-24 RX ADMIN — MAGNESIUM SULFATE HEPTAHYDRATE 2000 MG: 2 INJECTION, SOLUTION INTRAVENOUS at 14:21

## 2022-02-24 ASSESSMENT — ENCOUNTER SYMPTOMS
BLOOD IN STOOL: 0
ABDOMINAL PAIN: 0
WHEEZING: 0
TROUBLE SWALLOWING: 0
SHORTNESS OF BREATH: 0
BACK PAIN: 1
COLOR CHANGE: 0
VOMITING: 0
BLOOD IN STOOL: 0
RHINORRHEA: 0
DIARRHEA: 0
BACK PAIN: 1
COUGH: 0
DIARRHEA: 0
NAUSEA: 0
ABDOMINAL DISTENTION: 1
ABDOMINAL PAIN: 1
CONSTIPATION: 0

## 2022-02-24 ASSESSMENT — PAIN SCALES - GENERAL
PAINLEVEL_OUTOF10: 6
PAINLEVEL_OUTOF10: 0
PAINLEVEL_OUTOF10: 0

## 2022-02-24 NOTE — PROGRESS NOTES
Seb Mason (:  1950) is a 73 y.o. male,Established patient, here for evaluation of the following chief complaint(s):  Benign Prostatic Hypertrophy (spraying when urinating due to enlarged prostate) and Hip Pain (fell last week. right hip pain. bruised. requesting refill of tramadol. )         ASSESSMENT/PLAN:  1. Hodgkin lymphoma of intra-abdominal lymph nodes, unspecified Hodgkin lymphoma type ([facility]  2. Nausea  -     ondansetron (ZOFRAN-ODT) 4 MG disintegrating tablet; Take 1 tablet by mouth 3 times daily as needed for Nausea or Vomiting, Disp-21 tablet, R-0Normal  3. Anxiety  -     LORazepam (ATIVAN) 0.5 MG tablet; Take 1 tablet by mouth 2 times daily as needed for Anxiety. , Disp-60 tablet, R-0Normal  4. GVHD (graft versus host disease) ([facility]  -     traMADol (ULTRAM) 50 MG tablet; Take 1 tablet by mouth every 8 hours as needed for Pain for up to 14 days. , Disp-42 tablet, R-0Normal  5. Cancer associated pain  -     traMADol (ULTRAM) 50 MG tablet; Take 1 tablet by mouth every 8 hours as needed for Pain for up to 14 days. , Disp-42 tablet, R-0Normal  6. Type 2 diabetes mellitus without complication, with long-term current use of insulin (HCC)  -     Continuous Blood Gluc  (FREESTYLE PAOLA 2 READER) FLORESITA; Check sygar FAsting and ac (4 X daiy). , Disp-1 each, R-0Normal  -     Continuous Blood Gluc Sensor (FREESTYLE PAOLA 2 SENSOR) MISC; Check sugar 5 X daily. AM and AC, Disp-6 each, R-1Normal      No follow-ups on file. Subjective   SUBJECTIVE/OBJECTIVE:  HPI    Review of Systems   Constitutional: Negative for chills and diaphoresis. HENT: Negative for ear discharge, ear pain, hearing loss, nosebleeds and tinnitus. Respiratory: Negative for wheezing. Cardiovascular: Negative for chest pain. Gastrointestinal: Positive for abdominal pain (and ascites). Negative for blood in stool, constipation and diarrhea. Genitourinary: Negative for dysuria, flank pain and hematuria. Musculoskeletal: Positive for arthralgias and back pain (where he fell). Skin: Negative for rash. Neurological: Negative for headaches. Hematological: Does not bruise/bleed easily. Objective   /82 (Position: Supine)   Pulse 118   Temp 97 °F (36.1 °C) (Temporal)   Resp 16   Ht 5' 8\" (1.727 m)   SpO2 99%   BMI 30.71 kg/m²   Lab Results   Component Value Date    LABA1C 8.4 12/08/2021    LABA1C 9.2 05/27/2021    LABA1C 10.3 02/25/2021     Physical Exam  HENT:      Nose: Nose normal.   Eyes:      General:         Right eye: No discharge. Left eye: No discharge. Cardiovascular:      Rate and Rhythm: Normal rate and regular rhythm. Heart sounds: No murmur heard. Pulmonary:      Effort: No respiratory distress. Breath sounds: No rhonchi or rales. Abdominal:      General: Abdomen is flat. Tenderness: There is no abdominal tenderness. Musculoskeletal:      Cervical back: No rigidity. Comments: Plan admit [facility]     Skin:     General: Skin is warm. Capillary Refill: Capillary refill takes less than 2 seconds. Coloration: Skin is not pale. Findings: No bruising. Neurological:      Mental Status: He is alert. Psychiatric:         Mood and Affect: Mood normal.            On this date 2/24/2022 I have spent 32 minutes reviewing previous notes, test results and face to face with the patient discussing the diagnosis and importance of compliance with the treatment plan as well as documenting on the day of the visit. An electronic signature was used to authenticate this note.     --Yoly Langley DO

## 2022-02-24 NOTE — H&P
Department of Internal Medicine  History and Physical    PCP: Frank Jerez DO  Admitting Physician: Dr. Enrike Reyna  Consultants:   Date of Service: 2/24/2022    CHIEF COMPLAINT:  Abdominal distension    HISTORY OF PRESENT ILLNESS:    Patient is a 68-year-old male who presented to the ED due to abdominal distention. Patient has a history of cirrhosis. States he had started having issues about 2 months ago. He has had 3 paracentesis in that timeframe. His last one was about 3 days ago. He had about 8 L removed. He denies any abdominal pain currently. He denies any nausea or emesis. Denies any recent alcohol intake. He denies any fever or chills. He denies any chest pain or shortness of breath. He states he does drink a lot of fluids. He states he does drink a lot of liquids. Does not seem like he follows a salt restriction. States he takes his medications as prescribed.       PAST MEDICAL Hx:  Past Medical History:   Diagnosis Date    [facility]     chemotherapy    AML (acute myeloblastic leukemia) [facility] 2010    Cancer [facility]     Chronic kidney disease     Diabetes mellitus ([facility]     GERD (gastroesophageal reflux disease)     Hx of blood clots 2012    legs    Hyperlipidemia     Liver disease     MI, old     Other disorders of kidney and ureter     Other sleep disturbances     Thyroid disease     Type II or unspecified type diabetes mellitus without mention of complication, not stated as uncontrolled        PAST SURGICAL Hx:   Past Surgical History:   Procedure Laterality Date    APPENDECTOMY      BONE MARROW TRANSPLANT      double core transplant    CHOLECYSTECTOMY      COLONOSCOPY      CT NEEDLE BIOPSY LIVER PERCUTANEOUS  1/17/2022    CT NEEDLE BIOPSY LIVER PERCUTANEOUS    ENDOSCOPY, COLON, DIAGNOSTIC      FOOT NEUROMA SURGERY      UPPER GASTROINTESTINAL ENDOSCOPY         FAMILY Hx:  Family History   Problem Relation Age of Onset    Heart Disease Mother     High Blood Pressure Father     Heart Disease Father     Cancer Maternal Grandmother     Cancer Other     Cancer Other        HOME MEDICATIONS:  Prior to Admission medications    Medication Sig Start Date End Date Taking? Authorizing Provider   ondansetron (ZOFRAN-ODT) 4 MG disintegrating tablet Take 1 tablet by mouth 3 times daily as needed for Nausea or Vomiting 2/24/22  Yes Jen Bejarano DO   LORazepam (ATIVAN) 0.5 MG tablet Take 1 tablet by mouth 2 times daily as needed for Anxiety. 2/24/22 2/24/23 Yes Jen Bejarano DO   traMADol (ULTRAM) 50 MG tablet Take 1 tablet by mouth every 8 hours as needed for Pain for up to 14 days.  2/24/22 3/10/22 Yes Jen Bejarano DO   furosemide (LASIX) 20 MG tablet Take 10 mg by mouth 2 times daily   Yes Historical Provider, MD   meclizine (ANTIVERT) 12.5 MG tablet  1/17/22  Yes Historical Provider, MD   ferrous sulfate (IRON 325) 325 (65 Fe) MG tablet Take 1 tablet by mouth 2 times daily (with meals) 1/31/22  Yes Jen Bejarano DO   levothyroxine (SYNTHROID) 200 MCG tablet Take 1 tablet by mouth Daily 12/28/21  Yes JYOTI Madison CNP   sertraline (ZOLOFT) 50 MG tablet Take 1.5 tablets by mouth daily  Patient taking differently: Take 50 mg by mouth daily 1 tablet 11/9/21  Yes Jen Bejarano DO   pantoprazole (PROTONIX) 40 MG tablet TAKE 1 TABLET DAILY 5/27/21  Yes Jen Bejarano DO   insulin glargine (LANTUS SOLOSTAR) 100 UNIT/ML injection pen Inject 20 Units into the skin nightly 5/27/21  Yes Jen Bejarano DO   Coenzyme Q10 (CO Q-10) 100 MG CAPS Take by mouth   Yes Historical Provider, MD   insulin lispro, 1 Unit Dial, (HUMALOG KWIKPEN) 100 UNIT/ML SOPN INJECT 0-20 UNITS INTO THE SKIN THREE TIMES DAILY(BEFORE MEALS) 2/1/21  Yes JYOTI Morales CNP   vitamin D (CHOLECALCIFEROL) 1000 UNIT TABS tablet Take 2,000 Units by mouth daily    Yes Historical Provider, MD   aspirin 81 MG chewable tablet Take 81 mg by mouth daily   Yes Historical Provider, MD   Continuous Blood Gluc  (FREESTYLE PAOLA 2 READER) FLORESITA Check sygar FAsting and ac (4 X daiy). 22   Jennifer Dunham, DO   Continuous Blood Gluc Sensor (FREESTYLE PAOLA 2 SENSOR) MISC Check sugar 5 X daily. [facility] 22   Jennifer Dunham, DO   blood glucose test strips [facility] ULTRA) strip TEST 4 TIMES DAILY 22   Jennifer Dunham, DO   INS SYRINGE/NEEDLE .5CC/28G ([employer] INS SYR MX-COM .5CC/28G) 28G X 1/2\" 0.5 ML MISC Use three times daily 21   Jennifer Dunham, DO   Insulin Pen Needle (BD PEN NEEDLE JENNIFER U/F) 32G X 4 MM MISC USE AS DIRECTED 21   Jennifer Dunham, DO   blood glucose test strips (ONE TOUCH ULTRA TEST) strip Test up to 5 times daily As needed. 20   JYOTI Lopez - CNP       ALLERGIES:  Pneumococcal vaccines, Ciprofloxacin, Diatrizoate, Dye [iodides], Epinephrine, Iodine, Metformin and related, Penicillin g, Prednisone, Morphine, Other, and Oxycodone    SOCIAL Hx:  Social History     Socioeconomic History    Marital status:      Spouse name: Flor Main Number of children: 1    Years of education: Not on file    Highest education level: Not on file   Occupational History    Not on file   Tobacco Use    Smoking status: Former Smoker     Packs/day: 1.00     Years: 5.00     Pack years: 5.00     Quit date: 1976     Years since quittin.9    Smokeless tobacco: Former User     Quit date: 1998   Substance and Sexual Activity    Alcohol use:  Yes     Alcohol/week: 1.0 standard drink     Types: 1 Cans of beer per week     Comment: weekly    Drug use: No    Sexual activity: Yes     Partners: Female   Other Topics Concern    Not on file   Social History Narrative    Not on file     Social Determinants of Health     Financial Resource Strain: Low Risk     Difficulty of Paying Living Expenses: Not very hard   Food Insecurity: No Food Insecurity    Worried About Running Out of Food in the Last Year: Never true    Ran Out of Food in the Last Year: Never true   Transportation Needs:     Lack of Transportation (Medical): Not on file    Lack of Transportation (Non-Medical): Not on file   Physical Activity:     Days of Exercise per Week: Not on file    Minutes of Exercise per Session: Not on file   Stress:     Feeling of Stress : Not on file   Social Connections:     Frequency of Communication with Friends and Family: Not on file    Frequency of Social Gatherings with Friends and Family: Not on file    Attends [employer] Services: Not on file    Active Member of [address] [place] [address] or Organizations: Not on file    Attends Club or Organization Meetings: Not on file    Marital Status: Not on file   Intimate Partner Violence:     Fear of Current or Ex-Partner: Not on file    Emotionally Abused: Not on file    Physically Abused: Not on file    Sexually Abused: Not on file   Housing Stability:     Unable to Pay for Housing in the Last Year: Not on file    Number of [place] in the Last Year: Not on file    Unstable Housing in the Last Year: Not on file       ROS: Positive in bold  General:   Denies chills, fatigue, fever, malaise, night sweats or weight loss    Psychological:   Denies anxiety, disorientation or hallucinations    ENT:    Denies epistaxis, headaches, vertigo or visual changes    Cardiovascular:   Denies any chest pain, irregular heartbeats, or palpitations. No paroxysmal nocturnal dyspnea. Respiratory:   Denies shortness of breath, coughing, sputum production, hemoptysis, or wheezing. No orthopnea. Gastrointestinal:   Denies nausea, vomiting, diarrhea, or constipation. Denies any abdominal pain. Denies change in bowel habits or stools. Genito-Urinary:    Denies any urgency, frequency, hematuria. Voiding without difficulty.     Musculoskeletal:   Denies joint pain, joint stiffness, joint swelling or muscle pain    Neurology:    Denies any headache or focal neurological deficits. No weakness or paresthesia. Derm:    Denies any rashes, ulcers, or excoriations. Denies bruising. Extremities:   Denies any lower extremity swelling or edema. PHYSICAL EXAM: Abnormal findings noted  VITALS:  Vitals:    02/24/22 1530   BP: (!) 91/57   Pulse: 110   Resp: 16   Temp: 97.1 °F (36.2 °C)   SpO2: 96%         CONSTITUTIONAL:    Awake, alert, cooperative, no apparent distress, and appears stated age    EYES:     EOMI, sclera clear, conjunctiva normal    ENT:    Normocephalic, atraumatic, External ears without lesions. NECK:    Supple, symmetrical, trachea midline, , no JVD    HEMATOLOGIC/LYMPHATICS:    No cervical lymphadenopathy and no supraclavicular lymphadenopathy    LUNGS:    Symmetric. No increased work of breathing, good air exchange, clear to auscultation bilaterally, no wheezes, rhonchi, or rales,     CARDIOVASCULAR:    Normal apical impulse, regular rate and rhythm, normal S1 and S2, no S3 or S4, and no murmur noted    ABDOMEN:    soft, non-distended, non-tender  Patients abdomen is distended    MUSCULOSKELETAL:    There is no redness, warmth, or swelling of the joints. NEUROLOGIC:    Awake, alert, oriented to name, place and time. SKIN:    No bruising or bleeding. No redness, warmth, or swelling    EXTREMITIES:    Peripheral pulses present. No edema, cyanosis, or swelling.     LINES/CATHETERS     LABORATORY DATA:  CBC with Differential:    Lab Results   Component Value Date    WBC 4.1 02/24/2022    RBC 3.51 02/24/2022    HGB 9.5 02/24/2022    HCT 30.4 02/24/2022     02/24/2022    MCV 86.6 02/24/2022    MCH 27.1 02/24/2022    MCHC 31.3 02/24/2022    RDW 20.8 02/24/2022    NRBC 1 04/16/2012    SEGSPCT 22 02/03/2014    METASPCT 4.3 02/04/2021    LYMPHOPCT 22.0 02/24/2022    MONOPCT 4.0 02/24/2022    MYELOPCT 0.9 02/04/2021    BASOPCT 0.0 02/24/2022    MONOSABS 0.16 02/24/2022    LYMPHSABS 0.90 02/24/2022    EOSABS 0.00 02/24/2022 BASOSABS 0.00 02/24/2022     CMP:    Lab Results   Component Value Date     02/24/2022    K 4.0 02/24/2022    K 3.6 02/05/2022    CL 86 02/24/2022    CO2 26 02/24/2022    BUN 25 02/24/2022    CREATININE 0.9 02/24/2022    GFRAA >60 02/24/2022    LABGLOM >60 02/24/2022    GLUCOSE 308 02/24/2022    GLUCOSE 318 04/30/2012    PROT 4.3 02/24/2022    LABALBU 2.5 02/24/2022    LABALBU 4.0 04/30/2012    CALCIUM 8.2 02/24/2022    BILITOT 1.8 02/24/2022    ALKPHOS 309 02/24/2022    AST 34 02/24/2022    ALT 23 02/24/2022       ASSESSMENT/PLAN:  1. Decompensated cirrhosis  2. Chronic Anemia  3. Nondisplaced sacral fracture  4. Hypodense lesions of the liver  5. Subcentimeter pulmonary nodules  6. AML  7. Chronic kidney disease  8. Diabetes mellitus  9. History of DVT  10. Hyperlipidemia  11. History of MI  15. Hypothyroidism  13. Hx of tobacco abuse     Patient presented with abdominal distention. Patient will have paracentesis performed. GI will be consulted.       Daryle Moss, [facility]  5:47 PM  2/24/2022    Electronically signed by Daryle Moss, DO on 2/24/22 at 5:47 PM EST

## 2022-02-24 NOTE — ED PROVIDER NOTES
ED PROVIDER NOTE    Chief Complaint   Patient presents with    Abdominal Pain     needs drained and albumin per family member    Hip Pain     since last saturday when he fell       HPI:  2/24/22,   Time: 12:32 PM DANIEL Casillas is a 73 y.o. male presenting to the ED for abdominal distention and low back pain. Low back pain gradual onset 1w ago since mechanical ground level fall. Constant, throbbing, nonradiating, worse w/ movement. Also having abdominal distention which is chronic and recurring, usually gets paracentesis at [facility]. Last paracentesis was 6d ago. Associated shortness of breath worse w/ exertion. No orthopnea. Leg swelling stable from baseline. Hx notable for hodgkin lymphoma and AML s/p stem cell transplant and complicated by liver disease. Wife reports associated confusion and gait instability as well. No fever, chills, nausea, vomiting, cough, chest pain. Chart review: hx of AML, HL, DM, liver disease, HLD    Review of Systems:     Review of Systems   Constitutional: Negative for appetite change, chills and fever. HENT: Negative for congestion, rhinorrhea and trouble swallowing. Eyes: Negative for visual disturbance. Respiratory: Negative for cough and shortness of breath. Cardiovascular: Negative for chest pain and leg swelling. Gastrointestinal: Positive for abdominal distention. Negative for abdominal pain, blood in stool, diarrhea, nausea and vomiting. Genitourinary: Negative for decreased urine volume, difficulty urinating, dysuria, frequency, hematuria and urgency. Musculoskeletal: Positive for arthralgias and back pain. Negative for myalgias, neck pain and neck stiffness. Skin: Negative for color change.    Neurological: Negative for dizziness, syncope, weakness, light-headedness, numbness and headaches.         --------------------------------------------- PAST HISTORY ---------------------------------------------  Past Medical History: Past Medical History:   Diagnosis Date    AML (acute myeloblastic leukemia) ([facility]     chemotherapy    AML (acute myeloblastic leukemia) ([facility] 2010    [facility]     Chronic kidney disease     [facility]     GERD (gastroesophageal reflux disease)     Hx of blood clots 2012    legs    Hyperlipidemia     Liver disease     MI, old     Other disorders of kidney and ureter     Other sleep disturbances     Thyroid disease     Type II or unspecified type diabetes mellitus without mention of complication, not stated as uncontrolled        Past Surgical History:   Past Surgical History:   Procedure Laterality Date    APPENDECTOMY      BONE MARROW TRANSPLANT      double core transplant    CHOLECYSTECTOMY      COLONOSCOPY      CT NEEDLE BIOPSY LIVER PERCUTANEOUS  2022    CT NEEDLE BIOPSY LIVER PERCUTANEOUS    ENDOSCOPY, COLON, DIAGNOSTIC      FOOT NEUROMA SURGERY      UPPER GASTROINTESTINAL ENDOSCOPY         Social History:   Social History     Socioeconomic History    Marital status:      Spouse name: None    Number of children: None    Years of education: None    Highest education level: None   Occupational History    None   Tobacco Use    Smoking status: Former Smoker     Packs/day: 1.00     Years: 5.00     Pack years: 5.00     Quit date: 1976     Years since quittin.9    Smokeless tobacco: Former User     Quit date: 1998   Substance and Sexual Activity    Alcohol use:  Yes     Alcohol/week: 1.0 standard drink     Types: 1 Cans of beer per week     Comment: weekly    Drug use: No    Sexual activity: Yes     Partners: Female   Other Topics Concern    None   Social History Narrative    None     Social Determinants of Health     Financial Resource Strain: Low Risk     Difficulty of Paying Living Expenses: Not very hard   Food Insecurity: No Food Insecurity    Worried About Running Out of Food in the Last Year: Never true    Diane of Food in the Last Year: Never true   Transportation Needs:     Lack of Transportation (Medical): Not on file    Lack of Transportation (Non-Medical): Not on file   Physical Activity:     Days of Exercise per Week: Not on file    Minutes of Exercise per Session: Not on file   Stress:     Feeling of Stress : Not on file   Social Connections:     Frequency of Communication with Friends and Family: Not on file    Frequency of Social Gatherings with Friends and Family: Not on file    Attends Oriental orthodox Services: Not on file    Active Member of [address] [place] [address] [employer] or Organizations: Not on file    Attends Club or Organization Meetings: Not on file    Marital Status: Not on file   Intimate Partner Violence:     Fear of Current or Ex-Partner: Not on file    Emotionally Abused: Not on file    Physically Abused: Not on file    Sexually Abused: Not on file   Housing Stability:     Unable to Pay for Housing in the Last Year: Not on file    Number of [place] in the Last Year: Not on file    Unstable Housing in the Last Year: Not on file       Family History:   Family History   Problem Relation Age of Onset    Heart Disease Mother     High Blood Pressure Father     Heart Disease Father     Cancer Maternal Grandmother     Cancer Other     Cancer Other        The patients home medications have been reviewed. Allergies:    Allergies   Allergen Reactions    Pneumococcal Vaccines Dermatitis    Ciprofloxacin      Shuts kidneys down    Diatrizoate      IS able to have CT scans with premedication    Dye [Iodides]      statrted to go into cardiac arrest    Epinephrine     Iodine     Metformin And Related     Penicillin G      Unsure if he is still allergic to PCN, or if he ever was    Prednisone     Morphine Nausea And Vomiting    Other Nausea And Vomiting     fish    Oxycodone Nausea And Vomiting           ---------------------------------------------------PHYSICAL EXAM--------------------------------------    /61 Pulse 113   Temp 97.5 °F (36.4 °C)   Resp 18   Wt 187 lb (84.8 kg)   SpO2 98%   BMI 28.43 kg/m²     Physical Exam  Vitals and nursing note reviewed. Constitutional:       General: He is not in acute distress. Appearance: He is not toxic-appearing. HENT:      Mouth/Throat:      Mouth: Mucous membranes are moist.   Eyes:      General: No scleral icterus. Extraocular Movements: Extraocular movements intact. Pupils: Pupils are equal, round, and reactive to light. Neck:      Comments: No midline ttp/stepoff/deformity  Cardiovascular:      Rate and Rhythm: Regular rhythm. Tachycardia present. Pulses: Normal pulses. Heart sounds: Normal heart sounds. No murmur heard. Pulmonary:      Effort: Pulmonary effort is normal. No respiratory distress. Breath sounds: Normal breath sounds. No wheezing or rales. Abdominal:      General: There is distension (+fluid wave). Palpations: Abdomen is soft. Tenderness: There is no abdominal tenderness. There is no guarding or rebound. Musculoskeletal:         General: No swelling or deformity. Normal range of motion. Cervical back: Normal range of motion and neck supple. No rigidity. No muscular tenderness. Comments: Ecchymosis and ttp midline lumbosacral.   Skin:     General: Skin is warm and dry. Neurological:      Mental Status: He is alert and oriented to person, place, and time. Comments: Strength 5/5 and sensation grossly intact to light touch and equal bilaterally throughout all extremities             -------------------------------------------------- RESULTS -------------------------------------------------  I have personally reviewed all laboratory and imaging results for this patient. Results are listed below.      LABS:  Labs Reviewed   CBC WITH AUTO DIFFERENTIAL - Abnormal; Notable for the following components:       Result Value    WBC 4.1 (*)     RBC 3.51 (*)     Hemoglobin 9.5 (*)     Hematocrit 30.4 (*) MCHC 31.3 (*)     RDW 20.8 (*)     Platelets 073 (*)     Lymphocytes Absolute 0.90 (*)     Eosinophils Absolute 0.00 (*)     All other components within normal limits   BASIC METABOLIC PANEL - Abnormal; Notable for the following components:    Sodium 126 (*)     Chloride 86 (*)     Glucose 308 (*)     BUN 25 (*)     Calcium 8.2 (*)     All other components within normal limits   HEPATIC FUNCTION PANEL - Abnormal; Notable for the following components: Total Protein 4.3 (*)     Albumin 2.5 (*)     Alkaline Phosphatase 309 (*)     Total Bilirubin 1.8 (*)     Bilirubin, Direct 0.9 (*)     All other components within normal limits   MAGNESIUM - Abnormal; Notable for the following components:    Magnesium 1.1 (*)     All other components within normal limits   BRAIN NATRIURETIC PEPTIDE - Abnormal; Notable for the following components:    Pro- (*)     All other components within normal limits   TROPONIN - Abnormal; Notable for the following components:    Troponin, High Sensitivity 25 (*)     All other components within normal limits   AMMONIA       RADIOLOGY:  Interpreted personally and by Radiologist.  XR CHEST INSPIRATION AND EXPIRATION   Final Result   Curvilinear radiolucency in the right upper lung field noted on the prior   examination performed earlier in the day is less prominently seen on this   study but still suggested to be present, especially in the inspiration view. There appears to be extension beyond the chest wall and into the soft   tissues, indicating a skin fold. No definite evidence to suggest   pneumothorax. CT ABDOMEN PELVIS WO CONTRAST Additional Contrast? None   Preliminary Result   Nondisplaced sacral fracture as above. No additional acute osseous   abnormalities. Cirrhotic liver morphology with sequelae of portal hypertension including   splenomegaly and large volume of ascites in the abdomen and pelvis.       Multiple hypodense lesions scattered throughout the liver which may reflect   primary liver cancer or metastatic disease, among other etiologies. Numerous subcentimeter pulmonary nodules at both lung bases, increased in   number and size from the prior study. Findings are highly suspicious for   metastatic disease. Additional, incidental findings as above. RECOMMENDATIONS:   Unavailable         XR CHEST (2 VW)   Final Result   Hypoinflated lungs again identified, when compared to the previous study   performed 02/05/2022. Rule out skin fold versus small pneumothorax in the   right upper lung field. Repeat chest radiographs, including inspiration and   expiration are recommended. Elevated right hemidiaphragm again seen. Rule   out small right pleural effusion. RECOMMENDATION:   The findings were sent to the Radiology Results [address] at 01:14   p.m. on 02/24/2022 to be communicated to a licensed caregiver.                 ------------------------- NURSING NOTES AND VITALS REVIEWED ---------------------------   The nursing notes within the ED encounter and vital signs as below have been reviewed by myself. /61   Pulse 113   Temp 97.5 °F (36.4 °C)   Resp 18   Wt 187 lb (84.8 kg)   SpO2 98%   BMI 28.43 kg/m²   Oxygen Saturation Interpretation: Normal    The patients available past medical records and past encounters were reviewed. ------------------------------ ED COURSE/MEDICAL DECISION MAKING----------------------  Medications   magnesium sulfate 2000 mg in 50 mL IVPB premix (2,000 mg IntraVENous New Bag 2/24/22 1421)   fentaNYL (SUBLIMAZE) injection 50 mcg (50 mcg IntraVENous Given 2/24/22 1421)         Counseling: The emergency provider has spoken with the patient and discussed todays results, in addition to providing specific details for the plan of care and counseling regarding the diagnosis and prognosis. Questions are answered at this time and they are agreeable with the plan.     ED Course/Medical Decision Makin y.o. male here with abdominal swelling and low back pain s/p recent fall. Tachycardic on arrival. Hemodynamically stable and in no acute distress. Sent to ED by PCP for admission. Workup notable for hyponatremia, hypomag, hyperbili, hypoalbuminemia, pancytopenia. Initial CXR suggesting repeat to r/o PTX. Repeat CXR not suggestive of PTX. CT AP shows nondisplaced sacral fx and multiple lung and liver lesions which could represent metastatic disease. Treated w/ IV mag and pain control. Admitted in stable condition for further management.       --------------------------------- IMPRESSION AND DISPOSITION ---------------------------------    IMPRESSION  1. Decompensation of cirrhosis of liver ([facility]    2. Hypomagnesemia    3. Hyponatremia    4. Closed type [facility] fracture [facility]        DISPOSITION  Disposition: Admit to telemetry  Patient condition is stable    NOTE: This report was transcribed using voice recognition software.  Every effort was made to ensure accuracy; however, inadvertent computerized transcription errors may be present    Yoni Díaz MD  Attending Emergency Physician         Yoni Díaz MD  [phone]

## 2022-02-25 ENCOUNTER — APPOINTMENT (OUTPATIENT)
Dept: INTERVENTIONAL RADIOLOGY/VASCULAR | Age: 72
DRG: 424 | End: 2022-02-25
Payer: MEDICARE

## 2022-02-25 ENCOUNTER — APPOINTMENT (OUTPATIENT)
Dept: ULTRASOUND IMAGING | Age: 72
DRG: 424 | End: 2022-02-25
Payer: MEDICARE

## 2022-02-25 LAB
ALBUMIN FLUID: 0.3 G/DL
ALBUMIN SERPL-MCNC: 2.6 G/DL (ref 3.5–5.2)
ALP BLD-CCNC: 292 U/L (ref 40–129)
ALT SERPL-CCNC: 23 U/L (ref 0–40)
AMYLASE FLUID: 8 U/L
ANION GAP SERPL CALCULATED.3IONS-SCNC: 11 MMOL/L (ref 7–16)
ANISOCYTOSIS: ABNORMAL
APPEARANCE FLUID: CLEAR
AST SERPL-CCNC: 33 U/L (ref 0–39)
BASOPHILS ABSOLUTE: 0.01 E9/L (ref 0–0.2)
BASOPHILS RELATIVE PERCENT: 0.2 % (ref 0–2)
BILIRUB SERPL-MCNC: 1.8 MG/DL (ref 0–1.2)
BUN BLDV-MCNC: 25 MG/DL (ref 6–23)
CALCIUM SERPL-MCNC: 8.4 MG/DL (ref 8.6–10.2)
CELL COUNT FLUID TYPE: NORMAL
CHLORIDE BLD-SCNC: 92 MMOL/L (ref 98–107)
CO2: 27 MMOL/L (ref 22–29)
COLOR FLUID: NORMAL
CREAT SERPL-MCNC: 0.8 MG/DL (ref 0.7–1.2)
EOSINOPHILS ABSOLUTE: 0.02 E9/L (ref 0.05–0.5)
EOSINOPHILS RELATIVE PERCENT: 0.5 % (ref 0–6)
FLUID TYPE: NORMAL
GFR AFRICAN AMERICAN: >60
GFR NON-AFRICAN AMERICAN: >60 ML/MIN/1.73
GLUCOSE BLD-MCNC: 195 MG/DL (ref 74–99)
HCT VFR BLD CALC: 30.9 % (ref 37–54)
HEMOGLOBIN: 9.8 G/DL (ref 12.5–16.5)
HYPOCHROMIA: ABNORMAL
IMMATURE GRANULOCYTES #: 0.05 E9/L
IMMATURE GRANULOCYTES %: 1.2 % (ref 0–5)
INR BLD: 1.3
LACTATE DEHYDROGENASE: 188 U/L (ref 135–225)
LD, FLUID: 35 U/L
LYMPHOCYTES ABSOLUTE: 1.16 E9/L (ref 1.5–4)
LYMPHOCYTES RELATIVE PERCENT: 28.9 % (ref 20–42)
MAGNESIUM: 1.8 MG/DL (ref 1.6–2.6)
MCH RBC QN AUTO: 27.2 PG (ref 26–35)
MCHC RBC AUTO-ENTMCNC: 31.7 % (ref 32–34.5)
MCV RBC AUTO: 85.8 FL (ref 80–99.9)
METER GLUCOSE: 178 MG/DL (ref 74–99)
METER GLUCOSE: 310 MG/DL (ref 74–99)
METER GLUCOSE: 316 MG/DL (ref 74–99)
MONOCYTE, FLUID: 97 %
MONOCYTES ABSOLUTE: 0.87 E9/L (ref 0.1–0.95)
MONOCYTES RELATIVE PERCENT: 21.6 % (ref 2–12)
NEUTROPHIL, FLUID: 3 %
NEUTROPHILS ABSOLUTE: 1.91 E9/L (ref 1.8–7.3)
NEUTROPHILS RELATIVE PERCENT: 47.6 % (ref 43–80)
NUCLEATED CELLS FLUID: 401 /UL
OVALOCYTES: ABNORMAL
PDW BLD-RTO: 20.4 FL (ref 11.5–15)
PHOSPHORUS: 3 MG/DL (ref 2.5–4.5)
PLATELET # BLD: 104 E9/L (ref 130–450)
PMV BLD AUTO: 10.7 FL (ref 7–12)
POIKILOCYTES: ABNORMAL
POLYCHROMASIA: ABNORMAL
POTASSIUM SERPL-SCNC: 3.5 MMOL/L (ref 3.5–5)
PROCALCITONIN: 0.38 NG/ML (ref 0–0.08)
PROTEIN FLUID: 0.5 G/DL
PROTHROMBIN TIME: 15.6 SEC (ref 9.3–12.4)
RBC # BLD: 3.6 E12/L (ref 3.8–5.8)
RBC FLUID: <2000 /UL
SODIUM BLD-SCNC: 130 MMOL/L (ref 132–146)
T4 FREE: 1.24 NG/DL (ref 0.93–1.7)
TARGET CELLS: ABNORMAL
TOTAL PROTEIN: 4.3 G/DL (ref 6.4–8.3)
TSH SERPL DL<=0.05 MIU/L-ACNC: 3.61 UIU/ML (ref 0.27–4.2)
WBC # BLD: 4 E9/L (ref 4.5–11.5)

## 2022-02-25 PROCEDURE — 6360000002 HC RX W HCPCS: Performed by: INTERNAL MEDICINE

## 2022-02-25 PROCEDURE — 49083 ABD PARACENTESIS W/IMAGING: CPT

## 2022-02-25 PROCEDURE — 82962 GLUCOSE BLOOD TEST: CPT

## 2022-02-25 PROCEDURE — 97165 OT EVAL LOW COMPLEX 30 MIN: CPT

## 2022-02-25 PROCEDURE — 82042 OTHER SOURCE ALBUMIN QUAN EA: CPT

## 2022-02-25 PROCEDURE — 83735 ASSAY OF MAGNESIUM: CPT

## 2022-02-25 PROCEDURE — 84443 ASSAY THYROID STIM HORMONE: CPT

## 2022-02-25 PROCEDURE — 88305 TISSUE EXAM BY PATHOLOGIST: CPT

## 2022-02-25 PROCEDURE — 97530 THERAPEUTIC ACTIVITIES: CPT | Performed by: PHYSICAL THERAPIST

## 2022-02-25 PROCEDURE — 84157 ASSAY OF PROTEIN OTHER: CPT

## 2022-02-25 PROCEDURE — 6370000000 HC RX 637 (ALT 250 FOR IP): Performed by: INTERNAL MEDICINE

## 2022-02-25 PROCEDURE — 36415 COLL VENOUS BLD VENIPUNCTURE: CPT

## 2022-02-25 PROCEDURE — 80053 COMPREHEN METABOLIC PANEL: CPT

## 2022-02-25 PROCEDURE — 87205 SMEAR GRAM STAIN: CPT

## 2022-02-25 PROCEDURE — 85025 COMPLETE CBC W/AUTO DIFF WBC: CPT

## 2022-02-25 PROCEDURE — 83615 LACTATE (LD) (LDH) ENZYME: CPT

## 2022-02-25 PROCEDURE — 84439 ASSAY OF FREE THYROXINE: CPT

## 2022-02-25 PROCEDURE — 2580000003 HC RX 258: Performed by: INTERNAL MEDICINE

## 2022-02-25 PROCEDURE — 0W9G3ZZ DRAINAGE OF PERITONEAL CAVITY, PERCUTANEOUS APPROACH: ICD-10-PCS | Performed by: RADIOLOGY

## 2022-02-25 PROCEDURE — 88112 CYTOPATH CELL ENHANCE TECH: CPT

## 2022-02-25 PROCEDURE — 1200000000 HC SEMI PRIVATE

## 2022-02-25 PROCEDURE — P9047 ALBUMIN (HUMAN), 25%, 50ML: HCPCS | Performed by: INTERNAL MEDICINE

## 2022-02-25 PROCEDURE — 93976 VASCULAR STUDY: CPT

## 2022-02-25 PROCEDURE — 82150 ASSAY OF AMYLASE: CPT

## 2022-02-25 PROCEDURE — 85610 PROTHROMBIN TIME: CPT

## 2022-02-25 PROCEDURE — 87070 CULTURE OTHR SPECIMN AEROBIC: CPT

## 2022-02-25 PROCEDURE — C1729 CATH, DRAINAGE: HCPCS

## 2022-02-25 PROCEDURE — 97530 THERAPEUTIC ACTIVITIES: CPT

## 2022-02-25 PROCEDURE — 84100 ASSAY OF PHOSPHORUS: CPT

## 2022-02-25 PROCEDURE — 97110 THERAPEUTIC EXERCISES: CPT | Performed by: PHYSICAL THERAPIST

## 2022-02-25 PROCEDURE — 84145 PROCALCITONIN (PCT): CPT

## 2022-02-25 PROCEDURE — 97161 PT EVAL LOW COMPLEX 20 MIN: CPT | Performed by: PHYSICAL THERAPIST

## 2022-02-25 PROCEDURE — 76705 ECHO EXAM OF ABDOMEN: CPT

## 2022-02-25 RX ORDER — ALBUMIN (HUMAN) 12.5 G/50ML
50 SOLUTION INTRAVENOUS ONCE
Status: COMPLETED | OUTPATIENT
Start: 2022-02-25 | End: 2022-02-25

## 2022-02-25 RX ORDER — OXYCODONE HYDROCHLORIDE 5 MG/1
5 TABLET ORAL EVERY 6 HOURS PRN
Status: DISCONTINUED | OUTPATIENT
Start: 2022-02-25 | End: 2022-02-28 | Stop reason: HOSPADM

## 2022-02-25 RX ADMIN — ALBUMIN (HUMAN) 50 G: 0.25 INJECTION, SOLUTION INTRAVENOUS at 12:00

## 2022-02-25 RX ADMIN — SERTRALINE 50 MG: 50 TABLET, FILM COATED ORAL at 09:12

## 2022-02-25 RX ADMIN — Medication 10 ML: at 21:27

## 2022-02-25 RX ADMIN — ENOXAPARIN SODIUM 40 MG: 100 INJECTION SUBCUTANEOUS at 21:27

## 2022-02-25 RX ADMIN — Medication 10 ML: at 09:12

## 2022-02-25 RX ADMIN — OXYCODONE 5 MG: 5 TABLET ORAL at 12:01

## 2022-02-25 RX ADMIN — LEVOTHYROXINE SODIUM 200 MCG: 0.1 TABLET ORAL at 06:05

## 2022-02-25 RX ADMIN — PANTOPRAZOLE SODIUM 40 MG: 40 TABLET, DELAYED RELEASE ORAL at 09:12

## 2022-02-25 RX ADMIN — ASPIRIN 81 MG CHEWABLE TABLET 81 MG: 81 TABLET CHEWABLE at 09:12

## 2022-02-25 RX ADMIN — INSULIN GLARGINE 20 UNITS: 100 INJECTION, SOLUTION SUBCUTANEOUS at 21:27

## 2022-02-25 ASSESSMENT — PAIN SCALES - GENERAL
PAINLEVEL_OUTOF10: 3
PAINLEVEL_OUTOF10: 0
PAINLEVEL_OUTOF10: 0
PAINLEVEL_OUTOF10: 9

## 2022-02-25 NOTE — PROGRESS NOTES
Date:2022  Patient Name: Mikayla Crockett  MRN: [id number]  : 1950  ROOM #: [id number]    (2 attempt)     Occupational Therapy order received, chart reviewed and evaluation attempted this date. Patient is unavailable for OT evaluation due to being off the floor at [facility]. Then once returned to room pt was working with physical therapy. Will attempt OT evaluation at a later time. Thank you.    Haim Sams OTR/L #[id number]

## 2022-02-25 NOTE — PROGRESS NOTES
[address] [facility]  [facility] Deann Munoz. [place]        Date:2022                                                  Patient Name: Zackary Helton    MRN: [id number]    : 1950    Room: [address] [place] [address]      Evaluating OT: Michaela Díaz OTR/L; [id number]     Referring Provider and Specific Provider Orders/Date:      22  OT eval and treat  Start:  22,   End:  22,   ONE TIME,   Standing Count:  1 Occurrences,   R         Rufus Dwyer, DO      Placement Recommendation: HHOT       Diagnosis:   1. Decompensation of cirrhosis of liver ([address]    2. Hypomagnesemia    3. Hyponatremia    4.  Closed type III fracture of sacrum, initial encounter [facility])         Surgery: none       Pertinent Medical History:       Past Medical History:   Diagnosis Date    AML (acute myeloblastic leukemia) ([address]     chemotherapy    AML (acute myel[facility]) ([facility] 2010    Cancer ([address]     Chronic kidney disease     Diabetes mellitus ([address]     GERD (gastroesophageal reflux disease)     Hx of blood clots 2012    legs    Hyperlipidemia     Liver disease     MI, old     Other disorders of kidney and ureter     Other sleep disturbances     Thyroid disease     Type II or unspecified type diabetes mellitus without mention of complication, not stated as uncontrolled          Past Surgical History:   Procedure Laterality Date    APPENDECTOMY      BONE MARROW TRANSPLANT      double core transplant    CHOLECYSTECTOMY      COLONOSCOPY      CT NEEDLE BIOPSY LIVER PERCUTANEOUS  2022    CT NEEDLE BIOPSY LIVER PERCUTANEOUS    ENDOSCOPY, COLON, DIAGNOSTIC      FOOT NEUROMA SURGERY      UPPER GASTROINTESTINAL ENDOSCOPY        Precautions:  Fall Risk, waffle cushion provided d/t tailbone pain     Assessment of current deficits:     [x] Functional mobility  [x]ADLs  [x] Strength               []Cognition    [x] Functional transfers   [x] IADLs         [] Safety Awareness   [x]Endurance    [] Fine Coordination              [x] Balance      [] Vision/perception   []Sensation     []Gross Motor Coordination  [] ROM  [] Delirium                   [] Motor Control     OT PLAN OF CARE   OT POC based on physician orders, patient diagnosis and results of clinical assessment    Frequency/Duration 1-3 days/wk for 2 weeks PRN     Specific OT Treatment Interventions to include:   * Instruction/training on adapted ADL techniques and AE recommendations to increase functional independence within precautions       * Training on energy conservation strategies, correct breathing pattern and techniques to improve independence/tolerance for self-care routine  * Functional transfer/mobility training/DME recommendations for increased independence, safety, and fall prevention  * Patient/Family education to increase follow through with safety techniques and functional independence  * Recommendation of environmental modifications for increased safety with functional transfers/mobility and ADLs  * Splinting/positioning for increased function, prevention of contractures, and improve skin integrity  * Therapeutic exercise to improve motor endurance, ROM, and functional strength for ADLs/functional transfers  * Therapeutic activities to facilitate/challenge dynamic balance, stand tolerance for increased safety and independence with ADLs  * Positioning to improve skin integrity, interaction with environment and functional independence    Recommended Adaptive Equipment: waffle cushion provided     Home Living: with family: spouse; single family home, 1 story, 4 steps to enter with B rails, tub shower. Equipment owned: wheeled walker, cane, shower chair, grab bars     Prior Level of Function: Independent with ADLs , Independent with IADLs; mostly sponge bathes     Pain Level: 4/10 tailbone pain, pt provided waffle cushion. Nursing notified. Cognition: A&O: 4/4; Follows 3 step directions   Memory: good    Sequencing: good    Problem solving: good    Judgement/safety: good     [facility]   AM-PAC Daily Activity Inpatient   How much help for putting on and taking off regular lower body clothing?: A Little  How much help for Bathing?: A Little  How much help for Toileting?: A Little  How much help for putting on and taking off regular upper body clothing?: A Little  How much help for taking care of personal grooming?: A Little  How much help for eating meals?: None  AM-PAC Inpatient Daily Activity Raw Score: 19  AM-PAC Inpatient ADL T-Scale Score : 40.22  ADL Inpatient CMS 0-100% Score: 42.8  ADL Inpatient CMS G-Code Modifier : CK     Functional Assessment:    Initial Eval Status  Date: 2/25/22 Treatment Status  Date: STGs = LTGs  Time frame: 10-14 days   Feeding Independent to bring cup to mouth to sip water  Independent    Grooming Supervision   Independent    UB Dressing Minimal Assist to tie gown  Independent    LB Dressing Supervision to don socks while seated at EOB  Independent    Bathing Minimal Assist  Modified Eunice   Toileting Supervision   Independent    Bed Mobility  Supine to sit: Minimal Assist   Sit to supine: Minimal Assist  Minimal assist for rolling to place waffle cushion. Supine to sit: Independent   Sit to supine: Independent    Functional Transfers Minimal Assist from EOB to wheeled walker  Moderate assist for transfer to and from low commode with minimal verbal cues to use grab bar for safe commode transfer. Transfer training with verbal cues for hand placement throughout session to improve safety. Independent    Functional Mobility Minimal assist with wheeled walker to improve balance to and from bathroom, verbal cues for walker sequence and safety.    Modified Eunice    Balance Sitting:     Static: good     Dynamic: fair   Standing: fair  with wheeled walker     Activity Tolerance fair   good Visual/  Perceptual Glasses: yes                 Hand Dominance: right      AROM (PROM) Strength Additional Info:    RUE  WFL 4/5 good  and wfl FMC/dexterity noted during ADL tasks     LUE WFL 4/5 good  and wfl FMC/dexterity noted during ADL tasks       Hearing: WFL   Sensation:  No c/o numbness or tingling  Tone: WFL   Edema: yes, Globally     Comments: Upon arrival the patient was side lying on his left. At end of session, patient was supine and lying on a waffle cushion, HOB elevated. Call light and phone within reach, all lines and tubes intact. Overall patient demonstrated decreased independence and safety during completion of ADL/functional transfer/mobility tasks. Pt would benefit from continued skilled OT to increase safety and independence with completion of ADL/IADL tasks for functional independence and quality of life. Treatment: OT treatment provided this date includes:    Instruction/training on safety and adapted techniques for completion of ADLs    Instruction/training on safe functional mobility/transfer techniques    Instruction/training on energy conservation/work simplification for completion of ADLs    Proper Positioning/Alignment    Rehab Potential: Good for established goals. Patient / Family Goal: return home with spouse      Patient and/or family were instructed on functional diagnosis, prognosis/goals and OT plan of care. Demonstrated good understanding.      Eval Complexity: Low    Time In: 3:00pm   Time Out: 3:31pm    Total Treatment Time: 10      Min Units   OT Eval Low 97165  X  1    OT Eval Medium 43028      OT Eval High 35117      OT Re-Eval [id number]            ADL/Self Care 66901     Therapeutic Activities 52044  10  1    Therapeutic Ex 17956       Orthotic Management 58831       Manual 78839     Neuro Re-Ed 72907       Non-Billable Time        Evaluation Time additionally includes thorough review of current medical information, gathering information on past medical history/social history and prior level of function, interpretation of standardized testing/informal observation of tasks, assessment of data and development of plan of care and goals.         Evaluating OT: Loren Fraire OTR/L; [id number]

## 2022-02-25 NOTE — PROGRESS NOTES
Internal Medicine Progress Note    [facility]    Palalvi Campo. Sheryl Bourne., F.GRACIELAOMatthewI. Syed Garcia D.O., DEION Phillips D.O. Yadira Long, MSN, APRN, NP-C  Antolin Baker. Yumi Gr, MSN, APRN-CNP     Primary Care Physician: Brielle Lugo DO   Admitting Physician:  Anna Mckeon DO  Admission date and time: 2/24/2022 11:50 AM    Room:  0422/0422-02  Admitting diagnosis: Decompensation of cirrhosis of liver ([facility] [K72.90, K74.60]    Patient Name: Jessica Casillas  MRN: [id number]    Date of Service: 2/25/2022     Subjective: Shree Day is a 73 y.o. male who was seen and examined today,2/25/2022, at the bedside. Patient was seen status post paracentesis. States he does feel much better. Less abdominal distention. A little easier for him to move around. Admits to mild shortness of breath with dyspnea. Does admit to generalized weakness but is able to get around with assistance. No family present during my examination however when we initially entered the room earlier in the day patient's wife was present while the patient was at his paracentesis and extensive conversation was had with her in regard to the patient's health issues leading up to current hospitalization. Review of System:   Constitutional:   Denies fever or chills, weight loss or gain, positive for fatigue/ malaise. HEENT:   Denies ear pain, sore throat, sinus or eye problems. Cardiovascular:   Denies any chest pain, irregular heartbeats, or palpitations. Respiratory:   Denies shortness of breath, coughing, sputum production, hemoptysis, or wheezing. Gastrointestinal:   Denies nausea, vomiting, diarrhea, or constipation. Mild abdominal pain and does admit to distention. Genitourinary:    Denies any urgency, frequency, hematuria. Voiding  without difficulty. Extremities:   Ms. bilateral lower leg edema.   Neurology:    Denies any headache or focal neurological deficits, Denies generalized weakness or memory difficulty. Psch:   Denies being anxious or depressed. Musculoskeletal:    Denies  myalgias, joint complaints or back pain. Integumentary:   Denies any rashes, ulcers, or excoriations. Denies bruising. Hematologic/Lymphatic:  Denies bruising or bleeding. Physical Exam:  I/O this shift:  In: 360 [P.O.:360]  Out: -     Intake/Output Summary (Last 24 hours) at 2/25/2022 1752  Last data filed at 2/25/2022 1303  Gross per 24 hour   Intake 560 ml   Output    Net 560 ml   I/O last 3 completed shifts: In: 200 [P.O.:200]  Out: -   Patient Vitals for the past 96 hrs (Last 3 readings):   Weight   02/25/22 0459 188 lb (85.3 kg)   02/24/22 1915 188 lb (85.3 kg)   02/24/22 1148 187 lb (84.8 kg)     Vital Signs:   Blood pressure 103/70, pulse 117, temperature 98.2 °F (36.8 °C), temperature source Oral, resp. rate 20, weight 188 lb (85.3 kg), SpO2 97 %. General appearance:  Alert, responsive, oriented to person, place, and time. Well preserved, alert, no distress. Head:  Normocephalic. No masses, lesions or tenderness. Eyes:  PERRLA. EOMI. Sclera clear. Paired vision. Eyeglasses on. ENT:  Ears normal. Mucosa normal.  Neck:    Supple. Trachea midline. No thyromegaly. No JVD. No bruits. Heart:    Rhythm regular. Rate controlled. No murmurs. Lungs:    Symmetrical. Clear to auscultation bilaterally. No wheezes. No rhonchi. No rales. Abdomen:   Soft. tender. distended. Bowel sounds positive. No organomegaly or masses. No pain on palpation. dressing present. Extremities:    Peripheral pulses present. 1+ pitting edema the bilateral lower extremities. No ulcers. No cyanosis. No clubbing. Neurologic:    Alert x 3. No focal deficit. Cranial nerves grossly intact. No focal weakness. Psych:   Behavior is normal. Mood appears normal. Speech is not rapid and/or pressured. Musculoskeletal:   Spine ROM normal. Muscular strength intact. Gait not assessed.   Integumentary:  No rashes Skin normal color and texture. Genitalia/Breast:  Deferred    Medication:  Scheduled Meds:   sodium chloride flush  5-40 mL IntraVENous 2 times per day    enoxaparin  40 mg SubCUTAneous Daily    aspirin  81 mg Oral Daily    levothyroxine  200 mcg Oral Daily    insulin glargine  20 Units SubCUTAneous Nightly    pantoprazole  40 mg Oral Daily    sertraline  50 mg Oral Daily     Continuous Infusions:   dextrose      sodium chloride         Objective Data:  CBC:   Lab Results   Component Value Date    WBC 4.0 02/25/2022    RBC 3.60 02/25/2022    HGB 9.8 02/25/2022    HCT 30.9 02/25/2022    MCV 85.8 02/25/2022    MCH 27.2 02/25/2022    MCHC 31.7 02/25/2022    RDW 20.4 02/25/2022     02/25/2022    MPV 10.7 02/25/2022     CMP:    Lab Results   Component Value Date     02/25/2022    K 3.5 02/25/2022    K 3.6 02/05/2022    CL 92 02/25/2022    CO2 27 02/25/2022    BUN 25 02/25/2022    CREATININE 0.8 02/25/2022    GFRAA >60 02/25/2022    LABGLOM >60 02/25/2022    GLUCOSE 195 02/25/2022    GLUCOSE 318 04/30/2012    PROT 4.3 02/25/2022    LABALBU 2.6 02/25/2022    LABALBU 4.0 04/30/2012    CALCIUM 8.4 02/25/2022    BILITOT 1.8 02/25/2022    ALKPHOS 292 02/25/2022    AST 33 02/25/2022    ALT 23 02/25/2022       Wound Documentation:        Assessment:  1. Decompensated cirrhosis with ascites status post paracentesis performed February 25 with removal of approximately 6.8 L of ascitic fluid. 2. Cirrhosis of the liver with portal hypertension  3. Anemia of chronic disease  4. Nondisplaced sacral fracture  5. Hypodense lesions of the liver  6. Subcentimeter pulmonary nodules  7. AML  8. Chronic kidney disease  9. Diabetes mellitus  10. History of DVT  11. Hyperlipidemia  12. History of MI  15. Hypothyroidism  14. Hx of tobacco abuse       Plan:   Patient underwent paracentesis earlier today. Does feel somewhat better/less distended. GI was consulted and recommendations have been reviewed.   Patient to undergo ultrasound for evaluation of the portal and hepatic veins which was performed. Found to have patent portal and hepatic veins. Also found to have shrunken liver with severe cirrhosis and small to moderate ascites. Equivocal 1.6 cm hypoechoic lesion within the central liver was noted. .  Patient was found to have sacral fracture. He does admit to pain. Patient was initiated on oxycodone IR. We will continue to monitor the patient's pain and treat accordingly. We will give a dose of albumin status post paracentesis. Consult to oncology. Patient does follow elsewhere but was agreeable to local oncology evaluation as patient has been seen and treated by Dr. Arturo Donahue previously. PT/OT to evaluate and treat the patient.  for discharge planning    continue current therapy. See orders for further plan of care. More than 50% of my  time was spent at the bedside counseling/coordinating care with the patient and/or family with face to face contact. This time was spent reviewing notes and laboratory data as well as instructing and counseling the patient. Time I spent with the family or surrogate(s) is included only if the patient was incapable of providing the necessary information or participating in medical decisions. I also discussed the differential diagnosis and all of the proposed management plans with the patient and individuals accompanying the patient. Brooklyn Trejo requires this high level of physician care and nursing on the IMC/Telemetry unit due the complexity of decision management and chance of rapid decline or death. Continued cardiac monitoring and higher level of nursing are required. I am readily available for any further decision-making and intervention. The patient was seen, examined and then discussed with Dr. Carito Ferrari. Aileen Steele, JYOTI - CNP,  2/25/2022  5:52 PM     Discussed with wife.  I agree with the findings and the plan of care as documented in Aileen Steele NPCortezC's  note.     Modesta Person DO, D.O., FACOI  6:02 PM  2/25/2022

## 2022-02-25 NOTE — CARE COORDINATION
Ss note: 2/25/2022 2:20 PM Met with pt, he resides home with wife in ranch home 4 steps, Pt is on room air. PTA pt uses a ww, has cane & shower chair, does not drive, wife does homemaking chores, states wife does not drive. A neighbor provides transport, pts bibi Duval works full time & will provide transport home at discharge. No hx of HHC or SNF. Utilizes [facility] on [address]. Will await therapy evals. Pt states he plans to return home with wife.  Rosalina Cheadle, LSW

## 2022-02-25 NOTE — CONSULTS
[address]                                  CONSULTATION    PATIENT NAME: Mau Contreras                   :        1950  MED REC NO:   [id number]                            ROOM:       0422  ACCOUNT NO:   [de-identified]                           ADMIT DATE: 2022  PROVIDER:     Saralee Blizzard, MD    CONSULT DATE:  2022    CHIEF COMPLAINT:  Ascites. HISTORY OF PRESENT ILLNESS:  A 53-year-old man admitted to the hospital  for severe ascites. He is awaiting paracentesis. The patient suffers from alcoholic cirrhosis of the liver. Cirrhosis  was found on a CT scan of the abdomen, about a year ago, and further  evaluation included esophagogastroduodenoscopy, which revealed  esophageal varices. Since then, the patient experienced the deterioration in his health and  was hospitalized at [facility] in recent months. CT  scan of the abdomen and pelvis revealed multiple masses in the liver. Percutaneous biopsy led to the diagnosis of Hodgkin's lymphoma. He was  evaluated by the [facility]. He has a history of acute myelocytic  leukemia and is status post stem cell transplant in the past.  The  patient had been treated at [facility] in [facility], and was  planning to go there for an opinion regarding treatment. Meanwhile, he began having rapidly recurrent ascites requiring frequent  large volume paracentesis. I suspected portal and/or hepatic vein  obstruction and ordered a Doppler ultrasound of the portal and hepatic  veins. Unfortunately, that could not be scheduled until next Monday. He ended up coming to the hospital for severe ascites. The patient has been weak, sometimes mildly confused. He has had lower extremity edema. He is on furosemide 40 mg daily.     Laboratory evaluation shows a white cell count of 4100, hemoglobin of  9.5 gm/dL, and platelet count of 109,000. The electrolytes show  hyponatremia with a sodium of 126 mmol/L. The albumin is only 2.5  gm/dL. The total bilirubin is 1.8 mg/dL, alkaline phosphatase is 309  units/L. A CT scan of the abdomen and pelvis shows cirrhosis of the  liver with splenomegaly and large volume ascites, as well as multiple  hypodense lesions scattered throughout the liver and numerous  subcentimeter pulmonary nodules at the lung bases, suspicious for  metastatic disease. PAST MEDICAL HISTORY:  Diabetes mellitus, gastroesophageal reflux  disease, hyperlipidemia, coronary artery disease, old myocardial  infarction, sleep disturbance, acute myelocytic leukemia. PAST SURGICAL HISTORY:  Appendectomy, cholecystectomy, bone marrow  transplant, surgery for foot neuroma. FAMILY HISTORY:  Hypertension, heart disease, cancer. SOCIAL HISTORY:  Former smoker, drinks a can of beer a week. MEDICATIONS:  Lovenox, GlycoLax, Tylenol, Synthroid, Lantus, Ativan,  Protonix, Zoloft, magnesium sulfate. ALLERGIES:  PNEUMOCOCCAL VACCINES, CIPROFLOXACIN, DIATRIZOATE, IODINE  DYES, EPINEPHRINE, IODINE, METFORMIN, PENICILLIN, PREDNISONE, FISH,  OXYCODONE, MORPHINE. REVIEW OF SYSTEMS:  As above. There are no cardiac, pulmonary, urinary,  dermatologic complaints. He is weak, complains of arthralgias, fatigue,  abdominal distention, early satiety, and lower extremity edema. PHYSICAL EXAMINATION:  GENERAL:  A 42-year-old man, in no acute distress. HEENT:  Normocephalic, no scleral icterus. NECK:  Supple, no masses. LUNGS:  Clear. HEART:  S1, S2, without murmurs or gallops. ABDOMEN:  Soft, markedly distended, nontender. RECTUM:  Deferred. EXTREMITIES:  No clubbing or cyanosis. 2+ ankle edema. NEUROLOGIC:  Alert and oriented. No focal deficits. IMPRESSION:  Cirrhosis of the liver with portal hypertension,  splenomegaly, ascites, and esophageal varices.   The disease has been  complicated by Hodgkin's lymphoma, affecting the liver. The rapid  reaccumulation of ascites suggests an etiology other than simply  cirrhosis, such as portal or hepatic vein obstruction. RECOMMENDATIONS:  1. Diagnostic and therapeutic paracentesis. 2.  Doppler ultrasound of the portal and hepatic veins. Thank you for asking me to see the patient and for allowing me to  participate in his care.         Mayur Ralph MD    D: 02/24/2022 22:00:38       T: 02/25/2022 0:05:17     [id number]  Job#: [id number]     Doc#: [id number]    CC:  Susa Donovan, MD Lavell Lundborg, DO

## 2022-02-25 NOTE — PROGRESS NOTES
Physical Therapy Initial Evaluation/Plan of Care    Room #:  6908/0930-36  Patient Name: Anel Khalil  YOB: 1950  MRN: [id number]    Date of Service: 2/25/2022     Tentative placement recommendation: Home Health Physical Therapy   Equipment recommendation: To be determined      Evaluating Physical Therapist: Augusta Win, PT #[id number]      Specific Provider Orders/Date/Referring Provider :  02/25/22 0915   PT eval and treat Start: 02/25/22 0915, End: 02/25/22 0915, ONE TIME, Standing Count: 1 Occurrences, R    Janelle Sick, DO      Admitting Diagnosis:   Decompensation of cirrhosis of liver ([facility] [K72.90, K74.60]     Hip Pain (fell last week. right hip pain. bruised. requesting refill of tramadol. )    Surgery: none  Visit Diagnoses       Codes    Hypomagnesemia     E83.42    Hyponatremia     E87.1    Closed type III fracture of sacrum, initial encounter [facility])     S32.16XA          Patient Active Problem List   Diagnosis    Bronchitis with bronchospasm    Anxiety    Post herpetic neuralgia    Type 2 diabetes mellitus without complication, with long-term current use of insulin (HCC)    Cellulitis of left lower extremity    Plantar wart    Inguinal hernia    Acute myeloid leukemia in remission ([facility]    Pneumonia due to infectious organism    Cellulitis    Pharyngitis, acute    Thrombocytopenia (HCC)    GVHD (graft versus host disease) ([facility]    Portal hypertension ([facility]    Post-COVID chronic fatigue    Uncontrolled type 2 diabetes mellitus with hyperglycemia (HCC)    Type 2 diabetes mellitus with hyperglycemia    Type 2 diabetes mellitus with chronic kidney disease    Decompensation of cirrhosis of liver (HCC)        ASSESSMENT of Current Deficits Patient exhibits decreased strength, balance, endurance and pain low back impairing functional mobility, transfers, gait , gait distance and tolerance to activity unsteady gait, no Loss of balance. bilateral lower extremity edema. Patient requires skilled physical therapy to address concerns listed above to increase safety and independence at discharge. PHYSICAL THERAPY  PLAN OF CARE       Physical therapy plan of care is established based on physician order,  patient diagnosis and clinical assessment    Current Treatment Recommendations:    -Bed Mobility: Lower extremity exercises   -Sitting Balance: Facilitate active trunk muscle engagement , Facilitate postural control in all planes  and Engage in core activities to allow for movement within base of support   -Standing Balance: Perform strengthening exercises in standing to promote motor control with or without upper extremity support   -Transfers: Provide instruction on proper hand and foot position for adequate transfer of weight onto lower extremities and use of gait device if needed, Cues for hand placement, technique and safety. Provide stabilization to prevent fall  and Assist with extension of knees trunk and hip to accept weight transfer   -Gait: Gait training, Standing activities to improve: base of support, weight shift, weight bearing  and Pregait training to emphasize: Sequencing , Device control, Upright and Safety   -Endurance: Utilize Supervised activities to increase level of endurance to allow for safe functional mobility including transfers and gait   -Stairs: Stair training with instruction on proper technique and hand placement on rail    PT long term treatment goals are located in below grid    Patient and or family understand(s) diagnosis, prognosis, and plan of care. Frequency of treatments: Patient will be seen  daily.          Prior Level of Function: Patient ambulated with wheeled walker    Rehab Potential: good  - for baseline    Past medical history:   Past Medical History:   Diagnosis Date    AML (acute myeloblastic leukemia) ([facility]     chemotherapy    AML (acute myeloblastic leukemia) ([facility] 2010    [facility]     Chronic kidney disease     Diabetes mellitus ([facility]     GERD (gastroesophageal reflux disease)     Hx of blood clots 2012    legs    Hyperlipidemia     Liver disease     MI, old     Other disorders of kidney and ureter     Other sleep disturbances     Thyroid disease     Type II or unspecified type diabetes mellitus without mention of complication, not stated as uncontrolled      Past Surgical History:   Procedure Laterality Date    APPENDECTOMY      BONE MARROW TRANSPLANT      double core transplant    CHOLECYSTECTOMY      COLONOSCOPY      CT NEEDLE BIOPSY LIVER PERCUTANEOUS  1/17/2022    CT NEEDLE BIOPSY LIVER PERCUTANEOUS    ENDOSCOPY, COLON, DIAGNOSTIC      FOOT NEUROMA SURGERY      UPPER GASTROINTESTINAL ENDOSCOPY         SUBJECTIVE:    Precautions: Up with assistance, falls ,    CT OF THE ABDOMEN AND PELVIS WITHOUT CONTRAST 2/24/2022   Nondisplaced sacral fracture as above. No additional acute osseous abnormalities. Cirrhotic liver morphology with sequelae of portal hypertension including splenomegaly and large volume of ascites in the abdomen and pelvis. Multiple hypodense lesions scattered throughout the liver which may reflect primary liver cancer or metastatic disease, among other etiologies. Numerous subcentimeter pulmonary nodules at both lung bases, increased in number and size from the prior study. Findings are highly suspicious for metastatic disease.     Social history: Patient lives with spouse in a ranch home  with 4 steps  to enter bilateral Rail tub shower however  Sponge bathes grab bars    Equipment owned: [place],  [address] [employer] and [address] [facility] chair,       [address] [facility]   How much difficulty turning over in bed?: A Little  How much difficulty sitting down on / standing up from a chair with arms?: A Little  How much difficulty moving from lying on back to sitting on side of bed?: A Little  How much help from another person moving to and from a bed to a chair?: A Little  How much help from another person needed to walk in hospital room?: A Little  How much help from another person for climbing 3-5 steps with a railing?: A Lot  AM-PAC Inpatient Mobility Raw Score : 17  AM-PAC Inpatient T-Scale Score : 42.13  Mobility Inpatient CMS 0-100% Score: 50.57  Mobility Inpatient CMS G-Code Modifier : CK    Nursing cleared patient for PT evaluation. The admitting diagnosis and active problem list as listed above have been reviewed prior to the initiation of this evaluation. OBJECTIVE;   Initial Evaluation  Date: 2/25/2022 Treatment Date:     Short Term/ Long Term   Goals   Was pt agreeable to Eval/treatment? Yes  To be met in 3 days   Pain level   10/10  back     Bed Mobility    Rolling: Minimal assist of 1    Supine to sit: Minimal assist of 1    Sit to supine: Not assessed     Scooting: Minimal assist of 1    Rolling: Independent    Supine to sit:  Independent    Sit to supine: Independent    Scooting: Independent     Transfers Sit to stand: Minimal assist of 1 Cues for hand placement and safety   Sit to stand: Independent     Ambulation    40 feet using  wheeled walker with Minimal assist of 1   for walker control and cues for upright posture, safety and pacing   100 feet using  wheeled walker with Modified Independent    Stair negotiation: ascended and descended   Not assessed     4 steps 1-2 rail with supervision    ROM Within functional limits    Increase range of motion 10% of affected joints    Strength BUE:  refer to OT eval  RLE:  3+/5  LLE:  3+/5  Increase strength in affected mm groups by 1/3 grade   Balance Sitting EOB:  good -  Dynamic Standing:  fair wheeled walker   Sitting EOB:  good    Dynamic Standing: good wheeled walker      Patient is Alert & Oriented x person, place, time and situation and follows directions    Sensation:  Patient  denies numbness/tingling   Edema:  yes bilateral lower extremities   Endurance: fair       Vitals: room air   Blood Pressure at rest  Blood Pressure during session    Heart Rate at rest   Heart Rate during session     SPO2 at rest 98%  SPO2 during session 97%     Patient education  Patient educated on role of Physical Therapy, risks of immobility, safety and plan of care,  importance of mobility while in hospital  and ankle pumps, quad set and glut set for edema control, blood clot prevention     Patient response to education:   Pt verbalized understanding Pt demonstrated skill Pt requires further education in this area   Yes Partial Yes      Treatment:  Patient practiced and was instructed/facilitated in the following treatment: Patient assisted to edge of bed,  Sat edge of bed 15 minutes with Supervision  to increase dynamic sitting balance and activity tolerance. ambulated in room, assisted up to chair, performed exercises. Therapeutic Exercises:  ankle pumps, heel raises, long arc quad and seated marching  x 15-20 reps. At end of session, patient in chair with spouse present call light and phone within reach,  all lines and tubes intact, nursing notified. Patient would benefit from continued skilled Physical Therapy to improve functional independence and quality of life. Patient's/ family goals   home    Time in  56  Time out  1143    Total Treatment Time  24 minutes    Evaluation time includes thorough review of current medical information, gathering information on past medical history/social history and prior level of function, completion of standardized testing/informal observation of tasks, assessment of data, and development of Plan of care and goals.      CPT codes:  Low Complexity PT evaluation (52018)  Therapeutic activities (43834)   10 minutes  1 unit(s)  Therapeutic exercises (70231)   10 minutes  1 unit(s)  Gait Training (78647) 4 minutes 0 unit(s)    Jeanmarie Winston, PT

## 2022-02-25 NOTE — PROGRESS NOTES
Room #: 8220/7382-74  Date: 2022       Patient Name: Caitlyn Rudd  : 1950      MRN: [id number]     Patient unavailable for physical therapy eval due to off floor at [address]. Will attempt PT evaluation at a later time. Thank you.        Janie Howell, PT

## 2022-02-25 NOTE — PROGRESS NOTES
Subjective:  Patient follows with Dr. Janel Burch for history of male without history of disease since 2012 and newly diagnosed Hodgkin's lymphoma. Patient presented to the emergency department with abdominal pain and hip pain. He has underlying cirrhosis and recently started having worsening ascites requiring paracentesis. IR completed paracentesis 2/25 removing 6050 mL of hazy yellow-colored fluid. Patient seen at bedside  Patient feeling better after his paracentesis  He reports that he will be having a port placed tomorrow to begin treatment and he will be treated at [facility]. He stated the doctors were going to discharge him tomorrow so he can get his port placed at [facility]  He currently denies complaints and is getting ready to eat dinner    Denies chest pain, angina, dyspnea, abdominal discomfort, nausea/vomiting and diarrhea/constipation. Objective:    /70   Pulse 117   Temp 98.2 °F (36.8 °C) (Oral)   Resp 20   Wt 188 lb (85.3 kg)   SpO2 97%   BMI 28.59 kg/m²     General: NAD, awake and alert  HEENT: normocephalic/atraumatic, mucosa dry without ulcerations,thrush or mucositis, EOMI, PERRLA, sclera anicteric, conjuntiva pink  NECK: supple, trachea midline  Heart: Tachycardic, no murmurs, gallops, or rubs.   Lungs:  CTA bilaterally, no wheeze, rales or rhonchi  Abd: BS present, nontender, slightly distended but soft, no masses  Extrem: 1+ edema lower extremities bilaterally , no clubbing cyanosis  Lymphatics: No palpable adenopathy in cervical and supraclavicular regions  Skin: Intact, no petechia or purpura    CBC with Differential:    Lab Results   Component Value Date    WBC 4.0 02/25/2022    RBC 3.60 02/25/2022    HGB 9.8 02/25/2022    HCT 30.9 02/25/2022     02/25/2022    MCV 85.8 02/25/2022    MCH 27.2 02/25/2022    MCHC 31.7 02/25/2022    RDW 20.4 02/25/2022    NRBC 1 04/16/2012    SEGSPCT 22 02/03/2014    METASPCT 4.3 02/04/2021    LYMPHOPCT 28.9 02/25/2022    MONOPCT 21.6 02/25/2022    MYELOPCT 0.9 02/04/2021    BASOPCT 0.2 02/25/2022    MONOSABS 0.87 02/25/2022    LYMPHSABS 1.16 02/25/2022    EOSABS 0.02 02/25/2022    BASOSABS 0.01 02/25/2022     CMP:    Lab Results   Component Value Date     02/25/2022    K 3.5 02/25/2022    K 3.6 02/05/2022    CL 92 02/25/2022    CO2 27 02/25/2022    BUN 25 02/25/2022    CREATININE 0.8 02/25/2022    GFRAA >60 02/25/2022    LABGLOM >60 02/25/2022    GLUCOSE 195 02/25/2022    GLUCOSE 318 04/30/2012    PROT 4.3 02/25/2022    LABALBU 2.6 02/25/2022    LABALBU 4.0 04/30/2012    CALCIUM 8.4 02/25/2022    BILITOT 1.8 02/25/2022    ALKPHOS 292 02/25/2022    AST 33 02/25/2022    ALT 23 02/25/2022          Current Facility-Administered Medications:     oxyCODONE (ROXICODONE) immediate release tablet 5 mg, 5 mg, Oral, Q6H PRN, Cody Menendez, DO, 5 mg at 02/25/22 1201    glucose (GLUTOSE) 40 % oral gel 15 g, 15 g, Oral, PRN, Ismail U Khoa, DO    dextrose 50 % IV solution, 12.5 g, IntraVENous, PRN, Ismail U Khoa, DO    glucagon (rDNA) injection 1 mg, 1 mg, IntraMUSCular, PRN, Ismail U Khoa, DO    dextrose 5 % solution, 100 mL/hr, IntraVENous, PRN, Ismail U Khoa, DO    sodium chloride flush 0.9 % injection 5-40 mL, 5-40 mL, IntraVENous, 2 times per day, [place] Zahira, DO, 10 mL at 02/25/22 0912    sodium chloride flush 0.9 % injection 5-40 mL, 5-40 mL, IntraVENous, PRN, Lizbeth Zahira, DO    0.9 % sodium chloride infusion, 25 mL, IntraVENous, PRN, Lizbeth Zahira, DO    enoxaparin (LOVENOX) injection 40 mg, 40 mg, SubCUTAneous, Daily, Ismail U Khoa, DO, 40 mg at 02/24/22 2136    polyethylene glycol (GLYCOLAX) packet 17 g, 17 g, Oral, Daily PRN, Lizbeth Zahira, DO    acetaminophen (TYLENOL) tablet 650 mg, 650 mg, Oral, Q6H PRN **OR** acetaminophen (TYLENOL) suppository 650 mg, 650 mg, Rectal, Q6H PRN, Lizbeth Zahira, DO    aspirin chewable tablet 81 mg, 81 mg, Oral, Daily, Lizbeth Zahira, DO, 81 mg at 02/25/22 6086   levothyroxine (SYNTHROID) tablet 200 mcg, 200 mcg, Oral, Daily, Melvia Hacking, DO, 200 mcg at 02/25/22 0605    insulin glargine (LANTUS) injection vial 20 Units, 20 Units, SubCUTAneous, Nightly, Melvia Hacking, DO, 20 Units at 02/24/22 2138    LORazepam (ATIVAN) tablet 0.5 mg, 0.5 mg, Oral, BID PRN, Melvia Hacking, DO    pantoprazole (PROTONIX) tablet 40 mg, 40 mg, Oral, Daily, Ismail U Khoa, DO, 40 mg at 02/25/22 0912    sertraline (ZOLOFT) tablet 50 mg, 50 mg, Oral, Daily, Ismail U Khoa, DO, 50 mg at 02/25/22 0912    US DUP ABD PEL RETRO SCROT LIMITED   Final Result   1. Shrunken liver with severe cirrhosis and small to moderate ascites. Equivocal 1.6 cm hypoechoic lesion within the central liver. 2.  Patent portal and hepatic veins. Physiologic hepatopetal portal flow. US ABDOMEN LIMITED Specify organ? LIVER, SPLEEN   Final Result   1. Shrunken liver with severe cirrhosis and small to moderate ascites. Equivocal 1.6 cm hypoechoic lesion within the central liver. 2.  Patent portal and hepatic veins. Physiologic hepatopetal portal flow. IR US GUIDED PARACENTESIS   Final Result   Status post paracentesis. XR CHEST INSPIRATION AND EXPIRATION   Final Result   Curvilinear radiolucency in the right upper lung field noted on the prior   examination performed earlier in the day is less prominently seen on this   study but still suggested to be present, especially in the inspiration view. There appears to be extension beyond the chest wall and into the soft   tissues, indicating a skin fold. No definite evidence to suggest   pneumothorax. CT ABDOMEN PELVIS WO CONTRAST Additional Contrast? None   Final Result   Nondisplaced sacral fracture as above. No additional acute osseous   abnormalities. Cirrhotic liver morphology with sequelae of portal hypertension including   splenomegaly and large volume of ascites in the abdomen and pelvis.       Multiple hypodense lesions scattered throughout the liver which may reflect   primary liver cancer or metastatic disease, among other etiologies. Numerous subcentimeter pulmonary nodules at both lung bases, increased in   number and size from the prior study. Findings are highly suspicious for   metastatic disease. Additional, incidental findings as above. XR CHEST (2 VW)   Final Result   Hypoinflated lungs again identified, when compared to the previous study   performed 02/05/2022. Rule out skin fold versus small pneumothorax in the   right upper lung field. Repeat chest radiographs, including inspiration and   expiration are recommended. Elevated right hemidiaphragm again seen. Rule   out small right pleural effusion. RECOMMENDATION:   The findings were sent to the Radiology Results [address] at 01:14   p.m. on 02/24/2022 to be communicated to a licensed caregiver. 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 He had conditioning regimen with [facility] May 9, 2012 through May 9, 2012. He has been in remission since that time. He was recently diagnosed with stage IV classic Hodgkin lymphoma of the liver biopsy-proven January 17, 2022. He was seen by Dr. Janice Simpson 2/22/22 and was seen at [facility] 2/23/22 for second opinion.   EBV PRC completed 2/23/22 =125,099  Multiple PET positive bone lesions, liver mass biopsied and positive for Hodgkin lymphoma  Recent cardiac issues, junctional bradycardia and cardiogenic shock requiring temporary pacemaker x2    A/P:  -Anemia, likely multifactorial due to chronic disease, liver cirrhosis and  history of iron deficiency  -Monitor counts daily transfuse to maintain hemoglobin greater than 7  -Thrombocytopenia, chronic patient currently close to baseline  -Hodgkin's lymphoma  -Cirrhosis recurrent ascites    Patient's plan is to follow-up at [facility] for treatment of his Hodgkin's lymphoma. He is uncertain of what treatment they are giving him. Dr. Cassandra Brush and physician at [facility] can review plan for patient and he can follow-up as determined by them. Thank you for allowing us to participate in the care of Karina Cano. Higinio Dominguez PA-C  [phone]    Electronically signed by MICHAEL Malin on 2/25/2022 at 1:56 PM    Note: This report was completed using [employer] voiced recognition software. Every effort has been made to ensure accuracy; however, inadvertent computerized transcription errors may be present. Patient was seen. Chart was reviewed. Case discussed with NP. I read and I agree with above documentation. Only minor changes were made in the assessment and plan.

## 2022-02-25 NOTE — PROGRESS NOTES
Patient here for ultrasound guided paracentesis. Instructions given and questions answered. Consent signed. Abdomen scanned and marked under the guidance of procedural Radiologist.     0932   start procedure /73  109  18  96% on room air    1007  end procedure /59  110  18  98% on room air     6850  cc drained of hazy yellow colored ascites fluid.       Dry sterile dressing of 2x2 and foam tape to RLQ     Specimen sent to lab, nurse notified to print and send labels to the lab     Nurse to nurse called to YAMILET Landa    Patient sent to ultrasound then back to the floor

## 2022-02-26 LAB
ALBUMIN SERPL-MCNC: 2.6 G/DL (ref 3.5–5.2)
ALP BLD-CCNC: 315 U/L (ref 40–129)
ALT SERPL-CCNC: 23 U/L (ref 0–40)
AMMONIA: 76 UMOL/L (ref 16–60)
ANION GAP SERPL CALCULATED.3IONS-SCNC: 14 MMOL/L (ref 7–16)
ANISOCYTOSIS: ABNORMAL
AST SERPL-CCNC: 35 U/L (ref 0–39)
BASOPHILS ABSOLUTE: 0 E9/L (ref 0–0.2)
BASOPHILS RELATIVE PERCENT: 0 % (ref 0–2)
BILIRUB SERPL-MCNC: 1.7 MG/DL (ref 0–1.2)
BUN BLDV-MCNC: 28 MG/DL (ref 6–23)
CALCIUM SERPL-MCNC: 8.5 MG/DL (ref 8.6–10.2)
CHLORIDE BLD-SCNC: 88 MMOL/L (ref 98–107)
CO2: 26 MMOL/L (ref 22–29)
CREAT SERPL-MCNC: 0.9 MG/DL (ref 0.7–1.2)
EOSINOPHILS ABSOLUTE: 0 E9/L (ref 0.05–0.5)
EOSINOPHILS RELATIVE PERCENT: 0.5 % (ref 0–6)
GFR AFRICAN AMERICAN: >60
GFR NON-AFRICAN AMERICAN: >60 ML/MIN/1.73
GLUCOSE BLD-MCNC: 263 MG/DL (ref 74–99)
HCT VFR BLD CALC: 31.1 % (ref 37–54)
HEMOGLOBIN: 9.7 G/DL (ref 12.5–16.5)
HYPOCHROMIA: ABNORMAL
LYMPHOCYTES ABSOLUTE: 0.68 E9/L (ref 1.5–4)
LYMPHOCYTES RELATIVE PERCENT: 17.4 % (ref 20–42)
MCH RBC QN AUTO: 26.9 PG (ref 26–35)
MCHC RBC AUTO-ENTMCNC: 31.2 % (ref 32–34.5)
MCV RBC AUTO: 86.1 FL (ref 80–99.9)
METER GLUCOSE: 206 MG/DL (ref 74–99)
METER GLUCOSE: 233 MG/DL (ref 74–99)
METER GLUCOSE: 236 MG/DL (ref 74–99)
METER GLUCOSE: 358 MG/DL (ref 74–99)
MONOCYTES ABSOLUTE: 0.36 E9/L (ref 0.1–0.95)
MONOCYTES RELATIVE PERCENT: 8.7 % (ref 2–12)
NEUTROPHILS ABSOLUTE: 2.96 E9/L (ref 1.8–7.3)
NEUTROPHILS RELATIVE PERCENT: 73.9 % (ref 43–80)
OVALOCYTES: ABNORMAL
PDW BLD-RTO: 20.1 FL (ref 11.5–15)
PLATELET # BLD: 114 E9/L (ref 130–450)
PMV BLD AUTO: 11.3 FL (ref 7–12)
POIKILOCYTES: ABNORMAL
POLYCHROMASIA: ABNORMAL
POTASSIUM SERPL-SCNC: 3.7 MMOL/L (ref 3.5–5)
RBC # BLD: 3.61 E12/L (ref 3.8–5.8)
SODIUM BLD-SCNC: 128 MMOL/L (ref 132–146)
TOTAL PROTEIN: 4.5 G/DL (ref 6.4–8.3)
WBC # BLD: 4 E9/L (ref 4.5–11.5)

## 2022-02-26 PROCEDURE — 6370000000 HC RX 637 (ALT 250 FOR IP): Performed by: INTERNAL MEDICINE

## 2022-02-26 PROCEDURE — 85025 COMPLETE CBC W/AUTO DIFF WBC: CPT

## 2022-02-26 PROCEDURE — 36415 COLL VENOUS BLD VENIPUNCTURE: CPT

## 2022-02-26 PROCEDURE — 82962 GLUCOSE BLOOD TEST: CPT

## 2022-02-26 PROCEDURE — 6360000002 HC RX W HCPCS: Performed by: INTERNAL MEDICINE

## 2022-02-26 PROCEDURE — 1200000000 HC SEMI PRIVATE

## 2022-02-26 PROCEDURE — 80053 COMPREHEN METABOLIC PANEL: CPT

## 2022-02-26 PROCEDURE — 82140 ASSAY OF AMMONIA: CPT

## 2022-02-26 PROCEDURE — 2580000003 HC RX 258: Performed by: INTERNAL MEDICINE

## 2022-02-26 RX ORDER — ONDANSETRON 2 MG/ML
4 INJECTION INTRAMUSCULAR; INTRAVENOUS EVERY 6 HOURS PRN
Status: DISCONTINUED | OUTPATIENT
Start: 2022-02-26 | End: 2022-02-28 | Stop reason: HOSPADM

## 2022-02-26 RX ORDER — FERROUS SULFATE 325(65) MG
325 TABLET ORAL 2 TIMES DAILY WITH MEALS
Status: DISCONTINUED | OUTPATIENT
Start: 2022-02-26 | End: 2022-02-28 | Stop reason: HOSPADM

## 2022-02-26 RX ORDER — SPIRONOLACTONE 25 MG/1
25 TABLET ORAL DAILY
Status: DISCONTINUED | OUTPATIENT
Start: 2022-02-26 | End: 2022-02-26

## 2022-02-26 RX ORDER — LACTULOSE 10 G/15ML
20 SOLUTION ORAL 2 TIMES DAILY
Status: DISCONTINUED | OUTPATIENT
Start: 2022-02-26 | End: 2022-02-27

## 2022-02-26 RX ORDER — FUROSEMIDE 20 MG/1
10 TABLET ORAL 2 TIMES DAILY
Status: DISCONTINUED | OUTPATIENT
Start: 2022-02-26 | End: 2022-02-28 | Stop reason: HOSPADM

## 2022-02-26 RX ADMIN — FERROUS SULFATE TAB 325 MG (65 MG ELEMENTAL FE) 325 MG: 325 (65 FE) TAB at 10:21

## 2022-02-26 RX ADMIN — ONDANSETRON 4 MG: 2 INJECTION INTRAMUSCULAR; INTRAVENOUS at 13:00

## 2022-02-26 RX ADMIN — SERTRALINE 50 MG: 50 TABLET, FILM COATED ORAL at 08:58

## 2022-02-26 RX ADMIN — INSULIN LISPRO 2 UNITS: 100 INJECTION, SOLUTION INTRAVENOUS; SUBCUTANEOUS at 16:31

## 2022-02-26 RX ADMIN — LEVOTHYROXINE SODIUM 200 MCG: 0.1 TABLET ORAL at 05:59

## 2022-02-26 RX ADMIN — PANTOPRAZOLE SODIUM 40 MG: 40 TABLET, DELAYED RELEASE ORAL at 08:59

## 2022-02-26 RX ADMIN — Medication 10 ML: at 22:54

## 2022-02-26 RX ADMIN — RIFAXIMIN 550 MG: 550 TABLET ORAL at 10:20

## 2022-02-26 RX ADMIN — INSULIN LISPRO 1 UNITS: 100 INJECTION, SOLUTION INTRAVENOUS; SUBCUTANEOUS at 21:50

## 2022-02-26 RX ADMIN — LACTULOSE 20 G: 20 SOLUTION ORAL at 10:20

## 2022-02-26 RX ADMIN — Medication 10 ML: at 08:59

## 2022-02-26 RX ADMIN — ASPIRIN 81 MG CHEWABLE TABLET 81 MG: 81 TABLET CHEWABLE at 08:58

## 2022-02-26 RX ADMIN — LACTULOSE 20 G: 20 SOLUTION ORAL at 21:48

## 2022-02-26 RX ADMIN — ENOXAPARIN SODIUM 40 MG: 100 INJECTION SUBCUTANEOUS at 21:48

## 2022-02-26 RX ADMIN — FERROUS SULFATE TAB 325 MG (65 MG ELEMENTAL FE) 325 MG: 325 (65 FE) TAB at 16:30

## 2022-02-26 RX ADMIN — OXYCODONE 5 MG: 5 TABLET ORAL at 15:45

## 2022-02-26 RX ADMIN — FUROSEMIDE 10 MG: 20 TABLET ORAL at 10:20

## 2022-02-26 RX ADMIN — INSULIN LISPRO 2 UNITS: 100 INJECTION, SOLUTION INTRAVENOUS; SUBCUTANEOUS at 13:09

## 2022-02-26 RX ADMIN — INSULIN GLARGINE 20 UNITS: 100 INJECTION, SOLUTION SUBCUTANEOUS at 21:49

## 2022-02-26 RX ADMIN — RIFAXIMIN 550 MG: 550 TABLET ORAL at 21:48

## 2022-02-26 ASSESSMENT — PAIN SCALES - GENERAL
PAINLEVEL_OUTOF10: 0
PAINLEVEL_OUTOF10: 10

## 2022-02-26 NOTE — PROGRESS NOTES
Updated patient's wife; answered all questions. Discussed patient's home medications. Wife was adamant that patient cannot take spironolactone. Charge nurse notified.

## 2022-02-26 NOTE — PROGRESS NOTES
Internal Medicine Progress Note    [facility]    Annycarmen Rocha. Wade Zurita., F.A.CMatthewO.I. Barry Nathan D.O., PRASANNAOMatthewILIZ Scanlon, MSN, APRN, NP-C  Blayne Fuentes. Catrachito Grande, MSN, APRN-CNP     Primary Care Physician: Ainsley Beal DO   Admitting Physician:  Fransico Johansen DO  Admission date and time: 2/24/2022 11:50 AM    Room:  [facility]20422-  Admitting diagnosis: [facility] [K72.90, K74.60]    Patient Name: Lj Nails  MRN: [id number]    Date of Service: 2/26/2022     Subjective: Carmen Marcus is a 73 y.o. male who was seen and examined today,2/26/2022, at the bedside. Camren Marcus is confused during my examination today and is difficult to redirect. No family members were present but will be updated when they present to the bedside. As expected, ammonia was found to be elevated and will be addressed accordingly. Multiple subspecialists are providing care. He states he has far less abdominal pain following paracentesis. He is eating breakfast during my examination. Review of System:   Limited in the setting of the patient's current confusion. Constitutional:   Admits to persistent malaise and fatigue. HEENT:   Denies ear pain, sore throat, sinus or eye problems. Cardiovascular:   Denies any chest pain, irregular heartbeats, or palpitations. Respiratory:   Denies shortness of breath, coughing, sputum production, hemoptysis, or wheezing. Gastrointestinal:   Admits to significant relief in abdominal pain and pressure following paracentesis. Genitourinary:    Denies any urgency, frequency, hematuria. Voiding without difficulty. Extremities:   Admits to chronic lower extremity edema. Neurology:    Denies any headache or focal neurological deficits, Denies generalized weakness or memory difficulty. Psch:   Denies being anxious or depressed.   Musculoskeletal:    Denies  myalgias, joint complaints or back pain.   Integumentary:   Denies any rashes, ulcers, or excoriations. Denies bruising. Hematologic/Lymphatic:  Denies bruising or bleeding. Physical Exam:  No intake/output data recorded. Intake/Output Summary (Last 24 hours) at 2/26/2022 0925  Last data filed at 2/25/2022 2328  Gross per 24 hour   Intake 120 ml   Output 300 ml   Net -180 ml   I/O last 3 completed shifts: In: 560 [P.O.:560]  Out: 300 [Urine:300]  Patient Vitals for the past 96 hrs (Last 3 readings):   Weight   02/26/22 0556 184 lb 4.8 oz (83.6 kg)   02/25/22 0459 188 lb (85.3 kg)   02/24/22 1915 188 lb (85.3 kg)     Vital Signs:   Blood pressure 108/62, pulse 112, temperature 98.1 °F (36.7 °C), temperature source Oral, resp. rate 20, weight 184 lb 4.8 oz (83.6 kg), SpO2 96 %. General appearance:  Confused and encephalopathic  Head:  Normocephalic. No masses, lesions or tenderness. Eyes:  PERRLA. EOMI. Sclera clear. Paired vision. Eyeglasses on. ENT:  Ears normal. Mucosa normal.  Neck:    Supple. Trachea midline. No thyromegaly. No JVD. No bruits. Heart:    Rhythm regular. Rate controlled. No murmurs. Lungs:    Symmetrical. Clear to auscultation bilaterally. No wheezes. No rhonchi. No rales. Abdomen:   Mildly distended with residual ascites. No significant pain on palpation. Extremities:    Stable lower extremity edema with chronic pitting pattern. Neurologic:    Confused during my examination this morning. He is also difficult to redirect. No focal deficits are appreciated. Asterixis is identified  Psych:   Behavior is normal. Mood appears normal. Speech is not rapid and/or pressured. Musculoskeletal:   Spine ROM normal. Muscular strength intact. Gait not assessed. Integumentary:  No rashes  Skin normal color and texture.   Genitalia/Breast:  Deferred    Medication:  Scheduled Meds:   insulin lispro  0-6 Units SubCUTAneous TID     insulin lispro  0-3 Units SubCUTAneous Nightly    ferrous sulfate  325 mg Oral BID   furosemide  10 mg Oral BID    spironolactone  25 mg Oral Daily    lactulose  20 g Oral BID    rifaximin  550 mg Oral BID    sodium chloride flush  5-40 mL IntraVENous 2 times per day    enoxaparin  40 mg SubCUTAneous Daily    aspirin  81 mg Oral Daily    levothyroxine  200 mcg Oral Daily    insulin glargine  20 Units SubCUTAneous Nightly    pantoprazole  40 mg Oral Daily    sertraline  50 mg Oral Daily     Continuous Infusions:   dextrose      sodium chloride         Objective Data:  CBC:   Lab Results   Component Value Date    WBC 4.0 02/26/2022    RBC 3.61 02/26/2022    HGB 9.7 02/26/2022    HCT 31.1 02/26/2022    MCV 86.1 02/26/2022    MCH 26.9 02/26/2022    MCHC 31.2 02/26/2022    RDW 20.1 02/26/2022     02/26/2022    MPV 11.3 02/26/2022     CMP:    Lab Results   Component Value Date     02/26/2022    K 3.7 02/26/2022    K 3.6 02/05/2022    CL 88 02/26/2022    CO2 26 02/26/2022    BUN 28 02/26/2022    CREATININE 0.9 02/26/2022    GFRAA >60 02/26/2022    LABGLOM >60 02/26/2022    GLUCOSE 263 02/26/2022    GLUCOSE 318 04/30/2012    PROT 4.5 02/26/2022    LABALBU 2.6 02/26/2022    LABALBU 4.0 04/30/2012    CALCIUM 8.5 02/26/2022    BILITOT 1.7 02/26/2022    ALKPHOS 315 02/26/2022    AST 35 02/26/2022    ALT 23 02/26/2022         Assessment:  1. Decompensated cirrhosis with ascite decompensated cirrhosis status post paracentesis with removal of 6.8 L of fluid  2. Hepatic encephalopathy with hyperammonemia  3. End-stage alcoholic cirrhosis  4. Anemia of chronic disease  5. Nondisplaced sacral fracture  6. AML being followed closely by the oncologic team  7. Chronic kidney disease stage III  8. Insulin-dependent diabetes mellitus type 2  9. History of DVT  10. Hyperlipidemia  11. Coronary artery disease  12. Hypothyroidism  13. Hx of tobacco abuse       Plan:   Ammonia level was obtained during the examination today was found to be markedly elevated as expected.   He is encephalopathic at this point we will institute lactulose and Xifaxan therapy. We will resume diuretic medications from home in the setting of end-stage cirrhosis. Oncologic recommendations have been reviewed and recommendations are for Mediport placement. The surgical team has been consulted for this purpose. We continue to reinforce a fluid and salt restricted diet. Chronic comorbidities are being monitored and we will make further adjustments in his basal bolus insulin therapy to account for his persistent hyperglycemia. His wife was not present during the examination today but will be updated accordingly. Physical therapy and Occupational Therapy are necessary as well. More than 50% of my  time was spent at the bedside counseling/coordinating care with the patient and/or family with face to face contact. This time was spent reviewing notes and laboratory data as well as instructing and counseling the patient. Time I spent with the family or surrogate(s) is included only if the patient was incapable of providing the necessary information or participating in medical decisions. I also discussed the differential diagnosis and all of the proposed management plans with the patient and individuals accompanying the patient. Fayetta Holstein requires this high level of physician care and nursing on the IMC/Telemetry unit due the complexity of decision management and chance of rapid decline or death. Continued cardiac monitoring and higher level of nursing are required. I am readily available for any further decision-making and intervention.      Juanita Young DO, D.O., [facility]  9:25 AM  2/26/2022

## 2022-02-26 NOTE — PROGRESS NOTES
S: Patient states feels well and ready to go home, no bleeding. He thinks he missed the plan outpatient mediport placement that [facility] had set up and wants to see if can get it done here or outpatient Monday if discharged. He can not reach his wife to double check. O:  Temp 98.1 P-112 R-20 Bp108/62  Gen- alert, pleasant, lying in bed talking, non distressed  HEENT-mmm  Lungs- clear  Cardiac- regular slightly tachycardic  Abd- moderated distended but soft +BS, hepatosplenmegaly  Ext-w arm    Labs:    Wbc=4 hb=9.7 plt= 114 creatinine 0.4      -A/P: 73year old gentleman known to Dr. Lilian Wall and also has oncologist at [facility] with remote hx of AML treated with chemotherapy and umbilical cord transplant in remission since 2012. 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 He also has had cardiac issues with bradycardia requiring pacemaker. He feels better after paracentesis and feels ready to go home. He states he was supposed to have outpatient mediport arranged by [facility] and he thinks he missed appt and wants to know if it can be placed here. 1. Anemia- will check, iron, B12, folate at least in part due to cirrhosis  2. He believes he missed appt set up for mediport by [facility] and wants to see if can be done here. I will consult Dr. Mich Awan or Dr. Suggs Signs and see if available for mediport placement, could even have it  outpatient if discharged. I tried to reach is wife to verify if he missed appt for mediport but no answer. 3. Hodgkin's treatments are going to be done at [facility], [place]    - .

## 2022-02-27 LAB
ALBUMIN SERPL-MCNC: 2.6 G/DL (ref 3.5–5.2)
ALP BLD-CCNC: 327 U/L (ref 40–129)
ALT SERPL-CCNC: 24 U/L (ref 0–40)
AMMONIA: 58 UMOL/L (ref 16–60)
ANION GAP SERPL CALCULATED.3IONS-SCNC: 18 MMOL/L (ref 7–16)
ANISOCYTOSIS: ABNORMAL
AST SERPL-CCNC: 51 U/L (ref 0–39)
BASOPHILS ABSOLUTE: 0 E9/L (ref 0–0.2)
BASOPHILS RELATIVE PERCENT: 0 % (ref 0–2)
BILIRUB SERPL-MCNC: 2 MG/DL (ref 0–1.2)
BUN BLDV-MCNC: 32 MG/DL (ref 6–23)
CALCIUM SERPL-MCNC: 8.8 MG/DL (ref 8.6–10.2)
CHLORIDE BLD-SCNC: 87 MMOL/L (ref 98–107)
CO2: 22 MMOL/L (ref 22–29)
CREAT SERPL-MCNC: 1.2 MG/DL (ref 0.7–1.2)
EOSINOPHILS ABSOLUTE: 0 E9/L (ref 0.05–0.5)
EOSINOPHILS RELATIVE PERCENT: 0.2 % (ref 0–6)
FERRITIN: 578 NG/ML
FOLATE: 10.3 NG/ML (ref 4.8–24.2)
GFR AFRICAN AMERICAN: >60
GFR NON-AFRICAN AMERICAN: 60 ML/MIN/1.73
GLUCOSE BLD-MCNC: 229 MG/DL (ref 74–99)
HCT VFR BLD CALC: 30.7 % (ref 37–54)
HEMOGLOBIN: 9.7 G/DL (ref 12.5–16.5)
HYPOCHROMIA: ABNORMAL
IRON SATURATION: 21 % (ref 20–55)
IRON: 52 MCG/DL (ref 59–158)
LYMPHOCYTES ABSOLUTE: 0.78 E9/L (ref 1.5–4)
LYMPHOCYTES RELATIVE PERCENT: 16.7 % (ref 20–42)
MAGNESIUM: 1.5 MG/DL (ref 1.6–2.6)
MCH RBC QN AUTO: 27.4 PG (ref 26–35)
MCHC RBC AUTO-ENTMCNC: 31.6 % (ref 32–34.5)
MCV RBC AUTO: 86.7 FL (ref 80–99.9)
METER GLUCOSE: 178 MG/DL (ref 74–99)
METER GLUCOSE: 235 MG/DL (ref 74–99)
METER GLUCOSE: 237 MG/DL (ref 74–99)
METER GLUCOSE: 244 MG/DL (ref 74–99)
MONOCYTES ABSOLUTE: 0.41 E9/L (ref 0.1–0.95)
MONOCYTES RELATIVE PERCENT: 8.8 % (ref 2–12)
NEUTROPHILS ABSOLUTE: 3.45 E9/L (ref 1.8–7.3)
NEUTROPHILS RELATIVE PERCENT: 74.6 % (ref 43–80)
OVALOCYTES: ABNORMAL
PDW BLD-RTO: 20.2 FL (ref 11.5–15)
PHOSPHORUS: 4.3 MG/DL (ref 2.5–4.5)
PLATELET # BLD: 125 E9/L (ref 130–450)
PMV BLD AUTO: 11.3 FL (ref 7–12)
POIKILOCYTES: ABNORMAL
POLYCHROMASIA: ABNORMAL
POTASSIUM SERPL-SCNC: 4 MMOL/L (ref 3.5–5)
RBC # BLD: 3.54 E12/L (ref 3.8–5.8)
SODIUM BLD-SCNC: 127 MMOL/L (ref 132–146)
TOTAL IRON BINDING CAPACITY: 252 MCG/DL (ref 250–450)
TOTAL PROTEIN: 4.6 G/DL (ref 6.4–8.3)
VITAMIN B-12: 1527 PG/ML (ref 211–946)
WBC # BLD: 4.6 E9/L (ref 4.5–11.5)

## 2022-02-27 PROCEDURE — 83540 ASSAY OF IRON: CPT

## 2022-02-27 PROCEDURE — 97530 THERAPEUTIC ACTIVITIES: CPT

## 2022-02-27 PROCEDURE — 99222 1ST HOSP IP/OBS MODERATE 55: CPT | Performed by: SURGERY

## 2022-02-27 PROCEDURE — 82140 ASSAY OF AMMONIA: CPT

## 2022-02-27 PROCEDURE — 83735 ASSAY OF MAGNESIUM: CPT

## 2022-02-27 PROCEDURE — 82728 ASSAY OF FERRITIN: CPT

## 2022-02-27 PROCEDURE — 83550 IRON BINDING TEST: CPT

## 2022-02-27 PROCEDURE — 6360000002 HC RX W HCPCS: Performed by: INTERNAL MEDICINE

## 2022-02-27 PROCEDURE — 80053 COMPREHEN METABOLIC PANEL: CPT

## 2022-02-27 PROCEDURE — 84100 ASSAY OF PHOSPHORUS: CPT

## 2022-02-27 PROCEDURE — 82607 VITAMIN B-12: CPT

## 2022-02-27 PROCEDURE — 6370000000 HC RX 637 (ALT 250 FOR IP): Performed by: INTERNAL MEDICINE

## 2022-02-27 PROCEDURE — 1200000000 HC SEMI PRIVATE

## 2022-02-27 PROCEDURE — 2580000003 HC RX 258: Performed by: INTERNAL MEDICINE

## 2022-02-27 PROCEDURE — 82962 GLUCOSE BLOOD TEST: CPT

## 2022-02-27 PROCEDURE — 85025 COMPLETE CBC W/AUTO DIFF WBC: CPT

## 2022-02-27 PROCEDURE — 36415 COLL VENOUS BLD VENIPUNCTURE: CPT

## 2022-02-27 PROCEDURE — 82746 ASSAY OF FOLIC ACID SERUM: CPT

## 2022-02-27 PROCEDURE — 2580000003 HC RX 258: Performed by: SURGERY

## 2022-02-27 RX ORDER — CEFAZOLIN SODIUM 2 G/50ML
2000 SOLUTION INTRAVENOUS
Status: CANCELLED | OUTPATIENT
Start: 2022-02-27 | End: 2022-02-27

## 2022-02-27 RX ORDER — SODIUM CHLORIDE 0.9 % (FLUSH) 0.9 %
10 SYRINGE (ML) INJECTION PRN
Status: DISCONTINUED | OUTPATIENT
Start: 2022-02-27 | End: 2022-02-28 | Stop reason: HOSPADM

## 2022-02-27 RX ORDER — SODIUM CHLORIDE 9 MG/ML
25 INJECTION, SOLUTION INTRAVENOUS PRN
Status: DISCONTINUED | OUTPATIENT
Start: 2022-02-27 | End: 2022-02-28 | Stop reason: HOSPADM

## 2022-02-27 RX ORDER — FLUTICASONE PROPIONATE 50 MCG
1 SPRAY, SUSPENSION (ML) NASAL DAILY
Status: DISCONTINUED | OUTPATIENT
Start: 2022-02-27 | End: 2022-02-28 | Stop reason: HOSPADM

## 2022-02-27 RX ORDER — SODIUM CHLORIDE 0.9 % (FLUSH) 0.9 %
10 SYRINGE (ML) INJECTION EVERY 12 HOURS SCHEDULED
Status: DISCONTINUED | OUTPATIENT
Start: 2022-02-27 | End: 2022-02-28 | Stop reason: HOSPADM

## 2022-02-27 RX ORDER — LACTULOSE 10 G/15ML
10 SOLUTION ORAL 2 TIMES DAILY
Status: DISCONTINUED | OUTPATIENT
Start: 2022-02-27 | End: 2022-02-28 | Stop reason: HOSPADM

## 2022-02-27 RX ADMIN — LACTULOSE 20 G: 20 SOLUTION ORAL at 07:42

## 2022-02-27 RX ADMIN — INSULIN GLARGINE 20 UNITS: 100 INJECTION, SOLUTION SUBCUTANEOUS at 20:35

## 2022-02-27 RX ADMIN — FERROUS SULFATE TAB 325 MG (65 MG ELEMENTAL FE) 325 MG: 325 (65 FE) TAB at 16:40

## 2022-02-27 RX ADMIN — INSULIN LISPRO 1 UNITS: 100 INJECTION, SOLUTION INTRAVENOUS; SUBCUTANEOUS at 07:43

## 2022-02-27 RX ADMIN — FERROUS SULFATE TAB 325 MG (65 MG ELEMENTAL FE) 325 MG: 325 (65 FE) TAB at 07:42

## 2022-02-27 RX ADMIN — FUROSEMIDE 10 MG: 20 TABLET ORAL at 20:34

## 2022-02-27 RX ADMIN — INSULIN LISPRO 1 UNITS: 100 INJECTION, SOLUTION INTRAVENOUS; SUBCUTANEOUS at 20:35

## 2022-02-27 RX ADMIN — INSULIN LISPRO 2 UNITS: 100 INJECTION, SOLUTION INTRAVENOUS; SUBCUTANEOUS at 16:41

## 2022-02-27 RX ADMIN — LEVOTHYROXINE SODIUM 200 MCG: 0.1 TABLET ORAL at 05:02

## 2022-02-27 RX ADMIN — LACTULOSE 10 G: 20 SOLUTION ORAL at 20:34

## 2022-02-27 RX ADMIN — RIFAXIMIN 550 MG: 550 TABLET ORAL at 07:41

## 2022-02-27 RX ADMIN — OXYCODONE 5 MG: 5 TABLET ORAL at 23:32

## 2022-02-27 RX ADMIN — ONDANSETRON 4 MG: 2 INJECTION INTRAMUSCULAR; INTRAVENOUS at 19:50

## 2022-02-27 RX ADMIN — ACETAMINOPHEN 650 MG: 325 TABLET ORAL at 04:47

## 2022-02-27 RX ADMIN — SODIUM CHLORIDE, PRESERVATIVE FREE 10 ML: 5 INJECTION INTRAVENOUS at 20:34

## 2022-02-27 RX ADMIN — FUROSEMIDE 10 MG: 20 TABLET ORAL at 07:42

## 2022-02-27 RX ADMIN — PANTOPRAZOLE SODIUM 40 MG: 40 TABLET, DELAYED RELEASE ORAL at 07:42

## 2022-02-27 RX ADMIN — ASPIRIN 81 MG CHEWABLE TABLET 81 MG: 81 TABLET CHEWABLE at 07:41

## 2022-02-27 RX ADMIN — SERTRALINE 50 MG: 50 TABLET, FILM COATED ORAL at 07:41

## 2022-02-27 RX ADMIN — RIFAXIMIN 550 MG: 550 TABLET ORAL at 20:32

## 2022-02-27 RX ADMIN — Medication 10 ML: at 07:42

## 2022-02-27 RX ADMIN — ONDANSETRON 4 MG: 2 INJECTION INTRAMUSCULAR; INTRAVENOUS at 06:28

## 2022-02-27 ASSESSMENT — PAIN SCALES - GENERAL
PAINLEVEL_OUTOF10: 0
PAINLEVEL_OUTOF10: 8
PAINLEVEL_OUTOF10: 3

## 2022-02-27 NOTE — PROGRESS NOTES
balance. Bilateral lower extremity edema noted. Patient demonstrates understanding with seated exercises. Patient requires skilled physical therapy to address concerns listed above to increase safety and independence at discharge. PHYSICAL THERAPY  PLAN OF CARE       Physical therapy plan of care is established based on physician order,  patient diagnosis and clinical assessment    Current Treatment Recommendations:    -Bed Mobility: Lower extremity exercises   -Sitting Balance: Facilitate active trunk muscle engagement , Facilitate postural control in all planes  and Engage in core activities to allow for movement within base of support   -Standing Balance: Perform strengthening exercises in standing to promote motor control with or without upper extremity support   -Transfers: Provide instruction on proper hand and foot position for adequate transfer of weight onto lower extremities and use of gait device if needed, Cues for hand placement, technique and safety. Provide stabilization to prevent fall  and Assist with extension of knees trunk and hip to accept weight transfer   -Gait: Gait training, Standing activities to improve: base of support, weight shift, weight bearing  and Pregait training to emphasize: Sequencing , Device control, Upright and Safety   -Endurance: Utilize Supervised activities to increase level of endurance to allow for safe functional mobility including transfers and gait   -Stairs: Stair training with instruction on proper technique and hand placement on rail    PT long term treatment goals are located in below grid    Patient and or family understand(s) diagnosis, prognosis, and plan of care. Frequency of treatments: Patient will be seen  daily.          Prior Level of Function: Patient ambulated with wheeled walker    Rehab Potential: good  - for baseline    Past medical history:   Past Medical History:   Diagnosis Date    [facility]     chemotherapy  AML [facility] myel[facility] 2010    Cancer ([facility]     Chronic kidney disease     Diabetes mellitus ([facility]     GERD (gastroesophageal reflux disease)     Hx of blood clots 2012    legs    Hyperlipidemia     Liver disease     MI, old     Other disorders of kidney and ureter     Other sleep disturbances     Thyroid disease     Type II or unspecified type diabetes mellitus without mention of complication, not stated as uncontrolled      Past Surgical History:   Procedure Laterality Date    APPENDECTOMY      BONE MARROW TRANSPLANT      double core transplant    CHOLECYSTECTOMY      COLONOSCOPY      CT NEEDLE BIOPSY LIVER PERCUTANEOUS  1/17/2022    CT NEEDLE BIOPSY LIVER PERCUTANEOUS    ENDOSCOPY, COLON, DIAGNOSTIC      FOOT NEUROMA SURGERY      UPPER GASTROINTESTINAL ENDOSCOPY         SUBJECTIVE:    Precautions: Up with assistance, falls ,    CT OF THE ABDOMEN AND PELVIS WITHOUT CONTRAST 2/24/2022   Nondisplaced sacral fracture as above. No additional acute osseous abnormalities. Cirrhotic liver morphology with sequelae of portal hypertension including splenomegaly and large volume of ascites in the abdomen and pelvis. Multiple hypodense lesions scattered throughout the liver which may reflect primary liver cancer or metastatic disease, among other etiologies. Numerous subcentimeter pulmonary nodules at both lung bases, increased in number and size from the prior study. Findings are highly suspicious for metastatic disease.     Social history: Patient lives with spouse in a ranch home  with 4 steps  to enter bilateral Rail tub shower however  Sponge bathes grab bars    Equipment owned: Mandeep, [address] and [address] [facility] chair,       [address] [facility]   How much difficulty turning over in bed?: A Little  How much difficulty sitting down on / standing up from a chair with arms?: A Little  How much difficulty moving from lying on back to sitting on side of bed?: A Little  How much help from another person moving to and from a bed to a chair?: A Little  How much help from another person needed to walk in hospital room?: A Little  How much help from another person for climbing 3-5 steps with a railing?: A Lot  AM-PAC Inpatient Mobility Raw Score : 17  AM-PAC Inpatient T-Scale Score : 42.13  Mobility Inpatient CMS 0-100% Score: 50.57  Mobility Inpatient CMS G-Code Modifier : CK    Nursing cleared patient for PT treatment. Patient sitting on edge of bed with bed alarm going off at start of session as patient states he needs to use bathroom. OBJECTIVE;   Initial Evaluation  Date: 2/25/2022 Treatment Date:  2/27/2022     Short Term/ Long Term   Goals   Was pt agreeable to Eval/treatment? Yes yes To be met in 3 days   Pain level   10/10  back No number reported    Bed Mobility    Rolling: Minimal assist of 1    Supine to sit: Minimal assist of 1    Sit to supine: Not assessed     Scooting: Minimal assist of 1   Rolling: Supervision    Supine to sit: Supervision    Sit to supine: Not assessed patient in chair   Scooting: Supervision     Rolling: Independent    Supine to sit:  Independent    Sit to supine: Independent    Scooting: Independent     Transfers Sit to stand: Minimal assist of 1 Cues for hand placement and safety  Sit to stand: Minimal assist of 1 from side of bed and ModA from toilet with cues for use of grab bar     Sit to stand: Independent     Ambulation    40 feet using  wheeled walker with Minimal assist of 1   for walker control and cues for upright posture, safety and pacing 2x12 feet using  wheeled walker with Minimal assist of 1   cues for safety and pacing    100 feet using  wheeled walker with Modified Independent    Stair negotiation: ascended and descended   Not assessed     4 steps 1-2 rail with supervision    ROM Within functional limits    Increase range of motion 10% of affected joints    Strength BUE:  refer to OT eval  RLE:  3+/5  LLE:  3+/5 Increase strength in affected mm groups by 1/3 grade   Balance Sitting EOB:  good -  Dynamic Standing:  fair wheeled walker  Sitting EOB: fair   Dynamic Standing: fair with wheeled walker   Sitting EOB:  good    Dynamic Standing: good wheeled walker      Patient is Alert & Oriented x person, place, time and situation and follows directions    Sensation:  Patient  denies numbness/tingling   Edema:  yes bilateral lower extremities   Endurance: fair       Vitals: room air   Blood Pressure at rest  Blood Pressure during session    Heart Rate at rest   Heart Rate during session     SPO2 at rest 98%  SPO2 during session 97%     Patient education  Patient educated on role of Physical Therapy, risks of immobility, safety and plan of care,  importance of mobility while in hospital , safety  and seated exercises     Patient response to education:   Pt verbalized understanding Pt demonstrated skill Pt requires further education in this area   Yes Partial Yes      Treatment:  Patient practiced and was instructed/facilitated in the following treatment: Patient stood from  [place] of bed and amb into bathroom, assist on/off commode. Patient performed standing balance activities at sink and amb to bedside chair. Patient performed seated exercises. Therapeutic Exercises:  ankle pumps, long arc quad and seated marching  x 15-20 reps. At end of session, patient in chair with   call light and phone within reach,  all lines and tubes intact, nursing notified. Patient would benefit from continued skilled Physical Therapy to improve functional independence and quality of life.          Patient's/ family goals   home    Time in  930  Time out  945    Total Treatment Time  15 minutes    CPT codes:  Therapeutic activities (87924)   15 minutes  1 unit(s)    Rai Coe, hospitals #[id number]

## 2022-02-27 NOTE — PLAN OF CARE
Problem: Falls - Risk of:  Goal: Will remain free from falls  Description: Will remain free from falls  2/27/2022 0329 by Ibis Velazquez RN  Outcome: Met This Shift  2/26/2022 1810 by Nelida Clark RN  Outcome: Met This Shift  Goal: Absence of physical injury  Description: Absence of physical injury  2/27/2022 0329 by Ibis Velazquez RN  Outcome: Met This Shift  2/26/2022 1810 by Nelida Clark RN  Outcome: Met This Shift     Problem: Skin Integrity:  Goal: Will show no infection signs and symptoms  Description: Will show no infection signs and symptoms  2/26/2022 1810 by Nelida Clark RN  Outcome: Met This Shift  Goal: Absence of new skin breakdown  Description: Absence of new skin breakdown  2/26/2022 1810 by Nelida Clark RN  Outcome: Met This Shift

## 2022-02-27 NOTE — CARE COORDINATION
2/27/2022 WEEKEND CM consult: Xifaxan at discharge- may need other medications that require prior auth. APPROVED Xifaxan- need script and then can get cost to patient from [address] [place] [address] or [facility] pharmacy on Monday. KEY:[id number].  Electronically signed by Prachi Chan RN-BC on 2/27/2022 at 10:15 AM

## 2022-02-27 NOTE — PROGRESS NOTES
Internal Medicine Progress Note    [facility]    Ludwin May. Mart Rivero., GEENA.CAREN.DARLYNOMatthewI. Rubio Flynn D.O., DEION Solomon D.O. Julia Rodriguez, MSN, APRN, NP-C  Nereyda Prado. Markie Taylor, MSN, APRN-CNP     Primary Care Physician: Chary Elizabeth DO   Admitting Physician:  Clau Laurent DO  Admission date and time: 2/24/2022 11:50 AM    Room:  [address] [place] [address]  Admitting diagnosis: Decompensation of [facility] ([facility] [K72.90, K74.60]    Patient Name: Mera Lieberman  MRN: [id number]    Date of Service: 2/27/2022     Subjective: Renu Duval is a 73 y.o. male who was seen and examined today,2/27/2022, at the bedside. Kai's mentation has improved dramatically when compared to my examination yesterday. Following the administration of lactulose, he has had several bowel movements which has certainly improved his hepatic encephalopathy. Cirrhotic medications are being maximized. We discussed moving forward with Mediport placement tomorrow. His wife was not present during the examination today    Review of System:   Confusion has entirely resolved at this point  Constitutional:   Improving malaise and fatigue  HEENT:   Denies ear pain, sore throat, sinus or eye problems. Cardiovascular:   Denies any chest pain, irregular heartbeats, or palpitations. Respiratory:   Denies shortness of breath, coughing, sputum production, hemoptysis, or wheezing. Gastrointestinal:   No current abdominal pain or discomfort. Admits to diarrhea that began yesterday following lactulose administration. Genitourinary:    Denies any urgency, frequency, hematuria. Voiding without difficulty. Extremities:   Admits to chronic lower extremity edema. Neurology:    Denies any headache or focal neurological deficits, improving weakness and deconditioning  Psch:   Denies being anxious or depressed. Musculoskeletal:    Denies  myalgias, joint complaints or back pain.    Integumentary: Denies any rashes, ulcers, or excoriations. Denies bruising. Hematologic/Lymphatic:  Denies bruising or bleeding. Physical Exam:  No intake/output data recorded. Intake/Output Summary (Last 24 hours) at 2/27/2022 0745  Last data filed at 2/27/2022 0456  Gross per 24 hour   Intake 480 ml   Output 400 ml   Net 80 ml   I/O last 3 completed shifts: In: 480 [P.O.:480]  Out: 700 [Urine:700]  Patient Vitals for the past 96 hrs (Last 3 readings):   Weight   02/27/22 0647 185 lb (83.9 kg)   02/26/22 0556 184 lb 4.8 oz (83.6 kg)   02/25/22 0459 188 lb (85.3 kg)     Vital Signs:   Blood pressure (!) 102/58, pulse 119, temperature 98.8 °F (37.1 °C), temperature source Oral, resp. rate 18, weight 185 lb (83.9 kg), SpO2 98 %. General appearance:  No longer confused. He is alert and oriented. Head:  Normocephalic. No masses, lesions or tenderness. Eyes:  PERRLA. EOMI. Sclera clear. Paired vision. Eyeglasses on. ENT:  Ears normal. Mucosa normal.  Neck:    Supple. Trachea midline. No thyromegaly. No JVD. No bruits. Heart:    Rhythm regular. Rate controlled. No murmurs. Lungs:    Symmetrical. Clear to auscultation bilaterally. No wheezes. No rhonchi. No rales. Abdomen:   Minimal residual ascites. No pain to palpation. Extremities:    Stable lower extremity edema with chronic pitting pattern. Neurologic:    No further confusion. He is oriented x3. Tremor exists but asterixis has improved  Psych:   Behavior is normal. Mood appears normal. Speech is not rapid and/or pressured. Musculoskeletal:   Spine ROM normal. Muscular strength intact. Gait not assessed. Integumentary:  No rashes  Skin normal color and texture.   Genitalia/Breast:  Deferred    Medication:  Scheduled Meds:   insulin lispro  0-6 Units SubCUTAneous TID WC    insulin lispro  0-3 Units SubCUTAneous Nightly    ferrous sulfate  325 mg Oral BID WC    furosemide  10 mg Oral BID    lactulose  20 g Oral BID    rifaximin  550 mg Oral BID  sodium chloride flush  5-40 mL IntraVENous 2 times per day    [Held by provider] enoxaparin  40 mg SubCUTAneous Daily    aspirin  81 mg Oral Daily    levothyroxine  200 mcg Oral Daily    insulin glargine  20 Units SubCUTAneous Nightly    pantoprazole  40 mg Oral Daily    sertraline  50 mg Oral Daily     Continuous Infusions:   dextrose      sodium chloride         Objective Data:  CBC:   Lab Results   Component Value Date    WBC 4.0 02/26/2022    RBC 3.61 02/26/2022    HGB 9.7 02/26/2022    HCT 31.1 02/26/2022    MCV 86.1 02/26/2022    MCH 26.9 02/26/2022    MCHC 31.2 02/26/2022    RDW 20.1 02/26/2022     02/26/2022    MPV 11.3 02/26/2022     CMP:    Lab Results   Component Value Date     02/26/2022    K 3.7 02/26/2022    K 3.6 02/05/2022    CL 88 02/26/2022    CO2 26 02/26/2022    BUN 28 02/26/2022    CREATININE 0.9 02/26/2022    GFRAA >60 02/26/2022    LABGLOM >60 02/26/2022    GLUCOSE 263 02/26/2022    GLUCOSE 318 04/30/2012    PROT 4.5 02/26/2022    LABALBU 2.6 02/26/2022    LABALBU 4.0 04/30/2012    CALCIUM 8.5 02/26/2022    BILITOT 1.7 02/26/2022    ALKPHOS 315 02/26/2022    AST 35 02/26/2022    ALT 23 02/26/2022         Assessment:  1. Decompensated cirrhosis with ascite decompensated cirrhosis status post paracentesis with removal of 6.8 L of fluid  2. Hepatic encephalopathy with hyperammonemia  3. End-stage alcoholic cirrhosis  4. Anemia of chronic disease  5. Nondisplaced sacral fracture  6. AML being followed closely by the oncologic team  7. Chronic kidney disease stage III  8. Insulin-dependent diabetes mellitus type 2  9. History of DVT  10. Hyperlipidemia  11. Coronary artery disease  12. Hypothyroidism  13. Hx of tobacco abuse       Plan:   Following maximization of cirrhotic medications yesterday including administration of lactulose and rifaximin, his mentation has improved dramatically.   We are striving for 3-5 bowel movements per day and may need to de-escalate lactulose therapy in the setting of diarrhea overnight. Wife confirms that spironolactone resulted in intractable hyperkalemia and this has been discontinued. Lasix has been resumed. The surgical team has been asked to provide consultation to move forward with Mediport placement prior to discharge. He is granted clearance for this procedure. Chronic comorbidities are otherwise well controlled. He will work with the therapy teams today. Plans will be for discharge home tomorrow. More than 50% of my  time was spent at the bedside counseling/coordinating care with the patient and/or family with face to face contact. This time was spent reviewing notes and laboratory data as well as instructing and counseling the patient. Time I spent with the family or surrogate(s) is included only if the patient was incapable of providing the necessary information or participating in medical decisions. I also discussed the differential diagnosis and all of the proposed management plans with the patient and individuals accompanying the patient. Colby Lopez requires this high level of physician care and nursing on the IMC/Telemetry unit due the complexity of decision management and chance of rapid decline or death. Continued cardiac monitoring and higher level of nursing are required. I am readily available for any further decision-making and intervention.      Josr Carlisle DO, D.O., [facility]  7:45 AM  2/27/2022

## 2022-02-27 NOTE — CONSULTS
Simone Gordon  2/27/2022      Surgical Consult    CHIEF COMPLAINT:  Metastatic cancer    HISTORY OF PRESENT ILLNESS:  Simone Gordon is a 73 y.o. male with cirrhosis and ascites from metastatic disease. Surgery was requested for mediport placement. Takes aspirin daily and Lovenox was held today. Weight: 187 lb (84.8 kg). Past Medical History: He has a past medical history of AML (acute myeloblastic leukemia) ([facility] AML (acute myeloblastic leukemia) ([facility] Chronic kidney disease, Diabetes mellitus ([facility] GERD (gastroesophageal reflux disease), Hx of blood clots, Hyperlipidemia, Liver disease, MI, old, Other disorders of kidney and ureter, Other sleep disturbances, Thyroid disease, and Type II or unspecified type diabetes mellitus without mention of complication, not stated as uncontrolled. Past Surgical History: He has a past surgical history that includes Appendectomy; Cholecystectomy; Foot neuroma surgery; Colonoscopy; Endoscopy, colon, diagnostic; Upper gastrointestinal endoscopy; bone marrow transplant; and CT NEEDLE BIOPSY LIVER PERCUTANEOUS (1/17/2022). Allergies: Pneumococcal vaccines, Ciprofloxacin, Diatrizoate, Dye [iodides], Epinephrine, Iodine, Metformin and related, Penicillin g, Prednisone, Morphine, Other, and Oxycodone    Home Medicines:   Prior to Visit Medications    Medication Sig Taking? Authorizing Provider   ondansetron (ZOFRAN-ODT) 4 MG disintegrating tablet Take 1 tablet by mouth 3 times daily as needed for Nausea or Vomiting Yes Nickola Diss, DO   LORazepam (ATIVAN) 0.5 MG tablet Take 1 tablet by mouth 2 times daily as needed for Anxiety. Yes Nickola Diss, DO   traMADol (ULTRAM) 50 MG tablet Take 1 tablet by mouth every 8 hours as needed for Pain for up to 14 days.  Yes Nickola Diss, DO   furosemide (LASIX) 20 MG tablet Take 10 mg by mouth 2 times daily Yes Historical Provider, MD   meclizine (ANTIVERT) 12.5 MG tablet  Yes Historical Provider, MD   ferrous sulfate (IRON 325) 325 (65 Fe) MG tablet Take 1 tablet by mouth 2 times daily (with meals) Yes Rashaunsia Human, DO   levothyroxine (SYNTHROID) 200 MCG tablet Take 1 tablet by mouth Daily Yes JYOTI Barrett CNP   sertraline (ZOLOFT) 50 MG tablet Take 1.5 tablets by mouth daily  Patient taking differently: Take 50 mg by mouth daily 1 tablet Yes Tresia Human, DO   pantoprazole (PROTONIX) 40 MG tablet TAKE 1 TABLET DAILY Yes Tresia Human, DO   insulin glargine (LANTUS SOLOSTAR) 100 UNIT/ML injection pen Inject 20 Units into the skin nightly Yes Tresia Human, DO   Coenzyme Q10 (CO Q-10) 100 MG CAPS Take by mouth Yes Historical Provider, MD   insulin lispro, 1 Unit Dial, (HUMALOG KWIKPEN) 100 UNIT/ML SOPN INJECT 0-20 UNITS INTO THE SKIN THREE TIMES DAILY(BEFORE MEALS) Yes JYOTI Cartagena CNP   vitamin D (CHOLECALCIFEROL) 1000 UNIT TABS tablet Take 2,000 Units by mouth daily  Yes Historical Provider, MD   aspirin 81 MG chewable tablet Take 81 mg by mouth daily Yes Historical Provider, MD   Continuous Blood Gluc  (FREESTYLE PAOLA 2 READER) FLORESITA Check sygar FAsting and ac (4 X daiy). Tresia Human, DO   Continuous Blood Gluc Sensor (FREESTYLE PAOLA 2 SENSOR) MISC Check sugar 5 X daily. AM and AC  Tresia Human, DO   blood glucose test strips (ONETOUCH ULTRA) strip TEST 4 TIMES DAILY  Tresia Human, DO   INS SYRINGE/NEEDLE .5CC/28G (AIMSCO INS SYR MX-COM .5CC/28G) 28G X 1/2\" 0.5 ML MISC Use three times daily  Tresia Human, DO   Insulin Pen Needle (BD PEN NEEDLE JENNIFER U/F) 32G X 4 MM MISC USE AS DIRECTED  Tresia Human, DO   blood glucose test strips (ONE TOUCH ULTRA TEST) strip Test up to 5 times daily As needed. JYOTI Cartagena CNP       Social History:   TOBACCO:   reports that he quit smoking about 45 years ago. He has a 5.00 pack-year smoking history.  He quit smokeless tobacco use about 23 years ago.  ETOH:    reports current alcohol use of about 1.0 standard drink of alcohol per week. Problem Relation Age of Onset    Heart Disease Mother     High Blood Pressure Father     Heart Disease Father     Cancer Maternal Grandmother     Cancer Other     Cancer Other         REVIEW OF SYSTEMS:  Constitutional: negative  Respiratory: negative  Cardiovascular: negative  Gastrointestinal: ascites, cirrhosis  Musculoskeletal:negative  Behavioral/Psych: negative  All others reviewed, negative    Recent Labs     02/24/22  1237 02/24/22  1237 02/25/22  0601 02/25/22  0601 02/26/22  0510 02/27/22  0702   WBC 4.1*   < > 4.0*   < > 4.0* 4.6   HGB 9.5*   < > 9.8*   < > 9.7* 9.7*   *   < > 104*   < > 114* 125*   *   < > 130*   < > 128* 127*   CL 86*   < > 92*   < > 88* 87*   K 4.0   < > 3.5   < > 3.7 4.0   BUN 25*   < > 25*   < > 28* 32*   CREATININE 0.9   < > 0.8   < > 0.9 1.2   GLUCOSE 308*   < > 195*   < > 263* 229*   PROTIME  --   --  15.6*  --   --   --    INR  --   --  1.3  --   --   --    LABALBU 2.5*   < > 2.6*   < > 2.6* 2.6*   PROT 4.3*   < > 4.3*   < > 4.5* 4.6*   CALCIUM 8.2*   < > 8.4*   < > 8.5* 8.8   MG 1.1*   < > 1.8  --   --  1.5*   PHOS  --   --  3.0  --   --  4.3   BILITOT 1.8*   < > 1.8*   < > 1.7* 2.0*   BILIDIR 0.9*  --   --   --   --   --    LDH  --   --  188  --   --   --    ALKPHOS 309*   < > 292*   < > 315* 327*   AST 34   < > 33   < > 35 51*   ALT 23   < > 23   < > 23 24    < > = values in this interval not displayed. Physical exam:   VITALS:  Blood pressure 100/66, pulse 104, temperature 97.5 °F (36.4 °C), temperature source Oral, resp. rate 18, weight 185 lb (83.9 kg), SpO2 94 %.   General appearance: alert, appears stated age and cooperative  Head: Normocephalic, without obvious abnormality, atraumatic  Eyes: PERRL, EOMI  Neck: no adenopathy, no carotid bruit, no JVD, supple, symmetrical, trachea midline and thyroid not enlarged, symmetric, no tenderness/mass/nodules  Lungs: clear to auscultation bilaterally  Heart: regular rate and rhythm,   Abdomen: soft, non tender; bowel sounds normal; no hernias palpable  Extremities: extremities normal, atraumatic, no cyanosis or edema    ASSESSMENT:   Needs mediport    PLAN:   Mediport placement, orders placed    Signed: Dr. Love Kee M.D.     Send copy of H&P to PCP, Susana Andre DO

## 2022-02-28 ENCOUNTER — ANESTHESIA EVENT (OUTPATIENT)
Dept: OPERATING ROOM | Age: 72
DRG: 424 | End: 2022-02-28
Payer: MEDICARE

## 2022-02-28 ENCOUNTER — APPOINTMENT (OUTPATIENT)
Dept: GENERAL RADIOLOGY | Age: 72
DRG: 424 | End: 2022-02-28
Payer: MEDICARE

## 2022-02-28 ENCOUNTER — ANESTHESIA (OUTPATIENT)
Dept: OPERATING ROOM | Age: 72
DRG: 424 | End: 2022-02-28
Payer: MEDICARE

## 2022-02-28 VITALS
SYSTOLIC BLOOD PRESSURE: 96 MMHG | RESPIRATION RATE: 15 BRPM | DIASTOLIC BLOOD PRESSURE: 56 MMHG | OXYGEN SATURATION: 100 %

## 2022-02-28 VITALS
HEART RATE: 104 BPM | TEMPERATURE: 97.6 F | BODY MASS INDEX: 25.09 KG/M2 | SYSTOLIC BLOOD PRESSURE: 81 MMHG | WEIGHT: 165 LBS | RESPIRATION RATE: 16 BRPM | DIASTOLIC BLOOD PRESSURE: 51 MMHG | OXYGEN SATURATION: 99 %

## 2022-02-28 LAB
ALBUMIN SERPL-MCNC: 2.7 G/DL (ref 3.5–5.2)
ALP BLD-CCNC: 296 U/L (ref 40–129)
ALT SERPL-CCNC: 22 U/L (ref 0–40)
ANION GAP SERPL CALCULATED.3IONS-SCNC: 14 MMOL/L (ref 7–16)
APTT: 36.3 SEC (ref 24.5–35.1)
AST SERPL-CCNC: 35 U/L (ref 0–39)
BASOPHILS ABSOLUTE: 0 E9/L (ref 0–0.2)
BASOPHILS RELATIVE PERCENT: 0 % (ref 0–2)
BILIRUB SERPL-MCNC: 1.9 MG/DL (ref 0–1.2)
BODY FLUID CULTURE, STERILE: NORMAL
BUN BLDV-MCNC: 40 MG/DL (ref 6–23)
CALCIUM SERPL-MCNC: 8.9 MG/DL (ref 8.6–10.2)
CHLORIDE BLD-SCNC: 90 MMOL/L (ref 98–107)
CO2: 23 MMOL/L (ref 22–29)
CREAT SERPL-MCNC: 1.3 MG/DL (ref 0.7–1.2)
EOSINOPHILS ABSOLUTE: 0 E9/L (ref 0.05–0.5)
EOSINOPHILS RELATIVE PERCENT: 0 % (ref 0–6)
GFR AFRICAN AMERICAN: >60
GFR NON-AFRICAN AMERICAN: 55 ML/MIN/1.73
GLUCOSE BLD-MCNC: 203 MG/DL (ref 74–99)
GRAM STAIN RESULT: NORMAL
HCT VFR BLD CALC: 30.3 % (ref 37–54)
HEMOGLOBIN: 9.6 G/DL (ref 12.5–16.5)
IMMATURE GRANULOCYTES #: 0.05 E9/L
IMMATURE GRANULOCYTES %: 1.1 % (ref 0–5)
INR BLD: 1.4
LYMPHOCYTES ABSOLUTE: 0.83 E9/L (ref 1.5–4)
LYMPHOCYTES RELATIVE PERCENT: 17.7 % (ref 20–42)
MCH RBC QN AUTO: 27 PG (ref 26–35)
MCHC RBC AUTO-ENTMCNC: 31.7 % (ref 32–34.5)
MCV RBC AUTO: 85.4 FL (ref 80–99.9)
METER GLUCOSE: 178 MG/DL (ref 74–99)
METER GLUCOSE: 201 MG/DL (ref 74–99)
MONOCYTES ABSOLUTE: 0.99 E9/L (ref 0.1–0.95)
MONOCYTES RELATIVE PERCENT: 21.2 % (ref 2–12)
NEUTROPHILS ABSOLUTE: 2.81 E9/L (ref 1.8–7.3)
NEUTROPHILS RELATIVE PERCENT: 60 % (ref 43–80)
PDW BLD-RTO: 19.5 FL (ref 11.5–15)
PLATELET # BLD: 116 E9/L (ref 130–450)
PMV BLD AUTO: 11.1 FL (ref 7–12)
POTASSIUM SERPL-SCNC: 3.4 MMOL/L (ref 3.5–5)
PROTHROMBIN TIME: 16.2 SEC (ref 9.3–12.4)
RBC # BLD: 3.55 E12/L (ref 3.8–5.8)
SODIUM BLD-SCNC: 127 MMOL/L (ref 132–146)
TOTAL PROTEIN: 4.6 G/DL (ref 6.4–8.3)
WBC # BLD: 4.7 E9/L (ref 4.5–11.5)

## 2022-02-28 PROCEDURE — 3600000003 HC SURGERY LEVEL 3 BASE: Performed by: SURGERY

## 2022-02-28 PROCEDURE — 85610 PROTHROMBIN TIME: CPT

## 2022-02-28 PROCEDURE — 2500000003 HC RX 250 WO HCPCS: Performed by: SURGERY

## 2022-02-28 PROCEDURE — 3700000001 HC ADD 15 MINUTES (ANESTHESIA): Performed by: SURGERY

## 2022-02-28 PROCEDURE — 0JH60WZ INSERTION OF TOTALLY IMPLANTABLE VASCULAR ACCESS DEVICE INTO CHEST SUBCUTANEOUS TISSUE AND FASCIA, OPEN APPROACH: ICD-10-PCS | Performed by: SURGERY

## 2022-02-28 PROCEDURE — 02HV33Z INSERTION OF INFUSION DEVICE INTO SUPERIOR VENA CAVA, PERCUTANEOUS APPROACH: ICD-10-PCS | Performed by: SURGERY

## 2022-02-28 PROCEDURE — 6360000002 HC RX W HCPCS: Performed by: SURGERY

## 2022-02-28 PROCEDURE — 7100000001 HC PACU RECOVERY - ADDTL 15 MIN: Performed by: SURGERY

## 2022-02-28 PROCEDURE — 2580000003 HC RX 258: Performed by: SURGERY

## 2022-02-28 PROCEDURE — 85730 THROMBOPLASTIN TIME PARTIAL: CPT

## 2022-02-28 PROCEDURE — 3700000000 HC ANESTHESIA ATTENDED CARE: Performed by: SURGERY

## 2022-02-28 PROCEDURE — 2709999900 HC NON-CHARGEABLE SUPPLY: Performed by: SURGERY

## 2022-02-28 PROCEDURE — 3600000013 HC SURGERY LEVEL 3 ADDTL 15MIN: Performed by: SURGERY

## 2022-02-28 PROCEDURE — 6370000000 HC RX 637 (ALT 250 FOR IP): Performed by: SURGERY

## 2022-02-28 PROCEDURE — C1788 PORT, INDWELLING, IMP: HCPCS | Performed by: SURGERY

## 2022-02-28 PROCEDURE — 97110 THERAPEUTIC EXERCISES: CPT

## 2022-02-28 PROCEDURE — 80053 COMPREHEN METABOLIC PANEL: CPT

## 2022-02-28 PROCEDURE — 71045 X-RAY EXAM CHEST 1 VIEW: CPT

## 2022-02-28 PROCEDURE — 36415 COLL VENOUS BLD VENIPUNCTURE: CPT

## 2022-02-28 PROCEDURE — 7100000000 HC PACU RECOVERY - FIRST 15 MIN: Performed by: SURGERY

## 2022-02-28 PROCEDURE — 85025 COMPLETE CBC W/AUTO DIFF WBC: CPT

## 2022-02-28 PROCEDURE — 82962 GLUCOSE BLOOD TEST: CPT

## 2022-02-28 PROCEDURE — 6370000000 HC RX 637 (ALT 250 FOR IP): Performed by: INTERNAL MEDICINE

## 2022-02-28 PROCEDURE — 2580000003 HC RX 258: Performed by: NURSE ANESTHETIST, CERTIFIED REGISTERED

## 2022-02-28 PROCEDURE — A4216 STERILE WATER/SALINE, 10 ML: HCPCS | Performed by: NURSE ANESTHETIST, CERTIFIED REGISTERED

## 2022-02-28 PROCEDURE — 97530 THERAPEUTIC ACTIVITIES: CPT

## 2022-02-28 PROCEDURE — 6360000002 HC RX W HCPCS: Performed by: NURSE ANESTHETIST, CERTIFIED REGISTERED

## 2022-02-28 PROCEDURE — 36561 INSERT TUNNELED CV CATH: CPT | Performed by: SURGERY

## 2022-02-28 DEVICE — PORT INFUS 6FR PLAS PWR INJ ATTCH POLYUR CATH SIL FILL SUT: Type: IMPLANTABLE DEVICE | Status: FUNCTIONAL

## 2022-02-28 RX ORDER — CEFAZOLIN SODIUM 2 G/50ML
SOLUTION INTRAVENOUS
Status: COMPLETED
Start: 2022-02-28 | End: 2022-02-28

## 2022-02-28 RX ORDER — SODIUM CHLORIDE 0.9 % (FLUSH) 0.9 %
SYRINGE (ML) INJECTION PRN
Status: DISCONTINUED | OUTPATIENT
Start: 2022-02-28 | End: 2022-02-28 | Stop reason: ALTCHOICE

## 2022-02-28 RX ORDER — FENTANYL CITRATE 50 UG/ML
INJECTION, SOLUTION INTRAMUSCULAR; INTRAVENOUS PRN
Status: DISCONTINUED | OUTPATIENT
Start: 2022-02-28 | End: 2022-02-28 | Stop reason: SDUPTHER

## 2022-02-28 RX ORDER — SODIUM CHLORIDE 9 MG/ML
INJECTION INTRAVENOUS PRN
Status: DISCONTINUED | OUTPATIENT
Start: 2022-02-28 | End: 2022-02-28 | Stop reason: SDUPTHER

## 2022-02-28 RX ORDER — VITAMIN B COMPLEX
2000 TABLET ORAL DAILY
Status: DISCONTINUED | OUTPATIENT
Start: 2022-02-28 | End: 2022-02-28 | Stop reason: HOSPADM

## 2022-02-28 RX ORDER — PROPOFOL 10 MG/ML
INJECTION, EMULSION INTRAVENOUS CONTINUOUS PRN
Status: DISCONTINUED | OUTPATIENT
Start: 2022-02-28 | End: 2022-02-28 | Stop reason: SDUPTHER

## 2022-02-28 RX ORDER — BUPIVACAINE HYDROCHLORIDE AND EPINEPHRINE 2.5; 5 MG/ML; UG/ML
INJECTION, SOLUTION EPIDURAL; INFILTRATION; INTRACAUDAL; PERINEURAL PRN
Status: DISCONTINUED | OUTPATIENT
Start: 2022-02-28 | End: 2022-02-28 | Stop reason: ALTCHOICE

## 2022-02-28 RX ORDER — LACTULOSE 10 G/15ML
10 SOLUTION ORAL 2 TIMES DAILY
Qty: 900 ML | Refills: 0 | Status: ON HOLD | OUTPATIENT
Start: 2022-02-28 | End: 2022-03-09 | Stop reason: HOSPADM

## 2022-02-28 RX ORDER — CEFAZOLIN SODIUM 2 G/50ML
SOLUTION INTRAVENOUS PRN
Status: DISCONTINUED | OUTPATIENT
Start: 2022-02-28 | End: 2022-02-28 | Stop reason: SDUPTHER

## 2022-02-28 RX ORDER — HEPARIN SODIUM (PORCINE) LOCK FLUSH IV SOLN 100 UNIT/ML 100 UNIT/ML
SOLUTION INTRAVENOUS PRN
Status: DISCONTINUED | OUTPATIENT
Start: 2022-02-28 | End: 2022-02-28 | Stop reason: ALTCHOICE

## 2022-02-28 RX ORDER — MIDAZOLAM HYDROCHLORIDE 1 MG/ML
INJECTION INTRAMUSCULAR; INTRAVENOUS PRN
Status: DISCONTINUED | OUTPATIENT
Start: 2022-02-28 | End: 2022-02-28 | Stop reason: SDUPTHER

## 2022-02-28 RX ORDER — SODIUM CHLORIDE 9 MG/ML
INJECTION, SOLUTION INTRAVENOUS CONTINUOUS PRN
Status: DISCONTINUED | OUTPATIENT
Start: 2022-02-28 | End: 2022-02-28 | Stop reason: SDUPTHER

## 2022-02-28 RX ADMIN — ASPIRIN 81 MG CHEWABLE TABLET 81 MG: 81 TABLET CHEWABLE at 14:41

## 2022-02-28 RX ADMIN — LEVOTHYROXINE SODIUM 200 MCG: 0.1 TABLET ORAL at 05:45

## 2022-02-28 RX ADMIN — PHENYLEPHRINE HYDROCHLORIDE 200 MCG: 10 INJECTION INTRAVENOUS at 11:54

## 2022-02-28 RX ADMIN — FERROUS SULFATE TAB 325 MG (65 MG ELEMENTAL FE) 325 MG: 325 (65 FE) TAB at 14:47

## 2022-02-28 RX ADMIN — Medication 2000 UNITS: at 14:41

## 2022-02-28 RX ADMIN — PHENYLEPHRINE HYDROCHLORIDE 200 MCG: 10 INJECTION INTRAVENOUS at 12:07

## 2022-02-28 RX ADMIN — RIFAXIMIN 550 MG: 550 TABLET ORAL at 14:41

## 2022-02-28 RX ADMIN — FENTANYL CITRATE 50 MCG: 50 INJECTION, SOLUTION INTRAMUSCULAR; INTRAVENOUS at 11:42

## 2022-02-28 RX ADMIN — PHENYLEPHRINE HYDROCHLORIDE 300 MCG: 10 INJECTION INTRAVENOUS at 12:15

## 2022-02-28 RX ADMIN — SODIUM CHLORIDE: 9 INJECTION, SOLUTION INTRAVENOUS at 11:35

## 2022-02-28 RX ADMIN — FLUTICASONE PROPIONATE 1 SPRAY: 50 SPRAY, METERED NASAL at 08:30

## 2022-02-28 RX ADMIN — SODIUM CHLORIDE, PRESERVATIVE FREE 10 ML: 5 INJECTION INTRAVENOUS at 08:47

## 2022-02-28 RX ADMIN — PANTOPRAZOLE SODIUM 40 MG: 40 TABLET, DELAYED RELEASE ORAL at 14:41

## 2022-02-28 RX ADMIN — PHENYLEPHRINE HYDROCHLORIDE 200 MCG: 10 INJECTION INTRAVENOUS at 12:38

## 2022-02-28 RX ADMIN — CEFAZOLIN SODIUM 2000 MG: 2 SOLUTION INTRAVENOUS at 11:46

## 2022-02-28 RX ADMIN — PHENYLEPHRINE HYDROCHLORIDE 300 MCG: 10 INJECTION INTRAVENOUS at 12:10

## 2022-02-28 RX ADMIN — PROPOFOL 100 MCG/KG/MIN: 10 INJECTION, EMULSION INTRAVENOUS at 11:42

## 2022-02-28 RX ADMIN — FUROSEMIDE 10 MG: 20 TABLET ORAL at 14:41

## 2022-02-28 RX ADMIN — FENTANYL CITRATE 25 MCG: 50 INJECTION, SOLUTION INTRAMUSCULAR; INTRAVENOUS at 11:46

## 2022-02-28 RX ADMIN — FENTANYL CITRATE 25 MCG: 50 INJECTION, SOLUTION INTRAMUSCULAR; INTRAVENOUS at 11:50

## 2022-02-28 RX ADMIN — PHENYLEPHRINE HYDROCHLORIDE 300 MCG: 10 INJECTION INTRAVENOUS at 12:00

## 2022-02-28 RX ADMIN — PHENYLEPHRINE HYDROCHLORIDE 200 MCG: 10 INJECTION INTRAVENOUS at 11:49

## 2022-02-28 RX ADMIN — SERTRALINE 50 MG: 50 TABLET, FILM COATED ORAL at 14:41

## 2022-02-28 RX ADMIN — SODIUM CHLORIDE 3 ML: 9 INJECTION, SOLUTION INTRAMUSCULAR; INTRAVENOUS; SUBCUTANEOUS at 11:42

## 2022-02-28 RX ADMIN — MIDAZOLAM 2 MG: 1 INJECTION INTRAMUSCULAR; INTRAVENOUS at 11:36

## 2022-02-28 RX ADMIN — PHENYLEPHRINE HYDROCHLORIDE 200 MCG: 10 INJECTION INTRAVENOUS at 12:23

## 2022-02-28 ASSESSMENT — PULMONARY FUNCTION TESTS
PIF_VALUE: 20
PIF_VALUE: 1
PIF_VALUE: 5
PIF_VALUE: 20
PIF_VALUE: 1
PIF_VALUE: 20
PIF_VALUE: 1
PIF_VALUE: 3
PIF_VALUE: 1
PIF_VALUE: 3
PIF_VALUE: 20
PIF_VALUE: 1
PIF_VALUE: 1
PIF_VALUE: 20
PIF_VALUE: 18
PIF_VALUE: 23
PIF_VALUE: 20
PIF_VALUE: 20
PIF_VALUE: 2
PIF_VALUE: 1
PIF_VALUE: 20
PIF_VALUE: 25
PIF_VALUE: 18
PIF_VALUE: 1
PIF_VALUE: 18
PIF_VALUE: 1
PIF_VALUE: 3
PIF_VALUE: 1
PIF_VALUE: 1
PIF_VALUE: 20
PIF_VALUE: 18
PIF_VALUE: 1
PIF_VALUE: 20
PIF_VALUE: 3
PIF_VALUE: 25
PIF_VALUE: 20
PIF_VALUE: 1
PIF_VALUE: 20
PIF_VALUE: 3
PIF_VALUE: 19
PIF_VALUE: 20
PIF_VALUE: 3
PIF_VALUE: 20
PIF_VALUE: 20
PIF_VALUE: 1
PIF_VALUE: 3
PIF_VALUE: 3

## 2022-02-28 ASSESSMENT — PAIN SCALES - GENERAL
PAINLEVEL_OUTOF10: 0

## 2022-02-28 NOTE — PLAN OF CARE
Problem: Falls - Risk of:  Goal: Will remain free from falls  Description: Will remain free from falls  2/28/2022 0417 by Dino Peoples RN  Outcome: Met This Shift     Problem: Falls - Risk of:  Goal: Absence of physical injury  Description: Absence of physical injury  2/28/2022 0417 by Dino Peoples RN  Outcome: Met This Shift     Problem: Skin Integrity:  Goal: Will show no infection signs and symptoms  Description: Will show no infection signs and symptoms  2/28/2022 0417 by Dino Peoples RN  Outcome: Met This Shift     Problem: Skin Integrity:  Goal: Absence of new skin breakdown  Description: Absence of new skin breakdown  2/28/2022 0417 by Dino Peoples RN  Outcome: Met This Shift

## 2022-02-28 NOTE — CARE COORDINATION
2/28/2022 1355 CM note: No covid testing. Per [facility] outpt pharmacy, xifaxin monthly copay is $1,645.00 after insurance. Pt in surgery for mediport. CM spoke with pt's wife Nikolay Juan at bedside regarding xifaxin copay. Per [facility], they can not afford copay, she is agreeable to follow up with Dr Bettie Ocampo for possible samples. Pt is active with Dhruv Gunter for PT. Yosi Hansen [facility], informed of new Niharika Gunter orders and pt to be discharged today. Pt plans to return home at [place], family will provide ride. Will WOODARD

## 2022-02-28 NOTE — DISCHARGE SUMMARY
Internal Medicine Progress Note     [facility]     Viviana Miller. Nikki Lenz., F.A.CMatthewOMatthewI. Cristal Bobo D.O., PRASANNAOJESUS Ching D.O. Durga Saldivar, MSN, APRN, NP-C  Rande Blizzard. Lakesha Seymour, MSN, APRN-CNP       Internal Medicine  Discharge Summary    NAME: Eddie Pierson  :  1950  MRN:  [id number]  PCP:Suraj Moore DO  ADMITTED: 2022      DISCHARGED: 22    ADMITTING PHYSICIAN: Viviana Menendez DO    CONSULTANT(S):   IP CONSULT TO GI  IP CONSULT TO ONCOLOGY  IP CONSULT TO GENERAL SURGERY  IP CONSULT TO GENERAL SURGERY  IP CONSULT TO CASE MANAGEMENT     ADMITTING DIAGNOSIS:   Decompensation of cirrhosis of liver ([facility] [K72.90, K74.60]     DISCHARGE DIAGNOSES:   1. Decompensated cirrhosis with ascite decompensated cirrhosis status post paracentesis with removal of 6.8 L of fluid  2. Hepatic encephalopathy with hyperammonemia  3. End-stage alcoholic cirrhosis  4. Anemia of chronic disease  5. Nondisplaced sacral fracture  6. AML being followed closely by the oncologic team  7. Chronic kidney disease stage III  8. Insulin-dependent diabetes mellitus type 2  9. History of DVT  10. Hyperlipidemia  11. Coronary artery disease  12. Hypothyroidism  13. Hx of tobacco abuse     BRIEF HISTORY OF PRESENT ILLNESS:   Patient is a 60-year-old male who presented to the ED due to abdominal distention. Patient has a history of cirrhosis. States he had started having issues about 2 months ago. He has had 3 paracentesis in that timeframe. His last one was about 3 days ago. He had about 8 L removed. He denies any abdominal pain currently. He denies any nausea or emesis. Denies any recent alcohol intake. He denies any fever or chills. He denies any chest pain or shortness of breath. He states he does drink a lot of fluids. He states he does drink a lot of liquids. Does not seem like he follows a salt restriction.   States he takes his medications as prescribed. LABS[de-identified]  Lab Results   Component Value Date    WBC 4.7 02/28/2022    HGB 9.6 (L) 02/28/2022    HCT 30.3 (L) 02/28/2022     (L) 02/28/2022     (L) 02/28/2022    K 3.4 (L) 02/28/2022    CL 90 (L) 02/28/2022    CREATININE 1.3 (H) 02/28/2022    BUN 40 (H) 02/28/2022    CO2 23 02/28/2022    GLUCOSE 203 (H) 02/28/2022    ALT 22 02/28/2022    AST 35 02/28/2022    INR 1.4 02/28/2022     Lab Results   Component Value Date    INR 1.4 02/28/2022    INR 1.3 02/25/2022    INR 1.3 02/05/2022    PROTIME 16.2 (H) 02/28/2022    PROTIME 15.6 (H) 02/25/2022    PROTIME 13.7 (H) 02/05/2022      Lab Results   Component Value Date    TSH 3.610 02/25/2022     Lab Results   Component Value Date    TRIG 230 (H) 04/28/2020    TRIG 327 (H) 07/05/2018     Lab Results   Component Value Date    HDL 22 04/28/2020    HDL 17 07/05/2018     Lab Results   Component Value Date    LDLCALC 63 04/28/2020    LDLCALC 48 07/05/2018     Lab Results   Component Value Date    LABA1C 8.4 12/08/2021       IMAGING:  CT ABDOMEN PELVIS WO CONTRAST Additional Contrast? None    Result Date: 2/24/2022  EXAMINATION: CT OF THE ABDOMEN AND PELVIS WITHOUT CONTRAST 2/24/2022 1:19 pm TECHNIQUE: CT of the abdomen and pelvis was performed without the administration of intravenous contrast. Multiplanar reformatted images are provided for review. Dose modulation, iterative reconstruction, and/or weight based adjustment of the mA/kV was utilized to reduce the radiation dose to as low as reasonably achievable.  COMPARISON: February 5, 2022 HISTORY: ORDERING SYSTEM PROVIDED HISTORY: abdominal distention and low back pain/ttp s/p fall TECHNOLOGIST PROVIDED HISTORY: Reason for exam:->abdominal distention and low back pain/ttp s/p fall Additional Contrast?->None Decision Support Exception - unselect if not a suspected or confirmed emergency medical condition->Emergency Medical Condition (MA) FINDINGS: Lower Chest: There is chronic elevation of the right hemidiaphragm. Heart size is within normal limits. Coronary artery calcifications are noted, potentially a marker for coronary artery disease. Trace pericardial fluid is present. Moderate to severe bilateral gynecomastia has a symmetric appearance. There are multiple sub 5 mm pulmonary nodules in the lower lobes bilaterally. Several of these nodules are new from the prior study. Additionally, there is a 9 mm nodule in the left lower lobe on axial image 49, increased in size from the prior study when it measured approximately 4 mm. Organs: The liver is cirrhotic and contains multiple hypodense lesions scattered throughout the left and right lobes. Evaluation of these lesions is limited due to lack of IV contrast material.  There are sequelae of portal hypertension including splenomegaly, with a cranial to caudal spleen size of 13 cm. A linear hypodensity extending from the anterior to posterior aspect of the upper pole of the spleen is stable in appearance. The unenhanced pancreas is normal in appearance. The kidneys are without hydronephrosis. Bilateral nonobstructing renal calculi are stable from the prior study, measuring up to 4 mm in the right kidney and 2-3 mm in the left kidney. GI/Bowel: No evidence of a bowel obstruction, free air or pneumatosis. Portions the colon are decompressed, limiting assessment for mucosal based abnormalities. There is diverticulosis without evidence of diverticulitis. There is a short segment of colonic wall thickening along the proximal and mid segments of the transverse colon. Proximal ascending colon also demonstrates a short segment circumferential wall thickening. The appendix is not confidently visualized. No obvious pericecal inflammatory changes to suggest acute appendicitis. The stomach and duodenal sweep are under distended, limiting assessment. No obvious gastric or duodenal abnormality within this limitation. Pelvis:  The urinary bladder is distended, with a subcentimeter diverticulum arising from the anterior wall near the upper margin of the bladder on axial image 190. The prostate gland is heterogeneous but nonenlarged. There is no evidence of pelvic lymphadenopathy. A large volume of ascites is present in the pelvis. Peritoneum/Retroperitoneum: There is a large volume of abdominal ascites. An increased number of visible, top-normal and mildly enlarged mesenteric lymph nodes are present, along with mesenteric edema, not significantly changed in appearance compared with the February 5, 2022 exam.  The abdominal aorta is normal in caliber. The abdominal aorta is heavily calcified but normal in caliber. There are several top-normal sized periaortic/retroperitoneal lymph nodes. A retroperitoneal lymph node on axial image 106 is enlarged measuring 12 mm in short axis. Bones/Soft Tissues: Bones are diffusely osteopenic. There is a nondisplaced fracture through the S3 segment of the sacrum, best demonstrated on the sagittal view. No additional fractures are identified. Moderate degenerative changes involve the lower lumbar spine. Nondisplaced sacral fracture as above. No additional acute osseous abnormalities. Cirrhotic liver morphology with sequelae of portal hypertension including splenomegaly and large volume of ascites in the abdomen and pelvis. Multiple hypodense lesions scattered throughout the liver which may reflect primary liver cancer or metastatic disease, among other etiologies. Numerous subcentimeter pulmonary nodules at both lung bases, increased in number and size from the prior study. Findings are highly suspicious for metastatic disease. Additional, incidental findings as above.      CT ABDOMEN PELVIS WO CONTRAST Additional Contrast? None    Result Date: 2/5/2022  EXAMINATION: CT OF THE ABDOMEN AND PELVIS WITHOUT CONTRAST 2/5/2022 2:06 pm TECHNIQUE: CT of the abdomen and pelvis was performed without the administration of intravenous contrast. Multiplanar reformatted images are provided for review. Dose modulation, iterative reconstruction, and/or weight based adjustment of the mA/kV was utilized to reduce the radiation dose to as low as reasonably achievable. COMPARISON: 5-19 HISTORY: ORDERING SYSTEM PROVIDED HISTORY: worsening ascites in a pt with known [address] TECHNOLOGIST PROVIDED HISTORY: Reason for exam:->worsening ascites in a pt with known [address] Additional Contrast?->None Decision Support Exception - unselect if not a suspected or confirmed emergency medical condition->Emergency Medical Condition (MA) What reading provider will be dictating this exam?->CRC FINDINGS: Lower Chest: Basilar atelectasis more rightward and small right pleural effusion. Prominent gynecomastia. Upper margin of the exam has a left anterior small nodular focus at 4 mm. Additional is left lower lobe at 4 mm, right lower lobe also at 4 mm. Cannot exclude developing metastatic disease in light of findings reported below. Organs:   Splenomegaly with hypodensity upper central location extending to mid level posteriorly consistent with infarct. Advanced hepatic cirrhosis. Heterogeneity with low-attenuation areas most evident at the hepatic dome, anteriorly 2 cm and posterolateral at 7 cm, posteriorly at 4 cm, caudate lobe at 2.7 cm, image 60 additional small lesion at 1.7 cm anterolaterally, and perhaps more lesions by limited noncontrast assessment. Cholecystectomy. Mild nephrolithiasis. Organs are not well evaluated without contrast. Apparent small ventral cyst of the left kidney. GI/Bowel: Possible constipation without bowel obstruction. Pelvis: Inguinal fat hernias. Peritoneum/Retroperitoneum: Large volume of ascites. Bones/Soft Tissues: Mild spinal degenerative changes. Umbilicus fat hernia containing ascites. 1.  Severe hepatic cirrhosis and portal hypertension including large volume of ascites.  2. Multiple low-attenuation areas within the liver concerning for neoplasm such as hepatocellular carcinoma. Recommend MRI. 3. Splenomegaly with hypodensity suggestive of acute infarct. 4.  Incidental findings are described above. XR CHEST INSPIRATION AND EXPIRATION    Result Date: 2/24/2022  EXAMINATION: ONE XRAY VIEW OF THE CHEST IN INSPIRATION 2/24/2022 2:49 pm COMPARISON: The previous study performed earlier in the day at 12:59 p.m.. HISTORY: ORDERING SYSTEM PROVIDED HISTORY: r/o pneumothorax TECHNOLOGIST PROVIDED HISTORY: Reason for exam:->r/o pneumothorax FINDINGS: The inspiration study again reveals the lungs to be hypoinflated. There is no evidence of acute consolidation or infiltrate. There is again elevation of the right hemidiaphragm. The curvilinear radiolucency in the right upper lung field is less prominently noted but still suggested to be present, especially in the inspiration view. There appears to be extension beyond the chest wall and into the soft tissues, which would indicate a skinfold. There is no definite evidence to suggest pneumothorax. The cardiac silhouette and mediastinal structures are without significant interval change. Curvilinear radiolucency in the right upper lung field noted on the prior examination performed earlier in the day is less prominently seen on this study but still suggested to be present, especially in the inspiration view. There appears to be extension beyond the chest wall and into the soft tissues, indicating a skin fold. No definite evidence to suggest pneumothorax. XR CHEST (2 VW)    Result Date: 2/24/2022  EXAMINATION: TWO XRAY VIEWS OF THE CHEST 2/24/2022 1:05 pm COMPARISON: The previous study performed 02/05/2022. HISTORY: ORDERING SYSTEM PROVIDED HISTORY: shortness of breath TECHNOLOGIST PROVIDED HISTORY: Reason for exam:->shortness of breath FINDINGS: The lungs are again hypoinflated. There is again elevation of the right hemidiaphragm. A small right pleural effusion cannot be excluded. There is no evidence of acute consolidation or infiltrate. There is a small, curvilinear radiolucency seen involving the right upper lung field. Rule out skin fold versus small pneumothorax. Repeat chest radiographs, including inspiration and expiration are recommended. The cardiac silhouette and mediastinal structures are without significant interval change. Hypoinflated lungs again identified, when compared to the previous study performed 02/05/2022. Rule out skin fold versus small pneumothorax in the right upper lung field. Repeat chest radiographs, including inspiration and expiration are recommended. Elevated right hemidiaphragm again seen. Rule out small right pleural effusion. RECOMMENDATION: The findings were sent to the Radiology Results [id number] at 01:14 p.m. on 02/24/2022 to be communicated to a licensed caregiver. XR CHEST PORTABLE    Result Date: 2/5/2022  EXAMINATION: ONE XRAY VIEW OF THE CHEST 2/5/2022 1:27 pm COMPARISON: 12/03/2021 HISTORY: ORDERING SYSTEM PROVIDED HISTORY: worsening SOB TECHNOLOGIST PROVIDED HISTORY: Reason for exam:->worsening SOB What reading provider will be dictating this exam?->CRC FINDINGS: The cardiac silhouette is within normal limits. There is either consolidation within the right lower lobe or new elevation right hemidiaphragm. The left lung is clear. There is no appreciable left pleural effusion. 1. New consolidation seen within the right lung base which could represent an infiltrate, mass, atelectasis or possible elevation right hemidiaphragm. Dedicated CT of the chest is recommended for further evaluation 2. The left lung is clear. US ABDOMEN LIMITED Specify organ?  LIVER, SPLEEN    Result Date: 2/25/2022  EXAMINATION: RIGHT UPPER QUADRANT ULTRASOUND AND WITH LIVER DOPPLER 2/25/2022 10:11 am COMPARISON: CT ABDOMEN AND PELVIS WITHOUT CONTRAST 02/05/2022 HISTORY: ORDERING SYSTEM PROVIDED HISTORY: Portal and hepatic veins TECHNOLOGIST PROVIDED HISTORY: Reason for exam:->Portal and hepatic veins Specify organ?->LIVER Specify organ?->SPLEEN What reading provider will be dictating this exam?->CRC TECHNIQUE: Duplex ultrasound using B-mode/gray scaled imaging, Doppler spectral analysis and color flow Doppler was obtained of the liver and right upper quadrant abdomen. FINDINGS: Severe cirrhosis with a shrunken liver and prominent nodular patent contour. Recent paracentesis and with current findings of small to moderate ascites. The gallbladder is surgically absent. No intrahepatic or extrahepatic biliary dilatation. The common bile duct measures 0.4 cm in depth diameter which is normal. The liver shows a coarsened echotexture pattern as seen with cirrhosis. The central liver shows an equivocal 1.6 cm hypoechoic lesion which is poorly defined. This area may be artifactual.  No dominant lesion is seen. By Doppler evaluation, the portal and hepatic veins are patent. Physiologic hepatopetal portal blood flow is present and with portal vein velocity up to 22.1 cm/sec (normal 20-40 cm/sec). The splenic vein remains patent. The right kidney is visualized and appears normal.  No hydronephrosis. The right kidney measures 10.7 cm in length. 1.  Shrunken liver with severe cirrhosis and small to moderate ascites. Equivocal 1.6 cm hypoechoic lesion within the central liver. 2.  Patent portal and hepatic veins. Physiologic hepatopetal portal flow. IR US GUIDED PARACENTESIS    Result Date: 2/25/2022  PROCEDURE: PARACENTESIS WITHOUT IMAGE GUIDANCE US ABDOMEN LIMITED 2/25/2022 HISTORY: ORDERING SYSTEM PROVIDED HISTORY: paracentesis TECHNOLOGIST PROVIDED HISTORY: Reason for exam:->paracentesis TECHNIQUE: Informed consent was obtained after a detailed explanation of the procedure including risks, benefits, and alternatives. Universal protocol was followed. A limited ultrasound of the abdomen was performed.  The right abdomen was prepped and draped in sterile fashion and local anesthesia was achieved with lidocaine. A 5 [place] needle sheath was advanced into ascites and paracentesis was performed. The patient tolerated the procedure well. FINDINGS: Limited ultrasound of the abdomen demonstrates ascites. A total of 6850 cc of straw-colored ascitic fluid was removed. Status post paracentesis. US DUP ABD PEL RETRO SCROT LIMITED    Result Date: 2/25/2022  EXAMINATION: RIGHT UPPER QUADRANT ULTRASOUND AND WITH LIVER DOPPLER 2/25/2022 10:11 am COMPARISON: CT ABDOMEN AND PELVIS WITHOUT CONTRAST 02/05/2022 HISTORY: ORDERING SYSTEM PROVIDED HISTORY: Portal and hepatic veins TECHNOLOGIST PROVIDED HISTORY: Reason for exam:->Portal and hepatic veins Specify organ?->LIVER Specify organ?->SPLEEN What reading provider will be dictating this exam?->CRC TECHNIQUE: Duplex ultrasound using B-mode/gray scaled imaging, Doppler spectral analysis and color flow Doppler was obtained of the liver and right upper quadrant abdomen. FINDINGS: Severe cirrhosis with a shrunken liver and prominent nodular patent contour. Recent paracentesis and with current findings of small to moderate ascites. The gallbladder is surgically absent. No intrahepatic or extrahepatic biliary dilatation. The common bile duct measures 0.4 cm in depth diameter which is normal. The liver shows a coarsened echotexture pattern as seen with cirrhosis. The central liver shows an equivocal 1.6 cm hypoechoic lesion which is poorly defined. This area may be artifactual.  No dominant lesion is seen. By Doppler evaluation, the portal and hepatic veins are patent. Physiologic hepatopetal portal blood flow is present and with portal vein velocity up to 22.1 cm/sec (normal 20-40 cm/sec). The splenic vein remains patent. The right kidney is visualized and appears normal.  No hydronephrosis. The right kidney measures 10.7 cm in length. 1.  Shrunken liver with severe cirrhosis and small to moderate ascites. Equivocal 1.6 cm hypoechoic lesion within the central liver. 2.  Patent portal and hepatic veins. Physiologic hepatopetal portal flow. HOSPITAL COURSE:   Fayetta Holstein did very well throughout the hospitalization. He presented to the hospital suffering from decompensated alcoholic cirrhosis complicated by a known history of AML. He was evaluated by the oncology and GI teams throughout the hospitalization. He underwent paracentesis with removal of 6.8 L of fluid. He developed hyperammonemia throughout the hospitalization with encephalopathy. Lactulose therapy and Xifaxan were added to his regimen. He has also been placed back on Lasix therapy. He is intolerant to spironolactone therapy and this is not being utilized. In the setting of his AML being followed in [facility], recommendations were for Mediport placement and this will be arranged today prior to discharge. He has improved dramatically. I have contacted his wife, Kaushik Younger and updated her regarding hospitalization and changes in medication. She understands importance of following up with the oncology team both locally and in [facility]. .  Otherwise, he is working well with the therapy teams and has returned to his baseline. He understands importance of close outpatient follow-up. BRIEF PHYSICAL EXAMINATION AND LABORATORIES ON DAY OF DISCHARGE:  VITALS:  /75   Pulse 108   Temp 97.9 °F (36.6 °C) (Oral)   Resp 18   Wt 165 lb (74.8 kg)   SpO2 97%   BMI 25.09 kg/m²     HEENT:  PERRLA. EOMI. Sclera clear. Buccal mucosa moist.    Neck:  Supple. Trachea midline. No thyromegaly. No JVD. No bruits. Heart:  Rhythm regular, rate controlled. No murmurs. Lungs:  Symmetrical. Clear to auscultation bilaterally. No wheezes. No rhonchi. No rales. Abdomen: Substantial improvement in his presenting ascites. Abdomen is soft. Nontender. Bowel sounds are active. Extremities:  Peripheral pulses present. No peripheral edema.   No ulcers. Neurologic:  Alert x 3. No focal deficit. Cranial nerves grossly intact. Skin:  No petechia. No hemorrhage. No wounds. DISPOSITION:  The patient's condition is good. At this time the patient is without objective evidence of an acute process requiring continuing hospitalization or inpatient management. They are stable for discharge with outpatient follow-up. I have spoken with the patient and discussed the results of the current hospitalization, in addition to providing specific details for the plan of care and counseling regarding the diagnosis and prognosis. The plan has been discussed in detail and they are aware of the specific conditions for emergent return, as well as the importance of follow-up. Their questions are answered at this time and they are agreeable with the plan for discharge to home    DISCHARGE MEDICATIONS:   Current Discharge Medication List           Details   lactulose (CHRONULAC) 10 GM/15ML solution Take 15 mLs by mouth 2 times daily  Qty: 900 mL, Refills: 0      rifaximin (XIFAXAN) 550 MG tablet Take 1 tablet by mouth 2 times daily  Qty: 60 tablet, Refills: 0              Details   ondansetron (ZOFRAN-ODT) 4 MG disintegrating tablet Take 1 tablet by mouth 3 times daily as needed for Nausea or Vomiting  Qty: 21 tablet, Refills: 0    Associated Diagnoses: Nausea      LORazepam (ATIVAN) 0.5 MG tablet Take 1 tablet by mouth 2 times daily as needed for Anxiety. Qty: 60 tablet, Refills: 0    Associated Diagnoses: Anxiety      traMADol (ULTRAM) 50 MG tablet Take 1 tablet by mouth every 8 hours as needed for Pain for up to 14 days. Qty: 42 tablet, Refills: 0    Comments: Reduce doses taken as pain becomes manageable  Associated Diagnoses: GVHD (graft versus host disease) ([facility]  Cancer associated pain      furosemide (LASIX) 20 MG tablet Take 10 mg by mouth 2 times daily      ferrous sulfate (IRON 325) 325 (65 Fe) MG tablet Take 1 tablet by mouth 2 times daily (with meals)  Qty: 180 tablet, Refills: 0    Associated Diagnoses: Iron deficiency      levothyroxine (SYNTHROID) 200 MCG tablet Take 1 tablet by mouth Daily  Qty: 90 tablet, Refills: 1    Associated Diagnoses: Hypothyroidism, unspecified type      sertraline (ZOLOFT) 50 MG tablet Take 1.5 tablets by mouth daily  Qty: 30 tablet, Refills: 3    Associated Diagnoses: Anxiety      pantoprazole (PROTONIX) 40 MG tablet TAKE 1 TABLET DAILY  Qty: 90 tablet, Refills: 3    Associated Diagnoses: Gastroesophageal reflux disease without esophagitis      insulin glargine (LANTUS SOLOSTAR) 100 UNIT/ML injection pen Inject 20 Units into the skin nightly  Qty: 5 pen, Refills: 3    Associated Diagnoses: Type 2 diabetes mellitus without complication, with long-term current use of insulin ([facility])      insulin lispro, 1 Unit Dial, (HUMALOG KWIKPEN) 100 UNIT/ML SOPN INJECT 0-20 UNITS INTO THE SKIN THREE TIMES DAILY(BEFORE MEALS)  Qty: 48 mL, Refills: 0    Associated Diagnoses: Uncontrolled type 2 diabetes mellitus with hyperglycemia ([facility])      vitamin D (CHOLECALCIFEROL) 1000 UNIT TABS tablet Take 2,000 Units by mouth daily       aspirin 81 MG chewable tablet Take 81 mg by mouth daily      Continuous Blood Gluc  (FREESTYLE PAOLA 2 READER) FLORESITA Check sygar FAsting and ac (4 X daiy). Qty: 1 each, Refills: 0    Associated Diagnoses: Type 2 diabetes mellitus without complication, with long-term current use of insulin ([facility])      Continuous Blood Gluc Sensor (FREESTYLE PAOLA 2 SENSOR) MISC Check sugar 5 X daily. AM and AC  Qty: 6 each, Refills: 1    Associated Diagnoses: Type 2 diabetes mellitus without complication, with long-term current use of insulin ([facility]      ! ! blood glucose test strips (ONETOUCH ULTRA) strip TEST 4 TIMES DAILY  Qty: 300 strip, Refills: 5    Comments: DIAGNOSIS IS E11.9 !!   Associated Diagnoses: Type 2 diabetes mellitus without complication, with long-term current use of insulin ([facility])      INS SYRINGE/NEEDLE .5CC/28G ([employer] INS SYR MX-COM .5CC/28G) 28G X 1/2\" 0.5 ML MISC Use three times daily  Qty: 100 each, Refills: 3    Associated Diagnoses: Type 2 diabetes mellitus without complication, with long-term current use of insulin ([facility])      Insulin Pen Needle (BD PEN NEEDLE JENNIFER U/F) 32G X 4 MM MISC USE AS DIRECTED  Qty: 100 each, Refills: 3      !! blood glucose test strips (ONE TOUCH ULTRA TEST) strip Test up to 5 times daily As needed. Qty: 600 strip, Refills: 2    Associated Diagnoses: Type 2 diabetes mellitus without complication, with long-term current use of insulin ([address]       ! ! - Potential duplicate medications found. Please discuss with provider. FOLLOW UP/INSTRUCTIONS:  · This patient is instructed to follow-up with his primary care physician. · Patient is instructed to follow-up with the consults listed above as directed by them. · he is instructed to resume home medications and take new medications as indicated in the list above. · If the patient has a recurrence of symptoms, he is instructed to go to the ED. Preparing for this patient's discharge, including paperwork, orders, instructions, and meeting with patient did require > 40 minutes.     Teresa Lott DO   2/28/2022  8:08 AM

## 2022-02-28 NOTE — ANESTHESIA PRE PROCEDURE
Department of Anesthesiology  Preprocedure Note       Name:  Matthew Muro   Age:  73 y.o.  :  1950                                          MRN:  [id number]         Date:  2022      Surgeon: Micha Talley):  Tahir Curry MD    Procedure: Procedure(s): MEDIPORT CATHETER INSERTION    Medications prior to admission:   Prior to Admission medications    Medication Sig Start Date End Date Taking? Authorizing Provider   ondansetron (ZOFRAN-ODT) 4 MG disintegrating tablet Take 1 tablet by mouth 3 times daily as needed for Nausea or Vomiting 22  Yes Court Hyde DO   LORazepam (ATIVAN) 0.5 MG tablet Take 1 tablet by mouth 2 times daily as needed for Anxiety. 22 Yes Court Hyde DO   traMADol (ULTRAM) 50 MG tablet Take 1 tablet by mouth every 8 hours as needed for Pain for up to 14 days.  2/24/22 3/10/22 Yes Court Hyde DO   furosemide (LASIX) 20 MG tablet Take 10 mg by mouth 2 times daily   Yes Historical Provider, MD   meclizine (ANTIVERT) 12.5 MG tablet  22  Yes Historical Provider, MD   ferrous sulfate (IRON 325) 325 (65 Fe) MG tablet Take 1 tablet by mouth 2 times daily (with meals) 22  Yes Court Hyde DO   levothyroxine (SYNTHROID) 200 MCG tablet Take 1 tablet by mouth Daily 21  Yes JYOTI Montanez - CNP   sertraline (ZOLOFT) 50 MG tablet Take 1.5 tablets by mouth daily  Patient taking differently: Take 50 mg by mouth daily 1 tablet 21  Yes Court Hyde DO   pantoprazole (PROTONIX) 40 MG tablet TAKE 1 TABLET DAILY 21  Yes Court Hyde DO   insulin glargine (LANTUS SOLOSTAR) 100 UNIT/ML injection pen Inject 20 Units into the skin nightly 21  Yes Court Hyde DO   Coenzyme Q10 (CO Q-10) 100 MG CAPS Take by mouth   Yes Historical Provider, MD   insulin lispro, 1 Unit Dial, (HUMALOG KWIKPEN) 100 UNIT/ML SOPN INJECT 0-20 UNITS INTO THE SKIN THREE TIMES DAILY(BEFORE MEALS) 21  Yes Calos Vargas JYOTI Lai CNP   vitamin D (CHOLECALCIFEROL) 1000 UNIT TABS tablet Take 2,000 Units by mouth daily    Yes Historical Provider, MD   aspirin 81 MG chewable tablet Take 81 mg by mouth daily   Yes Historical Provider, MD   Continuous Blood Gluc  (FREESTYLE PAOLA 2 READER) FLORESITA Check sygar FAsting and ac (4 X daiy). 2/24/22   Mary Garcia, DO   Continuous Blood Gluc Sensor (FREESTYLE PAOLA 2 SENSOR) MISC Check sugar 5 X daily. AM and [facility] 2/24/22   Mary Garcia, DO   blood glucose test strips [facility] ULTRA) strip TEST 4 TIMES DAILY 2/14/22   Mary Garcia, DO   INS SYRINGE/NEEDLE .5CC/28G (SolutionreachCO INS SYR MX-COM .5CC/28G) 28G X 1/2\" 0.5 ML MISC Use three times daily 4/26/21   Mary Garcia, DO   Insulin Pen Needle (BD PEN NEEDLE JENNIFER U/F) 32G X 4 MM MISC USE AS DIRECTED 4/5/21   Mary Garcia, DO   blood glucose test strips (ONE TOUCH ULTRA TEST) strip Test up to 5 times daily As needed.  6/29/20   JYOTI Cheng - CNP       Current medications:    Current Facility-Administered Medications   Medication Dose Route Frequency Provider Last Rate Last Admin    ceFAZolin (ANCEF) 2-3 GM-%(50ML) IVPB (duplex)             lactulose (CHRONULAC) 10 GM/15ML solution 10 g  10 g Oral BID Alpa Reddy DO   10 g at 02/27/22 2034    fluticasone (FLONASE) 50 MCG/ACT nasal spray 1 spray  1 spray Each Nostril Daily Alpa Reddy DO        sodium chloride flush 0.9 % injection 10 mL  10 mL IntraVENous 2 times per day Alex Bangura MD   10 mL at 02/28/22 0847    sodium chloride flush 0.9 % injection 10 mL  10 mL IntraVENous PRN Alex Bangura MD        0.9 % sodium chloride infusion  25 mL IntraVENous PRN Alex Bangura MD        insulin lispro (HUMALOG) injection vial 0-6 Units  0-6 Units SubCUTAneous TID  Alpa Reddy DO   2 Units at 02/27/22 1641    insulin lispro (HUMALOG) injection vial 0-3 Units  0-3 Units SubCUTAneous Nightly Alpa Reddy DO   1 Units at 02/27/22 2035    ferrous sulfate (IRON 325) tablet 325 mg  325 mg Oral BID WC Vessie Linda, DO   325 mg at 02/27/22 1640    furosemide (LASIX) tablet 10 mg  10 mg Oral BID Vessie Linda, DO   10 mg at 02/27/22 2034    rifaximin (XIFAXAN) tablet 550 mg  550 mg Oral BID Vessie Linda, DO   550 mg at 02/27/22 2032    ondansetron (ZOFRAN) injection 4 mg  4 mg IntraVENous Q6H PRN Vessie Linda, DO   4 mg at 02/27/22 1950    oxyCODONE (ROXICODONE) immediate release tablet 5 mg  5 mg Oral Q6H PRN Clare Lime Bisel, DO   5 mg at 02/27/22 2332    glucose (GLUTOSE) 40 % oral gel 15 g  15 g Oral PRN Ane Nagy, DO        dextrose 50 % IV solution  12.5 g IntraVENous PRN Ane Nagy, DO        glucagon (rDNA) injection 1 mg  1 mg IntraMUSCular PRN Ane Nagy, DO        dextrose 5 % solution  100 mL/hr IntraVENous PRN Ane Nagy, DO        [Held by provider] enoxaparin (LOVENOX) injection 40 mg  40 mg SubCUTAneous Daily Ane Nagy, DO   40 mg at 02/26/22 2148    polyethylene glycol (GLYCOLAX) packet 17 g  17 g Oral Daily PRN Ane Nagy, DO        acetaminophen (TYLENOL) tablet 650 mg  650 mg Oral Q6H PRN Ane Nagy, DO   650 mg at 02/27/22 0447    Or    acetaminophen (TYLENOL) suppository 650 mg  650 mg Rectal Q6H PRN Ane Nagy, DO        aspirin chewable tablet 81 mg  81 mg Oral Daily Ane Nagy, DO   81 mg at 02/27/22 0741    levothyroxine (SYNTHROID) tablet 200 mcg  200 mcg Oral Daily Ane Nagy, DO   200 mcg at 02/28/22 0545    insulin glargine (LANTUS) injection vial 20 Units  20 Units SubCUTAneous Nightly Ane Nagy, DO   20 Units at 02/27/22 2035    LORazepam (ATIVAN) tablet 0.5 mg  0.5 mg Oral BID PRN Ane Nagy, DO        pantoprazole (PROTONIX) tablet 40 mg  40 mg Oral Daily Ane Nagy, DO   40 mg at 02/27/22 3943    sertraline (ZOLOFT) tablet 50 mg  50 mg Oral Daily Ane Nagy, DO   50 mg at 02/27/22 1117       Allergies:     Allergies   Allergen Reactions    Pneumococcal Vaccines Dermatitis    Ciprofloxacin      Shuts kidneys down    Diatrizoate      IS able to have CT scans with premedication    Dye [Iodides]      statrted to go into cardiac arrest    Epinephrine     Iodine     Metformin And Related     Penicillin G      Unsure if he is still allergic to PCN, or if he ever was    Prednisone     Morphine Nausea And Vomiting    Other Nausea And Vomiting     fish    Oxycodone Nausea And Vomiting       Problem List:    Patient Active Problem List   Diagnosis Code    Bronchitis with bronchospasm J20.9    Anxiety F41.9    Post herpetic neuralgia B02.29    Type 2 diabetes mellitus without complication, with long-term current use of insulin (HCC) E11.9, Z79.4    Cellulitis of left lower extremity L03. 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, U09.9    Uncontrolled type 2 diabetes mellitus with hyperglycemia ([facility]) E11.65    Type 2 diabetes mellitus with hyperglycemia E11.65    Type 2 diabetes mellitus with chronic kidney disease E11.22    Decompensation of cirrhosis of liver (HCC) K72.90, K74.60       Past Medical History:        Diagnosis Date    AML (acute myeloblastic leukemia) (HCC)     chemotherapy    AML (acute myeloblastic leukemia) ([facility] 2010    Cancer ([facility]     Chronic kidney disease     Diabetes mellitus ([facility]     GERD (gastroesophageal reflux disease)     Hx of blood clots 2012    legs    Hyperlipidemia     Liver disease     MI, old     Other disorders of kidney and ureter     Other sleep disturbances     Thyroid disease     Type II or unspecified type diabetes mellitus without mention of complication, not stated as uncontrolled        Past Surgical History:        Procedure Laterality Date    APPENDECTOMY      BONE MARROW TRANSPLANT      double core transplant    CHOLECYSTECTOMY      COLONOSCOPY      CT NEEDLE BIOPSY LIVER PERCUTANEOUS  2022    CT NEEDLE BIOPSY LIVER PERCUTANEOUS    ENDOSCOPY, COLON, DIAGNOSTIC      FOOT NEUROMA SURGERY      UPPER GASTROINTESTINAL ENDOSCOPY         Social History:    Social History     Tobacco Use    Smoking status: Former Smoker     Packs/day: 1.00     Years: 5.00     Pack years: 5.00     Quit date: 1976     Years since quittin.9    Smokeless tobacco: Former User     Quit date: 1998   Substance Use Topics    Alcohol use: Yes     Alcohol/week: 1.0 standard drink     Types: 1 Cans of beer per week     Comment: weekly                                Counseling given: Not Answered      Vital Signs (Current):   Vitals:    22 1856 22 0512 22 0830 22 1000   BP: 111/75  (!) 96/54 102/66   Pulse: 108  112 113   Resp: 18  18 16   Temp: 97.9 °F (36.6 °C)  98.3 °F (36.8 °C) 98.8 °F (37.1 °C)   TempSrc: Oral  Oral Oral   SpO2: 97%   96%   Weight:  165 lb (74.8 kg)                                                BP Readings from Last 3 Encounters:   22 102/66   22 122/82   22 122/80       NPO Status:                                                                                 BMI:   Wt Readings from Last 3 Encounters:   22 165 lb (74.8 kg)   22 202 lb (91.6 kg)   22 201 lb 3.2 oz (91.3 kg)     Body mass index is 25.09 kg/m².     CBC:   Lab Results   Component Value Date    WBC 4.7 2022    RBC 3.55 2022    HGB 9.6 2022    HCT 30.3 2022    MCV 85.4 2022    RDW 19.5 2022     2022       CMP:   Lab Results   Component Value Date     2022    K 3.4 2022    K 3.6 2022    CL 90 2022    CO2 23 2022    BUN 40 2022 CREATININE 1.3 02/28/2022    GFRAA >60 02/28/2022    LABGLOM 55 02/28/2022    GLUCOSE 203 02/28/2022    GLUCOSE 318 04/30/2012    PROT 4.6 02/28/2022    CALCIUM 8.9 02/28/2022    BILITOT 1.9 02/28/2022    ALKPHOS 296 02/28/2022    AST 35 02/28/2022    ALT 22 02/28/2022       POC Tests: No results for input(s): POCGLU, POCNA, POCK, POCCL, POCBUN, POCHEMO, POCHCT in the last 72 hours. Coags:   Lab Results   Component Value Date    PROTIME 16.2 02/28/2022    INR 1.4 02/28/2022    APTT 36.3 02/28/2022       HCG (If Applicable): No results found for: PREGTESTUR, PREGSERUM, HCG, HCGQUANT     ABGs: No results found for: PHART, PO2ART, WND2GXB, RFU4VUQ, BEART, Q3BRBEQX     Type & Screen (If Applicable):  Lab Results   Component Value Date    LABABO O 03/31/2012    [facility] POS 03/31/2012       Drug/Infectious Status (If Applicable):  No results found for: HIV, HEPCAB    COVID-19 Screening (If Applicable):   Lab Results   Component Value Date    COVID19 Detected 09/28/2021           Anesthesia Evaluation  Patient summary reviewed  Airway: Mallampati: IV  TM distance: >3 FB   Neck ROM: full  Mouth opening: > = 3 FB Dental:          Pulmonary: breath sounds clear to auscultation  (+) pneumonia:                            ROS comment: Former Smoker. Cardiovascular:    (+) past MI:, hyperlipidemia        Rhythm: regular  Rate: normal                    Neuro/Psych:   Negative Neuro/Psych ROS              GI/Hepatic/Renal:   (+) GERD:, liver disease:,           Endo/Other:    (+) DiabetesType II DM, using insulin, malignancy/cancer (AMLAcute Myeloid Leulemia. ). Abdominal:             Vascular: negative vascular ROS. ROS comment: H/O Blood Clots. . Other Findings:             Anesthesia Plan      MAC     ASA 3       Induction: intravenous. BIS and continuous noninvasive hemodynamic monitor  MIPS: Postoperative opioids intended. Anesthetic plan and risks discussed with patient.       Plan discussed with CRNA.     Attending anesthesiologist reviewed and agrees with Helena Dodson MD   2/28/2022    JYOTI Riddle - CRNA

## 2022-02-28 NOTE — OP NOTE
Maryann Isabel    Operative Report  DATE OF PROCEDURE: 2/28/2022  SURGEON: Dr. Val Johnston MD, M.D. Resident: Catalina Chowdhury DO   PREOPERATIVE DIAGNOSIS: Venous insufficiency (I87.2)  POSTOPERATIVE DIAGNOSIS: Same. OPERATION:  Mediport. (60917)   ANESTHESIA: LMAC   ESTIMATED BLOOD LOSS: None. SPECIMEN: none  COMPLICATIONS: None. HISTORY: Lysle Hashimoto is a  73 y.o.  male who needs a port. We discussed the extensive risks involved in the surgery including the risk of bleeding, infection, and needing further procedures. We also discussed wound infections and the risks of general anesthetic including MI, CVA, sudden death or reactions to anesthetic medications. The patient understood the risks. All questions were answered to the patient's satisfaction and they freely signed the consent and wished to proceed. OPERATION: The patient was placed on the table in a supine position. He was given Ancef 2 g IV preop. The patient was placed in a headdown position. The skin was prepped with ChloroPrep and draped with a sticky-drape over the face and neck, towels and an OR drape in the usual fashion. Ultrasound was used, the right internal jugular vein was tiny and did not go to the chest. The  left internal jugular vein was large. The skin was numbed with marcaine plus epinephrine. The vein was canulated and the wire passed easily. The head of the bed was raised. The chest skin was numbed with lidocaine and an incision was made for the port. Cautery was used to dissect down and form a pocket. The catheter was passed with a tunneler between the two incisions and cut so it was 17 cm at the neck skin. The port was attached to the catheter, flushed with saline and three separate 2-0 nylon sutures were placed through the port holes and sewn to the chest fascia and clamped with a hemostat then placed into the pocket.   The introducer dilator was passed over the wire into the vein. The wire and dilator were removed and the catheter was passed through the introducer so the tip was estimated to be in the superior vena cava. A Sigala needle was used to access the port, withdraw blood and flush with saline then Heplock solution. The sutures holding the port to the fascia were tied. The head was raised more. The dermis was closed with running 4-0 Monocryl. Skin-Afix glue was placed on both incisions, no dressing. The needle and sponge count were correct. The patient tolerated the procedure well and went to recovery in stable condition. A chest X-ray was ordered to check for catheter placement. Plan:   Place ice and pressure on the neck and chest wounds and keep the head elevated for 8 hours to decrease the pain and bruising. Tylenol and Motrin for pain. Ok to shower. Ok to use the port. Flush well after each use.     Physician Signature: Electronically signed by Dr. Nathaniel Rodriges MD, M.D.

## 2022-02-28 NOTE — PROGRESS NOTES
GENERAL SURGERY  DAILY PROGRESS NOTE  2/28/2022    Chief Complaint   Patient presents with    Abdominal Pain     needs drained and albumin per family member    Hip Pain     since last saturday when he fell       Subjective:  Pt doing well. No acute issues.  Plan for port today     Objective:  /75   Pulse 108   Temp 97.9 °F (36.6 °C) (Oral)   Resp 18   Wt 165 lb (74.8 kg)   SpO2 97%   BMI 25.09 kg/m²     GENERAL:  Laying in bed, awake, alert, cooperative, no apparent distress  LUNGS:  No increased work of breathing  CARDIOVASCULAR: RR    ABDOMEN:  Soft, non-tender, non-distended  EXTREMITIES: No edema or swelling  SKIN: Warm and dry    Assessment/Plan:  73 y.o. male with history of AML now with Hodgkin's lymphoma     Npo  Plan for port today     Electronically signed by Malaika Malin DO on 2/28/2022 at 7:09 AM

## 2022-02-28 NOTE — ANESTHESIA POSTPROCEDURE EVALUATION
Department of Anesthesiology  Postprocedure Note    Patient: Vickie Campos  MRN: [id number]  YOB: 1950  Date of evaluation: 2/28/2022  Time:  2:20 PM     Procedure Summary     Date: 02/28/22 Room / Location: [address] [place] [address] / [address] [place] [address]    Anesthesia Start: 6368 Anesthesia Stop: 6638    Procedure: [address] (N/A Chest) Diagnosis: (CIRRHOSIS)    Surgeons: Cristobal Parks MD Responsible Provider: Elly Godfrey MD    Anesthesia Type: MAC ASA Status: 3          Anesthesia Type: MAC    Ingrid Phase I: Ingrid Score: 9    Ingrid Phase II:      Last vitals: Reviewed and per EMR flowsheets.        Anesthesia Post Evaluation    Patient location during evaluation: PACU  Patient participation: complete - patient participated  Level of consciousness: awake  Pain score: 0  Airway patency: patent  Nausea & Vomiting: no nausea  Complications: no  Cardiovascular status: blood pressure returned to baseline  Respiratory status: acceptable  Hydration status: euvolemic

## 2022-02-28 NOTE — CARE COORDINATION
2/28/2022 1013 CM note: No covid testing. Met with patient for follow up of transition of care needs. Pt for mediport placement today. He [place] home at [place]. Pt started on xifaxin. Per weekend CM note, xifaxin has been approved-will need Rx to check copay. Pt plans to return home at [place], family will provide ride. Will WOODARD

## 2022-02-28 NOTE — PROGRESS NOTES
Chart reviewed, patient for discharge today. He has elected to follow-up for treatment at [facility]. He can follow-up with Dr. Clark Mercedes locally when Hodgkin treatments are completed.     Macie Diaz PA-C

## 2022-02-28 NOTE — DISCHARGE INSTR - COC
Continuity of Care Form    Patient Name: Lj Nails   :  1950  MRN:  [id number]    [facility] date:  2022  Discharge date:  ***    Code Status Order: Full Code   Advance Directives:      Admitting Physician:  Fransico Johansen DO  PCP: Ainsley Beal DO    Discharging Nurse: [facility] Unit/Room#: OR POOL/NONE  Discharging Unit Phone Number: ***    Emergency Contact:   Extended Emergency Contact Information  Primary Emergency Contact: Melanie San  Address: [address] Aylin Leaver of [address] [place] [address] Phone: [phone]  Mobile Phone: [phone]  Relation: Spouse  Secondary Emergency Contact: Kai San  Address: [address] [place] [address] Pat Thomas [address] Aylin Leaver of [address] [place] [address] Phone: [phone]  Mobile Phone: [phone]  Relation: Child    Past Surgical History:  Past Surgical History:   Procedure Laterality Date    APPENDECTOMY      BONE MARROW TRANSPLANT      double core transplant    CHOLECYSTECTOMY      COLONOSCOPY      CT NEEDLE BIOPSY LIVER PERCUTANEOUS  2022    CT NEEDLE BIOPSY LIVER PERCUTANEOUS    ENDOSCOPY, COLON, DIAGNOSTIC      FOOT NEUROMA SURGERY      UPPER GASTROINTESTINAL ENDOSCOPY         Immunization History:   Immunization History   Administered Date(s) Administered    DTaP 2013, 2013, 2013    Hepatitis B (Recombivax HB) 2013, 2013, 2013    Hib, unspecified 2013, 2013, 2013    Influenza, High Dose (Fluzone 65 yrs and older) 2017    Influenza, Quadv, IM, (6 mo and older Fluzone, Flulaval, Fluarix and 3 yrs and older Afluria) 2014    MMR 2014, 2016    Pneumococcal Conjugate 13-valent (Shireen Ally) 2017    Polio IPV (IPOL) 2013, 2013, 2013       Active Problems:  Patient Active Problem List   Diagnosis Code    Bronchitis with bronchospasm J20.9    Anxiety F41.9    Post herpetic neuralgia B02.29    Type 2 diabetes mellitus without complication, with long-term current use of insulin (HCC) E11.9, Z79.4    Cellulitis of left lower extremity L03.116    Plantar wart B07.0    Inguinal hernia K40.90    Acute myeloid leukemia in remission (HCC) C92.01    Pneumonia due to infectious organism J18.9    Cellulitis L03.90    Pharyngitis, acute J02.9    Thrombocytopenia (HCC) D69.6    GVHD (graft versus host disease) (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, U09.9    Uncontrolled type 2 diabetes mellitus with hyperglycemia (HCC) E11.65    Type 2 diabetes mellitus with hyperglycemia E11.65    Type 2 diabetes mellitus with chronic kidney disease E11.22    Decompensation of cirrhosis of liver (HCC) K72.90, K74.60       Isolation/Infection:   Isolation            No Isolation          Patient Infection Status       Infection Onset Added Last Indicated Last Indicated By Review Planned Expiration Resolved Resolved By    None active    Resolved    COVID-19 21 Covid-19 Ambulatory   10/12/21     COVID-19 (Rule Out) 21 COVID-19 Ambulatory (Ordered)   21 Rule-Out Test Resulted            Nurse Assessment:  Last Vital Signs: BP (!) 90/56   Pulse 101   Temp 97.6 °F (36.4 °C) (Infrared)   Resp 18   Wt 165 lb (74.8 kg)   SpO2 99%   BMI 25.09 kg/m²     Last documented pain score (0-10 scale): Pain Level: 0  Last Weight:   Wt Readings from Last 1 Encounters:   22 165 lb (74.8 kg)     Mental Status:  {IP PT MENTAL STATUS:}    IV Access:  { CHANDNI IV ACCESS:419816857}    Nursing Mobility/ADLs:  Walking   {CHP DME EUTM:730063703}  Transfer  {CHP DME UHMH:881556167}  Bathing  {CHP DME RXOK:551041900}  Dressing  {CHP DME OHCX:823410311}  Toileting  {CHP DME HJQL:209804758}  Feeding  {CHP DME GUYV:734318625}  Med Admin  {CHP DME HYLH:025151916}  Med Delivery   {[facility] MED Delivery:954015691}    Wound Care Documentation and Therapy: Elimination:  Continence: Bowel: {YES / PO:35667}  Bladder: {YES / JQ:61780}  Urinary Catheter: {Urinary Catheter:660043697}   Colostomy/Ileostomy/Ileal Conduit: {YES / PD:17296}       Date of Last BM: ***    Intake/Output Summary (Last 24 hours) at 2022 1405  Last data filed at 2022 1352  Gross per 24 hour   Intake 1200 ml   Output 10 ml   Net 1190 ml     I/O last 3 completed shifts:   In: 0 [P.O.:600]  Out: 0     Safety Concerns:     [address] Safety Concerns:443774085}    Impairments/Disabilities:      [address] Impairments/Disabilities:874782428}    Nutrition Therapy:  Current Nutrition Therapy:   [address] [employer] Diet List:515224549}    Routes of Feeding: {CHP DME Other Feedings:543620983}  Liquids: {Slp liquid thickness:58000}  Daily Fluid Restriction: {CHP DME Yes amt example:739981091}  Last Modified Barium Swallow with Video (Video Swallowing Test): {Done Not Done DLJ:791419511}    Treatments at the Time of Hospital Discharge:   Respiratory Treatments: ***  Oxygen Therapy:  {Therapy; copd oxygen:78840}  Ventilator:    {MH CC Vent LGVS:072772952}    Rehab Therapies: {THERAPEUTIC INTERVENTION:0936334463}  Weight Bearing Status/Restrictions: [address] [employer] Weight Bearin}  Other Medical Equipment (for information only, NOT a DME order):  {EQUIPMENT:982349206}  Other Treatments: ***    Patient's personal belongings (please select all that are sent with patient):  {CHP DME Belongings:138847882}    RN SIGNATURE:  {Esignature:219467895}    CASE MANAGEMENT/SOCIAL WORK SECTION    Inpatient Status Date: ***    Readmission Risk Assessment Score:  Readmission Risk              Risk of Unplanned Readmission:  29           Discharging to Facility/ Agency   Name:   Address:  Phone:  Fax:    Dialysis Facility (if applicable)   Name:  Address:  Dialysis Schedule:  Phone:  Fax:    / signature: {Esignature:012201551}    PHYSICIAN SECTION    Prognosis: {Prognosis:5683202293}    Condition at Discharge: [facility] Patient Condition:616238705}    Rehab Potential (if transferring to Rehab): {Prognosis:7867649176}    Recommended Labs or Other Treatments After Discharge: ***    Physician Certification: I certify the above information and transfer of Marylu Spells  is necessary for the continuing treatment of the diagnosis listed and that he requires {Admit to Appropriate Level of Care:63754} for {GREATER/LESS:297671206} 30 days.      Update Admission H&P: {CHP DME Changes in XWFNM:672372295}    PHYSICIAN SIGNATURE:  {Esignature:761333178}

## 2022-02-28 NOTE — PROGRESS NOTES
Physical Therapy Treatment Note/Plan of Care    Room #:  3932/5566-34  Patient Name: Aldo Cast  YOB: 1950  MRN: [id number]    Date of Service: 2/28/2022     Tentative placement recommendation: Home Health Physical Therapy   Equipment recommendation: To be determined      Evaluating Physical Therapist: Lorie Chong, PT #[id number]      Specific Provider Orders/Date/Referring Provider :  02/25/22 0915   PT eval and treat Start: 02/25/22 0915, End: 02/25/22 0915, ONE TIME, Standing Count: 1 Occurrences, R    [facility],       Admitting Diagnosis:   Decompensation of cirrhosis of liver ([facility] [K72.90, K74.60]     Hip Pain (fell last week. right hip pain. bruised. requesting refill of tramadol. )    Surgery: none  Visit Diagnoses       Codes    Hypomagnesemia     E83.42    Hyponatremia     E87.1    Closed type III fracture of sacrum, initial encounter [facility])     S32.16XA          Patient Active Problem List   Diagnosis    Bronchitis with bronchospasm    Anxiety    Post herpetic neuralgia    Type 2 diabetes mellitus without complication, with long-term current use of insulin (HCC)    Cellulitis of left lower extremity    Plantar wart    Inguinal hernia    Acute myeloid leukemia in [facility]    Pneumonia due to infectious organism    Cellulitis    Pharyngitis, acute    Thrombocytopenia (HCC)    GVHD (graft versus host disease) ([facility]    Portal hypertension ([facility]    Post-COVID chronic fatigue    Uncontrolled type 2 diabetes mellitus with hyperglycemia (HCC)    Type 2 diabetes mellitus with hyperglycemia    Type 2 diabetes mellitus with chronic kidney disease    Decompensation of cirrhosis of liver (HCC)        ASSESSMENT of Current Deficits Patient exhibits decreased strength, balance, endurance and pain low back impairing functional mobility, transfers, gait , gait distance and tolerance to activity.  Patient minimal assist with bed mobility, sit to stands and gait; however slightly unsteady during gait due to patient fatigued from increased sitting on commode. Patient needing tactile cues for correction of sequencing and motion on seated exercise. Complaints of pain in sacrum when patient would go and sit down; slow to sit. Patient requires skilled physical therapy to address concerns listed above to increase safety and independence at discharge. PHYSICAL THERAPY  PLAN OF CARE       Physical therapy plan of care is established based on physician order,  patient diagnosis and clinical assessment    Current Treatment Recommendations:    -Bed Mobility: Lower extremity exercises   -Sitting Balance: Facilitate active trunk muscle engagement , Facilitate postural control in all planes  and Engage in core activities to allow for movement within base of support   -Standing Balance: Perform strengthening exercises in standing to promote motor control with or without upper extremity support   -Transfers: Provide instruction on proper hand and foot position for adequate transfer of weight onto lower extremities and use of gait device if needed, Cues for hand placement, technique and safety. Provide stabilization to prevent fall  and Assist with extension of knees trunk and hip to accept weight transfer   -Gait: Gait training, Standing activities to improve: base of support, weight shift, weight bearing  and Pregait training to emphasize: Sequencing , Device control, Upright and Safety   -Endurance: Utilize Supervised activities to increase level of endurance to allow for safe functional mobility including transfers and gait   -Stairs: Stair training with instruction on proper technique and hand placement on rail    PT long term treatment goals are located in below grid    Patient and or family understand(s) diagnosis, prognosis, and plan of care. Frequency of treatments: Patient will be seen  daily.          Prior Level of Function: Patient ambulated with wheeled walker    Rehab Potential: good  - for baseline    Past medical history:   Past Medical History:   Diagnosis Date    AML (acute myeloblastic leukemia) ([facility]     chemotherapy    AML (acute myeloblastic leukemia) ([facility] 2010    Cancer ([facility]     Chronic kidney disease     Diabetes mellitus ([facility]     GERD (gastroesophageal reflux disease)     Hx of blood clots 2012    legs    Hyperlipidemia     Liver disease     MI, old     Other disorders of kidney and ureter     Other sleep disturbances     Thyroid disease     Type II or unspecified type diabetes mellitus without mention of complication, not stated as uncontrolled      Past Surgical History:   Procedure Laterality Date    APPENDECTOMY      BONE MARROW TRANSPLANT      double core transplant    CHOLECYSTECTOMY      COLONOSCOPY      CT NEEDLE BIOPSY LIVER PERCUTANEOUS  1/17/2022    CT NEEDLE BIOPSY LIVER PERCUTANEOUS    ENDOSCOPY, COLON, DIAGNOSTIC      FOOT NEUROMA SURGERY      UPPER GASTROINTESTINAL ENDOSCOPY         SUBJECTIVE:    Precautions: Up with assistance, falls ,    CT OF THE ABDOMEN AND PELVIS WITHOUT CONTRAST 2/24/2022   Nondisplaced sacral fracture as above. No additional acute osseous abnormalities. Cirrhotic liver morphology with sequelae of portal hypertension including splenomegaly and large volume of ascites in the abdomen and pelvis. Multiple hypodense lesions scattered throughout the liver which may reflect primary liver cancer or metastatic disease, among other etiologies. Numerous subcentimeter pulmonary nodules at both lung bases, increased in number and size from the prior study. Findings are highly suspicious for metastatic disease.     Social history: Patient lives with spouse in a ranch home  with 4 steps  to enter bilateral Rail tub shower however  Sponge bathes grab bars    Equipment owned: Warren Duff, [address] and [facility] Deepak chair,       [address] [facility]   How much difficulty turning over in bed?: CAREN Little  How much difficulty sitting down on / standing up from a chair with arms?: A Little  How much difficulty moving from lying on back to sitting on side of bed?: A Little  How much help from another person moving to and from a bed to a chair?: A Little  How much help from another person needed to walk in hospital room?: A Little  How much help from another person for climbing 3-5 steps with a railing?: A Lot  AM-PAC Inpatient Mobility Raw Score : 17  AM-PAC Inpatient T-Scale Score : 42.13  Mobility Inpatient CMS 0-100% Score: 50.57  Mobility Inpatient CMS G-Code Modifier : CK    Nursing cleared patient for PT treatment. patient in restroom at beginning of session. Patient ambulated himself education needed on safety in the hospital.   OBJECTIVE;   Initial Evaluation  Date: 2/25/2022 Treatment Date:  2/28/2022     Short Term/ Long Term   Goals   Was pt agreeable to Eval/treatment? Yes yes To be met in 3 days   Pain level   10/10  back No number reported; however reports of sacrum and abdominal pain. Bed Mobility    Rolling: Minimal assist of 1    Supine to sit: Minimal assist of 1    Sit to supine: Not assessed     Scooting: Minimal assist of 1   Rolling: Supervision    Supine to sit: Not assessed patient on commode. Sit to supine: Supervision    Scooting: Supervision     Rolling: Independent    Supine to sit:  Independent    Sit to supine: Independent    Scooting: Independent     Transfers Sit to stand: Minimal assist of 1 Cues for hand placement and safety  Sit to stand: Minimal assist of 1 from toilet cues for use of grab bar     Sit to stand: Independent     Ambulation    40 feet using  wheeled walker with Minimal assist of 1   for walker control and cues for upright posture, safety and pacing 3 feet and 12 feet using  wheeled walker with Minimal assist of 1   for walker control and safety and cues for pacing    100 feet using  wheeled walker with Modified Independent    Stair negotiation: ascended and descended   Not assessed  Not assessed     4 steps 1-2 rail with supervision    ROM Within functional limits    Increase range of motion 10% of affected joints    Strength BUE:  refer to OT sarita  RLE:  3+/5  LLE:  3+/5  Increase strength in affected mm groups by 1/3 grade   Balance Sitting EOB:  good -  Dynamic Standing:  fair wheeled walker  Sitting EOB: fair   Dynamic Standing: fair with wheeled walker   Sitting EOB:  good    Dynamic Standing: good wheeled walker      Patient is Alert & Oriented x person, place, time and situation and follows directions    Sensation:  Patient  denies numbness/tingling   Edema:  yes bilateral lower extremities   Endurance: fair       Vitals: room air   Blood Pressure at rest  Blood Pressure during session    Heart Rate at rest  60 Heart Rate during session     SPO2 at rest 98%  SPO2 during session %     Patient education  Patient educated on role of Physical Therapy, risks of immobility, safety and plan of care,  importance of mobility while in hospital , safety  and seated exercises     Patient response to education:   Pt verbalized understanding Pt demonstrated skill Pt requires further education in this area   Yes Partial Yes      Treatment:  Patient practiced and was instructed/facilitated in the following treatment: patient on commode, weight shifting for hygiene, donned depends. Stood cues for grab bar due to walker behind patient needing increased support from therapist. Multiple turns and cues for walker control. Washed hands ambulated to chair. Performed seated exercise. Stood multiple times. Reaching activities with increased base of support. Assisted to supine, positioned for comfort. Therapeutic Exercises:  ankle pumps, heel raises, long arc quad and seated marching  x 15-20 reps. At end of session, patient in bed with alarm call light and phone within reach,  all lines and tubes intact, nursing notified.       Patient would benefit from continued skilled Physical Therapy to improve functional independence and quality of life.          Patient's/ family goals   home    Time in   910  Time out  934    Total Treatment Time  24 minutes    CPT codes:  Therapeutic activities (95565)   16 minutes  1 unit(s)  Therapeutic exercises (96971)   8 minutes  1 unit(s)    Sam Avila, [facility] #[id number]

## 2022-03-01 ENCOUNTER — CARE COORDINATION (OUTPATIENT)
Dept: CASE MANAGEMENT | Age: 72
End: 2022-03-01

## 2022-03-01 NOTE — CARE COORDINATION
did not wish a call from [facility] pharmacy to review medications. 1111F not entered. Reviewed and educated family on any new and changed medications related to discharge diagnosis. Patient's wife states she has a new bottle of Lactulose 10gm/15 ml solution not opened prescribed by provider in [facility] and she will begin dosage today of 15 ml twice daily as ordered at hospital discharge. Patient's wife to not obtain the xifaxan d/t cost but states she will call Dr Bess Flores to check on samples. Patient's wife is also receptive to CTN referring to [employer] to check on assistance-CTN to email Kendallese Hurtado with [employer]. CTN also confirmed with patient's wife stopped medications of co-Q-10 and antivert. CTN provided contact information. Plan for follow-up call in 7-10 days based on severity of symptoms and risk factors. Plan for next call: symptom management-cough with expectoration, any return of severe abdominal distension  follow up appointment-review any visits  referrals-ensure communication with PAP        Non-face-to-face services provided:  Scheduled appointment with PCP-3/3/22  Scheduled appointment with [address] first chemo at [facility] per patient's wife.   Patient's wife aware of need for scheduling of general surgery appointment(Krista)  Obtained and reviewed discharge summary and/or continuity of care documents    Care Transitions 24 Hour Call    Do you have any ongoing symptoms?: Yes  Patient-reported symptoms: Cough (Comment: with expectoration of mucus-unknown color)  Interventions for patient-reported symptoms: Notified PCP/Physician  Do you have a copy of your discharge instructions?: Yes  Do you have all of your prescriptions and are they filled?: No  Have you been contacted by a [address]?: No  Have you scheduled your follow up appointment?: Yes  How are you going to get to your appointment?: Car - family or friend to transport  Were you discharged with any Home Care or Post Acute Services: Yes  Post Acute Services: Home Health (Comment: 3/1/22-resume with [facility])  Do you feel like you have everything you need to keep you well at home?: Yes  Care Transitions Interventions    Pharmacist: Declined     Medication Assistance Program: Completed          -Spoke with patient's wife Duy Mancera (on communication release of information form) for initial care transition call post hospital discharge. Patient also participating in conversation in the background.  -Patient reporting his abdominal distension is \"not too bad today. \"  Patient admitted to [address] [place] [address] [facility] for decompensation of cirrhosis of liver with symptoms of abdominal distension. History of paracentesis with last being while hospitalized and 6.8 L removed. -Patient's wife states she has been dealing with upper respiratory symptoms and notes her  now has a cough with mucus expectoration. Patient unable to provide CTN with color of mucus.  -Conversation then needed to be ended as [facility] nurse was arriving at residence.  -Patient's wife agreeable to continued follow up from CTN.     Follow Up  Future Appointments   Date Time Provider Yevgeniy Jones   3/3/2022  9:45 AM DO Althea Harris [facility]       Oliver Ulrich RN

## 2022-03-03 ENCOUNTER — CARE COORDINATION (OUTPATIENT)
Dept: CASE MANAGEMENT | Age: 72
End: 2022-03-03

## 2022-03-03 ENCOUNTER — OFFICE VISIT (OUTPATIENT)
Dept: FAMILY MEDICINE CLINIC | Age: 72
End: 2022-03-03
Payer: MEDICARE

## 2022-03-03 VITALS
DIASTOLIC BLOOD PRESSURE: 80 MMHG | HEART RATE: 88 BPM | SYSTOLIC BLOOD PRESSURE: 122 MMHG | TEMPERATURE: 96.9 F | HEIGHT: 68 IN | BODY MASS INDEX: 25.09 KG/M2 | RESPIRATION RATE: 16 BRPM | OXYGEN SATURATION: 99 %

## 2022-03-03 DIAGNOSIS — E11.9 TYPE 2 DIABETES MELLITUS WITHOUT COMPLICATION, WITH LONG-TERM CURRENT USE OF INSULIN (HCC): ICD-10-CM

## 2022-03-03 DIAGNOSIS — K74.4 SECONDARY BILIARY CIRRHOSIS (HCC): ICD-10-CM

## 2022-03-03 DIAGNOSIS — Z79.4 TYPE 2 DIABETES MELLITUS WITHOUT COMPLICATION, WITH LONG-TERM CURRENT USE OF INSULIN (HCC): ICD-10-CM

## 2022-03-03 DIAGNOSIS — E11.65 UNCONTROLLED TYPE 2 DIABETES MELLITUS WITH HYPERGLYCEMIA (HCC): Primary | ICD-10-CM

## 2022-03-03 DIAGNOSIS — C92.00 ACUTE MYELOID LEUKEMIA NOT HAVING ACHIEVED REMISSION (HCC): ICD-10-CM

## 2022-03-03 DIAGNOSIS — R05.9 COUGH: ICD-10-CM

## 2022-03-03 LAB
CHP ED QC CHECK: ABNORMAL
GLUCOSE BLD-MCNC: 283 MG/DL

## 2022-03-03 PROCEDURE — 99214 OFFICE O/P EST MOD 30 MIN: CPT | Performed by: FAMILY MEDICINE

## 2022-03-03 PROCEDURE — G8427 DOCREV CUR MEDS BY ELIG CLIN: HCPCS | Performed by: FAMILY MEDICINE

## 2022-03-03 PROCEDURE — 1111F DSCHRG MED/CURRENT MED MERGE: CPT | Performed by: FAMILY MEDICINE

## 2022-03-03 PROCEDURE — 1123F ACP DISCUSS/DSCN MKR DOCD: CPT | Performed by: FAMILY MEDICINE

## 2022-03-03 PROCEDURE — 2022F DILAT RTA XM EVC RTNOPTHY: CPT | Performed by: FAMILY MEDICINE

## 2022-03-03 PROCEDURE — 4040F PNEUMOC VAC/ADMIN/RCVD: CPT | Performed by: FAMILY MEDICINE

## 2022-03-03 PROCEDURE — 82962 GLUCOSE BLOOD TEST: CPT | Performed by: FAMILY MEDICINE

## 2022-03-03 PROCEDURE — G8417 CALC BMI ABV UP PARAM F/U: HCPCS | Performed by: FAMILY MEDICINE

## 2022-03-03 PROCEDURE — 3017F COLORECTAL CA SCREEN DOC REV: CPT | Performed by: FAMILY MEDICINE

## 2022-03-03 PROCEDURE — 1036F TOBACCO NON-USER: CPT | Performed by: FAMILY MEDICINE

## 2022-03-03 PROCEDURE — 3046F HEMOGLOBIN A1C LEVEL >9.0%: CPT | Performed by: FAMILY MEDICINE

## 2022-03-03 PROCEDURE — G8484 FLU IMMUNIZE NO ADMIN: HCPCS | Performed by: FAMILY MEDICINE

## 2022-03-03 RX ORDER — FLASH GLUCOSE SENSOR
KIT MISCELLANEOUS
Qty: 6 EACH | Refills: 1 | Status: SHIPPED | OUTPATIENT
Start: 2022-03-03

## 2022-03-03 RX ORDER — FLASH GLUCOSE SCANNING READER
EACH MISCELLANEOUS
Qty: 1 EACH | Refills: 0 | Status: SHIPPED | OUTPATIENT
Start: 2022-03-03

## 2022-03-03 RX ORDER — BENZONATATE 100 MG/1
100 CAPSULE ORAL 3 TIMES DAILY PRN
Qty: 20 CAPSULE | Refills: 1 | Status: SHIPPED | OUTPATIENT
Start: 2022-03-03 | End: 2022-03-10

## 2022-03-03 ASSESSMENT — ENCOUNTER SYMPTOMS
COUGH: 0
CONSTIPATION: 0
BLOOD IN STOOL: 0
WHEEZING: 0
ABDOMINAL PAIN: 0
DIARRHEA: 0
SHORTNESS OF BREATH: 0
NAUSEA: 0
VOMITING: 0

## 2022-03-03 NOTE — CARE COORDINATION
-CTN received email from Pete Villanueva [phone] with [employer]-\"Spoke with patients wife and we will start enrollments for Xifaxin, Lantus & Humalog. \"  -CTN will continue to follow patient.

## 2022-03-03 NOTE — PROGRESS NOTES
Post-Discharge Transitional Care Follow Up      Chirag Guerrero   YOB: 1950    Date of Office Visit:  3/3/2022  Date of Hospital Admission: 2/24/22  Date of Hospital Discharge: 2/28/22  Readmission Risk Score (high >=14%. Medium >=10%):Readmission Risk Score: 18.7 ( )      Care management risk score Rising risk (score 2-5) and Complex Care (Scores >=6): 5     Non face to face  following discharge, date last encounter closed (first attempt may have been earlier): 3/1/2022 12:06 PM     Call initiated 2 business days of discharge: Yes     Uncontrolled type 2 diabetes mellitus with hyperglycemia ([facility]  -     Continuous Blood Gluc  (FREESTYLE PAOLA 2 READER) [address]; Check sygar FAsting and ac (4 X daiy). , Disp-1 each, R-0Print  -     Continuous Blood Gluc Sensor (FREESTYLE PAOLA 2 SENSOR) MISC; Check sugar 5 X daily. AM and AC, Disp-6 each, R-1Print  Type 2 diabetes mellitus without complication, with long-term current use of insulin ([facility])  -     Continuous Blood Gluc  (FREESTYLE PAOLA 2 READER) FLORESITA; Check sygar FAsting and ac (4 X daiy). , Disp-1 each, R-0Print  -     Continuous Blood Gluc Sensor (FREESTYLE PAOLA 2 SENSOR) MISC; Check sugar 5 X daily. AM and AC, Disp-6 each, R-1Print  -     POCT Glucose  Cough  -     benzonatate (TESSALON PERLES) 100 MG capsule; Take 1 capsule by mouth 3 times daily as needed for Cough, Disp-20 capsule, R-1Normal  Secondary biliary cirrhosis ([facility]  Acute myeloid leukemia not having achieved remission [facility])      Medical Decision Making: moderate complexity  No follow-ups on file. On this date 3/3/2022 I have spent 32 minutes reviewing previous notes, test results and face to face with the patient discussing the diagnosis and importance of compliance with the treatment plan as well as documenting on the day of the visit. Subjective:   HPI    Inpatient course: Discharge summary reviewed- see chart.     Interval history/Current status: guarded    Patient Active Problem List   Diagnosis    Bronchitis with bronchospasm    Anxiety    Post herpetic neuralgia    Type 2 diabetes mellitus without complication, with long-term current use of insulin (HCC)    Cellulitis of left lower extremity    Plantar wart    Inguinal hernia    Acute myeloid leukemia in remission ([facility]    Pneumonia due to infectious organism    Cellulitis    Pharyngitis, acute    Thrombocytopenia (HCC)    GVHD (graft versus host disease) ([facility]    Portal hypertension ([facility]    Post-COVID chronic fatigue    Uncontrolled type 2 diabetes mellitus with hyperglycemia (HCC)    Type 2 diabetes mellitus with hyperglycemia    Type 2 diabetes mellitus with chronic kidney disease    Decompensation of cirrhosis of liver (HCC)       Medications listed as ordered at the time of discharge from hospital  [unfilled]     Medications marked \"taking\" at this time  Outpatient Medications Marked as Taking for the 3/3/22 encounter (Office Visit) with Yoly Langley, DO   Medication Sig Dispense Refill    Continuous Blood Gluc  (FREESTYLE PAOLA 2 READER) FLORESITA Check sygar FAsting and ac (4 X daiy). 1 each 0    Continuous Blood Gluc Sensor (FREESTYLE PAOLA 2 SENSOR) MISC Check sugar 5 X daily. AM and AC 6 each 1    benzonatate (TESSALON PERLES) 100 MG capsule Take 1 capsule by mouth 3 times daily as needed for Cough 20 capsule 1    lactulose (CHRONULAC) 10 GM/15ML solution Take 15 mLs by mouth 2 times daily 900 mL 0    rifaximin (XIFAXAN) 550 MG tablet Take 1 tablet by mouth 2 times daily 60 tablet 0    ondansetron (ZOFRAN-ODT) 4 MG disintegrating tablet Take 1 tablet by mouth 3 times daily as needed for Nausea or Vomiting 21 tablet 0    LORazepam (ATIVAN) 0.5 MG tablet Take 1 tablet by mouth 2 times daily as needed for Anxiety. 60 tablet 0    traMADol (ULTRAM) 50 MG tablet Take 1 tablet by mouth every 8 hours as needed for Pain for up to 14 days.  42 tablet 0    furosemide (LASIX) 20 MG tablet Take 10 mg by mouth 2 times daily      blood glucose test strips (ONETOUCH ULTRA) strip TEST 4 TIMES DAILY 300 strip 5    ferrous sulfate (IRON 325) 325 (65 Fe) MG tablet Take 1 tablet by mouth 2 times daily (with meals) 180 tablet 0    levothyroxine (SYNTHROID) 200 MCG tablet Take 1 tablet by mouth Daily 90 tablet 1    sertraline (ZOLOFT) 50 MG tablet Take 1.5 tablets by mouth daily (Patient taking differently: Take 50 mg by mouth daily 1 tablet) 30 tablet 3    pantoprazole (PROTONIX) 40 MG tablet TAKE 1 TABLET DAILY 90 tablet 3    insulin glargine (LANTUS SOLOSTAR) 100 UNIT/ML injection pen Inject 20 Units into the skin nightly 5 pen 3    INS SYRINGE/NEEDLE .5CC/28G ([employer] INS SYR MX-COM .5CC/28G) 28G X 1/2\" 0.5 ML MISC Use three times daily 100 each 3    Insulin Pen Needle (BD PEN NEEDLE JENNIFER U/F) 32G X 4 MM MISC USE AS DIRECTED 100 each 3    insulin lispro, 1 Unit Dial, (HUMALOG KWIKPEN) 100 UNIT/ML SOPN INJECT 0-20 UNITS INTO THE SKIN THREE TIMES DAILY(BEFORE MEALS) 48 mL 0    blood glucose test strips (ONE TOUCH ULTRA TEST) strip Test up to 5 times daily As needed. 600 strip 2    vitamin D (CHOLECALCIFEROL) 1000 UNIT TABS tablet Take 2,000 Units by mouth daily       aspirin 81 MG chewable tablet Take 81 mg by mouth daily          Medications patient taking as of now reconciled against medications ordered at time of hospital discharge: Yes    Review of Systems   Constitutional: Negative for chills, diaphoresis and fever. HENT: Negative for ear discharge, ear pain, hearing loss, nosebleeds and tinnitus. Respiratory: Negative for cough, shortness of breath and wheezing. Cardiovascular: Negative for chest pain. Gastrointestinal: Negative for abdominal pain, blood in stool, constipation, diarrhea, nausea and vomiting. Genitourinary: Negative for dysuria, flank pain and hematuria. Musculoskeletal: Negative for myalgias. Skin: Negative for rash. Neurological: Negative for headaches. Hematological: Does not bruise/bleed easily. Psychiatric/Behavioral: Negative for hallucinations and suicidal ideas. 1. Decompensated cirrhosis with ascite decompensated cirrhosis status post paracentesis with removal of 6.8 L of fluid  2. Hepatic encephalopathy with hyperammonemia  3. End-stage alcoholic cirrhosis  4. Anemia of chronic disease  5. Nondisplaced sacral fracture  6. AML being followed closely by the oncologic team  7. Chronic kidney disease stage III  8. Insulin-dependent diabetes mellitus type 2  9. History of DVT  10. Hyperlipidemia  11. Coronary artery disease  12. Hypothyroidism  13. Hx of tobacco abuse     Objective:    /80   Pulse 88   Temp 96.9 °F (36.1 °C) (Temporal)   Resp 16   Ht 5' 8\" (1.727 m)   SpO2 99%   BMI 25.09 kg/m²   Physical Exam  Eyes:      General:         Right eye: No discharge. Left eye: No discharge. Cardiovascular:      Rate and Rhythm: Normal rate and regular rhythm. Heart sounds: No murmur heard. Pulmonary:      Effort: No respiratory distress. Breath sounds: No rhonchi or rales. Abdominal:      Tenderness: There is no abdominal tenderness. Musculoskeletal:      Cervical back: Normal range of motion. No rigidity. Skin:     General: Skin is warm. Capillary Refill: Capillary refill takes less than 2 seconds. Coloration: Skin is not pale. Findings: No bruising. Neurological:      Mental Status: He is alert. Psychiatric:         Mood and Affect: Mood normal.         An electronic signature was used to authenticate this note.   --Vinnie Ask, DO

## 2022-03-04 ENCOUNTER — HOSPITAL ENCOUNTER (INPATIENT)
Age: 72
LOS: 5 days | Discharge: HOME | DRG: 442 | End: 2022-03-09
Attending: EMERGENCY MEDICINE | Admitting: INTERNAL MEDICINE
Payer: MEDICARE

## 2022-03-04 ENCOUNTER — APPOINTMENT (OUTPATIENT)
Dept: CT IMAGING | Age: 72
DRG: 442 | End: 2022-03-04
Payer: MEDICARE

## 2022-03-04 ENCOUNTER — APPOINTMENT (OUTPATIENT)
Dept: GENERAL RADIOLOGY | Age: 72
DRG: 442 | End: 2022-03-04
Payer: MEDICARE

## 2022-03-04 DIAGNOSIS — E72.20 HYPERAMMONEMIA (HCC): ICD-10-CM

## 2022-03-04 DIAGNOSIS — R41.82 ALTERED MENTAL STATUS, UNSPECIFIED ALTERED MENTAL STATUS TYPE: Primary | ICD-10-CM

## 2022-03-04 DIAGNOSIS — Z85.05 HISTORY OF LIVER CANCER: ICD-10-CM

## 2022-03-04 DIAGNOSIS — K76.6 PORTAL HYPERTENSION (HCC): ICD-10-CM

## 2022-03-04 DIAGNOSIS — I95.9 HYPOTENSION, UNSPECIFIED HYPOTENSION TYPE: ICD-10-CM

## 2022-03-04 DIAGNOSIS — E87.6 HYPOKALEMIA: ICD-10-CM

## 2022-03-04 DIAGNOSIS — K74.60 HEPATIC CIRRHOSIS, UNSPECIFIED HEPATIC CIRRHOSIS TYPE, UNSPECIFIED WHETHER ASCITES PRESENT (HCC): ICD-10-CM

## 2022-03-04 DIAGNOSIS — E87.1 HYPONATREMIA: ICD-10-CM

## 2022-03-04 DIAGNOSIS — E11.9 TYPE 2 DIABETES MELLITUS WITHOUT COMPLICATION, WITH LONG-TERM CURRENT USE OF INSULIN (HCC): ICD-10-CM

## 2022-03-04 DIAGNOSIS — Z79.4 TYPE 2 DIABETES MELLITUS WITHOUT COMPLICATION, WITH LONG-TERM CURRENT USE OF INSULIN (HCC): ICD-10-CM

## 2022-03-04 DIAGNOSIS — R18.0 MALIGNANT ASCITES: ICD-10-CM

## 2022-03-04 DIAGNOSIS — N18.9 ACUTE KIDNEY INJURY SUPERIMPOSED ON CKD (HCC): ICD-10-CM

## 2022-03-04 DIAGNOSIS — R74.8 ELEVATED LIPASE: ICD-10-CM

## 2022-03-04 DIAGNOSIS — N17.9 ACUTE KIDNEY INJURY SUPERIMPOSED ON CKD (HCC): ICD-10-CM

## 2022-03-04 LAB
ALBUMIN SERPL-MCNC: 2.5 G/DL (ref 3.5–5.2)
ALP BLD-CCNC: 299 U/L (ref 40–129)
ALT SERPL-CCNC: 20 U/L (ref 0–40)
AMMONIA: 108 UMOL/L (ref 16–60)
AMPHETAMINE SCREEN, URINE: NOT DETECTED
ANION GAP SERPL CALCULATED.3IONS-SCNC: 19 MMOL/L (ref 7–16)
APTT: 36.9 SEC (ref 24.5–35.1)
AST SERPL-CCNC: 36 U/L (ref 0–39)
BACTERIA: ABNORMAL /HPF
BARBITURATE SCREEN URINE: NOT DETECTED
BASOPHILS ABSOLUTE: 0.01 E9/L (ref 0–0.2)
BASOPHILS RELATIVE PERCENT: 0.2 % (ref 0–2)
BENZODIAZEPINE SCREEN, URINE: NOT DETECTED
BILIRUB SERPL-MCNC: 1.9 MG/DL (ref 0–1.2)
BILIRUBIN DIRECT: 1.1 MG/DL (ref 0–0.3)
BILIRUBIN URINE: ABNORMAL
BILIRUBIN, INDIRECT: 0.8 MG/DL (ref 0–1)
BLOOD, URINE: NEGATIVE
BUN BLDV-MCNC: 70 MG/DL (ref 6–23)
CALCIUM SERPL-MCNC: 9.2 MG/DL (ref 8.6–10.2)
CANNABINOID SCREEN URINE: NOT DETECTED
CHLORIDE BLD-SCNC: 88 MMOL/L (ref 98–107)
CHP ED QC CHECK: NORMAL
CLARITY: CLEAR
CO2: 19 MMOL/L (ref 22–29)
COCAINE METABOLITE SCREEN URINE: NOT DETECTED
COLOR: ABNORMAL
CREAT SERPL-MCNC: 1.8 MG/DL (ref 0.7–1.2)
EOSINOPHILS ABSOLUTE: 0 E9/L (ref 0.05–0.5)
EOSINOPHILS RELATIVE PERCENT: 0 % (ref 0–6)
FENTANYL SCREEN, URINE: NOT DETECTED
GFR AFRICAN AMERICAN: 45
GFR NON-AFRICAN AMERICAN: 38 ML/MIN/1.73
GLUCOSE BLD-MCNC: 239 MG/DL
GLUCOSE BLD-MCNC: 253 MG/DL (ref 74–99)
GLUCOSE URINE: NEGATIVE MG/DL
HCT VFR BLD CALC: 32.4 % (ref 37–54)
HEMOGLOBIN: 10.2 G/DL (ref 12.5–16.5)
HYALINE CASTS: ABNORMAL /LPF (ref 0–2)
IMMATURE GRANULOCYTES #: 0.1 E9/L
IMMATURE GRANULOCYTES %: 1.6 % (ref 0–5)
INR BLD: 1.5
KETONES, URINE: NEGATIVE MG/DL
LACTIC ACID, SEPSIS: 4.3 MMOL/L (ref 0.5–1.9)
LACTIC ACID, SEPSIS: 5.1 MMOL/L (ref 0.5–1.9)
LEUKOCYTE ESTERASE, URINE: NEGATIVE
LIPASE: 380 U/L (ref 13–60)
LYMPHOCYTES ABSOLUTE: 0.83 E9/L (ref 1.5–4)
LYMPHOCYTES RELATIVE PERCENT: 12.9 % (ref 20–42)
Lab: NORMAL
MAGNESIUM: 1.6 MG/DL (ref 1.6–2.6)
MCH RBC QN AUTO: 27.6 PG (ref 26–35)
MCHC RBC AUTO-ENTMCNC: 31.5 % (ref 32–34.5)
MCV RBC AUTO: 87.6 FL (ref 80–99.9)
METER GLUCOSE: 239 MG/DL (ref 74–99)
METHADONE SCREEN, URINE: NOT DETECTED
MONOCYTES ABSOLUTE: 1.05 E9/L (ref 0.1–0.95)
MONOCYTES RELATIVE PERCENT: 16.3 % (ref 2–12)
NEUTROPHILS ABSOLUTE: 4.44 E9/L (ref 1.8–7.3)
NEUTROPHILS RELATIVE PERCENT: 69 % (ref 43–80)
NITRITE, URINE: NEGATIVE
OPIATE SCREEN URINE: NOT DETECTED
OXYCODONE URINE: NOT DETECTED
PDW BLD-RTO: 19.8 FL (ref 11.5–15)
PH UA: 5.5 (ref 5–9)
PHENCYCLIDINE SCREEN URINE: NOT DETECTED
PLATELET # BLD: 102 E9/L (ref 130–450)
PMV BLD AUTO: 11.3 FL (ref 7–12)
POTASSIUM REFLEX MAGNESIUM: 3.4 MMOL/L (ref 3.5–5)
PROTEIN UA: ABNORMAL MG/DL
PROTHROMBIN TIME: 17.2 SEC (ref 9.3–12.4)
RBC # BLD: 3.7 E12/L (ref 3.8–5.8)
RBC UA: ABNORMAL /HPF (ref 0–2)
SODIUM BLD-SCNC: 126 MMOL/L (ref 132–146)
SPECIFIC GRAVITY UA: 1.02 (ref 1–1.03)
TOTAL PROTEIN: 4.6 G/DL (ref 6.4–8.3)
UROBILINOGEN, URINE: 0.2 E.U./DL
WBC # BLD: 6.4 E9/L (ref 4.5–11.5)
WBC UA: ABNORMAL /HPF (ref 0–5)

## 2022-03-04 PROCEDURE — 80076 HEPATIC FUNCTION PANEL: CPT

## 2022-03-04 PROCEDURE — 85025 COMPLETE CBC W/AUTO DIFF WBC: CPT

## 2022-03-04 PROCEDURE — 80143 DRUG ASSAY ACETAMINOPHEN: CPT

## 2022-03-04 PROCEDURE — 82962 GLUCOSE BLOOD TEST: CPT

## 2022-03-04 PROCEDURE — 6360000002 HC RX W HCPCS: Performed by: EMERGENCY MEDICINE

## 2022-03-04 PROCEDURE — 80179 DRUG ASSAY SALICYLATE: CPT

## 2022-03-04 PROCEDURE — 85730 THROMBOPLASTIN TIME PARTIAL: CPT

## 2022-03-04 PROCEDURE — 85610 PROTHROMBIN TIME: CPT

## 2022-03-04 PROCEDURE — 96372 THER/PROPH/DIAG INJ SC/IM: CPT

## 2022-03-04 PROCEDURE — 36415 COLL VENOUS BLD VENIPUNCTURE: CPT

## 2022-03-04 PROCEDURE — 80048 BASIC METABOLIC PNL TOTAL CA: CPT

## 2022-03-04 PROCEDURE — 71045 X-RAY EXAM CHEST 1 VIEW: CPT

## 2022-03-04 PROCEDURE — 99285 EMERGENCY DEPT VISIT HI MDM: CPT

## 2022-03-04 PROCEDURE — 2060000000 HC ICU INTERMEDIATE R&B

## 2022-03-04 PROCEDURE — 82140 ASSAY OF AMMONIA: CPT

## 2022-03-04 PROCEDURE — 74176 CT ABD & PELVIS W/O CONTRAST: CPT

## 2022-03-04 PROCEDURE — 96365 THER/PROPH/DIAG IV INF INIT: CPT

## 2022-03-04 PROCEDURE — 81001 URINALYSIS AUTO W/SCOPE: CPT

## 2022-03-04 PROCEDURE — 93005 ELECTROCARDIOGRAM TRACING: CPT | Performed by: EMERGENCY MEDICINE

## 2022-03-04 PROCEDURE — 80307 DRUG TEST PRSMV CHEM ANLYZR: CPT

## 2022-03-04 PROCEDURE — P9047 ALBUMIN (HUMAN), 25%, 50ML: HCPCS | Performed by: EMERGENCY MEDICINE

## 2022-03-04 PROCEDURE — 87040 BLOOD CULTURE FOR BACTERIA: CPT

## 2022-03-04 PROCEDURE — 82077 ASSAY SPEC XCP UR&BREATH IA: CPT

## 2022-03-04 PROCEDURE — 83735 ASSAY OF MAGNESIUM: CPT

## 2022-03-04 PROCEDURE — 2580000003 HC RX 258: Performed by: EMERGENCY MEDICINE

## 2022-03-04 PROCEDURE — 70450 CT HEAD/BRAIN W/O DYE: CPT

## 2022-03-04 PROCEDURE — 83605 ASSAY OF LACTIC ACID: CPT

## 2022-03-04 PROCEDURE — 83690 ASSAY OF LIPASE: CPT

## 2022-03-04 PROCEDURE — 6370000000 HC RX 637 (ALT 250 FOR IP): Performed by: EMERGENCY MEDICINE

## 2022-03-04 RX ORDER — POTASSIUM CHLORIDE 7.45 MG/ML
10 INJECTION INTRAVENOUS ONCE
Status: COMPLETED | OUTPATIENT
Start: 2022-03-04 | End: 2022-03-05

## 2022-03-04 RX ORDER — BLOOD SUGAR DIAGNOSTIC
STRIP MISCELLANEOUS
Qty: 300 STRIP | Refills: 5 | Status: ON HOLD
Start: 2022-03-04 | End: 2022-03-23 | Stop reason: HOSPADM

## 2022-03-04 RX ORDER — 0.9 % SODIUM CHLORIDE 0.9 %
1000 INTRAVENOUS SOLUTION INTRAVENOUS ONCE
Status: COMPLETED | OUTPATIENT
Start: 2022-03-04 | End: 2022-03-05

## 2022-03-04 RX ORDER — LACTULOSE 10 G/15ML
20 SOLUTION ORAL ONCE
Status: COMPLETED | OUTPATIENT
Start: 2022-03-04 | End: 2022-03-04

## 2022-03-04 RX ORDER — ALBUMIN (HUMAN) 12.5 G/50ML
25 SOLUTION INTRAVENOUS ONCE
Status: COMPLETED | OUTPATIENT
Start: 2022-03-04 | End: 2022-03-05

## 2022-03-04 RX ORDER — POTASSIUM CHLORIDE 7.45 MG/ML
10 INJECTION INTRAVENOUS ONCE
Status: COMPLETED | OUTPATIENT
Start: 2022-03-04 | End: 2022-03-04

## 2022-03-04 RX ORDER — 0.9 % SODIUM CHLORIDE 0.9 %
1000 INTRAVENOUS SOLUTION INTRAVENOUS ONCE
Status: COMPLETED | OUTPATIENT
Start: 2022-03-04 | End: 2022-03-04

## 2022-03-04 RX ORDER — SODIUM CHLORIDE 0.9 % (FLUSH) 0.9 %
5-40 SYRINGE (ML) INJECTION PRN
Status: DISCONTINUED | OUTPATIENT
Start: 2022-03-04 | End: 2022-03-05 | Stop reason: SDUPTHER

## 2022-03-04 RX ORDER — SODIUM CHLORIDE 0.9 % (FLUSH) 0.9 %
5-40 SYRINGE (ML) INJECTION EVERY 12 HOURS SCHEDULED
Status: DISCONTINUED | OUTPATIENT
Start: 2022-03-04 | End: 2022-03-05 | Stop reason: SDUPTHER

## 2022-03-04 RX ORDER — SODIUM CHLORIDE 9 MG/ML
25 INJECTION, SOLUTION INTRAVENOUS PRN
Status: DISCONTINUED | OUTPATIENT
Start: 2022-03-04 | End: 2022-03-05 | Stop reason: SDUPTHER

## 2022-03-04 RX ADMIN — POTASSIUM CHLORIDE 10 MEQ: 7.46 INJECTION, SOLUTION INTRAVENOUS at 22:01

## 2022-03-04 RX ADMIN — ALBUMIN (HUMAN) 25 G: 0.25 INJECTION, SOLUTION INTRAVENOUS at 22:59

## 2022-03-04 RX ADMIN — Medication 10 ML: at 22:11

## 2022-03-04 RX ADMIN — TRIMETHOBENZAMIDE HYDROCHLORIDE 200 MG: 100 INJECTION INTRAMUSCULAR at 21:58

## 2022-03-04 RX ADMIN — SODIUM CHLORIDE 1000 ML: 9 INJECTION, SOLUTION INTRAVENOUS at 22:51

## 2022-03-04 RX ADMIN — LACTULOSE 20 G: 20 SOLUTION ORAL at 22:01

## 2022-03-04 RX ADMIN — SODIUM CHLORIDE 1000 ML: 9 INJECTION, SOLUTION INTRAVENOUS at 20:39

## 2022-03-04 RX ADMIN — POTASSIUM CHLORIDE 10 MEQ: 7.46 INJECTION, SOLUTION INTRAVENOUS at 22:00

## 2022-03-04 NOTE — TELEPHONE ENCOUNTER
----- Message from [facility] sent at 3/4/2022 12:39 PM EST -----  Subject: Refill Request    QUESTIONS  Name of Medication? blood glucose test strips (ONETOUCH ULTRA) strip  Patient-reported dosage and instructions? 4 x a day  How many days do you have left? 1  Preferred Pharmacy? [id number]  Pharmacy phone number (if available)? [phone]  Additional Information for Provider? Pt's wife states. Pt states    needs ASAP.  ---------------------------------------------------------------------------  --------------  CALL BACK INFO  What is the best way for the office to contact you? OK to leave message on   voicemail  Preferred Call Back Phone Number?  [phone]

## 2022-03-05 LAB
ACETAMINOPHEN LEVEL: <5 MCG/ML (ref 10–30)
ALBUMIN SERPL-MCNC: 2.9 G/DL (ref 3.5–5.2)
ALP BLD-CCNC: 225 U/L (ref 40–129)
ALT SERPL-CCNC: 16 U/L (ref 0–40)
AMMONIA: 91 UMOL/L (ref 16–60)
ANION GAP SERPL CALCULATED.3IONS-SCNC: 19 MMOL/L (ref 7–16)
AST SERPL-CCNC: 31 U/L (ref 0–39)
BACTERIA: ABNORMAL /HPF
BASOPHILS ABSOLUTE: 0 E9/L (ref 0–0.2)
BASOPHILS RELATIVE PERCENT: 0 % (ref 0–2)
BILIRUB SERPL-MCNC: 1.9 MG/DL (ref 0–1.2)
BILIRUBIN URINE: NEGATIVE
BLOOD, URINE: ABNORMAL
BUN BLDV-MCNC: 69 MG/DL (ref 6–23)
CALCIUM SERPL-MCNC: 9 MG/DL (ref 8.6–10.2)
CHLORIDE BLD-SCNC: 93 MMOL/L (ref 98–107)
CLARITY: ABNORMAL
CO2: 18 MMOL/L (ref 22–29)
COLOR: YELLOW
CREAT SERPL-MCNC: 1.6 MG/DL (ref 0.7–1.2)
CREATININE URINE: 60 MG/DL (ref 40–278)
EKG ATRIAL RATE: 102 BPM
EKG P AXIS: 84 DEGREES
EKG P-R INTERVAL: 172 MS
EKG Q-T INTERVAL: 386 MS
EKG QRS DURATION: 150 MS
EKG QTC CALCULATION (BAZETT): 503 MS
EKG R AXIS: 82 DEGREES
EKG T AXIS: 101 DEGREES
EKG VENTRICULAR RATE: 102 BPM
EOSINOPHIL, URINE: 0 % (ref 0–1)
EOSINOPHILS ABSOLUTE: 0 E9/L (ref 0.05–0.5)
EOSINOPHILS RELATIVE PERCENT: 0 % (ref 0–6)
EPITHELIAL CELLS, UA: ABNORMAL /HPF
ETHANOL: <10 MG/DL (ref 0–0.08)
GFR AFRICAN AMERICAN: 52
GFR NON-AFRICAN AMERICAN: 43 ML/MIN/1.73
GLUCOSE BLD-MCNC: 245 MG/DL (ref 74–99)
GLUCOSE URINE: NEGATIVE MG/DL
HCT VFR BLD CALC: 24.7 % (ref 37–54)
HEMOGLOBIN: 7.8 G/DL (ref 12.5–16.5)
KETONES, URINE: NEGATIVE MG/DL
LACTIC ACID: 3.2 MMOL/L (ref 0.5–2.2)
LACTIC ACID: 3.6 MMOL/L (ref 0.5–2.2)
LACTIC ACID: 3.9 MMOL/L (ref 0.5–2.2)
LACTIC ACID: 4.3 MMOL/L (ref 0.5–2.2)
LACTIC ACID: 4.3 MMOL/L (ref 0.5–2.2)
LEUKOCYTE ESTERASE, URINE: NEGATIVE
LYMPHOCYTES ABSOLUTE: 0.22 E9/L (ref 1.5–4)
LYMPHOCYTES RELATIVE PERCENT: 6 % (ref 20–42)
MAGNESIUM: 1.5 MG/DL (ref 1.6–2.6)
MCH RBC QN AUTO: 28.3 PG (ref 26–35)
MCHC RBC AUTO-ENTMCNC: 31.6 % (ref 32–34.5)
MCV RBC AUTO: 89.5 FL (ref 80–99.9)
METER GLUCOSE: 220 MG/DL (ref 74–99)
METER GLUCOSE: 246 MG/DL (ref 74–99)
METER GLUCOSE: 264 MG/DL (ref 74–99)
METER GLUCOSE: 275 MG/DL (ref 74–99)
METER GLUCOSE: 299 MG/DL (ref 74–99)
MONOCYTES ABSOLUTE: 0.22 E9/L (ref 0.1–0.95)
MONOCYTES RELATIVE PERCENT: 6 % (ref 2–12)
NEUTROPHILS ABSOLUTE: 3.17 E9/L (ref 1.8–7.3)
NEUTROPHILS RELATIVE PERCENT: 88 % (ref 43–80)
NITRITE, URINE: NEGATIVE
OSMOLALITY: 294 MOSM/KG (ref 285–310)
PDW BLD-RTO: 19.8 FL (ref 11.5–15)
PH UA: 5.5 (ref 5–9)
PHOSPHORUS: 4.4 MG/DL (ref 2.5–4.5)
PLATELET # BLD: 72 E9/L (ref 130–450)
PLATELET CONFIRMATION: NORMAL
PMV BLD AUTO: 10.7 FL (ref 7–12)
POTASSIUM SERPL-SCNC: 3.4 MMOL/L (ref 3.5–5)
PROCALCITONIN: 0.87 NG/ML (ref 0–0.08)
PROTEIN UA: 100 MG/DL
RBC # BLD: 2.76 E12/L (ref 3.8–5.8)
RBC UA: ABNORMAL /HPF (ref 0–2)
SALICYLATE, SERUM: <0.3 MG/DL (ref 0–30)
SODIUM BLD-SCNC: 130 MMOL/L (ref 132–146)
SODIUM URINE: <20 MMOL/L
SPECIFIC GRAVITY UA: 1.02 (ref 1–1.03)
TOTAL PROTEIN: 4.5 G/DL (ref 6.4–8.3)
TRICYCLIC ANTIDEPRESSANTS SCREEN SERUM: NEGATIVE NG/ML
TSH SERPL DL<=0.05 MIU/L-ACNC: 5.31 UIU/ML (ref 0.27–4.2)
UREA NITROGEN, UR: 782 MG/DL (ref 800–1666)
URIC ACID, SERUM: 9.6 MG/DL (ref 3.4–7)
UROBILINOGEN, URINE: 0.2 E.U./DL
WBC # BLD: 3.6 E9/L (ref 4.5–11.5)
WBC UA: ABNORMAL /HPF (ref 0–5)

## 2022-03-05 PROCEDURE — 6370000000 HC RX 637 (ALT 250 FOR IP): Performed by: INTERNAL MEDICINE

## 2022-03-05 PROCEDURE — 83935 ASSAY OF URINE OSMOLALITY: CPT

## 2022-03-05 PROCEDURE — 84100 ASSAY OF PHOSPHORUS: CPT

## 2022-03-05 PROCEDURE — 87205 SMEAR GRAM STAIN: CPT

## 2022-03-05 PROCEDURE — P9047 ALBUMIN (HUMAN), 25%, 50ML: HCPCS | Performed by: INTERNAL MEDICINE

## 2022-03-05 PROCEDURE — 6360000002 HC RX W HCPCS: Performed by: INTERNAL MEDICINE

## 2022-03-05 PROCEDURE — 83735 ASSAY OF MAGNESIUM: CPT

## 2022-03-05 PROCEDURE — 36415 COLL VENOUS BLD VENIPUNCTURE: CPT

## 2022-03-05 PROCEDURE — 84145 PROCALCITONIN (PCT): CPT

## 2022-03-05 PROCEDURE — 84300 ASSAY OF URINE SODIUM: CPT

## 2022-03-05 PROCEDURE — 2580000003 HC RX 258: Performed by: INTERNAL MEDICINE

## 2022-03-05 PROCEDURE — 2060000000 HC ICU INTERMEDIATE R&B

## 2022-03-05 PROCEDURE — 80053 COMPREHEN METABOLIC PANEL: CPT

## 2022-03-05 PROCEDURE — 81001 URINALYSIS AUTO W/SCOPE: CPT

## 2022-03-05 PROCEDURE — 84540 ASSAY OF URINE/UREA-N: CPT

## 2022-03-05 PROCEDURE — 6360000002 HC RX W HCPCS: Performed by: EMERGENCY MEDICINE

## 2022-03-05 PROCEDURE — 82570 ASSAY OF URINE CREATININE: CPT

## 2022-03-05 PROCEDURE — 83930 ASSAY OF BLOOD OSMOLALITY: CPT

## 2022-03-05 PROCEDURE — 82140 ASSAY OF AMMONIA: CPT

## 2022-03-05 PROCEDURE — 84443 ASSAY THYROID STIM HORMONE: CPT

## 2022-03-05 PROCEDURE — 85025 COMPLETE CBC W/AUTO DIFF WBC: CPT

## 2022-03-05 PROCEDURE — 82962 GLUCOSE BLOOD TEST: CPT

## 2022-03-05 PROCEDURE — 83605 ASSAY OF LACTIC ACID: CPT

## 2022-03-05 PROCEDURE — 84550 ASSAY OF BLOOD/URIC ACID: CPT

## 2022-03-05 PROCEDURE — 89051 BODY FLUID CELL COUNT: CPT

## 2022-03-05 RX ORDER — ALBUMIN (HUMAN) 12.5 G/50ML
25 SOLUTION INTRAVENOUS ONCE
Status: COMPLETED | OUTPATIENT
Start: 2022-03-05 | End: 2022-03-05

## 2022-03-05 RX ORDER — ALBUTEROL SULFATE 0.63 MG/3ML
1 SOLUTION RESPIRATORY (INHALATION) EVERY 6 HOURS PRN
Status: ON HOLD | COMMUNITY
End: 2022-03-17 | Stop reason: HOSPADM

## 2022-03-05 RX ORDER — DEXTROSE MONOHYDRATE 25 G/50ML
12.5 INJECTION, SOLUTION INTRAVENOUS PRN
Status: DISCONTINUED | OUTPATIENT
Start: 2022-03-05 | End: 2022-03-05 | Stop reason: SDUPTHER

## 2022-03-05 RX ORDER — LACTULOSE 10 G/15ML
10 SOLUTION ORAL 2 TIMES DAILY
Status: DISCONTINUED | OUTPATIENT
Start: 2022-03-05 | End: 2022-03-05

## 2022-03-05 RX ORDER — ASPIRIN 81 MG/1
81 TABLET, CHEWABLE ORAL DAILY
Status: DISCONTINUED | OUTPATIENT
Start: 2022-03-05 | End: 2022-03-09 | Stop reason: HOSPADM

## 2022-03-05 RX ORDER — PREDNISONE 20 MG/1
20 TABLET ORAL DAILY
Status: DISCONTINUED | OUTPATIENT
Start: 2022-03-05 | End: 2022-03-09 | Stop reason: HOSPADM

## 2022-03-05 RX ORDER — NICOTINE POLACRILEX 4 MG
15 LOZENGE BUCCAL PRN
Status: DISCONTINUED | OUTPATIENT
Start: 2022-03-05 | End: 2022-03-09 | Stop reason: HOSPADM

## 2022-03-05 RX ORDER — MAGNESIUM OXIDE 400 MG/1
400 TABLET ORAL ONCE
Status: COMPLETED | OUTPATIENT
Start: 2022-03-05 | End: 2022-03-05

## 2022-03-05 RX ORDER — HEPARIN SODIUM 5000 [USP'U]/ML
5000 INJECTION, SOLUTION INTRAVENOUS; SUBCUTANEOUS EVERY 8 HOURS SCHEDULED
Status: DISCONTINUED | OUTPATIENT
Start: 2022-03-05 | End: 2022-03-08

## 2022-03-05 RX ORDER — LACTULOSE 10 G/15ML
20 SOLUTION ORAL EVERY 6 HOURS SCHEDULED
Status: DISCONTINUED | OUTPATIENT
Start: 2022-03-05 | End: 2022-03-09 | Stop reason: HOSPADM

## 2022-03-05 RX ORDER — SODIUM CHLORIDE 9 MG/ML
25 INJECTION, SOLUTION INTRAVENOUS PRN
Status: DISCONTINUED | OUTPATIENT
Start: 2022-03-05 | End: 2022-03-09 | Stop reason: HOSPADM

## 2022-03-05 RX ORDER — SODIUM CHLORIDE AND POTASSIUM CHLORIDE .9; .15 G/100ML; G/100ML
SOLUTION INTRAVENOUS CONTINUOUS
Status: DISCONTINUED | OUTPATIENT
Start: 2022-03-05 | End: 2022-03-09 | Stop reason: HOSPADM

## 2022-03-05 RX ORDER — SODIUM CHLORIDE 0.9 % (FLUSH) 0.9 %
5-40 SYRINGE (ML) INJECTION EVERY 12 HOURS SCHEDULED
Status: DISCONTINUED | OUTPATIENT
Start: 2022-03-05 | End: 2022-03-09 | Stop reason: HOSPADM

## 2022-03-05 RX ORDER — BENZONATATE 100 MG/1
100 CAPSULE ORAL 3 TIMES DAILY PRN
Status: DISCONTINUED | OUTPATIENT
Start: 2022-03-05 | End: 2022-03-09 | Stop reason: HOSPADM

## 2022-03-05 RX ORDER — LEVOTHYROXINE SODIUM 0.2 MG/1
200 TABLET ORAL DAILY
Status: DISCONTINUED | OUTPATIENT
Start: 2022-03-05 | End: 2022-03-09 | Stop reason: HOSPADM

## 2022-03-05 RX ORDER — DEXTROSE MONOHYDRATE 50 MG/ML
100 INJECTION, SOLUTION INTRAVENOUS PRN
Status: DISCONTINUED | OUTPATIENT
Start: 2022-03-05 | End: 2022-03-09 | Stop reason: HOSPADM

## 2022-03-05 RX ORDER — PANTOPRAZOLE SODIUM 40 MG/1
40 TABLET, DELAYED RELEASE ORAL
Status: DISCONTINUED | OUTPATIENT
Start: 2022-03-05 | End: 2022-03-09 | Stop reason: HOSPADM

## 2022-03-05 RX ORDER — INSULIN GLARGINE 100 [IU]/ML
20 INJECTION, SOLUTION SUBCUTANEOUS NIGHTLY
Status: DISCONTINUED | OUTPATIENT
Start: 2022-03-05 | End: 2022-03-09 | Stop reason: HOSPADM

## 2022-03-05 RX ORDER — SODIUM CHLORIDE 0.9 % (FLUSH) 0.9 %
5-40 SYRINGE (ML) INJECTION PRN
Status: DISCONTINUED | OUTPATIENT
Start: 2022-03-05 | End: 2022-03-09 | Stop reason: HOSPADM

## 2022-03-05 RX ADMIN — ASPIRIN 81 MG 81 MG: 81 TABLET ORAL at 09:08

## 2022-03-05 RX ADMIN — POTASSIUM CHLORIDE 10 MEQ: 7.46 INJECTION, SOLUTION INTRAVENOUS at 00:06

## 2022-03-05 RX ADMIN — LEVOTHYROXINE SODIUM 200 MCG: 0.2 TABLET ORAL at 06:33

## 2022-03-05 RX ADMIN — Medication 10 ML: at 21:05

## 2022-03-05 RX ADMIN — LACTULOSE 20 G: 20 SOLUTION ORAL at 18:08

## 2022-03-05 RX ADMIN — MAGNESIUM OXIDE 400 MG (241.3 MG MAGNESIUM) TABLET 400 MG: TABLET at 15:50

## 2022-03-05 RX ADMIN — PREDNISONE 20 MG: 20 TABLET ORAL at 01:05

## 2022-03-05 RX ADMIN — INSULIN GLARGINE 20 UNITS: 100 INJECTION, SOLUTION SUBCUTANEOUS at 21:06

## 2022-03-05 RX ADMIN — INSULIN LISPRO 3 UNITS: 100 INJECTION, SOLUTION INTRAVENOUS; SUBCUTANEOUS at 21:06

## 2022-03-05 RX ADMIN — WATER 2000 MG: 1 INJECTION INTRAMUSCULAR; INTRAVENOUS; SUBCUTANEOUS at 21:05

## 2022-03-05 RX ADMIN — Medication 10 ML: at 09:08

## 2022-03-05 RX ADMIN — ALBUMIN (HUMAN) 25 G: 0.25 INJECTION, SOLUTION INTRAVENOUS at 03:03

## 2022-03-05 RX ADMIN — LACTULOSE 10 G: 20 SOLUTION ORAL at 09:07

## 2022-03-05 RX ADMIN — INSULIN LISPRO 3 UNITS: 100 INJECTION, SOLUTION INTRAVENOUS; SUBCUTANEOUS at 08:13

## 2022-03-05 RX ADMIN — LACTULOSE 10 G: 20 SOLUTION ORAL at 02:58

## 2022-03-05 RX ADMIN — RIFAXIMIN 550 MG: 550 TABLET ORAL at 21:05

## 2022-03-05 RX ADMIN — SODIUM CHLORIDE AND POTASSIUM CHLORIDE: 9; 1.49 INJECTION, SOLUTION INTRAVENOUS at 18:08

## 2022-03-05 RX ADMIN — HEPARIN SODIUM 5000 UNITS: 5000 INJECTION INTRAVENOUS; SUBCUTANEOUS at 02:58

## 2022-03-05 RX ADMIN — ALBUMIN (HUMAN) 25 G: 25 SOLUTION INTRAVENOUS at 19:03

## 2022-03-05 RX ADMIN — INSULIN LISPRO 3 UNITS: 100 INJECTION, SOLUTION INTRAVENOUS; SUBCUTANEOUS at 17:50

## 2022-03-05 RX ADMIN — PANTOPRAZOLE SODIUM 40 MG: 40 TABLET, DELAYED RELEASE ORAL at 06:33

## 2022-03-05 RX ADMIN — INSULIN LISPRO 3 UNITS: 100 INJECTION, SOLUTION INTRAVENOUS; SUBCUTANEOUS at 12:00

## 2022-03-05 RX ADMIN — RIFAXIMIN 550 MG: 550 TABLET ORAL at 09:07

## 2022-03-05 RX ADMIN — HEPARIN SODIUM 5000 UNITS: 5000 INJECTION INTRAVENOUS; SUBCUTANEOUS at 14:30

## 2022-03-05 ASSESSMENT — PAIN SCALES - GENERAL
PAINLEVEL_OUTOF10: 0
PAINLEVEL_OUTOF10: 1

## 2022-03-05 NOTE — H&P
Department of Internal Medicine  History and Physical    PCP: Adele Callahan DO  Admitting Physician: Dr. Kelley Barragan  Consultants:   Date of Service: 3/4/2022    CHIEF COMPLAINT:  Altered mental status     HISTORY OF PRESENT ILLNESS:    Patient is 42-year-old male who presented to the ED due to altered mental status. Patient is a poor historian. Wife noted that patient has been taking his medications daily. He has been having more than 3 bowel movements daily. Patient has noted distention of abdomen. On exam he does not have acute complaints.     PAST MEDICAL Hx:  Past Medical History:   Diagnosis Date    AML (acute myeloblastic leukemia) [facility] [address]     chemotherapy    AML (acute myeloblastic leukemia) ([facility] [address] 2010    Cancer ([facility] [address]     Chronic kidney disease     Diabetes mellitus ([facility] [address]     GERD (gastroesophageal reflux disease)     Hx of blood clots 2012    legs    Hyperlipidemia     Liver disease     MI, old     Other disorders of kidney and ureter     Other sleep disturbances     Thyroid disease     Type II or unspecified type diabetes mellitus without mention of complication, not stated as uncontrolled        PAST SURGICAL Hx:   Past Surgical History:   Procedure Laterality Date    APPENDECTOMY      BONE MARROW TRANSPLANT      double core transplant    CATHETER INSERTION N/A 2/28/2022    MEDIPORT CATHETER INSERTION performed by Marie Norton MD at [address] [place] [address]      CT NEEDLE BIOPSY LIVER PERCUTANEOUS  1/17/2022    CT NEEDLE BIOPSY LIVER PERCUTANEOUS    ENDOSCOPY, COLON, DIAGNOSTIC      FOOT NEUROMA SURGERY      UPPER GASTROINTESTINAL ENDOSCOPY         FAMILY Hx:  Family History   Problem Relation Age of Onset    Heart Disease Mother     High Blood Pressure Father     Heart Disease Father     Cancer Maternal Grandmother     Cancer Other     Cancer Other        HOME MEDICATIONS:  Prior to Admission medications    Medication Sig Start Date End Date Taking? Authorizing Provider   blood glucose test strips (ONETOUCH ULTRA) strip TEST 3 TIMES DAILY 3/4/22   JYOTI Lowe CNP   Continuous Blood Gluc  (FREESTYLE PAOLA 2 READER) FLORESITA Check sygar FAsting and ac (4 X daiy). 3/3/22   Rachna Phipps, DO   Continuous Blood Gluc Sensor (FREESTYLE PAOLA 2 SENSOR) MISC Check sugar 5 X daily. AM and [facility] 3/3/22   Rachna Phipps, DO   benzonatate (TESSALON PERLES) 100 MG capsule Take 1 capsule by mouth 3 times daily as needed for Cough 3/3/22 3/10/22  Rachna Phipps, DO   lactulose [facility]) 10 GM/15ML solution Take 15 mLs by mouth 2 times daily 2/28/22 3/30/22  Jasper Toney, DO   rifaximin Michelle Monk) 550 MG tablet Take 1 tablet by mouth 2 times daily 2/28/22 3/30/22  Jasper Toney DO   ondansetron (ZOFRAN-ODT) 4 MG disintegrating tablet Take 1 tablet by mouth 3 times daily as needed for Nausea or Vomiting 2/24/22   Rachna Phipps, DO   LORazepam (ATIVAN) 0.5 MG tablet Take 1 tablet by mouth 2 times daily as needed for Anxiety. 2/24/22 2/24/23  Rachna Phipps, DO   traMADol (ULTRAM) 50 MG tablet Take 1 tablet by mouth every 8 hours as needed for Pain for up to 14 days.  2/24/22 3/10/22  Rachna Phipps, DO   furosemide (LASIX) 20 MG tablet Take 10 mg by mouth 2 times daily    Historical Provider, MD   ferrous sulfate (IRON 325) 325 (65 Fe) MG tablet Take 1 tablet by mouth 2 times daily (with meals) 1/31/22   Rachna Phipps DO   levothyroxine (SYNTHROID) 200 MCG tablet Take 1 tablet by mouth Daily 12/28/21   JYOTI Nuñez CNP   sertraline (ZOLOFT) 50 MG tablet Take 1.5 tablets by mouth daily  Patient taking differently: Take 50 mg by mouth daily 1 tablet 11/9/21   Rachna Phipps DO   pantoprazole (PROTONIX) 40 MG tablet TAKE 1 TABLET DAILY 5/27/21   Rachna Phipps,    insulin glargine (LANTUS SOLOSTAR) 100 UNIT/ML injection pen Inject 20 Units into the skin nightly 5/27/21   Rachna Phipps, DO   INS SYRINGE/NEEDLE .5CC/28G (AIMSCO INS SYR MX-COM .5CC/28G) 28G X 1/2\" 0.5 ML MISC Use three times daily 21   [facility], DO   Insulin Pen Needle (BD PEN NEEDLE JENNIFER U/F) 32G X 4 MM MISC USE AS DIRECTED 21   [facility], DO   insulin lispro, 1 Unit Dial, (HUMALOG KWIKPEN) 100 UNIT/ML SOPN INJECT 0-20 UNITS INTO THE SKIN THREE TIMES DAILY(BEFORE MEALS) 21   JYOTI Shukla - CNP   vitamin D (CHOLECALCIFEROL) 1000 UNIT TABS tablet Take 2,000 Units by mouth daily     Historical Provider, MD   aspirin 81 MG chewable tablet Take 81 mg by mouth daily    Historical Provider, MD       ALLERGIES:  Pneumococcal vaccines, Ciprofloxacin, Diatrizoate, Dye [iodides], Epinephrine, Iodine, Metformin and related, Penicillin g, Prednisone, Morphine, Other, and Oxycodone    SOCIAL Hx:  Social History     Socioeconomic History    Marital status:      Spouse name: Hill Weaver Number of children: 1    Years of education: Not on file    Highest education level: Not on file   Occupational History    Not on file   Tobacco Use    Smoking status: Former Smoker     Packs/day: 1.00     Years: 5.00     Pack years: 5.00     Quit date: 1976     Years since quittin.9    Smokeless tobacco: Former User     Quit date: 1998   Substance and Sexual Activity    Alcohol use: Yes     Alcohol/week: 1.0 standard drink     Types: 1 Cans of beer per week     Comment: weekly    Drug use: No    Sexual activity: Yes     Partners: Female   Other Topics Concern    Not on file   Social History Narrative    Not on file     Social Determinants of Health     Financial Resource Strain: Low Risk     Difficulty of Paying Living Expenses: Not very hard   Food Insecurity: No Food Insecurity    Worried About Running Out of Food in the Last Year: Never true    Diane of Food in the Last Year: Never true   Transportation Needs:     Lack of Transportation (Medical):  Not on file    Lack of Transportation (Non-Medical): Not on file   Physical Activity:     Days of Exercise per Week: Not on file    Minutes of Exercise per Session: Not on file   Stress:     Feeling of Stress : Not on file   Social Connections:     Frequency of Communication with Friends and Family: Not on file    Frequency of Social Gatherings with Friends and Family: Not on file    Attends Hindu Services: Not on file    Active Member of [address] [place] [address] or Organizations: Not on file    Attends Club or Organization Meetings: Not on file    Marital Status: Not on file   Intimate Partner Violence:     Fear of Current or Ex-Partner: Not on file    Emotionally Abused: Not on file    Physically Abused: Not on file    Sexually Abused: Not on file   Housing Stability:     Unable to Pay for Housing in the Last Year: Not on file    Number of [place] in the Last Year: Not on file    Unstable Housing in the Last Year: Not on file       ROS: Positive in bold  General:   Denies chills, fatigue, fever, malaise, night sweats or weight loss    Psychological:   Denies anxiety, disorientation or hallucinations    ENT:    Denies epistaxis, headaches, vertigo or visual changes    Cardiovascular:   Denies any chest pain, irregular heartbeats, or palpitations. No paroxysmal nocturnal dyspnea. Respiratory:   Denies shortness of breath, coughing, sputum production, hemoptysis, or wheezing. No orthopnea. Gastrointestinal:   Denies nausea, vomiting, diarrhea, or constipation. Denies any abdominal pain. Denies change in bowel habits or stools. Genito-Urinary:    Denies any urgency, frequency, hematuria. Voiding without difficulty. Musculoskeletal:   Denies joint pain, joint stiffness, joint swelling or muscle pain    Neurology:    Denies any headache or focal neurological deficits. No weakness or paresthesia. Derm:    Denies any rashes, ulcers, or excoriations. Denies bruising.       Extremities:   Denies any lower extremity swelling or edema. PHYSICAL EXAM: Abnormal findings noted  VITALS:  Vitals:    03/04/22 2303   BP: (!) 91/56   Pulse: 99   Resp: 19   Temp:    SpO2: 94%         CONSTITUTIONAL:    Awake, alert, cooperative, no apparent distress, and appears stated age    EYES:     EOMI, sclera clear, conjunctiva normal    ENT:    Normocephalic, atraumatic,  External ears without lesions. NECK:    Supple, symmetrical, trachea midline, no JVD    HEMATOLOGIC/LYMPHATICS:    No cervical lymphadenopathy and no supraclavicular lymphadenopathy    LUNGS:    Symmetric. No increased work of breathing, good air exchange, clear to auscultation bilaterally, no wheezes, rhonchi, or rales,     CARDIOVASCULAR:    Normal apical impulse, regular rate and rhythm, normal S1 and S2, no S3 or S4, and no murmur noted    ABDOMEN:     soft, non-distended, non-tender  Patient's abdomen is distended    MUSCULOSKELETAL:    There is no redness, warmth, or swelling of the joints. NEUROLOGIC:    Awake, alert, oriented to name, place and time. SKIN:    No bruising or bleeding. No redness, warmth, or swelling    EXTREMITIES:    Peripheral pulses present. No edema, cyanosis, or swelling.   Patient has bilateral lower extremity edema    LINES/CATHETERS   Left chest wall mediport     LABORATORY DATA:  CBC with Differential:    Lab Results   Component Value Date    WBC 6.4 03/04/2022    RBC 3.70 03/04/2022    HGB 10.2 03/04/2022    HCT 32.4 03/04/2022     03/04/2022    MCV 87.6 03/04/2022    MCH 27.6 03/04/2022    MCHC 31.5 03/04/2022    RDW 19.8 03/04/2022    NRBC 1 04/16/2012    SEGSPCT 22 02/03/2014    METASPCT 4.3 02/04/2021    LYMPHOPCT 12.9 03/04/2022    MONOPCT 16.3 03/04/2022    MYELOPCT 0.9 02/04/2021    BASOPCT 0.2 03/04/2022    MONOSABS 1.05 03/04/2022    LYMPHSABS 0.83 03/04/2022    EOSABS 0.00 03/04/2022    BASOSABS 0.01 03/04/2022     CMP:    Lab Results   Component Value Date     03/04/2022    K 3.4 03/04/2022    CL 88 03/04/2022    CO2 19 03/04/2022    BUN 70 03/04/2022    CREATININE 1.8 03/04/2022    GFRAA 45 03/04/2022    LABGLOM 38 03/04/2022    GLUCOSE 253 03/04/2022    GLUCOSE 318 04/30/2012    PROT 4.6 03/04/2022    LABALBU 2.5 03/04/2022    LABALBU 4.0 04/30/2012    CALCIUM 9.2 03/04/2022    BILITOT 1.9 03/04/2022    ALKPHOS 299 03/04/2022    AST 36 03/04/2022    ALT 20 03/04/2022       ASSESSMENT/PLAN:  1. Hepatic encephalopathy  2. Decompensated cirrhosis  3. Large volume ascites  4. LISSETTE on CKD in the setting of increased diuretic use  5. Lactic acidosis  6. Hypervolemic hyponatremia  7. AML  8. Chronic Anemia  9. Nondisplaced sacral fracture  10. Insulin-dependent diabetes mellitus  11. History of DVT  12. History of MI  15. Hypothyroidism  14. Hyperlipidemia  15. Hx of tobacco abuse     Patient presents with hepatic encephalopathy. He has decompensated cirrhosis. Apparently patient has been taking his medications as prescribed. He has been having multiple bowel movements a day. However his wife did mention that she had been giving him 80 mg of Lasix daily. States that this was prescribed by the GI doctor. Lewis Bradley However she thinks this is too much. We will continue with lactulose supplementation. Patient's ammonia level will be monitored. Assess for any underlying etiology. Patient also has large amount of ascites. We will obtain paracentesis. Additionally patient has been started on prednisone 20 mg daily. This will be continued.     Dee Dee Culver, [address]  11:14 PM  3/4/2022    Electronically signed by Dee Dee Culver DO on 3/4/22 at 11:14 PM EST

## 2022-03-05 NOTE — ED TRIAGE NOTES
Pt comes in with altered mental status -- pt has a hx of liver cancer/hodgkin's lymphoma and is supposed to be receiving 40mg lasix and 0.5mg ativan, but his wife gave him 80mg of lasix and 1.5mg ativan over the last two days. Pt had a paracentesis last week, as well. Pt recently had a port put in his left chest last week and is due to start chemotherapy next week. Pt is oriented to self (name, unable to tell me his birthday, keeps repeating \"[birth year]\") and year, believes he is at [facility]. Pt states he has been feeling lethargic as well.  Abdomen is very distended

## 2022-03-05 NOTE — PROGRESS NOTES
Internal Medicine Progress Note    [facility]    Camilo Monae, PRASANNAOJESUS Brian D.O., LIZ Mathis, MSN, APRN, NP-C  Kimberlyn Min. Suzy Gomez, MSN, APRN-CNP     Primary Care Physician: Sushma Acevedo DO   Admitting Physician:  Kanika Espinosa DO  Admission date and time: 3/4/2022  8:13 PM    Room:  [facility]/[address] [place] [address]  Admitting diagnosis: Portal hypertension ([facility] [address] [K76.6]  Hypokalemia [E87.6]  Hyponatremia [E87.1]  Hyperammonemia ([address] [E72.20]  Malignant ascites [R18.0]  History of liver cancer [Z85.05]  Elevated lipase [R74.8]  Hypotension, unspecified hypotension type [I95.9]  Altered mental status, unspecified altered mental status type [R41.82]  Acute kidney injury superimposed on CKD ([address] [N17.9, N18.9]  Hepatic cirrhosis, unspecified hepatic cirrhosis type, unspecified whether ascites present ([address] [E48.62]  AMS (altered mental status) [R41.82]    Patient Name: Bear Bernabe  MRN: [id number]    Date of Service: 3/5/2022     Subjective: Alexander Cr is a 73 y.o. male who was seen and examined today,3/5/2022, at the bedside. Patient pleasantly confused. Unable to state where he was at. He thought he was at [facility].  Does know the year and president however. States he is fatigued but otherwise offers no complaints. No family present during my examination. Review of System:   Constitutional:   Denies fever or chills, weight loss or gain, positive for fatigue. HEENT:   Denies ear pain, sore throat, sinus or eye problems. Cardiovascular:   Denies any chest pain, irregular heartbeats, or palpitations. Respiratory:   Denies shortness of breath, coughing, sputum production, hemoptysis, or wheezing. Gastrointestinal:   Denies nausea, vomiting, diarrhea, or constipation. Denies any abdominal pain. Genitourinary:    Denies any urgency, frequency, hematuria.  Voiding  without difficulty. Extremities:   Denies lower extremity swelling, edema or cyanosis. Neurology:    Denies any headache or focal neurological deficits, Denies generalized weakness or memory difficulty. Psch:   Denies being anxious or depressed. Musculoskeletal:    Denies  myalgias, joint complaints or back pain. Integumentary:   Denies any rashes, ulcers, or excoriations. Denies bruising. Hematologic/Lymphatic:  Denies bruising or bleeding. Physical Exam:  I/O this shift:  In: 360 [P.O.:360]  Out: -     Intake/Output Summary (Last 24 hours) at 3/5/2022 1611  Last data filed at 3/5/2022 1258  Gross per 24 hour   Intake 2845.97 ml   Output 77 ml   Net 2768.97 ml   I/O last 3 completed shifts: In: 2486 [IV Piggyback:2486]  Out: 68 [Urine:75; Stool:2]  Patient Vitals for the past 96 hrs (Last 3 readings):   Weight   03/05/22 0130 181 lb 1.6 oz (82.1 kg)   03/04/22 2015 165 lb (74.8 kg)     Vital Signs:   Blood pressure (!) 87/54, pulse 88, temperature 97.2 °F (36.2 °C), temperature source Infrared, resp. rate 20, height 5' 8\" (1.727 m), weight 181 lb 1.6 oz (82.1 kg), SpO2 97 %. General appearance:  Alert, responsive, oriented to person, oriented to place, did know the year. Well preserved, alert, no distress. Head:  Normocephalic. No masses, lesions or tenderness. Eyes:  PERRLA. EOMI. Sclera clear. Impaired vision. ENT:  Ears normal. Mucosa normal.  Missing teeth. Neck:    Supple. Trachea midline. No thyromegaly. No JVD. No bruits. Left chest Mediport-incision healing. Ecchymosis around the incision site. Heart:    Rhythm regular. Rate controlled. No murmurs. Lungs:    Symmetrical. Clear to auscultation bilaterally but diminished. No wheezes. No rhonchi. No rales. Abdomen:   Softly distended. Umbilical hernia. Bowel sounds positive. No organomegaly or masses. No pain on palpation. Extremities:    Peripheral pulses present. No peripheral edema. No ulcers. No cyanosis.  No clubbing. Neurologic:    Alert. Oriented to person and year. No focal deficit. Cranial nerves grossly intact. No focal weakness. Psych:   Behavior is normal. Mood appears normal. Speech is not rapid and/or pressured. Musculoskeletal:   Spine ROM normal. Muscular strength intact. Gait not assessed. Integumentary:  No rashes  Skin normal color and texture.   Genitalia/Breast:  Deferred    Medication:  Scheduled Meds:   heparin (porcine)  5,000 Units SubCUTAneous 3 times per day    sodium chloride flush  5-40 mL IntraVENous 2 times per day    aspirin  81 mg Oral Daily    insulin glargine  20 Units SubCUTAneous Nightly    levothyroxine  200 mcg Oral Daily    pantoprazole  40 mg Oral QAM AC    predniSONE  20 mg Oral Daily    rifaximin  550 mg Oral BID    insulin lispro  0-6 Units SubCUTAneous TID WC    insulin lispro  0-3 Units SubCUTAneous Nightly    lactulose  20 g Oral 4 times per day     Continuous Infusions:   dextrose      sodium chloride         Objective Data:  CBC with Differential:    Lab Results   Component Value Date    WBC 3.6 03/05/2022    RBC 2.76 03/05/2022    HGB 7.8 03/05/2022    HCT 24.7 03/05/2022    PLT 72 03/05/2022    MCV 89.5 03/05/2022    MCH 28.3 03/05/2022    MCHC 31.6 03/05/2022    RDW 19.8 03/05/2022    NRBC 1 04/16/2012    SEGSPCT 22 02/03/2014    METASPCT 4.3 02/04/2021    LYMPHOPCT 6.0 03/05/2022    MONOPCT 6.0 03/05/2022    MYELOPCT 0.9 02/04/2021    BASOPCT 0.0 03/05/2022    MONOSABS 0.22 03/05/2022    LYMPHSABS 0.22 03/05/2022    EOSABS 0.00 03/05/2022    BASOSABS 0.00 03/05/2022     CMP:    Lab Results   Component Value Date     03/05/2022    K 3.4 03/05/2022    K 3.4 03/04/2022    CL 93 03/05/2022    CO2 18 03/05/2022    BUN 69 03/05/2022    CREATININE 1.6 03/05/2022    GFRAA 52 03/05/2022    LABGLOM 43 03/05/2022    GLUCOSE 245 03/05/2022    GLUCOSE 318 04/30/2012    PROT 4.5 03/05/2022    LABALBU 2.9 03/05/2022    LABALBU 4.0 04/30/2012    CALCIUM 9.0 03/05/2022    BILITOT 1.9 03/05/2022    ALKPHOS 225 03/05/2022    AST 31 03/05/2022    ALT 16 03/05/2022     Assessment:  1. Hepatic encephalopathy  2. Decompensated cirrhosis  3. Large volume ascites  4. LISSETTE on CKD in the setting of increased diuretic use  5. Lactic acidosis  6. Hypervolemic hyponatremia  7. AML  8. Acute on chronic Anemia  9. Nondisplaced sacral fracture  10. Insulin-dependent diabetes mellitus  11. History of DVT  12. History of MI  15. Hypothyroidism-TSH 5.310  14. Hyperlipidemia  15. Hx of tobacco abuse       Plan:   · Consult Dr. Constantino/Armando-gastroenterology team-await evaluation and recommendations. · Obtain ammonia level  · IR for paracentesis  · Continue lactulose- goal to have 3-4 bowel movements daily. Monitor closely. · Monitor blood glucose levels and treat accordingly. · Hold VTE prophylaxis secondary to anemia    · potassium supplementation to replete potassium  stores. Monitor electrolytes closely. · Monitor patient's hemoglobin and treat accordingly. Patient's hemoglobin has trended downward. Continue current therapy. See orders for further plan of care. More than 50% of my  time was spent at the bedside counseling/coordinating care with the patient and/or family with face to face contact. This time was spent reviewing notes and laboratory data as well as instructing and counseling the patient. Time I spent with the family or surrogate(s) is included only if the patient was incapable of providing the necessary information or participating in medical decisions. I also discussed the differential diagnosis and all of the proposed management plans with the patient and individuals accompanying the patient. Isela Muniz requires this high level of physician care and nursing on the [facility]/Telemetry unit due the complexity of decision management and chance of rapid decline or death. Continued cardiac monitoring and higher level of nursing are required.  I am readily available for any further decision-making and intervention. The patient was seen, examined and then discussed with Dr. Dontae Abrams. JYOTI Lopez - CNP  3/5/2022  4:11 PM       I saw and evaluated the patient. I agree with the findings and the plan of care as documented in Susana Coyle NP-C's  note.     Felicia Nash DO, D.O., FACOI  4:25 PM  3/5/2022

## 2022-03-05 NOTE — PROGRESS NOTES
Pt. Had no urine output on this shift bladder scan pt. 621 ml called MD got new orders to place simmons 16 fr in.  urine color yellow clear no pain or discomfort noted after or during the procedure will continue to monitor pt.

## 2022-03-05 NOTE — ED NOTES
Report called to YAMILET He on 6th floor at this time. Pt remains a/ox2-3, remains drowsy but arousable.  Pt is in no apparent distress and on room air     Yony Perez RN  03/05/22 7932

## 2022-03-05 NOTE — ED NOTES
Ivs would not draw back for repeat lactate, but infusing without any issues.  Peripheral line placed in [address] [place] [address] for blood work at this time, pt tolerated well and lactate sent     Robert Sultana RN  03/04/22 2896

## 2022-03-05 NOTE — PLAN OF CARE
Problem: Falls - Risk of:  Goal: Will remain free from falls  Description: Will remain free from falls  Outcome: Met This Shift  Goal: Absence of physical injury  Description: Absence of physical injury  Outcome: Met This Shift     Problem: Confusion - Acute:  Goal: Absence of continued neurological deterioration signs and symptoms  Description: Absence of continued neurological deterioration signs and symptoms  Outcome: Ongoing  Goal: Mental status will be restored to baseline  Description: Mental status will be restored to baseline  Outcome: Ongoing     Problem: Injury - Risk of, Physical Injury:  Goal: Will remain free from falls  Description: Will remain free from falls  Outcome: Met This Shift  Goal: Absence of physical injury  Description: Absence of physical injury  Outcome: Met This Shift

## 2022-03-05 NOTE — ED NOTES
Pt cleaned up at this time following incontinent stool episode, medicated per order and tolerated well & updated we are going to take him upstairs.       David Saenz RN  03/05/22 1648

## 2022-03-05 NOTE — PROGRESS NOTES
Attempted to call patient's wife to verify home medications and other questions with no answer. Message left via voicemail. Awaiting call back.  Electronically signed by Juan Bach RN on 3/5/2022 at 1:45 AM

## 2022-03-05 NOTE — ED PROVIDER NOTES
Chief Complaint   Patient presents with    Altered Mental Status     Pt comes in with altered mental status -- pt has a hx of liver cancer/hodgkin's lymphoma and is supposed to be receiving 40mg lasix and 0.5mg ativan, but his wife gave him 80mg of lasix and 1.5mg ativan over the last two days. SHAUN Hernandez is a 73 y.o. male with a PMHx significant for cirrhosis of the liver and liver cancer (recently diagnosed with recurrence) who presents with altered mental status. The complaints are acute, moderate in severity, worsened by nothing and improved by nothing. On initial evaluation, the patient is only able to provide minimal history due to his altered state. EMS reported that the patient has a history of liver cancer and was recently diagnosed with a recurrence. He apparently has not started treatment yet. He lives at home with his wife who told him that the patient was not acting like himself and was very confused. On direct questioning, the patient states that he does not have any pain anywhere, but does feel somewhat nauseous. He is alert and oriented to self and time only. Of note, the patient apparently has frequent paracentesis due to recurrent ascites and EMS reports he was drained approximately 1 week ago. Point-of-care glucose is 239. The patient is currently taking no blood thinners. Tobacco Hx:   reports that he quit smoking about 45 years ago. He has a 5.00 pack-year smoking history. He quit smokeless tobacco use about 23 years ago. Alcohol Hx:   reports current alcohol use of about 1.0 standard drink of alcohol per week. Illicit Drug Hx:   reports no history of drug use. The history is provided by EMS and obtained via chart review. The patient is unable to provide any meaningful history 2/2 their condition.     Last Tetanus (if applicable): n/a    Review of Systems:   A complete review of systems was unable to be performed due to the patient's altered state.    Physical Exam:  GEN: Confused, cooperative, well-developed, well-nourished adult in NAD; pt appears stated age  Head: Normocephalic and atraumatic; no tenderness to palpation anywhere  Eyes: PERRL, EOMI, conjunctiva clear, cornea without gross abnormality, no visual deficits noted b/l  Ears: External ear normal-appearing and non-tender b/l; no hearing deficit noted  Nose: Nares patent and free of debris; septum midline; mucosa appears normal; no drainage noted  Throat: Oral mucosa dry with no lesions noted; there does appear to be some dried emesis around the patient's mouth.   Dentition wnl; no posterior pharyngeal erythema or edema; tonsils are not swollen and there is no exudate noted; no post-nasal drip  Neck: Supple and symmetrical with midline trachea; no cervical or supraclavicular lymphadenopathy noted; thyroid not palpated, but no tenderness or masses noted in the region; normal ROM with no meningeal signs  Lungs: LCTAB with no wheezes, rhonchi or rales noted; no respiratory distress, breathing unlabored   CV: RRR, no murmurs, gallops or rubs noted on auscultation, normal S1/S2; UE and LE distal pulses intact b/l +2/4 with no cyanosis noted; no LE edema noted b/l; capillary refill < 2 seconds  ABD: Soft, non-tender, mildly distended, no organomegaly, hernias or masses noted  /Rectal: no suprapubic tenderness; remainder of exam deferred  MSK: UEs and LEs without notable trauma, ROM restriction or tenderness to palpation b/l; normal strength throughout  Skin: Skin warm and dry without notable rash, erythema, bruising or lesion; normal turgor  Neuro: A&O to self and partially to time only; CN II-XII appear grossly intact; no focal deficits appreciated; pt thoughtful in discussion and able to answer all questions without issue  Psych: normal attention with no obvious AVH, normal mood, normal affect    --------------------------------------------- PAST HISTORY ---------------------------------------------  Past Medical History:  has a past medical history of AML (acute myeloblastic leukemia) ([facility] AML (acute myeloblastic leukemia) ([facility] Cancer ([place] [id number] Chronic kidney disease, Diabetes mellitus ([facility] GERD (gastroesophageal reflux disease), Hx of blood clots, Hyperlipidemia, Liver disease, MI, old, Other disorders of kidney and ureter, Other sleep disturbances, Thyroid disease, and Type II or unspecified type diabetes mellitus without mention of complication, not stated as uncontrolled. Past Surgical History:  has a past surgical history that includes Appendectomy; Cholecystectomy; Foot neuroma surgery; Colonoscopy; Endoscopy, colon, diagnostic; Upper gastrointestinal endoscopy; bone marrow transplant; CT NEEDLE BIOPSY LIVER PERCUTANEOUS (1/17/2022); and Catheter Insertion (N/A, 2/28/2022). Social History:  reports that he quit smoking about 45 years ago. He has a 5.00 pack-year smoking history. He quit smokeless tobacco use about 23 years ago. He reports current alcohol use of about 1.0 standard drink of alcohol per week. He reports that he does not use drugs. Family History: family history includes Cancer in his maternal grandmother and other family members; Heart Disease in his father and mother; High Blood Pressure in his father. Home Meds: Medications Prior to Admission: albuterol (ACCUNEB) 0.63 MG/3ML nebulizer solution, Take 1 ampule by nebulization every 6 hours as needed for Wheezing  benzonatate (TESSALON PERLES) 100 MG capsule, Take 1 capsule by mouth 3 times daily as needed for Cough  lactulose (CHRONULAC) 10 GM/15ML solution, Take 15 mLs by mouth 2 times daily  ondansetron (ZOFRAN-ODT) 4 MG disintegrating tablet, Take 1 tablet by mouth 3 times daily as needed for Nausea or Vomiting  LORazepam (ATIVAN) 0.5 MG tablet, Take 1 tablet by mouth 2 times daily as needed for Anxiety.   traMADol (ULTRAM) 50 MG tablet, Take 1 tablet by mouth every 8 hours as needed for Pain for up to 14 days. furosemide (LASIX) 20 MG tablet, Take 40 mg by mouth 2 times daily   ferrous sulfate (IRON 325) 325 (65 Fe) MG tablet, Take 1 tablet by mouth 2 times daily (with meals)  levothyroxine (SYNTHROID) 200 MCG tablet, Take 1 tablet by mouth Daily  sertraline (ZOLOFT) 50 MG tablet, Take 1.5 tablets by mouth daily (Patient taking differently: Take 50 mg by mouth daily 1 tablet)  pantoprazole (PROTONIX) 40 MG tablet, TAKE 1 TABLET DAILY  insulin glargine (LANTUS SOLOSTAR) 100 UNIT/ML injection pen, Inject 20 Units into the skin nightly  insulin lispro, 1 Unit Dial, (HUMALOG KWIKPEN) 100 UNIT/ML SOPN, INJECT 0-20 UNITS INTO THE SKIN THREE TIMES DAILY(BEFORE MEALS)  vitamin D (CHOLECALCIFEROL) 1000 UNIT TABS tablet, Take 2,000 Units by mouth daily   aspirin 81 MG chewable tablet, Take 81 mg by mouth daily  blood glucose test strips (ONETOUCH ULTRA) strip, TEST 3 TIMES DAILY  Continuous Blood Gluc  (FREESTYLE PAOLA 2 READER) FLORESITA, Check sygar FAsting and ac (4 X daiy). Continuous Blood Gluc Sensor (FREESTYLE PAOLA 2 SENSOR) MISC, Check sugar 5 X daily. AM and AC  INS SYRINGE/NEEDLE .5CC/28G (AIMSCO INS SYR MX-COM .5CC/28G) 28G X 1/2\" 0.5 ML MISC, Use three times daily  Insulin Pen Needle (BD PEN NEEDLE JENNIFER U/F) 32G X 4 MM MISC, USE AS DIRECTED  The patients home medications have been reviewed. Allergies: Pneumococcal vaccines, Ciprofloxacin, Diatrizoate, Dye [iodides], Epinephrine, Iodine, Metformin and related, Penicillin g, Prednisone, Morphine, Other, and Oxycodone    ------------------------- NURSING NOTES AND VITALS REVIEWED ---------------------------  Date / Time Roomed:  3/4/2022  8:13 PM  ED Bed Assignment:  [id number]    The nursing notes within the ED encounter and vital signs as below have been reviewed.    BP (!) 97/55   Pulse 99   Temp 97.2 °F (36.2 °C) (Infrared)   Resp 20   Ht 5' 8\" (1.727 m)   Wt 181 lb 1.6 oz (82.1 kg)   SpO2 96%   BMI 27.54 kg/m² -------------------------------------------------- RESULTS / INTERVENTIONS -------------------------------------------------  All laboratory and radiology tests have been reviewed by this physician.     LABS:  Results for orders placed or performed during the hospital encounter of 03/04/22   CBC with Auto Differential   Result Value Ref Range    WBC 6.4 4.5 - 11.5 E9/L    RBC 3.70 (L) 3.80 - 5.80 E12/L    Hemoglobin 10.2 (L) 12.5 - 16.5 g/dL    Hematocrit 32.4 (L) 37.0 - 54.0 %    MCV 87.6 80.0 - 99.9 fL    MCH 27.6 26.0 - 35.0 pg    MCHC 31.5 (L) 32.0 - 34.5 %    RDW 19.8 (H) 11.5 - 15.0 fL    Platelets 348 (L) 684 - 450 E9/L    MPV 11.3 7.0 - 12.0 fL    Neutrophils % 69.0 43.0 - 80.0 %    Immature Granulocytes % 1.6 0.0 - 5.0 %    Lymphocytes % 12.9 (L) 20.0 - 42.0 %    Monocytes % 16.3 (H) 2.0 - 12.0 %    Eosinophils % 0.0 0.0 - 6.0 %    Basophils % 0.2 0.0 - 2.0 %    Neutrophils Absolute 4.44 1.80 - 7.30 E9/L    Immature Granulocytes # 0.10 E9/L    Lymphocytes Absolute 0.83 (L) 1.50 - 4.00 E9/L    Monocytes Absolute 1.05 (H) 0.10 - 0.95 E9/L    Eosinophils Absolute 0.00 (L) 0.05 - 0.50 E9/L    Basophils Absolute 0.01 0.00 - 0.20 Z5/Y   Basic Metabolic Panel w/ Reflex to MG   Result Value Ref Range    Sodium 126 (L) 132 - 146 mmol/L    Potassium reflex Magnesium 3.4 (L) 3.5 - 5.0 mmol/L    Chloride 88 (L) 98 - 107 mmol/L    CO2 19 (L) 22 - 29 mmol/L    Anion Gap 19 (H) 7 - 16 mmol/L    Glucose 253 (H) 74 - 99 mg/dL    BUN 70 (H) 6 - 23 mg/dL    CREATININE 1.8 (H) 0.7 - 1.2 mg/dL    GFR Non-African American 38 >=60 mL/min/1.73    GFR African American 45     Calcium 9.2 8.6 - 10.2 mg/dL   Hepatic Function Panel   Result Value Ref Range    Total Protein 4.6 (L) 6.4 - 8.3 g/dL    Albumin 2.5 (L) 3.5 - 5.2 g/dL    Alkaline Phosphatase 299 (H) 40 - 129 U/L    ALT 20 0 - 40 U/L    AST 36 0 - 39 U/L    Total Bilirubin 1.9 (H) 0.0 - 1.2 mg/dL    Bilirubin, Direct 1.1 (H) 0.0 - 0.3 mg/dL    Bilirubin, Indirect 0.8 0.0 - 1.0 mg/dL   Lipase   Result Value Ref Range    Lipase 380 (H) 13 - 60 U/L   Protime-INR   Result Value Ref Range    Protime 17.2 (H) 9.3 - 12.4 sec    INR 1.5    APTT   Result Value Ref Range    aPTT 36.9 (H) 24.5 - 35.1 sec   Ammonia   Result Value Ref Range    Ammonia 108.0 (H) 16.0 - 60.0 umol/L   Lactate, Sepsis   Result Value Ref Range    Lactic Acid, Sepsis 5.1 (HH) 0.5 - 1.9 mmol/L   Lactate, Sepsis   Result Value Ref Range    Lactic Acid, Sepsis 4.3 (HH) 0.5 - 1.9 mmol/L   Urinalysis with Microscopic   Result Value Ref Range    Color, UA DKYELLOW Straw/Yellow    Clarity, UA Clear Clear    Glucose, Ur Negative Negative mg/dL    Bilirubin Urine SMALL (A) Negative    Ketones, Urine Negative Negative mg/dL    Specific Gravity, UA 1.020 1.005 - 1.030    Blood, Urine Negative Negative    pH, UA 5.5 5.0 - 9.0    Protein, UA TRACE Negative mg/dL    Urobilinogen, Urine 0.2 <2.0 E.U./dL    Nitrite, Urine Negative Negative    Leukocyte Esterase, Urine Negative Negative    Hyaline Casts, UA 0-2 0 - 2 /LPF    WBC, UA 0-1 0 - 5 /HPF    RBC, UA NONE 0 - 2 /HPF    Bacteria, UA NONE SEEN None Seen /HPF   Serum Drug Screen   Result Value Ref Range    Ethanol Lvl <10 mg/dL    Acetaminophen Level <5.0 (L) 10.0 - 85.8 mcg/mL    Salicylate, Serum <2.4 0.0 - 30.0 mg/dL   URINE DRUG SCREEN   Result Value Ref Range    Amphetamine Screen, Urine NOT DETECTED Negative <1000 ng/mL    Barbiturate Screen, Ur NOT DETECTED Negative < 200 ng/mL    Benzodiazepine Screen, Urine NOT DETECTED Negative < 200 ng/mL    Cannabinoid Scrn, Ur NOT DETECTED Negative < 50ng/mL    Cocaine Metabolite Screen, Urine NOT DETECTED Negative < 300 ng/mL    Opiate Scrn, Ur NOT DETECTED Negative < 300ng/mL    PCP Screen, Urine NOT DETECTED Negative < 25 ng/mL    Methadone Screen, Urine NOT DETECTED Negative <300 ng/mL    Oxycodone Urine NOT DETECTED Negative <100 ng/mL    FENTANYL SCREEN, URINE NOT DETECTED Negative <1 ng/mL    Drug Screen Comment: see below Magnesium   Result Value Ref Range    Magnesium 1.6 1.6 - 2.6 mg/dL   Lactic Acid   Result Value Ref Range    Lactic Acid 4.3 (HH) 0.5 - 2.2 mmol/L   Comprehensive Metabolic Panel   Result Value Ref Range    Sodium 130 (L) 132 - 146 mmol/L    Potassium 3.4 (L) 3.5 - 5.0 mmol/L    Chloride 93 (L) 98 - 107 mmol/L    CO2 18 (L) 22 - 29 mmol/L    Anion Gap 19 (H) 7 - 16 mmol/L    Glucose 245 (H) 74 - 99 mg/dL    BUN 69 (H) 6 - 23 mg/dL    CREATININE 1.6 (H) 0.7 - 1.2 mg/dL    GFR Non-African American 43 >=60 mL/min/1.73    GFR African American 52     Calcium 9.0 8.6 - 10.2 mg/dL    Total Protein 4.5 (L) 6.4 - 8.3 g/dL    Albumin 2.9 (L) 3.5 - 5.2 g/dL    Total Bilirubin 1.9 (H) 0.0 - 1.2 mg/dL    Alkaline Phosphatase 225 (H) 40 - 129 U/L    ALT 16 0 - 40 U/L    AST 31 0 - 39 U/L   CBC with Auto Differential   Result Value Ref Range    WBC 3.6 (L) 4.5 - 11.5 E9/L    RBC 2.76 (L) 3.80 - 5.80 E12/L    Hemoglobin 7.8 (L) 12.5 - 16.5 g/dL    Hematocrit 24.7 (L) 37.0 - 54.0 %    MCV 89.5 80.0 - 99.9 fL    MCH 28.3 26.0 - 35.0 pg    MCHC 31.6 (L) 32.0 - 34.5 %    RDW 19.8 (H) 11.5 - 15.0 fL    Platelets 72 (L) 876 - 450 E9/L    MPV 10.7 7.0 - 12.0 fL   Magnesium   Result Value Ref Range    Magnesium 1.5 (L) 1.6 - 2.6 mg/dL   Phosphorus   Result Value Ref Range    Phosphorus 4.4 2.5 - 4.5 mg/dL   TSH   Result Value Ref Range    TSH 5.310 (H) 0.270 - 4.200 uIU/mL   Uric Acid   Result Value Ref Range    Uric Acid, Serum 9.6 (H) 3.4 - 7.0 mg/dL   Ammonia   Result Value Ref Range    Ammonia 91.0 (H) 16.0 - 60.0 umol/L   Lactic Acid   Result Value Ref Range    Lactic Acid 3.9 (HH) 0.5 - 2.2 mmol/L   POCT Glucose   Result Value Ref Range    Glucose 239 mg/dL    QC OK? ok    POCT Glucose   Result Value Ref Range    Meter Glucose 239 (H) 74 - 99 mg/dL   POCT Glucose   Result Value Ref Range    Meter Glucose 220 (H) 74 - 99 mg/dL   EKG 12 Lead   Result Value Ref Range    Ventricular Rate 102 BPM    Atrial Rate 102 BPM    P-R Interval 172 ms    QRS Duration 150 ms    Q-T Interval 386 ms    QTc Calculation (Bazett) 503 ms    P Axis 84 degrees    R Axis 82 degrees    T Axis 101 degrees       RADIOLOGY: Interpreted by Radiologist unless otherwise noted. CT Head WO Contrast   Final Result   No acute intracranial abnormality. There is age-appropriate atrophy and   small-vessel ischemic disease. Sinus disease in the maxillary sinuses. RECOMMENDATIONS:   Unavailable         CT ABDOMEN PELVIS WO CONTRAST Additional Contrast? None   Final Result   1. Mild apparent MURAL THICKENING IN THE COLON may be an artifact of under   distension, cystitis or manifestation of portal hypertensive colopathy. 2. Liver cirrhosis with mild splenomegaly and large volume ascites. 3. The small hypodense nodules previously seen in the liver are not evident   on this study due to beam hardening artifact resulting from patient's arms   within the gantry. 4. Interval increase in size of multiple pulmonary nodules since 02/24/2022   (2 weeks ago) concerning for PROGRESSIVE METASTATIC DISEASE. 5. Grossly stable nonspecific mildly prominent left para-aortic lymph nodes. The      RECOMMENDATIONS:   Unavailable         XR CHEST PORTABLE   Final Result   Stable chest series. No acute cardiopulmonary pathology. IR Interventional Radiology Procedure Request    (Results Pending)       EKG: As interpreted by this ER physician.   Rate: 102 bpm  Rhythm: Sinus  Axis: Normal  ST Segments: No acute changes  T-waves: No acute changes  Interpretation: Sinus tachycardia with wide and low voltage QRS, occasional PACs and prolonged QTC  Comparison: changes compared to previous EKG    Oxygen Saturation Interpretation: Normal    Meds Given:  Medications   glucose (GLUTOSE) 40 % oral gel 15 g (has no administration in time range)   glucagon (rDNA) injection 1 mg (has no administration in time range)   dextrose 5 % solution (has no administration in time range)   heparin (porcine) injection 5,000 Units (5,000 Units SubCUTAneous Given 3/5/22 0258)   sodium chloride flush 0.9 % injection 5-40 mL (has no administration in time range)   sodium chloride flush 0.9 % injection 5-40 mL (has no administration in time range)   0.9 % sodium chloride infusion (has no administration in time range)   aspirin chewable tablet 81 mg (has no administration in time range)   benzonatate (TESSALON) capsule 100 mg (has no administration in time range)   insulin glargine (LANTUS) injection vial 20 Units (has no administration in time range)   levothyroxine (SYNTHROID) tablet 200 mcg (has no administration in time range)   lactulose (CHRONULAC) 10 GM/15ML solution 10 g (10 g Oral Given 3/5/22 0258)   pantoprazole (PROTONIX) tablet 40 mg (has no administration in time range)   predniSONE (DELTASONE) tablet 20 mg (20 mg Oral Given 3/5/22 0105)   rifaximin (XIFAXAN) tablet 550 mg (has no administration in time range)   insulin lispro (HUMALOG) injection vial 0-6 Units (has no administration in time range)   insulin lispro (HUMALOG) injection vial 0-3 Units (has no administration in time range)   0.9 % sodium chloride bolus (0 mLs IntraVENous Stopped 3/4/22 2150)   potassium chloride 10 mEq/100 mL IVPB (Peripheral Line) (0 mEq IntraVENous Stopped 3/4/22 2300)   potassium chloride 10 mEq/100 mL IVPB (Peripheral Line) (0 mEq IntraVENous Stopped 3/5/22 0005)   potassium chloride 10 mEq/100 mL IVPB (Peripheral Line) (0 mEq IntraVENous Stopped 3/5/22 0110)   lactulose (CHRONULAC) 10 GM/15ML solution 20 g (20 g Oral Given 3/4/22 2201)   trimethobenzamide (TIGAN) injection 200 mg (200 mg IntraMUSCular Given 3/4/22 2158)   0.9 % sodium chloride bolus (0 mLs IntraVENous Stopped 3/5/22 0006)   albumin human 25 % IV solution 25 g (0 g IntraVENous Stopped 3/5/22 0004)   albumin human 25 % IV solution 25 g (0 g IntraVENous Stopped 3/5/22 0433)       Procedures:  No procedures performed.     --------------------------------- PROGRESS NOTES / ADDITIONAL PROVIDER NOTES ---------------------------------  Consultations:  As outlined below. ED Course:  ED Course as of 03/05/22 5254   Estefania Urrutia 04, 2022   [id number] Case was discussed with internal medicine agrees to admit the patient for further evaluation and management. [ML]      ED Course User Index  [ML] Kishan Rodríguez DO       6:32 AM: All results were discussed with the patient and/or their surrogate and I have provided specific details regarding the plan to admit the patient. The patient was seen in the emergency department by myself and the assigned attending physician, Katia Larios DO, who agreed with the assessment, plan and decision to admit as laid out herein. The patient and/or family verbalized an understanding and agreement with the plan for admission and all questions were answered to the highest degree of accuracy possible based on the current information available. The patient was without objective evidence of hemodynamic instability and was therefore deemed to be in stable condition at the time of transport. This patient's ED course included: a personal history and physicial examination, re-evaluation prior to disposition, multiple bedside re-evaluations, IV medications, cardiac monitoring and continuous pulse oximetry    MDM:  Patient presented with altered mental status. On arrival, he was hypotensive and tachycardic with remaining vital signs within normal limits. EKG and physical exam were  as documented above. Labs were remarkable for elevated ammonia, significantly elevated lipase, elevated lactate, no leukocytosis, hyponatremia, hypokalemia and creatinine of 1.8 which was significantly elevated over the patient's most recent baseline value of 1.3. Head CT was negative for acute intracranial pathology. Chest x-ray revealed no acute cardiopulmonary pathology.   CT of the abdomen and pelvis revealed significant ascites and findings consistent with portal hypertension. Lesions were noted of both lungs and the liver. There was interval worsening of the lung lesions noted. The patient was given 2 L of normal saline IV fluid as well as a dose of albumin to support his blood pressure. He was given a dose of lactulose for his hyper ammonemia. Potassium was repleted. Given the patient's presentation and findings on work up, the decision was made to admit the patient for further evaluation and management. The case was discussed with IM who agreed to admit the patient. The patient was stable at the time of their disposition. Diagnosis:  1. Altered mental status, unspecified altered mental status type    2. Hyperammonemia ([address]    3. Hyponatremia    4. Hypokalemia    5. Malignant ascites    6. Portal hypertension (HCC)    7. Hepatic cirrhosis, unspecified hepatic cirrhosis type, unspecified whether ascites present ([address]    8. History of liver cancer    9. Elevated lipase    10. Acute kidney injury superimposed on CKD ([address]    11. Hypotension, unspecified hypotension type        Disposition:  Patient's disposition: Admit to telemetry  Patient's condition is stable. This patient was seen, examined and treated with Keren Cifuentes DO. All pertinent aspects of the patient's care were discussed with the attending physician. Sherri Carter DO  Resident  03/05/22 6273      ATTENDING PROVIDER ATTESTATION:     I have personally performed and/or participated in the history, exam, medical decision making, and procedures and agree with all pertinent clinical information. I have also reviewed and agree with the past medical, family and social history unless otherwise noted. I have discussed this patient in detail with the resident, and provided the instruction and education regarding altered mental status, liver cancer, hepatic encephalopathy and drug overdose. My findings/Plan: Patient is confused. Tachycardic. Lungs CTA bilaterally. Abdomen is distended. Nontender to palpation. Bowel sounds normal. Supportive care. Admit for further evaluation and treatment.          [id number] [facility],   04/04/22 9915

## 2022-03-05 NOTE — ED NOTES
Lab called about critical lactate level -- Dr. Jany Morgan made aware, no new orders at this time     Donna Cuevas RN  03/04/22 2483

## 2022-03-05 NOTE — ED NOTES
Pt states he had a head cold that he feels has moved into his chest.  Has hx of pneumonia. Has had productive cough with green sputum. Has abscess in middle of chest with purulent drainage. Area surrounding abscess is red. Pt thinks he may have been bitten by spider. Son at bedside -- updated on status and plan  Of care at this time, states understanding and states he is going to step out to update the patient's wife while RNs obtain urine sample     Eloy Patton RN  03/04/22 3260

## 2022-03-06 ENCOUNTER — APPOINTMENT (OUTPATIENT)
Dept: ULTRASOUND IMAGING | Age: 72
DRG: 442 | End: 2022-03-06
Payer: MEDICARE

## 2022-03-06 LAB
ALBUMIN SERPL-MCNC: 3.1 G/DL (ref 3.5–5.2)
ALP BLD-CCNC: 223 U/L (ref 40–129)
ALT SERPL-CCNC: 17 U/L (ref 0–40)
AMMONIA: 60 UMOL/L (ref 16–60)
ANION GAP SERPL CALCULATED.3IONS-SCNC: 16 MMOL/L (ref 7–16)
AST SERPL-CCNC: 30 U/L (ref 0–39)
BASOPHILS ABSOLUTE: 0 E9/L (ref 0–0.2)
BASOPHILS RELATIVE PERCENT: 0 % (ref 0–2)
BILIRUB SERPL-MCNC: 1.6 MG/DL (ref 0–1.2)
BUN BLDV-MCNC: 62 MG/DL (ref 6–23)
CALCIUM SERPL-MCNC: 9.1 MG/DL (ref 8.6–10.2)
CHLORIDE BLD-SCNC: 98 MMOL/L (ref 98–107)
CO2: 20 MMOL/L (ref 22–29)
CREAT SERPL-MCNC: 1.3 MG/DL (ref 0.7–1.2)
EOSINOPHILS ABSOLUTE: 0.06 E9/L (ref 0.05–0.5)
EOSINOPHILS RELATIVE PERCENT: 2 % (ref 0–6)
GFR AFRICAN AMERICAN: >60
GFR NON-AFRICAN AMERICAN: 55 ML/MIN/1.73
GLUCOSE BLD-MCNC: 195 MG/DL (ref 74–99)
HCT VFR BLD CALC: 25.3 % (ref 37–54)
HEMOGLOBIN: 8.1 G/DL (ref 12.5–16.5)
INR BLD: 1.6
LACTIC ACID: 2.6 MMOL/L (ref 0.5–2.2)
LIPASE: 385 U/L (ref 13–60)
LYMPHOCYTES ABSOLUTE: 0.42 E9/L (ref 1.5–4)
LYMPHOCYTES RELATIVE PERCENT: 13 % (ref 20–42)
MAGNESIUM: 1.5 MG/DL (ref 1.6–2.6)
MCH RBC QN AUTO: 27.7 PG (ref 26–35)
MCHC RBC AUTO-ENTMCNC: 32 % (ref 32–34.5)
MCV RBC AUTO: 86.6 FL (ref 80–99.9)
METER GLUCOSE: 189 MG/DL (ref 74–99)
METER GLUCOSE: 232 MG/DL (ref 74–99)
METER GLUCOSE: 260 MG/DL (ref 74–99)
METER GLUCOSE: 308 MG/DL (ref 74–99)
MONOCYTES ABSOLUTE: 0.38 E9/L (ref 0.1–0.95)
MONOCYTES RELATIVE PERCENT: 12 % (ref 2–12)
MYELOCYTE PERCENT: 2 % (ref 0–0)
NEUTROPHILS ABSOLUTE: 2.34 E9/L (ref 1.8–7.3)
NEUTROPHILS RELATIVE PERCENT: 71 % (ref 43–80)
NUCLEATED RED BLOOD CELLS: 1 /100 WBC
OSMOLALITY URINE: 390 MOSM/KG (ref 300–900)
PDW BLD-RTO: 19.8 FL (ref 11.5–15)
PLATELET # BLD: 68 E9/L (ref 130–450)
PLATELET CONFIRMATION: NORMAL
PMV BLD AUTO: 11 FL (ref 7–12)
POTASSIUM SERPL-SCNC: 3.1 MMOL/L (ref 3.5–5)
PROTHROMBIN TIME: 18.6 SEC (ref 9.3–12.4)
RBC # BLD: 2.92 E12/L (ref 3.8–5.8)
SODIUM BLD-SCNC: 134 MMOL/L (ref 132–146)
TOTAL PROTEIN: 4.6 G/DL (ref 6.4–8.3)
WBC # BLD: 3.2 E9/L (ref 4.5–11.5)

## 2022-03-06 PROCEDURE — 6370000000 HC RX 637 (ALT 250 FOR IP): Performed by: INTERNAL MEDICINE

## 2022-03-06 PROCEDURE — 2060000000 HC ICU INTERMEDIATE R&B

## 2022-03-06 PROCEDURE — 76705 ECHO EXAM OF ABDOMEN: CPT

## 2022-03-06 PROCEDURE — 85610 PROTHROMBIN TIME: CPT

## 2022-03-06 PROCEDURE — 83690 ASSAY OF LIPASE: CPT

## 2022-03-06 PROCEDURE — 6360000002 HC RX W HCPCS: Performed by: INTERNAL MEDICINE

## 2022-03-06 PROCEDURE — 82962 GLUCOSE BLOOD TEST: CPT

## 2022-03-06 PROCEDURE — 83735 ASSAY OF MAGNESIUM: CPT

## 2022-03-06 PROCEDURE — 82140 ASSAY OF AMMONIA: CPT

## 2022-03-06 PROCEDURE — 83605 ASSAY OF LACTIC ACID: CPT

## 2022-03-06 PROCEDURE — 36415 COLL VENOUS BLD VENIPUNCTURE: CPT

## 2022-03-06 PROCEDURE — 85025 COMPLETE CBC W/AUTO DIFF WBC: CPT

## 2022-03-06 PROCEDURE — 2580000003 HC RX 258: Performed by: INTERNAL MEDICINE

## 2022-03-06 PROCEDURE — 80053 COMPREHEN METABOLIC PANEL: CPT

## 2022-03-06 RX ORDER — TRAMADOL HYDROCHLORIDE 50 MG/1
50 TABLET ORAL 2 TIMES DAILY PRN
Status: DISCONTINUED | OUTPATIENT
Start: 2022-03-06 | End: 2022-03-09 | Stop reason: HOSPADM

## 2022-03-06 RX ADMIN — INSULIN LISPRO 3 UNITS: 100 INJECTION, SOLUTION INTRAVENOUS; SUBCUTANEOUS at 20:59

## 2022-03-06 RX ADMIN — LACTULOSE 20 G: 20 SOLUTION ORAL at 00:11

## 2022-03-06 RX ADMIN — INSULIN GLARGINE 20 UNITS: 100 INJECTION, SOLUTION SUBCUTANEOUS at 20:58

## 2022-03-06 RX ADMIN — INSULIN LISPRO 4 UNITS: 100 INJECTION, SOLUTION INTRAVENOUS; SUBCUTANEOUS at 12:06

## 2022-03-06 RX ADMIN — INSULIN LISPRO 8 UNITS: 100 INJECTION, SOLUTION INTRAVENOUS; SUBCUTANEOUS at 16:52

## 2022-03-06 RX ADMIN — TRAMADOL HYDROCHLORIDE 50 MG: 50 TABLET, COATED ORAL at 14:49

## 2022-03-06 RX ADMIN — LACTULOSE 20 G: 20 SOLUTION ORAL at 18:47

## 2022-03-06 RX ADMIN — ASPIRIN 81 MG 81 MG: 81 TABLET ORAL at 07:51

## 2022-03-06 RX ADMIN — INSULIN LISPRO 2 UNITS: 100 INJECTION, SOLUTION INTRAVENOUS; SUBCUTANEOUS at 07:55

## 2022-03-06 RX ADMIN — PREDNISONE 20 MG: 20 TABLET ORAL at 07:50

## 2022-03-06 RX ADMIN — LEVOTHYROXINE SODIUM 200 MCG: 0.2 TABLET ORAL at 05:58

## 2022-03-06 RX ADMIN — RIFAXIMIN 550 MG: 550 TABLET ORAL at 20:57

## 2022-03-06 RX ADMIN — WATER 2000 MG: 1 INJECTION INTRAMUSCULAR; INTRAVENOUS; SUBCUTANEOUS at 18:46

## 2022-03-06 RX ADMIN — RIFAXIMIN 550 MG: 550 TABLET ORAL at 07:51

## 2022-03-06 RX ADMIN — PANTOPRAZOLE SODIUM 40 MG: 40 TABLET, DELAYED RELEASE ORAL at 05:58

## 2022-03-06 RX ADMIN — Medication 10 ML: at 20:57

## 2022-03-06 RX ADMIN — LACTULOSE 20 G: 20 SOLUTION ORAL at 12:06

## 2022-03-06 RX ADMIN — LACTULOSE 20 G: 20 SOLUTION ORAL at 05:58

## 2022-03-06 ASSESSMENT — PAIN SCALES - GENERAL
PAINLEVEL_OUTOF10: 8
PAINLEVEL_OUTOF10: 0
PAINLEVEL_OUTOF10: 8

## 2022-03-06 ASSESSMENT — PAIN DESCRIPTION - LOCATION: LOCATION: BACK

## 2022-03-06 NOTE — PROGRESS NOTES
Pt was asked three times if he would like to wear a gown, each time he refused. When told that his wife has called to report her frustration with him being naked he stated \"aah, she doesn't know what she is talking about\". Pt currently resting comfortably.

## 2022-03-06 NOTE — PROGRESS NOTES
Pt wife called with many complaints. She is concerned about the pt not being able to receive his chemotherapy while he is here. I reassured her that I will pass along the message to have this addressed. She was also very upset to find that the pt is naked with a sheet on. I informed her that this is the pts choice and that I will not be forcing him to do anything that he doesn't want to do. She voiced her disagreement regarding his choices. All questions were answered at this time.

## 2022-03-06 NOTE — PROGRESS NOTES
Spoke with wife via phone, update provided. Wife would like to have Dr. Reyes Achilles, hem/oc, consulted.

## 2022-03-06 NOTE — PROGRESS NOTES
Internal Medicine Progress Note    [facility]    Brian Peña. Justin Farrell., F.A.C.OMatthewI. Gerri Quinn D.O., PRASANNAOLIZ Bui, MSN, APRN, NP-C  Romayne Divine. Alicia Young, MSN, APRN-CNP     Primary Care Physician: Judith Graham DO   Admitting Physician:  Remington Romo DO  Admission date and time: 3/4/2022  8:13 PM    Room:  [address] [place] [address]  Admitting diagnosis: Portal hypertension ([address] [K76.6]  Hypokalemia [E87.6]  Hyponatremia [E87.1]  Hyperammonemia ([address] [E72.20]  Malignant ascites [R18.0]  History of liver cancer [Z85.05]  Elevated lipase [R74.8]  Hypotension, unspecified hypotension type [I95.9]  Altered mental status, unspecified altered mental status type [R41.82]  Acute kidney injury superimposed on CKD ([address] [N17.9, N18.9]  Hepatic cirrhosis, unspecified hepatic cirrhosis type, unspecified whether ascites present ([address] [L91.71]  AMS (altered mental status) [R41.82]    Patient Name: Katia Bowles  MRN: [id number]    Date of Service: 3/6/2022     Subjective: Gabrielle Manning is a 73 y.o. male who was seen and examined today,3/6/2022, at the bedside. Patient pleasantly confused-oriented to person and place at time of examination. He was able to state the year. Wife is present. Main complaint at the time of examination is that of back pain. Apparently patient has chronic back pain for which he uses tramadol at home. Patient admits to abdominal.    Wife present during my examination. Review of System:   Constitutional:   Denies fever or chills, weight loss or gain, positive for fatigue. HEENT:   Denies ear pain, sore throat, sinus or eye problems. Cardiovascular:   Denies any chest pain, irregular heartbeats, or palpitations. Respiratory:   Denies shortness of breath, coughing, sputum production, hemoptysis, or wheezing. Gastrointestinal:   Denies nausea, vomiting, diarrhea, or constipation. Denies any abdominal pain.   Admits abdominal distention. Genitourinary:    Denies any urgency, frequency, hematuria. Voiding  without difficulty with Corrales catheter. Extremities:   Denies lower extremity swelling, edema or cyanosis. Neurology:    Denies any headache or focal neurological deficits, midst generalized weakness. Psch:   Denies being anxious or depressed. Musculoskeletal:    Denies  myalgias, joint complaints or back pain. Integumentary:   Denies any rashes, ulcers, or excoriations. Denies bruising. Hematologic/Lymphatic:  Denies bruising or bleeding. Physical Exam:  I/O this shift:  In: 240 [P.O.:240]  Out: 1450 [Urine:1450]    Intake/Output Summary (Last 24 hours) at 3/6/2022 1651  Last data filed at 3/6/2022 1421  Gross per 24 hour   Intake 240 ml   Output 1930 ml   Net -1690 ml   I/O last 3 completed shifts: In: 2846 [P.O.:360; IV Piggyback:2486]  Out: 26 [Urine:555; Stool:2]  Patient Vitals for the past 96 hrs (Last 3 readings):   Weight   03/06/22 0600 180 lb 14.4 oz (82.1 kg)   03/05/22 0130 181 lb 1.6 oz (82.1 kg)   03/04/22 2015 165 lb (74.8 kg)     Vital Signs:   Blood pressure (!) 94/58, pulse 109, temperature 97.3 °F (36.3 °C), temperature source Temporal, resp. rate 18, height 5' 8\" (1.727 m), weight 180 lb 14.4 oz (82.1 kg), SpO2 97 %. General appearance:  Alert, responsive, oriented to person, place, and did know what year it is. Well preserved, alert, no distress. Head:  Normocephalic. No masses, lesions or tenderness. Eyes:  PERRLA. EOMI. Sclera clear. Impaired vision. ENT:  Ears normal. Mucosa normal.  Missing teeth. Neck:    Supple. Trachea midline. No thyromegaly. No JVD. No bruits. Left chest Mediport-incision healing. Ecchymosis around the incision site. Heart:    Rhythm regular. Rate controlled. No murmurs. Lungs:    Symmetrical. Clear to auscultation bilaterally but diminished. No wheezes. No rhonchi. No rales. Abdomen:   Softly distended. Umbilical hernia. Bowel sounds positive. No organomegaly or masses. No pain on palpation. Extremities:    Peripheral pulses present. No peripheral edema. No ulcers. No cyanosis. No clubbing. Neurologic:    Alert. Oriented to person and year. No focal deficit. Cranial nerves grossly intact. No focal weakness. Psych:   Behavior is normal. Mood appears normal. Speech is not rapid and/or pressured. Musculoskeletal:   Spine ROM normal. Muscular strength weak. Gait not assessed. Integumentary:  No rashes  Skin normal color and texture.   Genitalia/Breast:  Deferred    Medication:  Scheduled Meds:   [Held by provider] heparin (porcine)  5,000 Units SubCUTAneous 3 times per day    sodium chloride flush  5-40 mL IntraVENous 2 times per day    aspirin  81 mg Oral Daily    insulin glargine  20 Units SubCUTAneous Nightly    levothyroxine  200 mcg Oral Daily    pantoprazole  40 mg Oral QAM AC    predniSONE  20 mg Oral Daily    rifaximin  550 mg Oral BID    lactulose  20 g Oral 4 times per day    cefTRIAXone (ROCEPHIN) IV  2,000 mg IntraVENous Q24H    insulin lispro  0-12 Units SubCUTAneous TID WC    insulin lispro  0-6 Units SubCUTAneous Nightly     Continuous Infusions:   dextrose      sodium chloride      0.9% NaCl with KCl 20 mEq 83 mL/hr at 03/05/22 1808       Objective Data:  CBC with Differential:    Lab Results   Component Value Date    WBC 3.2 03/06/2022    RBC 2.92 03/06/2022    HGB 8.1 03/06/2022    HCT 25.3 03/06/2022    PLT 68 03/06/2022    MCV 86.6 03/06/2022    MCH 27.7 03/06/2022    MCHC 32.0 03/06/2022    RDW 19.8 03/06/2022    NRBC 1.0 03/06/2022    SEGSPCT 22 02/03/2014    METASPCT 4.3 02/04/2021    LYMPHOPCT 13.0 03/06/2022    MONOPCT 12.0 03/06/2022    MYELOPCT 2.0 03/06/2022    BASOPCT 0.0 03/06/2022    MONOSABS 0.38 03/06/2022    LYMPHSABS 0.42 03/06/2022    EOSABS 0.06 03/06/2022    BASOSABS 0.00 03/06/2022     CMP:    Lab Results   Component Value Date     03/06/2022    K 3.1 03/06/2022    K 3.4 03/04/2022    CL 98 03/06/2022    CO2 20 03/06/2022    BUN 62 03/06/2022    CREATININE 1.3 03/06/2022    GFRAA >60 03/06/2022    LABGLOM 55 03/06/2022    GLUCOSE 195 03/06/2022    GLUCOSE 318 04/30/2012    PROT 4.6 03/06/2022    LABALBU 3.1 03/06/2022    LABALBU 4.0 04/30/2012    CALCIUM 9.1 03/06/2022    BILITOT 1.6 03/06/2022    ALKPHOS 223 03/06/2022    AST 30 03/06/2022    ALT 17 03/06/2022     Assessment:  1. Hepatic encephalopathy  2. Decompensated cirrhosis  3. Large volume ascites  4. LISSETTE on CKD in the setting of increased diuretic use  5. Lactic acidosis  6. Hypervolemic hyponatremia  7. AML  8. Hypokalemia  9. Acute on chronic Anemia  10. Nondisplaced sacral fracture  11. Insulin-dependent diabetes mellitus  12. History of DVT  13. History of MI  14. Hypothyroidism-TSH 5.310  15. Hyperlipidemia  16. Hx of tobacco abuse       Plan:   · Dr. Constantino/Armando-gastroenterology team from inpatient. The time of this dictation consult was performed but dictation not available. Await recommendations. · Consult the [facility]. Patient's wife has concerns the patient is supposed to go to [facility] for chemotherapy this coming week and is concerned that he may still be hospitalized at this time must have discussion with local oncologist in regard to there is possibility it could be performed here. Defer to oncology. · Ammonia level 91. Adjust lactulose dose as needed- goal to have 3-4 bowel movements daily. Monitor closely. · IR for paracentesis  · Monitor blood glucose levels and treat accordingly. · Hold VTE prophylaxis secondary to anemia    · potassium supplementation to replete potassium  stores. Monitor electrolytes closely. · Monitor patient's hemoglobin and treat accordingly. Patient's hemoglobin has trended downward. · Repeat lactic acid level. Results revealed that it is trending downward. Continue gentle hydration. Continue current therapy. See orders for further plan of care.     More than 50% of my  time was spent at the bedside counseling/coordinating care with the patient and/or family with face to face contact. This time was spent reviewing notes and laboratory data as well as instructing and counseling the patient. Time I spent with the family or surrogate(s) is included only if the patient was incapable of providing the necessary information or participating in medical decisions. I also discussed the differential diagnosis and all of the proposed management plans with the patient and individuals accompanying the patient. Humble Cano requires this high level of physician care and nursing on the IMC/Telemetry unit due the complexity of decision management and chance of rapid decline or death. Continued cardiac monitoring and higher level of nursing are required. I am readily available for any further decision-making and intervention. The patient was seen, examined and then discussed with Dr. Elke Funez. JYOTI Jaquez - CNP  3/6/2022  4:51 PM       I saw and evaluated the patient. I agree with the findings and the plan of care as documented in Thomas Burrell NP-C's  note.     Melvin Stewart DO, D.O., FACOI  5:02 PM  3/6/2022

## 2022-03-06 NOTE — PLAN OF CARE
Problem: Falls - Risk of:  Goal: Will remain free from falls  Description: Will remain free from falls  Outcome: Met This Shift  Goal: Absence of physical injury  Description: Absence of physical injury  Outcome: Met This Shift     Problem: Confusion - Acute:  Goal: Absence of continued neurological deterioration signs and symptoms  Description: Absence of continued neurological deterioration signs and symptoms  Outcome: Met This Shift     Problem: Discharge Planning:  Goal: Participates in care planning  Description: Participates in care planning  Outcome: Met This Shift     Problem: Injury - Risk of, Physical Injury:  Goal: Will remain free from falls  Description: Will remain free from falls  Outcome: Met This Shift  Goal: Absence of physical injury  Description: Absence of physical injury  Outcome: Met This Shift     Problem: Mood - Altered:  Goal: Mood stable  Description: Mood stable  Outcome: Met This Shift     Problem: Sleep Pattern Disturbance:  Goal: Appears well-rested  Description: Appears well-rested  Outcome: Met This Shift

## 2022-03-07 ENCOUNTER — APPOINTMENT (OUTPATIENT)
Dept: INTERVENTIONAL RADIOLOGY/VASCULAR | Age: 72
DRG: 442 | End: 2022-03-07
Payer: MEDICARE

## 2022-03-07 LAB
ALBUMIN FLUID: 0.4 G/DL
ALBUMIN SERPL-MCNC: 3 G/DL (ref 3.5–5.2)
ALP BLD-CCNC: 241 U/L (ref 40–129)
ALT SERPL-CCNC: 18 U/L (ref 0–40)
AMMONIA: 45 UMOL/L (ref 16–60)
ANION GAP SERPL CALCULATED.3IONS-SCNC: 12 MMOL/L (ref 7–16)
ANISOCYTOSIS: ABNORMAL
APPEARANCE FLUID: NORMAL
AST SERPL-CCNC: 27 U/L (ref 0–39)
BASOPHILS ABSOLUTE: 0 E9/L (ref 0–0.2)
BASOPHILS RELATIVE PERCENT: 0 % (ref 0–2)
BILIRUB SERPL-MCNC: 1.5 MG/DL (ref 0–1.2)
BUN BLDV-MCNC: 47 MG/DL (ref 6–23)
CALCIUM SERPL-MCNC: 9.5 MG/DL (ref 8.6–10.2)
CELL COUNT FLUID TYPE: NORMAL
CHLORIDE BLD-SCNC: 105 MMOL/L (ref 98–107)
CO2: 21 MMOL/L (ref 22–29)
COLOR FLUID: NORMAL
CREAT SERPL-MCNC: 1 MG/DL (ref 0.7–1.2)
EOSINOPHILS ABSOLUTE: 0 E9/L (ref 0.05–0.5)
EOSINOPHILS RELATIVE PERCENT: 0 % (ref 0–6)
GFR AFRICAN AMERICAN: >60
GFR NON-AFRICAN AMERICAN: >60 ML/MIN/1.73
GLUCOSE BLD-MCNC: 213 MG/DL (ref 74–99)
HCT VFR BLD CALC: 27.4 % (ref 37–54)
HEMOGLOBIN: 8.6 G/DL (ref 12.5–16.5)
LACTIC ACID: 2.2 MMOL/L (ref 0.5–2.2)
LD, FLUID: 34 U/L
LIPASE: 236 U/L (ref 13–60)
LYMPHOCYTES ABSOLUTE: 0.27 E9/L (ref 1.5–4)
LYMPHOCYTES RELATIVE PERCENT: 7.8 % (ref 20–42)
MAGNESIUM: 1.4 MG/DL (ref 1.6–2.6)
MCH RBC QN AUTO: 28.6 PG (ref 26–35)
MCHC RBC AUTO-ENTMCNC: 31.4 % (ref 32–34.5)
MCV RBC AUTO: 91 FL (ref 80–99.9)
METER GLUCOSE: 185 MG/DL (ref 74–99)
METER GLUCOSE: 212 MG/DL (ref 74–99)
METER GLUCOSE: 228 MG/DL (ref 74–99)
METER GLUCOSE: 297 MG/DL (ref 74–99)
MONOCYTE, FLUID: 98 %
MONOCYTES ABSOLUTE: 0.27 E9/L (ref 0.1–0.95)
MONOCYTES RELATIVE PERCENT: 7.8 % (ref 2–12)
NEUTROPHIL, FLUID: 2 %
NEUTROPHILS ABSOLUTE: 2.86 E9/L (ref 1.8–7.3)
NEUTROPHILS RELATIVE PERCENT: 84.3 % (ref 43–80)
NUCLEATED CELLS FLUID: 492 /UL
OVALOCYTES: ABNORMAL
PDW BLD-RTO: 20.7 FL (ref 11.5–15)
PLATELET # BLD: 66 E9/L (ref 130–450)
PLATELET CONFIRMATION: NORMAL
PMV BLD AUTO: 11.5 FL (ref 7–12)
POIKILOCYTES: ABNORMAL
POLYCHROMASIA: ABNORMAL
POTASSIUM SERPL-SCNC: 3.3 MMOL/L (ref 3.5–5)
PROTEIN FLUID: 0.7 G/DL
RBC # BLD: 3.01 E12/L (ref 3.8–5.8)
RBC FLUID: 2000 /UL
SODIUM BLD-SCNC: 138 MMOL/L (ref 132–146)
TOTAL PROTEIN: 4.6 G/DL (ref 6.4–8.3)
WBC # BLD: 3.4 E9/L (ref 4.5–11.5)

## 2022-03-07 PROCEDURE — 83605 ASSAY OF LACTIC ACID: CPT

## 2022-03-07 PROCEDURE — 83735 ASSAY OF MAGNESIUM: CPT

## 2022-03-07 PROCEDURE — 6370000000 HC RX 637 (ALT 250 FOR IP): Performed by: INTERNAL MEDICINE

## 2022-03-07 PROCEDURE — 82150 ASSAY OF AMYLASE: CPT

## 2022-03-07 PROCEDURE — 87070 CULTURE OTHR SPECIMN AEROBIC: CPT

## 2022-03-07 PROCEDURE — 93005 ELECTROCARDIOGRAM TRACING: CPT | Performed by: INTERNAL MEDICINE

## 2022-03-07 PROCEDURE — 82042 OTHER SOURCE ALBUMIN QUAN EA: CPT

## 2022-03-07 PROCEDURE — 6360000002 HC RX W HCPCS: Performed by: INTERNAL MEDICINE

## 2022-03-07 PROCEDURE — 88305 TISSUE EXAM BY PATHOLOGIST: CPT

## 2022-03-07 PROCEDURE — 36415 COLL VENOUS BLD VENIPUNCTURE: CPT

## 2022-03-07 PROCEDURE — 87205 SMEAR GRAM STAIN: CPT

## 2022-03-07 PROCEDURE — 82140 ASSAY OF AMMONIA: CPT

## 2022-03-07 PROCEDURE — 6360000002 HC RX W HCPCS: Performed by: NURSE PRACTITIONER

## 2022-03-07 PROCEDURE — 0W9G3ZZ DRAINAGE OF PERITONEAL CAVITY, PERCUTANEOUS APPROACH: ICD-10-PCS | Performed by: RADIOLOGY

## 2022-03-07 PROCEDURE — 83690 ASSAY OF LIPASE: CPT

## 2022-03-07 PROCEDURE — C1729 CATH, DRAINAGE: HCPCS

## 2022-03-07 PROCEDURE — 85025 COMPLETE CBC W/AUTO DIFF WBC: CPT

## 2022-03-07 PROCEDURE — 88112 CYTOPATH CELL ENHANCE TECH: CPT

## 2022-03-07 PROCEDURE — 80053 COMPREHEN METABOLIC PANEL: CPT

## 2022-03-07 PROCEDURE — 2580000003 HC RX 258: Performed by: INTERNAL MEDICINE

## 2022-03-07 PROCEDURE — 49083 ABD PARACENTESIS W/IMAGING: CPT

## 2022-03-07 PROCEDURE — 2060000000 HC ICU INTERMEDIATE R&B

## 2022-03-07 PROCEDURE — 84157 ASSAY OF PROTEIN OTHER: CPT

## 2022-03-07 PROCEDURE — 83615 LACTATE (LD) (LDH) ENZYME: CPT

## 2022-03-07 PROCEDURE — 6370000000 HC RX 637 (ALT 250 FOR IP): Performed by: NURSE PRACTITIONER

## 2022-03-07 PROCEDURE — 82962 GLUCOSE BLOOD TEST: CPT

## 2022-03-07 RX ORDER — MAGNESIUM SULFATE IN WATER 40 MG/ML
2000 INJECTION, SOLUTION INTRAVENOUS ONCE
Status: COMPLETED | OUTPATIENT
Start: 2022-03-07 | End: 2022-03-07

## 2022-03-07 RX ORDER — POTASSIUM CHLORIDE 20 MEQ/1
40 TABLET, EXTENDED RELEASE ORAL ONCE
Status: COMPLETED | OUTPATIENT
Start: 2022-03-07 | End: 2022-03-07

## 2022-03-07 RX ORDER — MAGNESIUM OXIDE 400 MG/1
400 TABLET ORAL 2 TIMES DAILY
Status: DISCONTINUED | OUTPATIENT
Start: 2022-03-07 | End: 2022-03-09 | Stop reason: HOSPADM

## 2022-03-07 RX ADMIN — PREDNISONE 20 MG: 20 TABLET ORAL at 10:12

## 2022-03-07 RX ADMIN — HEPARIN SODIUM 5000 UNITS: 5000 INJECTION INTRAVENOUS; SUBCUTANEOUS at 20:24

## 2022-03-07 RX ADMIN — LEVOTHYROXINE SODIUM 200 MCG: 0.2 TABLET ORAL at 05:10

## 2022-03-07 RX ADMIN — INSULIN LISPRO 3 UNITS: 100 INJECTION, SOLUTION INTRAVENOUS; SUBCUTANEOUS at 20:21

## 2022-03-07 RX ADMIN — LACTULOSE 20 G: 20 SOLUTION ORAL at 05:10

## 2022-03-07 RX ADMIN — Medication 10 ML: at 10:15

## 2022-03-07 RX ADMIN — HEPARIN SODIUM 5000 UNITS: 5000 INJECTION INTRAVENOUS; SUBCUTANEOUS at 13:38

## 2022-03-07 RX ADMIN — INSULIN LISPRO 4 UNITS: 100 INJECTION, SOLUTION INTRAVENOUS; SUBCUTANEOUS at 18:12

## 2022-03-07 RX ADMIN — MAGNESIUM OXIDE 400 MG: 400 TABLET ORAL at 20:25

## 2022-03-07 RX ADMIN — POTASSIUM CHLORIDE 40 MEQ: 20 TABLET, EXTENDED RELEASE ORAL at 13:38

## 2022-03-07 RX ADMIN — LACTULOSE 20 G: 20 SOLUTION ORAL at 18:08

## 2022-03-07 RX ADMIN — INSULIN LISPRO 4 UNITS: 100 INJECTION, SOLUTION INTRAVENOUS; SUBCUTANEOUS at 11:45

## 2022-03-07 RX ADMIN — RIFAXIMIN 550 MG: 550 TABLET ORAL at 10:11

## 2022-03-07 RX ADMIN — ASPIRIN 81 MG 81 MG: 81 TABLET ORAL at 10:12

## 2022-03-07 RX ADMIN — MAGNESIUM SULFATE HEPTAHYDRATE 2000 MG: 2 INJECTION, SOLUTION INTRAVENOUS at 13:44

## 2022-03-07 RX ADMIN — WATER 2000 MG: 1 INJECTION INTRAMUSCULAR; INTRAVENOUS; SUBCUTANEOUS at 18:08

## 2022-03-07 RX ADMIN — INSULIN LISPRO 2 UNITS: 100 INJECTION, SOLUTION INTRAVENOUS; SUBCUTANEOUS at 10:15

## 2022-03-07 RX ADMIN — INSULIN GLARGINE 20 UNITS: 100 INJECTION, SOLUTION SUBCUTANEOUS at 20:22

## 2022-03-07 RX ADMIN — LACTULOSE 20 G: 20 SOLUTION ORAL at 11:18

## 2022-03-07 RX ADMIN — MAGNESIUM OXIDE 400 MG: 400 TABLET ORAL at 13:38

## 2022-03-07 RX ADMIN — LACTULOSE 20 G: 20 SOLUTION ORAL at 01:04

## 2022-03-07 RX ADMIN — PANTOPRAZOLE SODIUM 40 MG: 40 TABLET, DELAYED RELEASE ORAL at 05:10

## 2022-03-07 RX ADMIN — Medication 10 ML: at 20:29

## 2022-03-07 ASSESSMENT — PAIN DESCRIPTION - LOCATION
LOCATION: BACK
LOCATION: BACK

## 2022-03-07 ASSESSMENT — PAIN DESCRIPTION - PAIN TYPE
TYPE: CHRONIC PAIN
TYPE: CHRONIC PAIN

## 2022-03-07 ASSESSMENT — PAIN SCALES - GENERAL
PAINLEVEL_OUTOF10: 7
PAINLEVEL_OUTOF10: 7
PAINLEVEL_OUTOF10: 0

## 2022-03-07 NOTE — PLAN OF CARE
Problem: Falls - Risk of:  Goal: Will remain free from falls  Description: Will remain free from falls  3/7/2022 1715 by Jocy Roy RN  Outcome: Met This Shift     Problem: Falls - Risk of:  Goal: Absence of physical injury  Description: Absence of physical injury  3/7/2022 1715 by Jocy Roy RN  Outcome: Met This Shift     Problem: Discharge Planning:  Goal: Ability to perform activities of daily living will improve  Description: Ability to perform activities of daily living will improve  Outcome: Met This Shift     Problem: Injury - Risk of, Physical Injury:  Goal: Will remain free from falls  Description: Will remain free from falls  3/7/2022 1715 by Jocy Roy RN  Outcome: Met This Shift     Problem: Injury - Risk of, Physical Injury:  Goal: Absence of physical injury  Description: Absence of physical injury  3/7/2022 1715 by Jocy Roy RN  Outcome: Met This Shift     Problem: Mood - Altered:  Goal: Mood stable  Description: Mood stable  Outcome: Met This Shift     Problem: Mood - Altered:  Goal: Absence of abusive behavior  Description: Absence of abusive behavior  Outcome: Met This Shift     Problem: Mood - Altered:  Goal: Verbalizations of feeling emotionally comfortable while being cared for will increase  Description: Verbalizations of feeling emotionally comfortable while being cared for will increase  Outcome: Met This Shift     Problem: Psychomotor Activity - Altered:  Goal: Absence of psychomotor disturbance signs and symptoms  Description: Absence of psychomotor disturbance signs and symptoms  Outcome: Met This Shift     Problem: Sleep Pattern Disturbance:  Goal: Appears well-rested  Description: Appears well-rested  Outcome: Met This Shift     Problem: Pain:  Goal: Pain level will decrease  Description: Pain level will decrease  Outcome: Met This Shift     Problem: Pain:  Goal: Control of acute pain  Description: Control of acute pain  Outcome: Met This Shift     Problem: Pain:  Goal: Control of chronic pain  Description: Control of chronic pain  Outcome: Met This Shift     Problem: Fluid Volume:  Goal: Will maintain adequate fluid volume  Description: Will maintain adequate fluid volume  Outcome: Met This Shift     Problem: Fluid Volume:  Goal: Will show no signs and symptoms of excessive bleeding  Description: Will show no signs and symptoms of excessive bleeding  Outcome: Met This Shift     Problem: Skin Integrity:  Goal: Will show no infection signs and symptoms  Description: Will show no infection signs and symptoms  Outcome: Met This Shift     Problem: Skin Integrity:  Goal: Absence of new skin breakdown  Description: Absence of new skin breakdown  Outcome: Met This Shift

## 2022-03-07 NOTE — PROGRESS NOTES
Internal Medicine Progress Note    [facility]    Antonio Corcoran. Joi Thurston., F.A.C.OMatthewIMatthew Duval D.O., PRASANNAOJESUS Shipley D.O. Hema Cali, MSN, APRN, NP-C  Cody Romero, MSN, APRN-CNP     Primary Care Physician: Key Cardenas DO   Admitting Physician:  Ronald Mojica DO  Admission date and time: 3/4/2022  8:13 PM    Room:  [address] [place] [address]  Admitting diagnosis: Portal hypertension ([address] [K76.6]  Hypokalemia [E87.6]  Hyponatremia [E87.1]  Hyperammonemia ([address] [E72.20]  Malignant ascites [R18.0]  History of liver cancer [Z85.05]  Elevated lipase [R74.8]  Hypotension, unspecified hypotension type [I95.9]  Altered mental status, unspecified altered mental status type [R41.82]  Acute kidney injury superimposed on CKD ([address] [N17.9, N18.9]  Hepatic cirrhosis, unspecified hepatic cirrhosis type, unspecified whether ascites present ([address] [E58.53]  AMS (altered mental status) [R41.82]    Patient Name: Krista Beckham  MRN: [id number]    Date of Service: 3/7/2022     Subjective: Jamie Arreola is a 73 y.o. male who was seen and examined today,3/7/2022, at the bedside. Jamie Arreola is feeling much better overall. He had paracentesis with removal of nearly 5 L of fluid he admits that he was not taking lactulose at home and is feeling better since resumption of lactulose. He was unable to afford Xifaxan so this was not being taken at home as well. We have enforced the need for very strict medication adherence and discussion with primary care provider if not able to obtain medications. He continues to want to follow with oncology in [place] and this will need discuss further with the patient and family as his medical condition has not allowed him to make this transfer recently. No family present    Review of System: Limited in the setting of confusion  Constitutional:   Denies fever or chills, weight loss or gain, positive for fatigue.   HEENT:   Denies ear pain, sore throat, sinus or eye problems. Cardiovascular:   Denies any chest pain, irregular heartbeats, or palpitations. Respiratory:   Denies shortness of breath, coughing, sputum production, hemoptysis, or wheezing. Gastrointestinal:   Denies nausea, vomiting, diarrhea, or constipation. Denies any abdominal pain. Improved abdominal distention following paracentesis. Genitourinary:    Denies any urgency, frequency, hematuria. Voiding with Ocrrales catheter. Extremities:   Denies lower extremity swelling, edema or cyanosis. Neurology:    Denies any headache or focal neurological deficits, admits to generalized weakness without focal component. Psch: Improving confusion. Denies being anxious or depressed. Musculoskeletal:    Denies  myalgias, joint complaints or back pain. Integumentary:   Denies any rashes, ulcers, or excoriations. Denies bruising. Hematologic/Lymphatic:  Denies bruising or bleeding. Physical Exam:  I/O this shift:  In: 100 [P.O.:100]  Out: 1200 [Urine:1200]    Intake/Output Summary (Last 24 hours) at 3/7/2022 1222  Last data filed at 3/7/2022 1110  Gross per 24 hour   Intake 340 ml   Output 1750 ml   Net -1410 ml   I/O last 3 completed shifts: In: 240 [P.O.:240]  Out: 1930 [TAJXU:5684]  Patient Vitals for the past 96 hrs (Last 3 readings):   Weight   03/07/22 0600 189 lb (85.7 kg)   03/06/22 0600 180 lb 14.4 oz (82.1 kg)   03/05/22 0130 181 lb 1.6 oz (82.1 kg)     Vital Signs:   Blood pressure (!) 140/78, pulse 89, temperature 96.8 °F (36 °C), temperature source Infrared, resp. rate 20, height 5' 8\" (1.727 m), weight 189 lb (85.7 kg), SpO2 98 %. General appearance:  Alert, responsive, oriented to person, place and time. Acute on chronic ill appearance without distress. Head:  Normocephalic. No masses, lesions or tenderness. Eyes:  PERRLA. EOMI. Sclera clear. Impaired vision. ENT:  Ears normal. Mucosa normal.  Missing teeth. Neck:    Supple. Trachea midline. No thyromegaly. No JVD. No bruits. Left chest Mediport-incision healing. Ecchymosis around the incision site. Heart:    Rhythm regular. Rate controlled. S1 and S2. Systolic murmur  Lungs:    Symmetrical.  Mildly diminished bibasilar. Otherwise lungs are clear to auscultation bilaterally but diminished. No wheezes. No rhonchi. No rales. Abdomen:   Softly distended with significant improvement following paracentesis. Bowel sounds positive. No organomegaly or masses. No pain on palpation. Extremities:    Peripheral pulses present. No pitting peripheral edema. No ulcers. No cyanosis. No clubbing. Neurologic:    Alert. Oriented to person place and time somewhat. Generally weak and deconditioned with no focal deficit. Cranial nerves grossly intact. No focal weakness. Psych:   Behavior is very mildly confused. Mood appears normal. Speech is not rapid and/or pressured. Musculoskeletal:   Spine ROM normal. Muscular strength weak. Gait not assessed. Integumentary:  No rashes  Skin normal color and texture.   Genitalia/Breast:  Corrales    Medication:  Scheduled Meds:   magnesium sulfate  2,000 mg IntraVENous Once    magnesium oxide  400 mg Oral BID    potassium chloride  40 mEq Oral Once    heparin (porcine)  5,000 Units SubCUTAneous 3 times per day    sodium chloride flush  5-40 mL IntraVENous 2 times per day    aspirin  81 mg Oral Daily    insulin glargine  20 Units SubCUTAneous Nightly    levothyroxine  200 mcg Oral Daily    pantoprazole  40 mg Oral QAM AC    predniSONE  20 mg Oral Daily    rifaximin  550 mg Oral BID    lactulose  20 g Oral 4 times per day    cefTRIAXone (ROCEPHIN) IV  2,000 mg IntraVENous Q24H    insulin lispro  0-12 Units SubCUTAneous TID     insulin lispro  0-6 Units SubCUTAneous Nightly     Continuous Infusions:   dextrose      sodium chloride      0.9% NaCl with KCl 20 mEq 83 mL/hr at 03/05/22 3615       Objective Data:  CBC with Differential:    Lab Results   Component Value Date WBC 3.4 03/07/2022    RBC 3.01 03/07/2022    HGB 8.6 03/07/2022    HCT 27.4 03/07/2022    PLT 66 03/07/2022    MCV 91.0 03/07/2022    MCH 28.6 03/07/2022    MCHC 31.4 03/07/2022    RDW 20.7 03/07/2022    NRBC 1.0 03/06/2022    SEGSPCT 22 02/03/2014    METASPCT 4.3 02/04/2021    LYMPHOPCT 7.8 03/07/2022    MONOPCT 7.8 03/07/2022    MYELOPCT 2.0 03/06/2022    BASOPCT 0.0 03/07/2022    MONOSABS 0.27 03/07/2022    LYMPHSABS 0.27 03/07/2022    EOSABS 0.00 03/07/2022    BASOSABS 0.00 03/07/2022     CMP:    Lab Results   Component Value Date     03/07/2022    K 3.3 03/07/2022    K 3.4 03/04/2022     03/07/2022    CO2 21 03/07/2022    BUN 47 03/07/2022    CREATININE 1.0 03/07/2022    GFRAA >60 03/07/2022    LABGLOM >60 03/07/2022    GLUCOSE 213 03/07/2022    GLUCOSE 318 04/30/2012    PROT 4.6 03/07/2022    LABALBU 3.0 03/07/2022    LABALBU 4.0 04/30/2012    CALCIUM 9.5 03/07/2022    BILITOT 1.5 03/07/2022    ALKPHOS 241 03/07/2022    AST 27 03/07/2022    ALT 18 03/07/2022     Assessment:  1. Hepatic encephalopathy in the setting of decompensated cirrhosis and large volume ascites partially due to medication noncompliance  2. LISSETTE on CKD in the setting of increased diuretic use  3. Lactic acidosis  4. Hypervolemic hyponatremia  5. AML  6. Hypokalemia and hypomagnesemia  7. Acute on chronic Anemia, likely dilutional without overt GI bleeding identified  8. Nondisplaced sacral fracture  9. Insulin-dependent diabetes mellitus  10. History of DVT  11. History of MI  15. Hypothyroidism-TSH 5.310  13. Hyperlipidemia  14. Hx of tobacco abuse       Plan:   Clinically we have is doing better overall. With the reinstitution of lactulose therapy he has seen a downtrend in his ammonia and an improvement in the degree of encephalopathy. Now only mildly confused.   Xifaxan was also resumed during hospitalization but may not be able to be continued upon discharge as he could not afford his medications we will investigate this further. GI is following and their recommendations have been reviewed. Medical optimization without further invasive procedures planned per their recommendations. Oncology is also been consulted as the patient was slated for chemotherapy this week at [facility] but patient and his wife are concerned he may not be able to make it there. Cierra Boyle is following and we await formal recommendations in this regard. Had paracentesis with removal of nearly 5 L of fluid and tolerated this well. As anemia is not secondary to GI bleeding, subcutaneous heparin will be resumed we will monitor this closely. Suspect acute on chronic anemia was influenced by dilution due to hypervolemia. He remains weak and deconditioned. PT, OT and  following for discharge planning purposes. Laboratory values, vital signs and chronic morbidities are being monitored and addressed accordingly. Continue current therapy. See orders for further plan of care. More than 50% of my  time was spent at the bedside counseling/coordinating care with the patient and/or family with face to face contact. This time was spent reviewing notes and laboratory data as well as instructing and counseling the patient. Time I spent with the family or surrogate(s) is included only if the patient was incapable of providing the necessary information or participating in medical decisions. I also discussed the differential diagnosis and all of the proposed management plans with the patient and individuals accompanying the patient. Lennox Baldy requires this high level of physician care and nursing on the IMC/Telemetry unit due the complexity of decision management and chance of rapid decline or death. Continued cardiac monitoring and higher level of nursing are required. I am readily available for any further decision-making and intervention.      Garrett Sultana, JYOTI - CNP  3/7/2022  12:22 PM

## 2022-03-07 NOTE — CARE COORDINATION
SOCIAL WORK / DISCHARGE PLANNING:  COVID testing not done. Sw spoke with pt's spouse, Pratik Lerma via phone. Dc plan is to return home with spouse, [facility]. Resume [facility] orders in chart. Pt has number of dme in place. Yanet Bowman from [employer] has sent her a packet to complete for Xifaxin, Lantus, and Humalog. Wife states she has 2 bottles of Lactulose at home to use and will ask Dr Kendall Hawkins for samples. Pt's son or neighbor will provide home going transport. PCP Dr Luiz Alonso Readmission assessment completed.                  Electronically signed by WILMAR Collins on 3/7/2022 at 2:57 PM

## 2022-03-07 NOTE — PLAN OF CARE
Problem: Falls - Risk of:  Goal: Will remain free from falls  Description: Will remain free from falls  Outcome: Met This Shift     Problem: Discharge Planning:  Goal: Ability to perform activities of daily living will improve  Description: Ability to perform activities of daily living will improve  Outcome: Met This Shift     Problem: Injury - Risk of, Physical Injury:  Goal: Will remain free from falls  Description: Will remain free from falls  Outcome: Met This Shift     Problem: Mood - Altered:  Goal: Mood stable  Description: Mood stable  Outcome: Met This Shift

## 2022-03-07 NOTE — PROGRESS NOTES
Patient here for ultrasound guided paracentesis. Instructions given and questions answered. Consent signed. Abdomen scanned and marked under the guidance of procedural Radiologist.     2616 start procedure /79  120  22  99% on room air    0910 end procedure /69  122  22  98% on room air      4800 cc drained of hazy yellow colored ascites fluid. Dry sterile dressing of 2x2 tegaderm to RLQ     Specimen sent to lab with labels. Nurse to nurse given to Monika Ventura RN    Patient sent back to the floor.

## 2022-03-07 NOTE — CONSULTS
[address]                                  CONSULTATION    PATIENT NAME: Cezar Mejia                   :        1950  MED REC NO:   [id number]                            ROOM:       0660  ACCOUNT NO:   [de-identified]                           ADMIT DATE: 2022  PROVIDER:     Hasmukh Figueredo    CONSULT DATE:  2022    PMD:  House staff, Dr. Leonard Sicard:  Dr. Valentin Cap:  Hepatic encephalopathy. HISTORY OF PRESENT ILLNESS:  The patient is a 79-year-old male  readmitted to the hospital for hepatic encephalopathy. He is known to  have alcoholic cirrhosis and last endoscopy as an outpatient with Dr. José Luis Avendaño, his gastroenterologist, last year revealed that he has varices. His ammonia level on admission was above 90. He is taking lactulose  twice a day 10 mg at home only and his wife reported he was not taking  the last couple of days. He is not able to afford his Xifaxan as per  his wife and he is not on Xifaxan as an outpatient. The patient was  given lactulose, dose was increased last night by me to 20 gm every six  hours, aimed to have three good bowel movements per day up to five bowel  movements. Also, Xifaxan was restarted in the hospital 550 mg twice a  day. The patient was seen in his room with his wife with him, more  awake and oriented. He knows where he is and the name of the hospital  he is. He denies any abdominal pain. He has multiple bowel movements  today as per his wife, brown stool. No reports of melena, or  hematochezia, or overt GI bleeding. He is known to be anemic in the  past and he has anemia also on this admission. He is scheduled to have  chemotherapy at [facility] next week, on Wednesday, three days from now  and his wife is questioning if he can make it by car.   The patient is  also scheduled to have paracentesis tomorrow. 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 PAST SURGICAL HISTORY:  Appendectomy, bone marrow transplant, Mediport  insertion, cholecystectomy, endoscopy last year, colonoscopy five years  ago as per wife. FAMILY HISTORY:  Positive for coronary artery disease, cancer, and high  blood pressure. MEDICATIONS:  See list in the chart. ALLERGIES:  CIPRO, IODINE, EPINEPHRINE, METFORMIN, PENICILLIN,  PREDNISONE, MORPHINE, OXYCODONE, PNEUMOCOCCAL VACCINE. SOCIAL HISTORY:  His is a former smoker and . He has history of  alcohol abuse in the past.    REVIEW OF SYSTEMS:  All systems reviewed, unrevealing except per  history. LABORATORY DATA:  Sodium 134, potassium 3.1, BUN 62, creatinine 1.3,  magnesium 1.5, albumin 3.1, ammonia 60. Today; ALT 17, alk phos 223,  bilirubin 1.6. WBC _____. H and H were 8.1 and 25.3, and platelet  count 68. H and H yesterday were 7.3 and 24.7, and ammonia yesterday  was 91. CT scan of the abdomen and pelvis performed on 03/04/2022 revealed  markedly stenotic liver with irregular contour. Multiple nodules that  were previously identified in the liver, cannot be visualized on current  study due to increased beam hardening artifact resulting from the  patient's arms within the gantry. Gallbladder absent, pancreas  unremarkable, spleen mildly enlarged. Mild thickening of the colon,  nonspecific and therefore, increase in the size of multiple pulmonary  nodules seen on 02/24/2022, two weeks ago concerning for progressive  metastatic disease. PHYSICAL EXAMINATION:  VITAL SIGNS:  Blood pressure 94/58, pulse 108, respirations 18,  temperature 97.3.   GENERAL:  Awake, responsive, oriented to self and place. HEENT:  Normocephalic, atraumatic. Pupils equal, reactive to light. Extraocular muscles are intact. Conjunctivae injected. Sclerae  anicteric. NECK:  Supple. No palpable cervical lymphadenopathy. No palpable  thyromegaly. HEART:  S1, S2. No murmur, no gallop. LUNGS:  Decreased breathing sounds bilaterally. No wheeze, no crackles. ABDOMEN:  Distended. Large ascites. Positive bowel sounds. No rebound  and no tenderness. EXTREMITIES:  1+ edema and positive pulse. SKIN:  No ecchymosis, no cyanosis, no clubbing. ASSESSMENT AND PLAN:  A 79-year-old male with decompensated liver  cirrhosis, readmitted to the hospital with encephalopathy, elevated  ammonia level. The patient is not taking his medication at home as per  his wife and he is not taking Xifaxan as he cannot afford it. The  patient was restarted on lactulose, dose was increased to 20 gm every  six hours until his ammonia normalized; then dose can be adjusted  accordingly, 20 gm twice or three times a day, aimed to have three up to  five good bowel movements per day in this patient who is not taking any  Xifaxan at home. While in the hospital, Xifaxan has been restarted at  550 mg twice a day. The patient was advised to work with hospital to  see if he is able to afford his medication through _____ organization or  calling the company if he qualifies for free medication. The patient  also was advised to call our office to help him in this process. The  patient also was found to have large ascites and he is going to large  volume paracentesis tomorrow. Albumin to be given during his  paracentesis. The patient also was found to be anemic. He is  chronically anemic. He has acute on chronic anemia with no overt GI  bleeding reported, and no melena or hematochezia. His anemia could be  related to his underlying medical condition. The patient is scheduled  for chemotherapy at [facility] next week, on Wednesday.   If he

## 2022-03-07 NOTE — ACP (ADVANCE CARE PLANNING)
Advance Care Planning   Healthcare Decision Maker:    Primary Decision Maker: Niyah Bateman Spouse - [phone]    Secondary Decision Maker: Jenny La - Child - [phone]    Click here to complete Healthcare Decision Makers including selection of the Healthcare Decision Maker Relationship (ie \"Primary\"). Today we documented Decision Maker(s) consistent with Legal Next of Kin hierarchy.

## 2022-03-07 NOTE — PROGRESS NOTES
Subjective:    Reporting no chest pain or dyspnea. No abdominal pain. Looks comfortable but confused    Objective:    BP (!) 140/78   Pulse 89   Temp 96.8 °F (36 °C) (Infrared)   Resp 20   Ht 5' 8\" (1.727 m)   Wt 189 lb (85.7 kg)   SpO2 98%   BMI 28.74 kg/m²     General: Confused. Not in apparent distress. HEENT: No thrush or mucositis, EOMI, PERRLA  Heart:  RRR, no murmurs, gallops, or rubs. Lungs: Poor respiratory effort. No rales or rhonchi can be heard  Abdomen: Enlarged but actively stopped. No pain palpation.   Extrem:  No clubbing, cyanosis, bilateral pedal edema  Lymphatics: No palpable adenopathy in cervical and supraclavicular regions  Skin: Intact, no petechia or purpura    CBC with Differential:    Lab Results   Component Value Date    WBC 3.4 03/07/2022    RBC 3.01 03/07/2022    HGB 8.6 03/07/2022    HCT 27.4 03/07/2022    PLT 66 03/07/2022    MCV 91.0 03/07/2022    MCH 28.6 03/07/2022    MCHC 31.4 03/07/2022    RDW 20.7 03/07/2022    NRBC 1.0 03/06/2022    SEGSPCT 22 02/03/2014    METASPCT 4.3 02/04/2021    LYMPHOPCT 7.8 03/07/2022    MONOPCT 7.8 03/07/2022    MYELOPCT 2.0 03/06/2022    BASOPCT 0.0 03/07/2022    MONOSABS 0.27 03/07/2022    LYMPHSABS 0.27 03/07/2022    EOSABS 0.00 03/07/2022    BASOSABS 0.00 03/07/2022     CMP:    Lab Results   Component Value Date     03/07/2022    K 3.3 03/07/2022    K 3.4 03/04/2022     03/07/2022    CO2 21 03/07/2022    BUN 47 03/07/2022    CREATININE 1.0 03/07/2022    GFRAA >60 03/07/2022    LABGLOM >60 03/07/2022    GLUCOSE 213 03/07/2022    GLUCOSE 318 04/30/2012    PROT 4.6 03/07/2022    LABALBU 3.0 03/07/2022    LABALBU 4.0 04/30/2012    CALCIUM 9.5 03/07/2022    BILITOT 1.5 03/07/2022    ALKPHOS 241 03/07/2022    AST 27 03/07/2022    ALT 18 03/07/2022            Current Facility-Administered Medications:     magnesium sulfate 2000 mg in 50 mL IVPB premix, 2,000 mg, IntraVENous, Once, Rose Chowdary, APRN - CNP    magnesium oxide (MAG-OX) tablet 400 mg, 400 mg, Oral, BID, Elaina Jamshid, APRN - CNP, 400 mg at 03/07/22 1338    traMADol (ULTRAM) tablet 50 mg, 50 mg, Oral, BID PRN, Cody Menendez, DO, 50 mg at 03/06/22 1449    glucose (GLUTOSE) 40 % oral gel 15 g, 15 g, Oral, PRN, Sophia Constant, DO    glucagon (rDNA) injection 1 mg, 1 mg, IntraMUSCular, PRN, Ismail U Khoa, DO    dextrose 5 % solution, 100 mL/hr, IntraVENous, PRN, Sophia Constant, DO    heparin (porcine) injection 5,000 Units, 5,000 Units, SubCUTAneous, 3 times per day, Sophia Constant, DO, 5,000 Units at 03/07/22 1338    sodium chloride flush 0.9 % injection 5-40 mL, 5-40 mL, IntraVENous, 2 times per day, Sophia Constant, DO, 10 mL at 03/07/22 1015    sodium chloride flush 0.9 % injection 5-40 mL, 5-40 mL, IntraVENous, PRN, Sophia Constant, DO    0.9 % sodium chloride infusion, 25 mL, IntraVENous, PRN, Sophia Constant, DO    aspirin chewable tablet 81 mg, 81 mg, Oral, Daily, Ismail U Khoa, DO, 81 mg at 03/07/22 1012    benzonatate (TESSALON) capsule 100 mg, 100 mg, Oral, TID PRN, Sophia Constant, DO    insulin glargine (LANTUS) injection vial 20 Units, 20 Units, SubCUTAneous, Nightly, Sophia Constant, DO, 20 Units at 03/06/22 2058    levothyroxine (SYNTHROID) tablet 200 mcg, 200 mcg, Oral, Daily, Sophia Constant, DO, 200 mcg at 03/07/22 0510    pantoprazole (PROTONIX) tablet 40 mg, 40 mg, Oral, QAM AC, Ismail U Khoa, DO, 40 mg at 03/07/22 0510    predniSONE (DELTASONE) tablet 20 mg, 20 mg, Oral, Daily, Ismail U Khoa, DO, 20 mg at 03/07/22 1012    lactulose (CHRONULAC) 10 GM/15ML solution 20 g, 20 g, Oral, 4 times per day, Mireille Odell MD, 20 g at 03/07/22 1118    0.9% NaCl with KCl 20 mEq infusion, , IntraVENous, Continuous, JYOTI Pickering - CNP, Last Rate: 60 mL/hr at 03/07/22 1327, Rate Change at 03/07/22 1327    cefTRIAXone (ROCEPHIN) 2,000 mg in sterile water 20 mL IV syringe, 2,000 mg, IntraVENous, Q24H, Ismail CARLA Couch DO, 2,000 mg at 03/06/22 1846    insulin lispro (HUMALOG) injection vial 0-12 Units, 0-12 Units, SubCUTAneous, TID WC, Ismail U Khoa, DO, 4 Units at 03/07/22 1145    insulin lispro (HUMALOG) injection vial 0-6 Units, 0-6 Units, SubCUTAneous, Nightly, Heavenly Bruno, DO, 3 Units at 03/06/22 2059    Assessment:    Principal Problem:    Decompensation of cirrhosis of liver (HCC)  Active Problems:    AMS (altered mental status)  Resolved Problems:    * No resolved hospital problems. 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 He had conditioning regimen with FluCyTBI May 9, 2012 through May 9, 2012. He has been in remission since that time. He was recently diagnosed with stage IV classic Hodgkin lymphoma of the liver biopsy-proven January 17, 2022. He was seen by Dr. Smith Lugo 2/22/22 and was seen at [address] [place] [address] 2/23/22 for second opinion. EBV PRC completed 2/23/22 =125,099  Multiple PET positive bone lesions, liver mass biopsied and positive for Hodgkin lymphoma  Recent cardiac issues, junctional bradycardia and cardiogenic shock requiring temporary pacemaker   Patient admitted to the hospital this time for elevated ammonia level and symptomatic hepatic encephalopathy    Plan:  Hepatic encephalopathy related to liver cirrhosis: On lactulose. Ammonia level decreasing. Advanced Hodgkin disease: Status post multiple lines of therapy. He was not able to start treatment yet at [address] [place] [address]. Prognosis seems to be poor due to comorbidities. Pancytopenia, multifactorial including history to prior exposure to chemotherapy for Hodgkin's disease as well as liver cirrhosis. Keep hemoglobin above 7.5 g/dL. Keep platelet count above 10. Transfuse as needed. All blood products preferably should be irradiated  Okay to continue heparin for DVT prophylaxis as long as platelet count is above 50.     Electronically signed by Yudy Cleary MD on 3/7/2022 at 1:43 PM

## 2022-03-08 PROBLEM — E43 SEVERE PROTEIN-CALORIE MALNUTRITION (HCC): Chronic | Status: ACTIVE | Noted: 2022-03-08

## 2022-03-08 LAB
ALBUMIN SERPL-MCNC: 2.8 G/DL (ref 3.5–5.2)
ALP BLD-CCNC: 266 U/L (ref 40–129)
ALT SERPL-CCNC: 29 U/L (ref 0–40)
AMMONIA: 22 UMOL/L (ref 16–60)
ANION GAP SERPL CALCULATED.3IONS-SCNC: 11 MMOL/L (ref 7–16)
ANISOCYTOSIS: ABNORMAL
AST SERPL-CCNC: 49 U/L (ref 0–39)
BASOPHILS ABSOLUTE: 0 E9/L (ref 0–0.2)
BASOPHILS RELATIVE PERCENT: 0 % (ref 0–2)
BILIRUB SERPL-MCNC: 1.4 MG/DL (ref 0–1.2)
BUN BLDV-MCNC: 35 MG/DL (ref 6–23)
CALCIUM SERPL-MCNC: 9.3 MG/DL (ref 8.6–10.2)
CHLORIDE BLD-SCNC: 104 MMOL/L (ref 98–107)
CO2: 21 MMOL/L (ref 22–29)
CREAT SERPL-MCNC: 0.8 MG/DL (ref 0.7–1.2)
EKG ATRIAL RATE: 129 BPM
EKG P AXIS: 20 DEGREES
EKG P-R INTERVAL: 138 MS
EKG Q-T INTERVAL: 308 MS
EKG QRS DURATION: 136 MS
EKG QTC CALCULATION (BAZETT): 449 MS
EKG R AXIS: -6 DEGREES
EKG T AXIS: 154 DEGREES
EKG VENTRICULAR RATE: 128 BPM
EOSINOPHILS ABSOLUTE: 0 E9/L (ref 0.05–0.5)
EOSINOPHILS RELATIVE PERCENT: 0 % (ref 0–6)
GFR AFRICAN AMERICAN: >60
GFR NON-AFRICAN AMERICAN: >60 ML/MIN/1.73
GLUCOSE BLD-MCNC: 276 MG/DL (ref 74–99)
HCT VFR BLD CALC: 25.3 % (ref 37–54)
HEMOGLOBIN: 7.7 G/DL (ref 12.5–16.5)
LACTIC ACID: 2.9 MMOL/L (ref 0.5–2.2)
LIPASE: 150 U/L (ref 13–60)
LYMPHOCYTES ABSOLUTE: 0.15 E9/L (ref 1.5–4)
LYMPHOCYTES RELATIVE PERCENT: 5.3 % (ref 20–42)
MAGNESIUM: 1.5 MG/DL (ref 1.6–2.6)
MCH RBC QN AUTO: 28.1 PG (ref 26–35)
MCHC RBC AUTO-ENTMCNC: 30.4 % (ref 32–34.5)
MCV RBC AUTO: 92.3 FL (ref 80–99.9)
METAMYELOCYTES RELATIVE PERCENT: 0.9 % (ref 0–1)
METER GLUCOSE: 232 MG/DL (ref 74–99)
METER GLUCOSE: 269 MG/DL (ref 74–99)
METER GLUCOSE: 364 MG/DL (ref 74–99)
METER GLUCOSE: 371 MG/DL (ref 74–99)
MONOCYTES ABSOLUTE: 0.12 E9/L (ref 0.1–0.95)
MONOCYTES RELATIVE PERCENT: 3.5 % (ref 2–12)
MYELOCYTE PERCENT: 1.8 % (ref 0–0)
NEUTROPHILS ABSOLUTE: 2.73 E9/L (ref 1.8–7.3)
NEUTROPHILS RELATIVE PERCENT: 88.6 % (ref 43–80)
OVALOCYTES: ABNORMAL
PDW BLD-RTO: 20.3 FL (ref 11.5–15)
PLATELET # BLD: 58 E9/L (ref 130–450)
PLATELET CONFIRMATION: NORMAL
PMV BLD AUTO: 11.8 FL (ref 7–12)
POIKILOCYTES: ABNORMAL
POLYCHROMASIA: ABNORMAL
POTASSIUM SERPL-SCNC: 4.2 MMOL/L (ref 3.5–5)
RBC # BLD: 2.74 E12/L (ref 3.8–5.8)
SODIUM BLD-SCNC: 136 MMOL/L (ref 132–146)
TOTAL PROTEIN: 4.2 G/DL (ref 6.4–8.3)
WBC # BLD: 3 E9/L (ref 4.5–11.5)

## 2022-03-08 PROCEDURE — 86022 PLATELET ANTIBODIES: CPT

## 2022-03-08 PROCEDURE — 6360000002 HC RX W HCPCS: Performed by: NURSE PRACTITIONER

## 2022-03-08 PROCEDURE — 83735 ASSAY OF MAGNESIUM: CPT

## 2022-03-08 PROCEDURE — 6360000002 HC RX W HCPCS: Performed by: INTERNAL MEDICINE

## 2022-03-08 PROCEDURE — 6370000000 HC RX 637 (ALT 250 FOR IP): Performed by: NURSE PRACTITIONER

## 2022-03-08 PROCEDURE — 97161 PT EVAL LOW COMPLEX 20 MIN: CPT | Performed by: PHYSICAL THERAPIST

## 2022-03-08 PROCEDURE — 36591 DRAW BLOOD OFF VENOUS DEVICE: CPT

## 2022-03-08 PROCEDURE — 83605 ASSAY OF LACTIC ACID: CPT

## 2022-03-08 PROCEDURE — 2580000003 HC RX 258: Performed by: INTERNAL MEDICINE

## 2022-03-08 PROCEDURE — 6370000000 HC RX 637 (ALT 250 FOR IP): Performed by: INTERNAL MEDICINE

## 2022-03-08 PROCEDURE — 36415 COLL VENOUS BLD VENIPUNCTURE: CPT

## 2022-03-08 PROCEDURE — 85025 COMPLETE CBC W/AUTO DIFF WBC: CPT

## 2022-03-08 PROCEDURE — 82140 ASSAY OF AMMONIA: CPT

## 2022-03-08 PROCEDURE — 2060000000 HC ICU INTERMEDIATE R&B

## 2022-03-08 PROCEDURE — 82962 GLUCOSE BLOOD TEST: CPT

## 2022-03-08 PROCEDURE — 80053 COMPREHEN METABOLIC PANEL: CPT

## 2022-03-08 PROCEDURE — 97530 THERAPEUTIC ACTIVITIES: CPT | Performed by: PHYSICAL THERAPIST

## 2022-03-08 PROCEDURE — 83690 ASSAY OF LIPASE: CPT

## 2022-03-08 RX ORDER — FONDAPARINUX SODIUM 2.5 MG/.5ML
2.5 INJECTION SUBCUTANEOUS DAILY
Status: DISCONTINUED | OUTPATIENT
Start: 2022-03-08 | End: 2022-03-09 | Stop reason: HOSPADM

## 2022-03-08 RX ADMIN — INSULIN LISPRO 4 UNITS: 100 INJECTION, SOLUTION INTRAVENOUS; SUBCUTANEOUS at 12:14

## 2022-03-08 RX ADMIN — INSULIN LISPRO 5 UNITS: 100 INJECTION, SOLUTION INTRAVENOUS; SUBCUTANEOUS at 20:34

## 2022-03-08 RX ADMIN — MAGNESIUM OXIDE 400 MG: 400 TABLET ORAL at 20:34

## 2022-03-08 RX ADMIN — PANTOPRAZOLE SODIUM 40 MG: 40 TABLET, DELAYED RELEASE ORAL at 05:43

## 2022-03-08 RX ADMIN — INSULIN GLARGINE 20 UNITS: 100 INJECTION, SOLUTION SUBCUTANEOUS at 20:36

## 2022-03-08 RX ADMIN — ASPIRIN 81 MG 81 MG: 81 TABLET ORAL at 08:53

## 2022-03-08 RX ADMIN — LACTULOSE 20 G: 20 SOLUTION ORAL at 17:31

## 2022-03-08 RX ADMIN — LACTULOSE 20 G: 20 SOLUTION ORAL at 05:43

## 2022-03-08 RX ADMIN — Medication 10 ML: at 20:37

## 2022-03-08 RX ADMIN — PREDNISONE 20 MG: 20 TABLET ORAL at 08:53

## 2022-03-08 RX ADMIN — SODIUM CHLORIDE AND POTASSIUM CHLORIDE: 9; 1.49 INJECTION, SOLUTION INTRAVENOUS at 00:19

## 2022-03-08 RX ADMIN — SODIUM CHLORIDE AND POTASSIUM CHLORIDE: 9; 1.49 INJECTION, SOLUTION INTRAVENOUS at 18:15

## 2022-03-08 RX ADMIN — MAGNESIUM OXIDE 400 MG: 400 TABLET ORAL at 08:53

## 2022-03-08 RX ADMIN — LACTULOSE 20 G: 20 SOLUTION ORAL at 00:20

## 2022-03-08 RX ADMIN — Medication 10 ML: at 09:47

## 2022-03-08 RX ADMIN — INSULIN LISPRO 10 UNITS: 100 INJECTION, SOLUTION INTRAVENOUS; SUBCUTANEOUS at 08:59

## 2022-03-08 RX ADMIN — WATER 2000 MG: 1 INJECTION INTRAMUSCULAR; INTRAVENOUS; SUBCUTANEOUS at 18:14

## 2022-03-08 RX ADMIN — INSULIN LISPRO 6 UNITS: 100 INJECTION, SOLUTION INTRAVENOUS; SUBCUTANEOUS at 17:00

## 2022-03-08 RX ADMIN — LEVOTHYROXINE SODIUM 200 MCG: 0.2 TABLET ORAL at 05:43

## 2022-03-08 RX ADMIN — HEPARIN SODIUM 5000 UNITS: 5000 INJECTION INTRAVENOUS; SUBCUTANEOUS at 05:43

## 2022-03-08 RX ADMIN — LACTULOSE 20 G: 20 SOLUTION ORAL at 12:17

## 2022-03-08 RX ADMIN — FONDAPARINUX SODIUM 2.5 MG: 2.5 INJECTION SUBCUTANEOUS at 15:45

## 2022-03-08 ASSESSMENT — PAIN SCALES - GENERAL: PAINLEVEL_OUTOF10: 0

## 2022-03-08 NOTE — CONSULTS
Comprehensive Nutrition Assessment    Type and Reason for Visit:  Initial,Consult    Nutrition Recommendations/Plan: Patient with increased nutrient needs due to catabolic illness with malnutrition shown by muscle/fat wasting, decreased PO. Note ammonia currently WNL. Continue current ONS and monitor. Nutrition Assessment:  Pt adm d/t AMS w/ hepatic encephalopathy 2/2 Cirrhosis/ascites w/ recent liver mass bx w/ hodgkin lymphoma w/ bone mets. Hx AML, DM. Note LISSETTE on CKD. Pt w/ severe malnutrition and increased nutrient needs, continue current ONS & monitor. Malnutrition Assessment:  Malnutrition Status:  Severe malnutrition    Context:  Chronic Illness     Findings of the 6 clinical characteristics of malnutrition:  Energy Intake:  7 - 75% or less estimated energy requirements for 1 month or longer  Weight Loss:  Unable to assess     Body Fat Loss:   (moderate) Orbital,Buccal region   Muscle Mass Loss:  7 - Severe muscle mass loss Temples (temporalis),Clavicles (pectoralis & deltoids)  Fluid Accumulation:  No significant fluid accumulation     Strength:  Not Performed    Estimated Daily Nutrient Needs:  Energy (kcal):  0644-4756; Weight Used for Energy Requirements:  Current     Protein (g):  85-95; Weight Used for Protein Requirements:  Ideal (1.2-1.4)        Fluid (ml/day):  3884-7803; Method Used for Fluid Requirements:  1 ml/kcal      Nutrition Related Findings:  disoriented, round/distended abd, gross ascites, hyperactive BS, nonpitting BLE/BUE edema, BG elevated, ammonia WNL, Lipase 150      Wounds:  Surgical Incision       Current Nutrition Therapies:    ADULT DIET;  Regular  ADULT ORAL NUTRITION SUPPLEMENT; Breakfast, Lunch, Dinner; Diabetic Oral Supplement    Anthropometric Measures:  · Height: 5' 8\" (172.7 cm)  · Current Body Weight: 180 lb 12.8 oz (82 kg) (3/8 bed scale)   · Admission Body Weight: 181 lb 1.6 oz (82.1 kg) (3/5 first taken bed scale)    · Usual Body Weight:  (MALATHI d/t fluctuations w/ fluids, wt discrepancies)     · Ideal Body Weight: 154 lbs; % Ideal Body Weight 117.4 %   · BMI: 27.5   · BMI Categories: Overweight (BMI 25.0-29. 9)       Nutrition Diagnosis:   · In context of chronic illness,Severe malnutrition related to catabolic illness as evidenced by poor intake prior to admission,moderate loss of subcutaneous fat,severe muscle loss,intake 0-25%    Nutrition Interventions:   Food and/or Nutrient Delivery:  Continue Current Diet,Continue Oral Nutrition Supplement  Nutrition Education/Counseling:  Education not indicated   Coordination of Nutrition Care:  Continue to monitor while inpatient    Goals:  Pt consumes >50% meals/ONS       Nutrition Monitoring and Evaluation:   Behavioral-Environmental Outcomes:  None Identified   Food/Nutrient Intake Outcomes:  Food and Nutrient Intake,Supplement Intake  Physical Signs/Symptoms Outcomes:  Biochemical Data,Nutrition Focused Physical Findings,Skin,Weight,Chewing or Swallowing,GI Status,Fluid Status or Edema     Discharge Planning:     Too soon to determine     Electronically signed by Marine Larson, MS, RD, LD on 3/8/22 at 12:56 PM EST    Contact: [phone]

## 2022-03-08 NOTE — PROGRESS NOTES
Patient's wife updated at this time. She is very concerned about him receiving ensure. Dietary consult ordered per protocol.  Electronically signed by Debby Cummins RN on 3/7/2022 at 9:05 PM

## 2022-03-08 NOTE — CARE COORDINATION
SOCIAL WORK / DISCHARGE PLANNING:  COVID testing not done. Wife continues to plan returnn home but pt displays increased weakness. PT/OT eval ordered. Active with [facility], [facility] orders in chart. Pt's son or neighbor to provide home going transport.                  Electronically signed by WILMAR Salamanca on 3/8/2022 at 3:31 PM

## 2022-03-08 NOTE — PROGRESS NOTES
S: Patient awake and doing much better, wife at bedside, denies bleeding    O;  Temp 97.3 P-94 R-18 Bp112/68  Gen- awake, talking, alert, lying in bed  HEENt- no oral lesions  Lungs- clear  Cardiac- RRR  Abd- soft, palpable liver enlarged and spleen  Ext- warm and well perfused    Labs:  Wbc= 3hb=7.7 plt=58 with 88% segs, creatinin e0.8 ast=49 alt=29 bilie=1.3    A/P:  73year old gentleman with prior AML years ago s/p transplant with remission later developed cirrhosis and then January 2022 was found on liver mass biopsy to have stage IV classical Hodgkin 's lymphoma with bone mets found on PET scan. He is admitted with hepatic encephalopathy which has improved. He has not yet started treatment for Hodgkin's at [facility]. He has worsening thrombocytopenia since admission which may be due to heparin. 1. Stop heparin, send HIT antibody, start arixtra 2.mg SQ daily for DVt prophylaxis  2. I do not know which therapy they are planning at [facility] for his Hodgkin's but if is chemotherapy then they will not be able to start tx with his low platelet count and wife states they were scheduled tomorrow. I have asked her to let [facility] know of his current platelet counts and changes we are making.     Wei Hamilton MD

## 2022-03-08 NOTE — PROGRESS NOTES
Internal Medicine Progress Note    [facility]    Ziggy Wilcox. Cassia Dumont., GEENA.A.CMatthewOJESUS Huertas D.O., GEENA.CAREN.CMatthewOJESUS Pires D.O. Lucille Toussaint, MSN, APRN, NP-C  Tanner Gamino. Shelbie García, MSN, APRN-CNP     Primary Care Physician: Arslan Morgan DO   Admitting Physician:  Willie Sorto DO  Admission date and time: 3/4/2022  8:13 PM    Room:  [address] [place] [address]  Admitting diagnosis: Portal hypertension ([facility] [K76.6]  Hypokalemia [E87.6]  Hyponatremia [E87.1]  Hyperammonemia ([facility] [E72.20]  Malignant ascites [R18.0]  History of liver cancer [Z85.05]  Elevated lipase [R74.8]  Hypotension, unspecified hypotension type [I95.9]  Altered mental status, unspecified altered mental status type [R41.82]  Acute kidney injury superimposed on CKD ([facility] [N17.9, N18.9]  Hepatic cirrhosis, unspecified hepatic cirrhosis type, unspecified whether ascites present ([facility] [B58.69]  AMS (altered mental status) [R41.82]    Patient Name: Chirag Guerrero  MRN: [id number]    Date of Service: 3/8/2022     Subjective: Kyra Serrano is a 73 y.o. male who was seen and examined today,3/8/2022, at the bedside. Kyra Serrano was evaluated in the presence of his wife, Sol Anton. We had a long conversation regarding his current hospitalization and medication adjustments. We also discussed plans moving forward regarding his underlying malignant process. He is resting very comfortably today but remains weak and deconditioned. We discussed working with the therapy teams. Review of System:  Constitutional:   Admits to malaise and fatigue. Requesting increased work with the therapy teams. HEENT:   Denies ear pain, sore throat, sinus or eye problems. Cardiovascular:   Denies any chest pain, irregular heartbeats, or palpitations. Respiratory:   Denies shortness of breath, coughing, sputum production, hemoptysis, or wheezing.   Gastrointestinal:   Admits to mild reaccumulation of ascitic fluid which is chronic in nature. No overt abdominal pain. Increasing appetite. Genitourinary:    Denies any urgency, frequency, hematuria. Voiding with Corrales catheter. Extremities:   Denies lower extremity swelling, edema or cyanosis. Neurology:    Denies any headache or focal neurological deficits, admits to generalized weakness without focal component. Psch:   Confusion has resolved. Musculoskeletal:    Denies  myalgias, joint complaints or back pain. Integumentary:   Denies any rashes, ulcers, or excoriations. Denies bruising. Hematologic/Lymphatic:  Denies bruising or bleeding. Physical Exam:  I/O this shift:  In: 120 [P.O.:120]  Out: -     Intake/Output Summary (Last 24 hours) at 3/8/2022 1140  Last data filed at 3/8/2022 0730  Gross per 24 hour   Intake 1757.85 ml   Output 1651 ml   Net 106.85 ml   I/O last 3 completed shifts: In: 1737.9 [P.O.:820; I.V.:874.8; IV Piggyback:43.1]  Out: 6857 [Urine:2850; Stool:1]  Patient Vitals for the past 96 hrs (Last 3 readings):   Weight   03/08/22 0110 180 lb 12.8 oz (82 kg)   03/07/22 0600 189 lb (85.7 kg)   03/06/22 0600 180 lb 14.4 oz (82.1 kg)     Vital Signs:   Blood pressure 112/68, pulse 94, temperature 97.3 °F (36.3 °C), resp. rate 18, height 5' 8\" (1.727 m), weight 180 lb 12.8 oz (82 kg), SpO2 100 %. General appearance:  Alert, responsive, oriented to person, place and time. Chronically ill-appearing but appears to be approaching his baseline  Head:  Normocephalic. No masses, lesions or tenderness. Eyes:  PERRLA. EOMI. Sclera clear. Impaired vision. ENT:  Ears normal. Mucosa normal.  Missing teeth. Neck:    Supple. Trachea midline. No thyromegaly. No JVD. No bruits. Left chest Mediport-incision healing. Ecchymosis around the incision site. Heart:    Rhythm regular. Rate controlled. S1 and S2. Systolic murmur  Lungs:    Relatively good aeration throughout. Abdomen:   Softly distended with significant improvement following paracentesis.  Bowel sounds positive. No organomegaly or masses. No pain on palpation. Extremities:    Peripheral pulses present. No pitting peripheral edema. No ulcers. No cyanosis. No clubbing. Neurologic:    Alert. Oriented to person place and time somewhat. Generally weak and deconditioned with no focal deficit. Cranial nerves grossly intact. No focal weakness. Psych:   Behavior is very mildly confused. Mood appears normal. Speech is not rapid and/or pressured. Musculoskeletal:   Spine ROM normal. Muscular strength weak. Gait not assessed. Integumentary:  No rashes  Skin normal color and texture.   Genitalia/Breast:  Corrales    Medication:  Scheduled Meds:   fondaparinux  2.5 mg SubCUTAneous Daily    magnesium oxide  400 mg Oral BID    sodium chloride flush  5-40 mL IntraVENous 2 times per day    aspirin  81 mg Oral Daily    insulin glargine  20 Units SubCUTAneous Nightly    levothyroxine  200 mcg Oral Daily    pantoprazole  40 mg Oral QAM AC    predniSONE  20 mg Oral Daily    lactulose  20 g Oral 4 times per day    cefTRIAXone (ROCEPHIN) IV  2,000 mg IntraVENous Q24H    insulin lispro  0-12 Units SubCUTAneous TID WC    insulin lispro  0-6 Units SubCUTAneous Nightly     Continuous Infusions:   dextrose      sodium chloride      0.9% NaCl with KCl 20 mEq 60 mL/hr at 03/08/22 0020       Objective Data:  CBC with Differential:    Lab Results   Component Value Date    WBC 3.0 03/08/2022    RBC 2.74 03/08/2022    HGB 7.7 03/08/2022    HCT 25.3 03/08/2022    PLT 58 03/08/2022    MCV 92.3 03/08/2022    MCH 28.1 03/08/2022    MCHC 30.4 03/08/2022    RDW 20.3 03/08/2022    NRBC 1.0 03/06/2022    SEGSPCT 22 02/03/2014    METASPCT 0.9 03/08/2022    LYMPHOPCT 5.3 03/08/2022    MONOPCT 3.5 03/08/2022    MYELOPCT 1.8 03/08/2022    BASOPCT 0.0 03/08/2022    MONOSABS 0.12 03/08/2022    LYMPHSABS 0.15 03/08/2022    EOSABS 0.00 03/08/2022    BASOSABS 0.00 03/08/2022     CMP:    Lab Results   Component Value Date     03/08/2022 K 4.2 03/08/2022    K 3.4 03/04/2022     03/08/2022    CO2 21 03/08/2022    BUN 35 03/08/2022    CREATININE 0.8 03/08/2022    GFRAA >60 03/08/2022    LABGLOM >60 03/08/2022    GLUCOSE 276 03/08/2022    GLUCOSE 318 04/30/2012    PROT 4.2 03/08/2022    LABALBU 2.8 03/08/2022    LABALBU 4.0 04/30/2012    CALCIUM 9.3 03/08/2022    BILITOT 1.4 03/08/2022    ALKPHOS 266 03/08/2022    AST 49 03/08/2022    ALT 29 03/08/2022     Assessment:  1. Hepatic encephalopathy in the setting of decompensated cirrhosis and large volume ascites partially due to medication noncompliance  2. LISSETTE on CKD in the setting of increased diuretic use  3. Lactic acidosis  4. Hypervolemic hyponatremia  5. AML  6. Hypokalemia and hypomagnesemia  7. Acute on chronic Anemia, likely dilutional without overt GI bleeding identified  8. Nondisplaced sacral fracture  9. Insulin-dependent diabetes mellitus  10. History of DVT  11. History of MI  15. Hypothyroidism-TSH 5.310  13. Hyperlipidemia  14. Hx of tobacco abuse       Plan:   I discussed the case extensively with the patient and his wife at the bedside. In regards to the encephalopathy, his mentation has essentially returned to baseline. His ammonia level has trended downward and we again reinforced the need to titrate lactulose therapy as an outpatient to maintain 4-5 bowel movements per day. They are unable to afford Xifaxan therapy and there are no current patient assistance programs nor any sampling of this medication locally. There has been a downward trend in his platelet count which may prohibit him from undergoing chemotherapy. The oncology team has provided consultation and recommendations have been reviewed. Arixtra is being utilized for DVT prophylaxis. We will reassess platelet count tomorrow. Multiple questions were answered at the bedside. Pending ongoing stabilization and laboratory values, plans will be for discharge home tomorrow.   Again, we discussed the need for outpatient follow-up for his oncologic needs. She remains insistent on following up at [facility] which is acceptable if the patient is capable of making this travel. I am becoming increasingly concerned with his recent hospitalizations that such travel will be prohibitive. More than 50% of my  time was spent at the bedside counseling/coordinating care with the patient and/or family with face to face contact. This time was spent reviewing notes and laboratory data as well as instructing and counseling the patient. Time I spent with the family or surrogate(s) is included only if the patient was incapable of providing the necessary information or participating in medical decisions. I also discussed the differential diagnosis and all of the proposed management plans with the patient and individuals accompanying the patient. Holli Patton requires this high level of physician care and nursing on the IMC/Telemetry unit due the complexity of decision management and chance of rapid decline or death. Continued cardiac monitoring and higher level of nursing are required. I am readily available for any further decision-making and intervention.      Agnes Toledo DO  3/8/2022  11:40 AM

## 2022-03-08 NOTE — PROGRESS NOTES
Physical Therapy  Physical Therapy Initial Evaluation/Plan of Care    Room #:  3712/0919-26  Patient Name: Fatmata Siddiqi  YOB: 1950  MRN: [id number]    Date of Service: 3/8/2022     Tentative placement recommendation: Home Health Physical Therapy with family assist  Equipment recommendation: Patient has needed equipment       Evaluating Physical Therapist: Zandra Maynard, Student Physical Therapist      Specific Provider Orders/Date/Referring Provider : 03/08/22 1130   PT eval and treat Start: 03/08/22 1130, End: 03/08/22 1130, ONE TIME, Standing Count: 1 Occurrences, R    Namrata Munson, APRN - CNP    Admitted with AMS  Admitting Diagnosis:   Portal hypertension ([facility] [address] [K76.6]  Hypokalemia [E87.6]  Hyponatremia [E87.1]  Hyperammonemia ([address] [E72.20]  Malignant ascites [R18.0]  History of liver cancer [Z85.05]  Elevated lipase [R74.8]  Hypotension, unspecified hypotension type [I95.9]  Altered mental status, unspecified altered mental status type [R41.82]  Acute kidney injury superimposed on CKD ([address] [N17.9, N18.9]  Hepatic cirrhosis, unspecified hepatic cirrhosis type, unspecified whether ascites present ([address] [K74.60]  AMS (altered mental status) [R41.82]      Surgery: abdominal paracentesis 3/7  Visit Diagnoses       Codes    Altered mental status, unspecified altered mental status type    -  Primary R41.82    Hyperammonemia ([address]     E72.20    Hyponatremia     E87.1    Hypokalemia     E87.6    Malignant ascites     R18.0    Hepatic cirrhosis, unspecified hepatic cirrhosis type, unspecified whether ascites present ([address]     K74.60    History of liver cancer     Z85.05    Elevated lipase     R74.8    Acute kidney injury superimposed on CKD ([address]     N17.9, N18.9    Hypotension, unspecified hypotension type     I95.9          Patient Active Problem List   Diagnosis    Bronchitis with bronchospasm    Anxiety    Post herpetic neuralgia    Type 2 diabetes mellitus without complication, with long-term current use of insulin (HCC)    Cellulitis of left lower extremity    Plantar wart    Inguinal hernia    Acute myeloid leukemia in remission ([facility]    Pneumonia due to infectious organism    Cellulitis    Pharyngitis, acute    Thrombocytopenia (HCC)    GVHD (graft versus host disease) ([facility]    Portal hypertension ([address]    Post-COVID chronic fatigue    Uncontrolled type 2 diabetes mellitus with hyperglycemia (HCC)    Type 2 diabetes mellitus with hyperglycemia    Type 2 diabetes mellitus with chronic kidney disease    Decompensation of cirrhosis of liver ([facility] [facility]    AMS (altered mental status)        ASSESSMENT of Current Deficits Patient exhibits decreased strength, balance and endurance impairing functional mobility, transfers, gait  and gait distance. Patient was pleasant and motivated to perform therapy. Patient able to maintain stable vitals throughout session. Patient requires tactile cueing for safe use of wheeled walker with ambulation. According to chart, per , patient plans to discharge home with home health care. Patients wife is able to assist at home. Slight unsteadiness with ambulation, and home setup, including steps are barriers to d/c and require skilled intervention during hospital stay to attain pre hospital level of function. Decreased strength, balance and endurance  increases patient's risk for fall.        PHYSICAL THERAPY  PLAN OF CARE       Physical therapy plan of care is established based on physician order,  patient diagnosis and clinical assessment    Current Treatment Recommendations:    -Bed Mobility: Lower extremity exercises  and Upper extremity exercises   -Sitting Balance: Incorporate reaching activities to activate trunk muscles  and Facilitate active trunk muscle engagement   -Standing Balance: Perform strengthening exercises in standing to promote motor control with or without upper extremity support  and Perform sit to stand activities maintaining FWB (full weight bearing) weightbearing Bilateral   -Transfers: Provide instruction on proper hand and foot position for adequate transfer of weight onto lower extremities and use of gait device if needed and Cues for hand placement, technique and safety. Provide stabilization to prevent fall   -Gait: Gait training, Standing activities to improve: base of support, weight shift, weight bearing  and Use of Assistive device for FWB (full weight bearing) weight bearing Bilateral     -Endurance: Utilize Supervised activities to increase level of endurance to allow for safe functional mobility including transfers and gait   -Stairs: Stair training with instruction on proper technique and hand placement on rail    PT long term treatment goals are located in below grid    Patient and or family understand(s) diagnosis, prognosis, and plan of care. Frequency of treatments: Patient will be seen  daily.          Prior Level of Function: Patient ambulated with wheeled walker   Rehab Potential: good  for baseline    Past medical history:   Past Medical History:   Diagnosis Date    AML (acute myeloblastic leukemia) ([facility]     chemotherapy    AML (acute myeloblastic leukemia) ([facility] 2010    Cancer ([facility]     Chronic kidney disease     Diabetes mellitus ([facility]     GERD (gastroesophageal reflux disease)     Hx of blood clots 2012    legs    Hyperlipidemia     Liver disease     MI, old     Other disorders of kidney and ureter     Other sleep disturbances     Thyroid disease     Type II or unspecified type diabetes mellitus without mention of complication, not stated as uncontrolled      Past Surgical History:   Procedure Laterality Date    APPENDECTOMY      BONE MARROW TRANSPLANT      double core transplant    CATHETER INSERTION N/A 2/28/2022    MEDIPORT CATHETER INSERTION performed by Tawana Carey MD at [address] [place] [address]      CT NEEDLE BIOPSY LIVER PERCUTANEOUS  1/17/2022    CT NEEDLE BIOPSY LIVER PERCUTANEOUS    ENDOSCOPY, COLON, DIAGNOSTIC      FOOT NEUROMA SURGERY      UPPER GASTROINTESTINAL ENDOSCOPY         SUBJECTIVE:    Precautions: Activity as tolerated and Up with assistance, falls and alarm , simmons  Social history: Patient lives with spouse in a ranch home  with 3 steps  to enter with Rail  Tub shower built in shower chair    Equipment owned: Mandeep israel Kaelyn Marsh,      [address] [facility]   How much difficulty turning over in bed?: A Little  How much difficulty sitting down on / standing up from a chair with arms?: A Little  How much difficulty moving from lying on back to sitting on side of bed?: A Little  How much help from another person moving to and from a bed to a chair?: A Little  How much help from another person needed to walk in hospital room?: A Lot  How much help from another person for climbing 3-5 steps with a railing?: A Lot  AM-PAC Inpatient Mobility Raw Score : 16  AM-PAC Inpatient T-Scale Score : 40.78  Mobility Inpatient CMS 0-100% Score: 54.16  Mobility Inpatient CMS G-Code Modifier : CK    Nursing cleared patient for PT evaluation. The admitting diagnosis and active problem list as listed above have been reviewed prior to the initiation of this evaluation. OBJECTIVE;   Initial Evaluation  Date: 3/8/2022 Treatment Date:     Short Term/ Long Term   Goals   Was pt agreeable to Eval/treatment? Yes  To be met in 4 days   Pain level   0/10       Bed Mobility    Rolling: Supervision     Supine to sit: Supervision     Sit to supine: Supervision     Scooting: Supervision     Rolling: Independent    Supine to sit:  Independent    Sit to supine: Independent    Scooting: Independent     Transfers Sit to stand: Minimal assist of 1 3x  Sit to stand: Independent    Ambulation    2x15 feet using  wheeled walker with Moderate assist of 1  and cues for sequencing, upright posture, walker approximation, safety and proper hand placement, and turning of walker   2x50 feet using  wheeled walker with Supervision     Stair negotiation: ascended and descended   Not assessed     3 steps with rail min assist of 1    ROM Within functional limits    Increase range of motion 10% of affected joints    Strength BUE:  4/5  RLE:  4/5  LLE:  4/5  Increase strength in affected mm groups by 1/3 grade   Balance Sitting EOB:  fair +  Dynamic Standing:  fair -  Sitting EOB:  good   Dynamic Standing: good      Patient is Alert & Oriented x person, place, time and situation and follows directions    Sensation:  Patient  denies numbness/tingling   Edema:  no   Endurance: fair -    Vitals: room air   Blood Pressure at rest  Blood Pressure during session    Heart Rate at rest 100 Heart Rate during session 102-113   SPO2 at rest 99%  SPO2 during session %     Patient education  Patient educated on role of Physical Therapy, risks of immobility, safety and plan of care, energy conservation,  importance of mobility while in hospital , ankle pumps, quad set and glut set for edema control, blood clot prevention, safety  and stair training      Patient response to education:   Pt verbalized understanding Pt demonstrated skill Pt requires further education in this area   Yes Partial Yes      Treatment:  Patient practiced and was instructed/facilitated in the following treatment: Patient  Sat edge of bed 10 minutes with Supervision  to increase dynamic sitting balance and activity tolerance. Patient performed 3x Sit to Stand transfers with cueing for hand placement, and reaching back before sitting. Patient ambulated in room with cues for walker negotiation, and turning of walker. Therapeutic Exercises:  ankle pumps, long arc quad and seated marching  x 20 reps. At end of session, patient in bed with alarm call light and phone within reach,  all lines and tubes intact, nursing notified.       Patient would benefit from continued skilled Physical Therapy to improve functional independence and quality of life. Patient's/ family goals   home     Time in  1201  Time out  1234    Total Treatment Time  13 minutes    Evaluation time includes thorough review of current medical information, gathering information on past medical history/social history and prior level of function, completion of standardized testing/informal observation of tasks, assessment of data, and development of Plan of care and goals.      CPT codes:  Low Complexity PT evaluation (05843)  Therapeutic activities (89222)   13 minutes  1 unit(s)    Adelaida Sandoval, Student Physical Therapist

## 2022-03-09 ENCOUNTER — APPOINTMENT (OUTPATIENT)
Dept: ULTRASOUND IMAGING | Age: 72
DRG: 442 | End: 2022-03-09
Payer: MEDICARE

## 2022-03-09 VITALS
WEIGHT: 184.4 LBS | HEART RATE: 109 BPM | OXYGEN SATURATION: 95 % | DIASTOLIC BLOOD PRESSURE: 75 MMHG | BODY MASS INDEX: 27.95 KG/M2 | TEMPERATURE: 97.6 F | RESPIRATION RATE: 18 BRPM | HEIGHT: 68 IN | SYSTOLIC BLOOD PRESSURE: 110 MMHG

## 2022-03-09 LAB
ALBUMIN SERPL-MCNC: 2.7 G/DL (ref 3.5–5.2)
ALP BLD-CCNC: 275 U/L (ref 40–129)
ALT SERPL-CCNC: 29 U/L (ref 0–40)
AMMONIA: 23 UMOL/L (ref 16–60)
ANION GAP SERPL CALCULATED.3IONS-SCNC: 14 MMOL/L (ref 7–16)
ANISOCYTOSIS: ABNORMAL
AST SERPL-CCNC: 35 U/L (ref 0–39)
BASOPHILS ABSOLUTE: 0 E9/L (ref 0–0.2)
BASOPHILS RELATIVE PERCENT: 0 % (ref 0–2)
BILIRUB SERPL-MCNC: 1.4 MG/DL (ref 0–1.2)
BODY FLUID CULTURE, STERILE: NORMAL
BUN BLDV-MCNC: 31 MG/DL (ref 6–23)
BURR CELLS: ABNORMAL
CALCIUM SERPL-MCNC: 9.1 MG/DL (ref 8.6–10.2)
CHLORIDE BLD-SCNC: 100 MMOL/L (ref 98–107)
CO2: 17 MMOL/L (ref 22–29)
CREAT SERPL-MCNC: 0.7 MG/DL (ref 0.7–1.2)
EOSINOPHILS ABSOLUTE: 0 E9/L (ref 0.05–0.5)
EOSINOPHILS RELATIVE PERCENT: 0 % (ref 0–6)
GFR AFRICAN AMERICAN: >60
GFR NON-AFRICAN AMERICAN: >60 ML/MIN/1.73
GLUCOSE BLD-MCNC: 308 MG/DL (ref 74–99)
GRAM STAIN RESULT: NORMAL
HCT VFR BLD CALC: 26.8 % (ref 37–54)
HEMOGLOBIN: 8.4 G/DL (ref 12.5–16.5)
LACTIC ACID: 3.4 MMOL/L (ref 0.5–2.2)
LIPASE: 118 U/L (ref 13–60)
LYMPHOCYTES ABSOLUTE: 0.18 E9/L (ref 1.5–4)
LYMPHOCYTES RELATIVE PERCENT: 3.5 % (ref 20–42)
MAGNESIUM: 1.4 MG/DL (ref 1.6–2.6)
MCH RBC QN AUTO: 28.3 PG (ref 26–35)
MCHC RBC AUTO-ENTMCNC: 31.3 % (ref 32–34.5)
MCV RBC AUTO: 90.2 FL (ref 80–99.9)
METER GLUCOSE: 296 MG/DL (ref 74–99)
METER GLUCOSE: 313 MG/DL (ref 74–99)
MONOCYTES ABSOLUTE: 0.18 E9/L (ref 0.1–0.95)
MONOCYTES RELATIVE PERCENT: 3.5 % (ref 2–12)
NEUTROPHILS ABSOLUTE: 4.28 E9/L (ref 1.8–7.3)
NEUTROPHILS RELATIVE PERCENT: 93 % (ref 43–80)
OVALOCYTES: ABNORMAL
PDW BLD-RTO: 20 FL (ref 11.5–15)
PLATELET # BLD: 56 E9/L (ref 130–450)
PLATELET CONFIRMATION: NORMAL
PMV BLD AUTO: 11.9 FL (ref 7–12)
POIKILOCYTES: ABNORMAL
POLYCHROMASIA: ABNORMAL
POTASSIUM SERPL-SCNC: 4.5 MMOL/L (ref 3.5–5)
RBC # BLD: 2.97 E12/L (ref 3.8–5.8)
SODIUM BLD-SCNC: 131 MMOL/L (ref 132–146)
TOTAL PROTEIN: 4.3 G/DL (ref 6.4–8.3)
WBC # BLD: 4.6 E9/L (ref 4.5–11.5)

## 2022-03-09 PROCEDURE — 83735 ASSAY OF MAGNESIUM: CPT

## 2022-03-09 PROCEDURE — 97165 OT EVAL LOW COMPLEX 30 MIN: CPT | Performed by: OCCUPATIONAL THERAPIST

## 2022-03-09 PROCEDURE — 85025 COMPLETE CBC W/AUTO DIFF WBC: CPT

## 2022-03-09 PROCEDURE — 0W9G3ZZ DRAINAGE OF PERITONEAL CAVITY, PERCUTANEOUS APPROACH: ICD-10-PCS | Performed by: RADIOLOGY

## 2022-03-09 PROCEDURE — 97530 THERAPEUTIC ACTIVITIES: CPT | Performed by: OCCUPATIONAL THERAPIST

## 2022-03-09 PROCEDURE — 6360000002 HC RX W HCPCS: Performed by: INTERNAL MEDICINE

## 2022-03-09 PROCEDURE — C1729 CATH, DRAINAGE: HCPCS

## 2022-03-09 PROCEDURE — 80053 COMPREHEN METABOLIC PANEL: CPT

## 2022-03-09 PROCEDURE — 82140 ASSAY OF AMMONIA: CPT

## 2022-03-09 PROCEDURE — P9047 ALBUMIN (HUMAN), 25%, 50ML: HCPCS | Performed by: INTERNAL MEDICINE

## 2022-03-09 PROCEDURE — 97535 SELF CARE MNGMENT TRAINING: CPT | Performed by: OCCUPATIONAL THERAPIST

## 2022-03-09 PROCEDURE — 2580000003 HC RX 258: Performed by: INTERNAL MEDICINE

## 2022-03-09 PROCEDURE — 6370000000 HC RX 637 (ALT 250 FOR IP): Performed by: NURSE PRACTITIONER

## 2022-03-09 PROCEDURE — 83605 ASSAY OF LACTIC ACID: CPT

## 2022-03-09 PROCEDURE — 82962 GLUCOSE BLOOD TEST: CPT

## 2022-03-09 PROCEDURE — 83690 ASSAY OF LIPASE: CPT

## 2022-03-09 PROCEDURE — 36591 DRAW BLOOD OFF VENOUS DEVICE: CPT

## 2022-03-09 PROCEDURE — 36415 COLL VENOUS BLD VENIPUNCTURE: CPT

## 2022-03-09 PROCEDURE — 6370000000 HC RX 637 (ALT 250 FOR IP): Performed by: INTERNAL MEDICINE

## 2022-03-09 PROCEDURE — 97116 GAIT TRAINING THERAPY: CPT | Performed by: PHYSICAL THERAPIST

## 2022-03-09 PROCEDURE — 97112 NEUROMUSCULAR REEDUCATION: CPT | Performed by: PHYSICAL THERAPIST

## 2022-03-09 RX ORDER — LACTULOSE 10 G/15ML
20 SOLUTION ORAL 3 TIMES DAILY
Qty: 2700 ML | Refills: 0 | Status: ON HOLD | OUTPATIENT
Start: 2022-03-09 | End: 2022-03-23 | Stop reason: HOSPADM

## 2022-03-09 RX ORDER — MAGNESIUM OXIDE 400 MG/1
400 TABLET ORAL 2 TIMES DAILY
Qty: 30 TABLET | Refills: 1 | Status: ON HOLD | OUTPATIENT
Start: 2022-03-09 | End: 2022-03-23 | Stop reason: HOSPADM

## 2022-03-09 RX ORDER — FUROSEMIDE 20 MG/1
40 TABLET ORAL DAILY
Qty: 60 TABLET | Refills: 0 | Status: ON HOLD | OUTPATIENT
Start: 2022-03-09 | End: 2022-03-23 | Stop reason: HOSPADM

## 2022-03-09 RX ORDER — ALBUMIN (HUMAN) 12.5 G/50ML
50 SOLUTION INTRAVENOUS ONCE
Status: COMPLETED | OUTPATIENT
Start: 2022-03-09 | End: 2022-03-09

## 2022-03-09 RX ADMIN — Medication 10 ML: at 08:37

## 2022-03-09 RX ADMIN — LACTULOSE 20 G: 20 SOLUTION ORAL at 06:09

## 2022-03-09 RX ADMIN — LEVOTHYROXINE SODIUM 200 MCG: 0.2 TABLET ORAL at 06:10

## 2022-03-09 RX ADMIN — ASPIRIN 81 MG 81 MG: 81 TABLET ORAL at 08:37

## 2022-03-09 RX ADMIN — INSULIN LISPRO 8 UNITS: 100 INJECTION, SOLUTION INTRAVENOUS; SUBCUTANEOUS at 11:52

## 2022-03-09 RX ADMIN — INSULIN LISPRO 6 UNITS: 100 INJECTION, SOLUTION INTRAVENOUS; SUBCUTANEOUS at 08:37

## 2022-03-09 RX ADMIN — LACTULOSE 20 G: 20 SOLUTION ORAL at 18:18

## 2022-03-09 RX ADMIN — ALBUMIN (HUMAN) 50 G: 0.25 INJECTION, SOLUTION INTRAVENOUS at 12:05

## 2022-03-09 RX ADMIN — LACTULOSE 20 G: 20 SOLUTION ORAL at 00:29

## 2022-03-09 RX ADMIN — PANTOPRAZOLE SODIUM 40 MG: 40 TABLET, DELAYED RELEASE ORAL at 06:10

## 2022-03-09 RX ADMIN — PREDNISONE 20 MG: 20 TABLET ORAL at 08:36

## 2022-03-09 RX ADMIN — FONDAPARINUX SODIUM 2.5 MG: 2.5 INJECTION SUBCUTANEOUS at 08:37

## 2022-03-09 RX ADMIN — MAGNESIUM OXIDE 400 MG: 400 TABLET ORAL at 08:36

## 2022-03-09 ASSESSMENT — PAIN SCALES - GENERAL
PAINLEVEL_OUTOF10: 0
PAINLEVEL_OUTOF10: 0

## 2022-03-09 NOTE — PROGRESS NOTES
Physical Therapy  Physical Therapy Treatment Note/Plan of Care    Room #:  1481/2751-83  Patient Name: Solange Beasley  YOB: 1950  MRN: [id number]    Date of Service: 3/9/2022     Tentative placement recommendation: Home Health Physical Therapy with family assist  Equipment recommendation: Patient has needed equipment       Evaluating Physical Therapist: Rachel Bettencourt, Student Physical Therapist      Specific Provider Orders/Date/Referring Provider : 03/08/22 1130   PT eval and treat Start: 03/08/22 1130, End: 03/08/22 1130, ONE TIME, Standing Count: 1 Occurrences, R    Elaina Breen, APRN - CNP    Admitted with AMS  Admitting Diagnosis:   Portal hypertension ([facility] [address] [K76.6]  Hypokalemia [E87.6]  Hyponatremia [E87.1]  Hyperammonemia ([address] [E72.20]  Malignant ascites [R18.0]  History of liver cancer [Z85.05]  Elevated lipase [R74.8]  Hypotension, unspecified hypotension type [I95.9]  Altered mental status, unspecified altered mental status type [R41.82]  Acute kidney injury superimposed on CKD ([address] [N17.9, N18.9]  Hepatic cirrhosis, unspecified hepatic cirrhosis type, unspecified whether ascites present ([address] [K74.60]  AMS (altered mental status) [R41.82]      Surgery: abdominal paracentesis 3/7  Visit Diagnoses       Codes    Altered mental status, unspecified altered mental status type    -  Primary R41.82    Hyperammonemia ([address]     E72.20    Hyponatremia     E87.1    Hypokalemia     E87.6    Malignant ascites     R18.0    Hepatic cirrhosis, unspecified hepatic cirrhosis type, unspecified whether ascites present ([address]     K74.60    History of liver cancer     Z85.05    Elevated lipase     R74.8    Acute kidney injury superimposed on CKD ([address]     N17.9, N18.9    Hypotension, unspecified hypotension type     I95.9          Patient Active Problem List   Diagnosis    Bronchitis with bronchospasm    Anxiety    Post herpetic neuralgia    Type 2 diabetes mellitus without complication, with long-term current use of insulin (HCC)    Cellulitis of left lower extremity    Plantar wart    Inguinal hernia    Acute myeloid leukemia in remission ([facility] [address]    Pneumonia due to infectious organism    Cellulitis    Pharyngitis, acute    Thrombocytopenia (HCC)    GVHD (graft versus host disease) ([address]    Portal hypertension ([address]    Post-COVID chronic fatigue    Uncontrolled type 2 diabetes mellitus with hyperglycemia (HCC)    Type 2 diabetes mellitus with hyperglycemia    Type 2 diabetes mellitus with chronic kidney disease    Decompensation of cirrhosis of liver ([address]    AMS (altered mental status)    Severe protein-calorie malnutrition (HCC)        ASSESSMENT of Current Deficits Patient exhibits decreased strength, balance and endurance impairing functional mobility, transfers, gait  and gait distance. Patient was pleasant and motivated to perform therapy. Patient able to maintain stable vitals throughout session. Patient requires tactile cueing for safe use of wheeled walker with ambulation. Slight unsteadiness with ambulation , self limitation and home setup, including steps are barriers to d/c and require skilled intervention during hospital stay to attain pre hospital level of function. Decreased strength, balance and endurance  increases patient's risk for fall.        PHYSICAL THERAPY  PLAN OF CARE       Physical therapy plan of care is established based on physician order,  patient diagnosis and clinical assessment    Current Treatment Recommendations:    -Bed Mobility: Lower extremity exercises  and Upper extremity exercises   -Sitting Balance: Incorporate reaching activities to activate trunk muscles  and Facilitate active trunk muscle engagement   -Standing Balance: Perform strengthening exercises in standing to promote motor control with or without upper extremity support  and Perform sit to stand activities maintaining FWB (full weight bearing) weightbearing Bilateral   -Transfers: Provide instruction on proper hand and foot position for adequate transfer of weight onto lower extremities and use of gait device if needed and Cues for hand placement, technique and safety. Provide stabilization to prevent fall   -Gait: Gait training, Standing activities to improve: base of support, weight shift, weight bearing  and Use of Assistive device for FWB (full weight bearing) weight bearing Bilateral     -Endurance: Utilize Supervised activities to increase level of endurance to allow for safe functional mobility including transfers and gait   -Stairs: Stair training with instruction on proper technique and hand placement on rail    PT long term treatment goals are located in below grid    Patient and or family understand(s) diagnosis, prognosis, and plan of care. Frequency of treatments: Patient will be seen  daily.          Prior Level of Function: Patient ambulated with wheeled walker   Rehab Potential: good  for baseline    Past medical history:   Past Medical History:   Diagnosis Date    AML (acute myeloblastic leukemia) ([facility] [address]     chemotherapy    AML (acute myeloblastic leukemia) ([facility] [address] 2010    Cancer ([facility] [address]     Chronic kidney disease     Diabetes mellitus ([facility] [address]     GERD (gastroesophageal reflux disease)     Hx of blood clots 2012    legs    Hyperlipidemia     Liver disease     MI, old     Other disorders of kidney and ureter     Other sleep disturbances     Thyroid disease     Type II or unspecified type diabetes mellitus without mention of complication, not stated as uncontrolled      Past Surgical History:   Procedure Laterality Date    APPENDECTOMY      BONE MARROW TRANSPLANT      double core transplant    CATHETER INSERTION N/A 2/28/2022    MEDIPORT CATHETER INSERTION performed by Fredi Morton MD at [address] [place] [address]      CT NEEDLE BIOPSY LIVER PERCUTANEOUS  1/17/2022    CT NEEDLE BIOPSY LIVER PERCUTANEOUS    ENDOSCOPY, COLON, DIAGNOSTIC      FOOT NEUROMA SURGERY      UPPER GASTROINTESTINAL ENDOSCOPY          SUBJECTIVE:    Precautions: Activity as tolerated and Up with assistance, falls and alarm , simmons  Social history: Patient lives with spouse in a ranch home  with 3 steps  to enter with Rail  Tub shower built in shower chair    Equipment owned: Mandeep and Dianna Marquesly,      [address]   How much difficulty turning over in bed?: A Little  How much difficulty sitting down on / standing up from a chair with arms?: A Little  How much difficulty moving from lying on back to sitting on side of bed?: A Little  How much help from another person moving to and from a bed to a chair?: A Little  How much help from another person needed to walk in hospital room?: A Little  How much help from another person for climbing 3-5 steps with a railing?: A Lot  AM-PAC Inpatient Mobility Raw Score : 17  AM-PAC Inpatient T-Scale Score : 42.13  Mobility Inpatient CMS 0-100% Score: 50.57  Mobility Inpatient CMS G-Code Modifier : CK    Nursing cleared patient for PT treatment. OBJECTIVE;   Initial Evaluation  Date: 3/8/2022 Treatment Date:    3/9/2022    Short Term/ Long Term   Goals   Was pt agreeable to Eval/treatment? Yes yes To be met in 4 days   Pain level   0/10   0/10    Bed Mobility    Rolling: Supervision     Supine to sit: Supervision     Sit to supine: Supervision     Scooting: Supervision    Rolling: Supervision    Supine to sit: Minimal assist of 1   Sit to supine: Minimal assist of 1   Scooting: Minimal assist of 1   Rolling: Independent    Supine to sit:  Independent    Sit to supine: Independent    Scooting: Independent     Transfers Sit to stand: Minimal assist of 1 3x Sit to stand: Minimal assist of 1 from bed and chair x 3 reps     Sit to stand: Independent    Ambulation    2x15 feet using  wheeled walker with Moderate assist of 1  and cues for sequencing, upright posture, walker approximation, safety and proper hand placement, and turning of walker 3x10 feet using  wheeled walker with Minimal assist of 1  and  cues for upright posture, walker approximation and safety   2x50 feet using  wheeled walker with Supervision     Stair negotiation: ascended and descended   Not assessed     3 steps with rail min assist of 1    ROM Within functional limits    Increase range of motion 10% of affected joints    Strength BUE:  4/5  RLE:  4/5  LLE:  4/5  Increase strength in affected mm groups by 1/3 grade   Balance Sitting EOB:  fair +  Dynamic Standing:  fair -  Sitting EOB:  good   Dynamic Standing: good      Patient is Alert & Oriented x person, place, time and situation and follows directions    Sensation:  Patient  denies numbness/tingling   Edema:  no   Endurance: fair        Patient education  Patient educated on  importance of mobility while in hospital , safety  and stair training      Patient response to education:   Pt verbalized understanding Pt demonstrated skill Pt requires further education in this area   Yes Partial Yes      Treatment:  Patient practiced and was instructed/facilitated in the following treatment: Patient  Sat edge of bed 15 minutes with Supervision  to increase dynamic sitting balance and activity tolerance. seated and standing challenges  Patient ambulated in room with cues for walker negotiation, and turning of walker. Therapeutic Exercises:  not performed      At end of session, patient in bed with alarm call light and phone within reach,  all lines and tubes intact, nursing notified. Patient would benefit from continued skilled Physical Therapy to improve functional independence and quality of life.          Patient's/ family goals   home     Time in  1035  Time out  1104    Total Treatment Time  29 minutes  CPT codes:   Gait Training (11239) 19 minutes 1 unit(s)  Neuromuscular reeducation [phone])   10 minutes  1 unit(s)

## 2022-03-09 NOTE — DISCHARGE SUMMARY
Internal Medicine Progress Note     [facility]     Hector Young. Eleonora Lundy, PRASANNAOMatthewIMatthew Galloway D.O., CIERAA.CMatthewOLIZ Arambula, MSN, APRN, NP-C  Vickie Clarity. Cranford Phalen, MSN, APRN-CNP       Internal Medicine  Discharge Summary    NAME: Cele Abrams  :  1950  MRN:  [id number]  PCP:Suraj Marcus DO  ADMITTED: 3/4/2022      DISCHARGED: 3/9/22    ADMITTING PHYSICIAN: Hector Menendez DO    CONSULTANT(S):   IP CONSULT TO INTERNAL MEDICINE  IP CONSULT TO ONCOLOGY  IP CONSULT TO GI  IP CONSULT TO SOCIAL WORK  IP CONSULT TO DIETITIAN     ADMITTING DIAGNOSIS:   Portal hypertension ([facility] [address] [K76.6]  Hypokalemia [E87.6]  Hyponatremia [E87.1]  Hyperammonemia ([address] [E72.20]  Malignant ascites [R18.0]  History of liver cancer [Z85.05]  Elevated lipase [R74.8]  Hypotension, unspecified hypotension type [I95.9]  Altered mental status, unspecified altered mental status type [R41.82]  Acute kidney injury superimposed on CKD ([address] [N17.9, N18.9]  Hepatic cirrhosis, unspecified hepatic cirrhosis type, unspecified whether ascites present ([facility] [address] [K74.60]  AMS (altered mental status) [R41.82]     DISCHARGE DIAGNOSES:   1. Hepatic encephalopathy in the setting of decompensated cirrhosis and large volume ascites partially due to medication noncompliance  2. Acute on chronic kidney disease stage III complicated by outpatient diuretic therapy and overall decreased oral intake  3. AML  4. Acute on chronic anemia  5. Nondisplaced sacral fracture  6. Insulin-dependent diabetes mellitus type II  7. History of DVT  8. History of MI  9. Hypothyroidism-TSH 5.310  10. Hyperlipidemia  11. Hx of tobacco abuse       BRIEF HISTORY OF PRESENT ILLNESS:   Patient is 62-year-old male who presented to the ED due to altered mental status. Patient is a poor historian. Wife noted that patient has been taking his medications daily.   He has been having more than 3 bowel movements daily. Patient has noted distention of abdomen. On exam he does not have acute complaints. LABS[de-identified]  Lab Results   Component Value Date    WBC 4.6 03/09/2022    HGB 8.4 (L) 03/09/2022    HCT 26.8 (L) 03/09/2022    PLT 56 (L) 03/09/2022     (L) 03/09/2022    K 4.5 03/09/2022     03/09/2022    CREATININE 0.7 03/09/2022    BUN 31 (H) 03/09/2022    CO2 17 (L) 03/09/2022    GLUCOSE 308 (H) 03/09/2022    ALT 29 03/09/2022    AST 35 03/09/2022    INR 1.6 03/06/2022     Lab Results   Component Value Date    INR 1.6 03/06/2022    INR 1.5 03/04/2022    INR 1.4 02/28/2022    PROTIME 18.6 (H) 03/06/2022    PROTIME 17.2 (H) 03/04/2022    PROTIME 16.2 (H) 02/28/2022      Lab Results   Component Value Date    TSH 5.310 (H) 03/05/2022     Lab Results   Component Value Date    TRIG 230 (H) 04/28/2020    TRIG 327 (H) 07/05/2018     Lab Results   Component Value Date    HDL 22 04/28/2020    HDL 17 07/05/2018     Lab Results   Component Value Date    LDLCALC 63 04/28/2020    LDLCALC 48 07/05/2018     Lab Results   Component Value Date    LABA1C 8.4 12/08/2021       IMAGING:  CT ABDOMEN PELVIS WO CONTRAST Additional Contrast? None    Result Date: 3/4/2022  EXAMINATION: CT OF THE ABDOMEN AND PELVIS WITHOUT CONTRAST 3/4/2022 9:42 pm TECHNIQUE: CT of the abdomen and pelvis was performed without the administration of intravenous contrast. Multiplanar reformatted images are provided for review. Dose modulation, iterative reconstruction, and/or weight based adjustment of the mA/kV was utilized to reduce the radiation dose to as low as reasonably achievable. COMPARISON: CT of the abdomen and pelvis, 02/24/2022.  HISTORY: ORDERING SYSTEM PROVIDED HISTORY: Liver cirrhosis with recurrent liver CA, in with AMS nausea and vomiting TECHNOLOGIST PROVIDED HISTORY: Reason for exam:->Liver cirrhosis with recurrent liver CA, in with AMS nausea and vomiting Additional Contrast?->None FINDINGS: Lower Chest: Compared to the recent CT from 02/24/2022 (2 weeks prior) there is interval enlargement of a left lower lobe pulmonary nodule from 9-13 mm (series 6, image 45). Another nodule in the right lower lobe has increased in size from 6-11 mm (image 50). Multiple other smaller nodules are seen, which are stable to minimally enlarged. The heart is normal in size. Small pericardial effusion. Small right pleural effusion. Organs: Liver: Markedly cirrhotic liver with an irregular contour. Due to increased beam hardening artifact resulting from patient's arms within the gantry, the multiple small nodules previously identified the liver cannot be visualized on current study. Gallbladder: Surgically absent. Pancreas: Unremarkable. Spleen:  Mildly enlarged, measuring 16 cm in the maximum AP dimension. Adrenals: Unremarkable. Kidneys: Small bilateral renal calculi, with the largest on the left measuring approximately 5 mm. No hydronephrosis. Small left renal cyst. GI/Bowel: There is mild mural thickening in the colon which is nonspecific. No evidence of bowel obstruction. Pelvis: The urinary bladder and the prostate are grossly unremarkable. Small fat containing inguinal hernias. Peritoneum/Retroperitoneum: Large volume ascites. No free air Mild calcified atherosclerosis is seen in the aorta. No aneurysm. Mildly prominent retroperitoneal lymph nodes Bones/Soft Tissues: The visualized bones are intact without fracture or focal lesion. 1. Mild apparent MURAL THICKENING IN THE COLON may be an artifact of under distension, cystitis or manifestation of portal hypertensive colopathy. 2. Liver cirrhosis with mild splenomegaly and large volume ascites. 3. The small hypodense nodules previously seen in the liver are not evident on this study due to beam hardening artifact resulting from patient's arms within the gantry.  4. Interval increase in size of multiple pulmonary nodules since 02/24/2022 (2 weeks ago) concerning for PROGRESSIVE METASTATIC DISEASE. 5. Grossly stable nonspecific mildly prominent left para-aortic lymph nodes. The RECOMMENDATIONS: Unavailable     CT ABDOMEN PELVIS WO CONTRAST Additional Contrast? None    Result Date: 2/24/2022  EXAMINATION: CT OF THE ABDOMEN AND PELVIS WITHOUT CONTRAST 2/24/2022 1:19 pm TECHNIQUE: CT of the abdomen and pelvis was performed without the administration of intravenous contrast. Multiplanar reformatted images are provided for review. Dose modulation, iterative reconstruction, and/or weight based adjustment of the mA/kV was utilized to reduce the radiation dose to as low as reasonably achievable. COMPARISON: February 5, 2022 HISTORY: ORDERING SYSTEM PROVIDED HISTORY: abdominal distention and low back pain/ttp s/p fall TECHNOLOGIST PROVIDED HISTORY: Reason for exam:->abdominal distention and low back pain/ttp s/p fall Additional Contrast?->None Decision Support Exception - unselect if not a suspected or confirmed emergency medical condition->Emergency Medical Condition (MA) FINDINGS: Lower Chest: There is chronic elevation of the right hemidiaphragm. Heart size is within normal limits. Coronary artery calcifications are noted, potentially a marker for coronary artery disease. Trace pericardial fluid is present. Moderate to severe bilateral gynecomastia has a symmetric appearance. There are multiple sub 5 mm pulmonary nodules in the lower lobes bilaterally. Several of these nodules are new from the prior study. Additionally, there is a 9 mm nodule in the left lower lobe on axial image 49, increased in size from the prior study when it measured approximately 4 mm. Organs: The liver is cirrhotic and contains multiple hypodense lesions scattered throughout the left and right lobes. Evaluation of these lesions is limited due to lack of IV contrast material.  There are sequelae of portal hypertension including splenomegaly, with a cranial to caudal spleen size of 13 cm.   A linear hypodensity extending from the anterior to posterior aspect of the upper pole of the spleen is stable in appearance. The unenhanced pancreas is normal in appearance. The kidneys are without hydronephrosis. Bilateral nonobstructing renal calculi are stable from the prior study, measuring up to 4 mm in the right kidney and 2-3 mm in the left kidney. GI/Bowel: No evidence of a bowel obstruction, free air or pneumatosis. Portions the colon are decompressed, limiting assessment for mucosal based abnormalities. There is diverticulosis without evidence of diverticulitis. There is a short segment of colonic wall thickening along the proximal and mid segments of the transverse colon. Proximal ascending colon also demonstrates a short segment circumferential wall thickening. The appendix is not confidently visualized. No obvious pericecal inflammatory changes to suggest acute appendicitis. The stomach and duodenal sweep are under distended, limiting assessment. No obvious gastric or duodenal abnormality within this limitation. Pelvis: The urinary bladder is distended, with a subcentimeter diverticulum arising from the anterior wall near the upper margin of the bladder on axial image 190. The prostate gland is heterogeneous but nonenlarged. There is no evidence of pelvic lymphadenopathy. A large volume of ascites is present in the pelvis. Peritoneum/Retroperitoneum: There is a large volume of abdominal ascites. An increased number of visible, top-normal and mildly enlarged mesenteric lymph nodes are present, along with mesenteric edema, not significantly changed in appearance compared with the February 5, 2022 exam.  The abdominal aorta is normal in caliber. The abdominal aorta is heavily calcified but normal in caliber. There are several top-normal sized periaortic/retroperitoneal lymph nodes. A retroperitoneal lymph node on axial image 106 is enlarged measuring 12 mm in short axis. Bones/Soft Tissues: Bones are diffusely osteopenic.   There is a nondisplaced fracture through the S3 segment of the sacrum, best demonstrated on the sagittal view. No additional fractures are identified. Moderate degenerative changes involve the lower lumbar spine. Nondisplaced sacral fracture as above. No additional acute osseous abnormalities. Cirrhotic liver morphology with sequelae of portal hypertension including splenomegaly and large volume of ascites in the abdomen and pelvis. Multiple hypodense lesions scattered throughout the liver which may reflect primary liver cancer or metastatic disease, among other etiologies. Numerous subcentimeter pulmonary nodules at both lung bases, increased in number and size from the prior study. Findings are highly suspicious for metastatic disease. Additional, incidental findings as above. XR CHEST INSPIRATION AND EXPIRATION    Result Date: 2/24/2022  EXAMINATION: ONE XRAY VIEW OF THE CHEST IN INSPIRATION 2/24/2022 2:49 pm COMPARISON: The previous study performed earlier in the day at 12:59 p.m.. HISTORY: ORDERING SYSTEM PROVIDED HISTORY: r/o pneumothorax TECHNOLOGIST PROVIDED HISTORY: Reason for exam:->r/o pneumothorax FINDINGS: The inspiration study again reveals the lungs to be hypoinflated. There is no evidence of acute consolidation or infiltrate. There is again elevation of the right hemidiaphragm. The curvilinear radiolucency in the right upper lung field is less prominently noted but still suggested to be present, especially in the inspiration view. There appears to be extension beyond the chest wall and into the soft tissues, which would indicate a skinfold. There is no definite evidence to suggest pneumothorax. The cardiac silhouette and mediastinal structures are without significant interval change.      Curvilinear radiolucency in the right upper lung field noted on the prior examination performed earlier in the day is less prominently seen on this study but still suggested to be present, especially in the inspiration view. There appears to be extension beyond the chest wall and into the soft tissues, indicating a skin fold. No definite evidence to suggest pneumothorax. XR CHEST (2 VW)    Result Date: 2/24/2022  EXAMINATION: TWO XRAY VIEWS OF THE CHEST 2/24/2022 1:05 pm COMPARISON: The previous study performed 02/05/2022. HISTORY: ORDERING SYSTEM PROVIDED HISTORY: shortness of breath TECHNOLOGIST PROVIDED HISTORY: Reason for exam:->shortness of breath FINDINGS: The lungs are again hypoinflated. There is again elevation of the right hemidiaphragm. A small right pleural effusion cannot be excluded. There is no evidence of acute consolidation or infiltrate. There is a small, curvilinear radiolucency seen involving the right upper lung field. Rule out skin fold versus small pneumothorax. Repeat chest radiographs, including inspiration and expiration are recommended. The cardiac silhouette and mediastinal structures are without significant interval change. Hypoinflated lungs again identified, when compared to the previous study performed 02/05/2022. Rule out skin fold versus small pneumothorax in the right upper lung field. Repeat chest radiographs, including inspiration and expiration are recommended. Elevated right hemidiaphragm again seen. Rule out small right pleural effusion. RECOMMENDATION: The findings were sent to the Radiology Results [address] at 01:14 p.m. on 02/24/2022 to be communicated to a licensed caregiver. CT Head WO Contrast    Result Date: 3/4/2022  EXAMINATION: CT OF THE HEAD WITHOUT CONTRAST  3/4/2022 9:42 pm TECHNIQUE: CT of the head was performed without the administration of intravenous contrast. Dose modulation, iterative reconstruction, and/or weight based adjustment of the mA/kV was utilized to reduce the radiation dose to as low as reasonably achievable. COMPARISON: None.  HISTORY: ORDERING SYSTEM PROVIDED HISTORY: AMS TECHNOLOGIST PROVIDED HISTORY: Reason done without IV contrast of March 4. See report of the CT abdomen pelvis. There is a fair amount of ascites present in the right upper quadrant, and in the right lower quadrant as well. The right kidney has normal size, measures 10 x 4.8 cm and in the mid 3rd of the right kidney there is a focus of hyperechogenicity compatible with a renal calculus measures 10 mm. There is no hydronephrosis changes in the right kidney. The The left upper quadrant is not evaluated on this study. The spleen is seen on the recent CT abdomen pelvis of March 4 measures 16 by 6.8 cm which indicates splenomegaly. See report of the previous CT abdomen pelvis of March 4. With ultrasound there are areas of hypoechogenicity peripherally located the in the anterior aspect of interface between the right lobe and the left lobe at least 1 is identified measures 2.6 x 1.5 cm which can be suspicious for focal lesions in addition to the diffuse background of liver cirrhosis. Proper evaluation of the liver will required a multiphase contrast study, CT or MRI being MRI a likely in better advantage than CT for additional tissue characterization. 1.  Status post cholecystectomy, no indication for positive ultrasound March sign in the right upper quadrant at the time of the study. 2.  Advanced liver cirrhosis. See above comments and recommendations. 3.  No dilatation of the biliary tree. 4.  Limited evaluation of the pancreas. The left upper quadrant not included on this study. The see report CT abdomen pelvis of March 4, 2022. 5.  Moderate large size ascites present in the abdomen. XR CHEST PORTABLE    Result Date: 3/4/2022  EXAMINATION: ONE XRAY VIEW OF THE CHEST 3/4/2022 8:59 pm COMPARISON: Chest series from February 28, 2022 HISTORY: ORDERING SYSTEM PROVIDED HISTORY: AMS TECHNOLOGIST PROVIDED HISTORY: Reason for exam:->AMS FINDINGS: Left chest wall MediPort with distal tip projecting in the proximal superior vena cava.  Stable elevation of the right hemidiaphragm. No new airspace disease, pleural effusions, or pneumothoraces. Stable appearance of the cardiomediastinal silhouette. Normal pulmonary vascularity. Osseous and thoracic soft tissue structures demonstrate no acute findings. Cholecystectomy clips in the right upper quadrant. Stable chest series. No acute cardiopulmonary pathology. XR CHEST PORTABLE    Result Date: 2/28/2022  EXAMINATION: ONE XRAY VIEW OF THE CHEST 2/28/2022 1:08 pm COMPARISON: 02/24/2022 HISTORY: ORDERING SYSTEM PROVIDED HISTORY: [facility] TECHNOLOGIST PROVIDED HISTORY: [facility] exam[facility] FINDINGS: There is elevation the right hemidiaphragm. There is some scarring in the right lower lobe. Left lung is clear. There is a left-sided chest port. Heart size is normal.  Depth of inspiration is not ideal.     No definite acute process     US ABDOMEN LIMITED Specify organ? LIVER, SPLEEN    Result Date: 2/25/2022  EXAMINATION: RIGHT UPPER QUADRANT ULTRASOUND AND WITH LIVER DOPPLER 2/25/2022 10:11 am COMPARISON: CT ABDOMEN AND PELVIS WITHOUT CONTRAST 02/05/2022 HISTORY: ORDERING SYSTEM PROVIDED HISTORY: Portal and hepatic veins TECHNOLOGIST PROVIDED HISTORY: Reason for exam:->Portal and hepatic veins Specify organ?->LIVER Specify organ?->SPLEEN What reading provider will be dictating this exam?->CRC TECHNIQUE: Duplex ultrasound using B-mode/gray scaled imaging, Doppler spectral analysis and color flow Doppler was obtained of the liver and right upper quadrant abdomen. FINDINGS: Severe cirrhosis with a shrunken liver and prominent nodular patent contour. Recent paracentesis and with current findings of small to moderate ascites. The gallbladder is surgically absent. No intrahepatic or extrahepatic biliary dilatation. The common bile duct measures 0.4 cm in depth diameter which is normal. The liver shows a coarsened echotexture pattern as seen with cirrhosis.   The central liver shows an equivocal 1.6 cm hypoechoic lesion which is poorly defined. This area may be artifactual.  No dominant lesion is seen. By Doppler evaluation, the portal and hepatic veins are patent. Physiologic hepatopetal portal blood flow is present and with portal vein velocity up to 22.1 cm/sec (normal 20-40 cm/sec). The splenic vein remains patent. The right kidney is visualized and appears normal.  No hydronephrosis. The right kidney measures 10.7 cm in length. 1.  Shrunken liver with severe cirrhosis and small to moderate ascites. Equivocal 1.6 cm hypoechoic lesion within the central liver. 2.  Patent portal and hepatic veins. Physiologic hepatopetal portal flow. IR US GUIDED PARACENTESIS    Result Date: 3/7/2022  PROCEDURE: PARACENTESIS WITHOUT IMAGE GUIDANCE US ABDOMEN LIMITED 3/7/2022 HISTORY: ORDERING SYSTEM PROVIDED HISTORY: paracentesis TECHNOLOGIST PROVIDED HISTORY: Reason for exam:->paracentesis TECHNIQUE: Informed consent was obtained after a detailed explanation of the procedure including risks, benefits, and alternatives. Universal protocol was followed. A limited ultrasound of the abdomen was performed. The right abdomen was prepped and draped in sterile fashion and local anesthesia was achieved with lidocaine. An 8 Nepali needle sheath was advanced into ascites and paracentesis was performed. The patient tolerated the procedure well. FINDINGS: Limited ultrasound of the abdomen demonstrates ascites. A total of 4800 cc was removed. Successful paracentesis. IR US GUIDED PARACENTESIS    Result Date: 2/25/2022  PROCEDURE: PARACENTESIS WITHOUT IMAGE GUIDANCE US ABDOMEN LIMITED 2/25/2022 HISTORY: ORDERING SYSTEM PROVIDED HISTORY: paracentesis TECHNOLOGIST PROVIDED HISTORY: Reason for exam:->paracentesis TECHNIQUE: Informed consent was obtained after a detailed explanation of the procedure including risks, benefits, and alternatives. Universal protocol was followed.   A limited ultrasound of the abdomen was performed. The right abdomen was prepped and draped in sterile fashion and local anesthesia was achieved with lidocaine. A 5 Irish needle sheath was advanced into ascites and paracentesis was performed. The patient tolerated the procedure well. FINDINGS: Limited ultrasound of the abdomen demonstrates ascites. A total of 6850 cc of straw-colored ascitic fluid was removed. Status post paracentesis. US DUP ABD PEL RETRO SCROT LIMITED    Result Date: 2/25/2022  EXAMINATION: RIGHT UPPER QUADRANT ULTRASOUND AND WITH LIVER DOPPLER 2/25/2022 10:11 am COMPARISON: CT ABDOMEN AND PELVIS WITHOUT CONTRAST 02/05/2022 HISTORY: ORDERING SYSTEM PROVIDED HISTORY: Portal and hepatic veins TECHNOLOGIST PROVIDED HISTORY: Reason for exam:->Portal and hepatic veins Specify organ?->LIVER Specify organ?->SPLEEN What reading provider will be dictating this exam?->CRC TECHNIQUE: Duplex ultrasound using B-mode/gray scaled imaging, Doppler spectral analysis and color flow Doppler was obtained of the liver and right upper quadrant abdomen. FINDINGS: Severe cirrhosis with a shrunken liver and prominent nodular patent contour. Recent paracentesis and with current findings of small to moderate ascites. The gallbladder is surgically absent. No intrahepatic or extrahepatic biliary dilatation. The common bile duct measures 0.4 cm in depth diameter which is normal. The liver shows a coarsened echotexture pattern as seen with cirrhosis. The central liver shows an equivocal 1.6 cm hypoechoic lesion which is poorly defined. This area may be artifactual.  No dominant lesion is seen. By Doppler evaluation, the portal and hepatic veins are patent. Physiologic hepatopetal portal blood flow is present and with portal vein velocity up to 22.1 cm/sec (normal 20-40 cm/sec). The splenic vein remains patent. The right kidney is visualized and appears normal.  No hydronephrosis. The right kidney measures 10.7 cm in length.      1.  Shrunken liver with severe cirrhosis and small to moderate ascites. Equivocal 1.6 cm hypoechoic lesion within the central liver. 2.  Patent portal and hepatic veins. Physiologic hepatopetal portal flow. HOSPITAL COURSE:   Jayne Campa did well throughout the hospitalization. Upon discharge, there is some concern that he had not been taking lactulose therapy. He was found to be encephalopathic upon presentation and responded accordingly to the reintroduction of lactulose therapy. We had a very long and jason conversation with the patient's wife regarding the need for 4-5 bowel movements per day. She voiced understanding and agreement. He underwent paracentesis and undergo repeat paracentesis prior to discharge. In the setting of worsening thrombocytopenia, anticoagulation therapy was placed on hold and HIT panel was obtained. This is pending at this point. His platelet count seems to have stabilized. We discussed the possibility of transitioning all oncologic care here locally to avoid frequent trips to [facility]. The wife defers at this point and plans on taking the patient to [place] tomorrow for his rescheduled appointment. They will need to determine if chemotherapy can be employed in the setting of thrombocytopenia. Otherwise, diuretic therapy has been gently resumed. He will continue with his weekly paracentesis. His wife was updated extensively at the bedside as there are multiple issues at play here. BRIEF PHYSICAL EXAMINATION AND LABORATORIES ON DAY OF DISCHARGE:  VITALS:  /75   Pulse 109   Temp 97.6 °F (36.4 °C)   Resp 18   Ht 5' 8\" (1.727 m)   Wt 184 lb 6.4 oz (83.6 kg)   SpO2 95%   BMI 28.04 kg/m²     HEENT:  PERRLA. EOMI. Sclera clear. Buccal mucosa moist.    Neck:  Supple. Trachea midline. No thyromegaly. No JVD. No bruits. Heart:  Rhythm regular, rate controlled. No murmurs. Lungs:  Symmetrical. Clear to auscultation bilaterally. No wheezes. No rhonchi.  No rales.    Abdomen: Soft. Recurrent abdominal ascites. Extremities:  Peripheral pulses present. No peripheral edema. No ulcers. Neurologic:  Alert x 3. No focal deficit. Cranial nerves grossly intact. Skin:  No petechia. No hemorrhage. No wounds. DISPOSITION:  The patient's condition is good. At this time the patient is without objective evidence of an acute process requiring continuing hospitalization or inpatient management. They are stable for discharge with outpatient follow-up. I have spoken with the patient and discussed the results of the current hospitalization, in addition to providing specific details for the plan of care and counseling regarding the diagnosis and prognosis. The plan has been discussed in detail and they are aware of the specific conditions for emergent return, as well as the importance of follow-up. Their questions are answered at this time and they are agreeable with the plan for discharge to home    DISCHARGE MEDICATIONS:   Current Discharge Medication List           Details   magnesium oxide (MAG-OX) 400 MG tablet Take 1 tablet by mouth 2 times daily  Qty: 30 tablet, Refills: 1              Details   lactulose (CHRONULAC) 10 GM/15ML solution Take 30 mLs by mouth 3 times daily  Qty: 2700 mL, Refills: 0              Details   albuterol (ACCUNEB) 0.63 MG/3ML nebulizer solution Take 1 ampule by nebulization every 6 hours as needed for Wheezing      benzonatate (TESSALON PERLES) 100 MG capsule Take 1 capsule by mouth 3 times daily as needed for Cough  Qty: 20 capsule, Refills: 1    Associated Diagnoses: Cough      ondansetron (ZOFRAN-ODT) 4 MG disintegrating tablet Take 1 tablet by mouth 3 times daily as needed for Nausea or Vomiting  Qty: 21 tablet, Refills: 0    Associated Diagnoses: Nausea      traMADol (ULTRAM) 50 MG tablet Take 1 tablet by mouth every 8 hours as needed for Pain for up to 14 days.   Qty: 42 tablet, Refills: 0    Comments: Reduce doses taken as pain becomes manageable  Associated Diagnoses: GVHD (graft versus host disease) ([facility] [address] Cancer associated pain      furosemide (LASIX) 20 MG tablet Take 40 mg by mouth 2 times daily       ferrous sulfate (IRON 325) 325 (65 Fe) MG tablet Take 1 tablet by mouth 2 times daily (with meals)  Qty: 180 tablet, Refills: 0    Associated Diagnoses: Iron deficiency      levothyroxine (SYNTHROID) 200 MCG tablet Take 1 tablet by mouth Daily  Qty: 90 tablet, Refills: 1    Associated Diagnoses: Hypothyroidism, unspecified type      sertraline (ZOLOFT) 50 MG tablet Take 1.5 tablets by mouth daily  Qty: 30 tablet, Refills: 3    Associated Diagnoses: Anxiety      pantoprazole (PROTONIX) 40 MG tablet TAKE 1 TABLET DAILY  Qty: 90 tablet, Refills: 3    Associated Diagnoses: Gastroesophageal reflux disease without esophagitis      insulin glargine (LANTUS SOLOSTAR) 100 UNIT/ML injection pen Inject 20 Units into the skin nightly  Qty: 5 pen, Refills: 3    Associated Diagnoses: Type 2 diabetes mellitus without complication, with long-term current use of insulin ([facility])      insulin lispro, 1 Unit Dial, (HUMALOG KWIKPEN) 100 UNIT/ML SOPN INJECT 0-20 UNITS INTO THE SKIN THREE TIMES DAILY(BEFORE MEALS)  Qty: 48 mL, Refills: 0    Associated Diagnoses: Uncontrolled type 2 diabetes mellitus with hyperglycemia ([facility])      vitamin D (CHOLECALCIFEROL) 1000 UNIT TABS tablet Take 2,000 Units by mouth daily       aspirin 81 MG chewable tablet Take 81 mg by mouth daily      blood glucose test strips (ONETOUCH ULTRA) strip TEST 3 TIMES DAILY  Qty: 300 strip, Refills: 5    Comments: DIAGNOSIS IS E11.9 !! Associated Diagnoses: Type 2 diabetes mellitus without complication, with long-term current use of insulin ([facility])      Continuous Blood Gluc  (FREESTYLE PAOLA 2 READER) FLORESITA Check sygar FAsting and ac (4 X daiy).   Qty: 1 each, Refills: 0    Associated Diagnoses: Type 2 diabetes mellitus without complication, with long-term current use of insulin ([facility] [address] Uncontrolled type 2 diabetes mellitus with hyperglycemia (HCC)      Continuous Blood Gluc Sensor (FREESTYLE PAOLA 2 SENSOR) MISC Check sugar 5 X daily. AM and AC  Qty: 6 each, Refills: 1    Associated Diagnoses: Type 2 diabetes mellitus without complication, with long-term current use of insulin ([facility] [address] Uncontrolled type 2 diabetes mellitus with hyperglycemia (HCC)      INS SYRINGE/NEEDLE .5CC/28G (AIMSCO INS SYR MX-COM .5CC/28G) 28G X 1/2\" 0.5 ML MISC Use three times daily  Qty: 100 each, Refills: 3    Associated Diagnoses: Type 2 diabetes mellitus without complication, with long-term current use of insulin (HCC)      Insulin Pen Needle (BD PEN NEEDLE JENNIFER U/F) 32G X 4 MM MISC USE AS DIRECTED  Qty: 100 each, Refills: 3             FOLLOW UP/INSTRUCTIONS:  · This patient is instructed to follow-up with his primary care physician. · Patient is instructed to follow-up with the consults listed above as directed by them. · he is instructed to resume home medications and take new medications as indicated in the list above. · If the patient has a recurrence of symptoms, he is instructed to go to the ED. Preparing for this patient's discharge, including paperwork, orders, instructions, and meeting with patient did require > 40 minutes.     Tierney Shepard DO   3/9/2022  11:53 AM

## 2022-03-09 NOTE — PLAN OF CARE
Problem: Falls - Risk of:  Goal: Will remain free from falls  Description: Will remain free from falls  3/9/2022 0655 by Juan Diego Booker RN  Outcome: Met This Shift  3/8/2022 1718 by Felix Pretty RN  Outcome: Met This Shift  Goal: Absence of physical injury  Description: Absence of physical injury  3/9/2022 0655 by Juan Diego Booker RN  Outcome: Met This Shift  3/8/2022 1718 by Felix Pretty RN  Outcome: Met This Shift     Problem: Confusion - Acute:  Goal: Absence of continued neurological deterioration signs and symptoms  Description: Absence of continued neurological deterioration signs and symptoms  3/9/2022 0655 by Juan Diego Booker RN  Outcome: Ongoing  3/8/2022 1718 by Felix Pretty RN  Outcome: Not Met This Shift  Goal: Mental status will be restored to baseline  Description: Mental status will be restored to baseline  3/9/2022 0655 by Juan Diego Booker RN  Outcome: Ongoing  3/8/2022 1718 by Felix Pretty RN  Outcome: Not Met This Shift

## 2022-03-09 NOTE — CARE COORDINATION
3/9/2022 CM was requested to \" watch for \" possible Arixtra at discharge- this has not been confirmed by medical yet. Per [employer]: No prior auth needed for the Arixtra- if discharging home on this. Arnold Senior will evaluate the patients costs and notify CM.  Electronically signed by SULEMA Gutierrez on 3/9/2022 at 11:27 AM

## 2022-03-09 NOTE — PROGRESS NOTES
[facility]  [address] [facility]         [id number]                                                   Patient Name: Jennifer Oseguera     MRN: [id number]     : 1950     Room: [address] [place] [address]       Evaluating OT: Chase Cooper, YANYR/L; [id number]       Referring Provider and Orders/Date:   OT eval and treat Start: 22 1130, End: 22 1130, ONE TIME, Standing Count: 1 Occurrences, R    Hernandez Sires, APRN - CNP     Diagnosis:   1. Altered mental status, unspecified altered mental status type    2. Hyperammonemia ([address]    3. Hyponatremia    4. Hypokalemia    5. Malignant ascites    6. Portal hypertension (HCC)    7. Hepatic cirrhosis, unspecified hepatic cirrhosis type, unspecified whether ascites present ([address]    8. History of liver cancer    9. Elevated lipase    10. Acute kidney injury superimposed on CKD ([address]    11.  Hypotension, unspecified hypotension type         Pertinent Medical History:        Past Medical History:   Diagnosis Date    AML (acute myeloblastic leukemia) ([address]     chemotherapy    AML (acute myeloblastic leukemia) ([address] 2010    Cancer ([address]     Chronic kidney disease     Diabetes mellitus ([address]     GERD (gastroesophageal reflux disease)     Hx of blood clots 2012    legs    Hyperlipidemia     Liver disease     MI, old     Other disorders of kidney and ureter     Other sleep disturbances     Thyroid disease     Type II or unspecified type diabetes mellitus without mention of complication, not stated as uncontrolled           Past Surgical History:   Procedure Laterality Date    APPENDECTOMY      BONE MARROW TRANSPLANT      double core transplant    CATHETER INSERTION N/A 2022    MEDIPORT CATHETER INSERTION performed by Justin Holloway MD at [address] [place] [address]      CT NEEDLE BIOPSY LIVER PERCUTANEOUS  2022    CT NEEDLE BIOPSY LIVER PERCUTANEOUS    ENDOSCOPY, COLON, DIAGNOSTIC      FOOT NEUROMA SURGERY      UPPER GASTROINTESTINAL ENDOSCOPY         Precautions:  Fall Risk, confusion, simmons    Recommended placement: subacute    Assessment of current deficits     [x] Functional mobility  [x]ADLs  [x] Strength               [x]Cognition     [x] Functional transfers   [x] IADLs         [x] Safety Awareness   [x]Endurance     [] Fine Coordination              [x] Balance      [] Vision/perception   []Sensation      [x]Gross Motor Coordination  [] ROM  [] Delirium                   [] Motor Control     OT PLAN OF CARE   OT POC based on physician orders, patient diagnosis and results of clinical assessment    Frequency/Duration 1-3 days/wk for 2 weeks PRN   Specific OT Treatment Interventions to include:   * Instruction/training on adapted ADL techniques and AE recommendations to increase functional independence within precautions       * Training on energy conservation strategies, correct breathing pattern and techniques to improve independence/tolerance for self-care routine  * Functional transfer/mobility training/DME recommendations for increased independence, safety, and fall prevention  * Patient/Family education to increase follow through with safety techniques and functional independence  * Recommendation of environmental modifications for increased safety with functional transfers/mobility and ADLs  * Therapeutic exercise to improve motor endurance, ROM, and functional strength for ADLs/functional transfers  * Therapeutic activities to facilitate/challenge dynamic balance, stand tolerance for increased safety and independence with ADLs  * Therapeutic activities to facilitate gross/fine motor skills for increased independence with ADLs  * Positioning to improve skin integrity, interaction with environment and functional independence     Recommended Adaptive Equipment/DME:  TBD      Home Living: Pt is a poor historian and unsure if report is accurate. Patient lives with spouse in a ranch home with 3 steps  to enter with Rail. Wife can provide 24/7 support. No steps to the basement. Bathroom setup: tub shower with built in chair; standard toilet    DME owned: cane, ww     Prior Level of Function: assist with ADLs , dep with IADLs; ambulated with ww   Driving: \"not for [employer]"   Occupation: retired   Enjoys: watching TV, \"busy with the chemo\", social with friends and family    Pain Level: none  Cognition: A&O: 3/4 with confusion to situation; Follows 2 step directions   Memory:  Fair-   Sequencing:  Fair-   Problem solving:  Fair-   Judgement/safety:  Poor+    AM-PAC Daily Activity Inpatient   How much help for putting on and taking off regular lower body clothing?: Total  How much help for Bathing?: A Lot  How much help for Toileting?: Total  How much help for putting on and taking off regular upper body clothing?: A Lot  How much help for taking care of personal grooming?: A Lot  How much help for eating meals?: A Lot  AM-[facility] Inpatient Daily Activity Raw Score: 10  AM-PAC Inpatient ADL T-Scale Score : 27.31  ADL Inpatient CMS 0-100% Score: 74.7  ADL Inpatient CMS G-Code Modifier : CL     Functional Assessment:     Initial Eval Status  Date: 3/9/2022   Treatment Status  Date: STGs = LTGs  Time frame: 10-14 days   Feeding Moderate Assist due to coordination and spillage. Required repositioning  Set up   Grooming Moderate Assist with oral care due to coordination and some confusion. Stand by Assist    UB Dressing Maximal Assist sitting EOB  Minimal Assist    LB Dressing Dependent to don mary socks from sitting EOB  Moderate Assist    Bathing Maximal Assist from supine level for safety. Pt able to wash arms and chest.   Minimal Assist    Toileting Dependent with bed pan and assist for all parts. Attempt to go to toilet, but not safe with mobility. Dep with simmons management.    Moderate Assist    Bed Mobility  Supine to sit: Moderate Assist Sit to supine: Moderate Assist  For lower body management   Supine to sit: Stand by Assist   Sit to supine: Stand by Assist    Functional Transfers Maximal Assist for sit to stand with hand held assist due to high fall risk and confusion. Required blocking of rommel knees. Minimal Assist    Functional Mobility Maximal Assist with hand held assist for side stepping to [facility]. Attempted to bathroom, but not safe with high fall risk. Moderate Assist    Balance Sitting:     Static:  Fair-    Dynamic:fair-  Standing: poor  Sitting:     Static:  Fair+    Dynamic:fair  Standing: poor+   Activity Tolerance Vitals with activity: Room air 98%   Sitting EOB for 10min overall. Standing for <1min. Increase standing tolerance for >4min with stable vital signs for carry over into toileting, functional tranfers and indep in ADLs   Visual/  Perceptual Glasses: Present; WFL    Reports change in vision since admission: No     NA   Rommel UE Strengthening  4-/5 generally  4+/5MMT generally for carry over into self care, functional transfers and functional mobility with AD. Hand Dominance  [x] Right  [] Left    AROM (PROM) Strength Additional Info:    RUE  WFL 4-/5 good  and  FMC/dexterity noted during ADL tasks  Opposition [x] Intact [] Impaired  Finger to nose [x] Intact [] Impaired     LUE WFL 4-/5 good  and FMC/dexterity noted during ADL tasks  Opposition [x] Intact [] Impaired  Finger to nose [x] Intact [] Impaired     Hearing: WFL   Sensation:  No c/o numbness or tingling   Tone: WFL   Edema: none    Comments: Upon arrival patient supine. Pt required max A for most UB ADLs and depA LB ADLs tasks. Limited with max A for standing during LB ADLs and functional transfers. The biggest barriers reflect that of functional transfers, functional mobility, UB/LB ADLs, cognition, activity tolerance, balance, safety and strengthening.  At end of session, patient supine in bed with call light and phone within reach, all lines and tubes intact. Overall patient demonstrated decreased independence and safety during completion of ADL/functional transfer/mobility tasks compared to PLOF. Nursing updated on pt position and status following OT eval. Pt would benefit from continued skilled OT to increase safety and independence with completion of ADL/IADL tasks for functional independence and quality of life. Treatment: OT treatment provided this date includes:   Instruction, education and training on safe facilitation and adapted techniques for completion of ADLs. These include neuromuscular reeducation to facilitate balance/righting reactions,safe functional transfer techniques, proper positioning/alignment to improve interaction with environment and overall function and on adapted techniques/work simplification for completion of ADLs. Education provided on hand/feet placement with bed rails and body mechanics for fall prevention. Cues for energy conservation and safety for in the home at DC, including modifications and DME. Pt confused and impulsive throughout with redirection required. Extended time to complete all tasks, including skilled monitoring of patient's response during treatment session and vital signs. Prior to and at the end of session, environmental modifications / line management completed for patients safety and efficiency of treatment session. See above for further details. Rehab Potential: Good  for established goals     Patient / Family Goal: \"Just let me walk out of here\"      Patient and/or family were instructed on functional diagnosis, prognosis/goals and OT plan of care. Demonstrated fair understanding. Eval Complexity: Low  · History: Brief review of medical records and additional review of physical, cognitive, or psychosocial history related to current functional performance  · Exam: 3+ performance deficits  · Assistance/Modification: Mod assistance or modifications required to perform tasks.  May have comorbidities that affect occupational performance. Time In: 0900  Time Out: 1000  Total Treatment Time: 40    Min Units   OT Eval Low 97165  x  1   OT Eval Medium 49136      OT Eval High 08593      OT Re-Eval [id number]       Therapeutic Ex 64952       Therapeutic Activities 07862  25 2    ADL/Self Care 38817  15 1    Orthotic Management 63885       Manual [id number]     Neuro Re-Ed 07709       Non-Billable Time          Evaluation Time additionally includes thorough review of current medical information, gathering information on past medical history/social history and prior level of function, interpretation of standardized testing/informal observation of tasks, assessment of data and development of plan of care and goals.             Isabelle Watson OTR/L; [id number]

## 2022-03-09 NOTE — PROGRESS NOTES
Subjective:  Chart reviewed  Patient seen at bedside  He will be discharged today  He has no complaints and plans to followup at [facility]    Objective:    /75   Pulse 109   Temp 97.6 °F (36.4 °C)   Resp 18   Ht 5' 8\" (1.727 m)   Wt 184 lb 6.4 oz (83.6 kg)   SpO2 95%   BMI 28.04 kg/m²     General: NAD  HEENT: normocephalic/atraumatic, EOMI, PERRLA, sclera anicteric, conjuntiva pink  NECK: supple, trachea midline  Heart:  Tachycardic  Lungs:  Respirations easy  Skin: Intact, no petechia or purpura    CBC with Differential:    Lab Results   Component Value Date    WBC 4.6 03/09/2022    RBC 2.97 03/09/2022    HGB 8.4 03/09/2022    HCT 26.8 03/09/2022    PLT 56 03/09/2022    MCV 90.2 03/09/2022    MCH 28.3 03/09/2022    MCHC 31.3 03/09/2022    RDW 20.0 03/09/2022    NRBC 1.0 03/06/2022    SEGSPCT 22 02/03/2014    METASPCT 0.9 03/08/2022    LYMPHOPCT 3.5 03/09/2022    MONOPCT 3.5 03/09/2022    MYELOPCT 1.8 03/08/2022    BASOPCT 0.0 03/09/2022    MONOSABS 0.18 03/09/2022    LYMPHSABS 0.18 03/09/2022    EOSABS 0.00 03/09/2022    BASOSABS 0.00 03/09/2022     CMP:    Lab Results   Component Value Date     03/09/2022    K 4.5 03/09/2022    K 3.4 03/04/2022     03/09/2022    CO2 17 03/09/2022    BUN 31 03/09/2022    CREATININE 0.7 03/09/2022    GFRAA >60 03/09/2022    LABGLOM >60 03/09/2022    GLUCOSE 308 03/09/2022    GLUCOSE 318 04/30/2012    PROT 4.3 03/09/2022    LABALBU 2.7 03/09/2022    LABALBU 4.0 04/30/2012    CALCIUM 9.1 03/09/2022    BILITOT 1.4 03/09/2022    ALKPHOS 275 03/09/2022    AST 35 03/09/2022    ALT 29 03/09/2022          Current Facility-Administered Medications:     fondaparinux (ARIXTRA) injection 2.5 mg, 2.5 mg, SubCUTAneous, Daily, Taran Black MD, 2.5 mg at 03/09/22 0837    magnesium oxide (MAG-OX) tablet 400 mg, 400 mg, Oral, BID, JYOTI Nash - CNP, 400 mg at 03/09/22 0836    traMADol (ULTRAM) tablet 50 mg, 50 mg, Oral, BID PRN, Cody Menendez DO, 50 mg at 03/06/22 1449    glucose (GLUTOSE) 40 % oral gel 15 g, 15 g, Oral, PRN, Ousmane Fusi, DO    glucagon (rDNA) injection 1 mg, 1 mg, IntraMUSCular, PRN, Ismail U Khoa, DO    dextrose 5 % solution, 100 mL/hr, IntraVENous, PRN, Ismail U Khoa, DO    sodium chloride flush 0.9 % injection 5-40 mL, 5-40 mL, IntraVENous, 2 times per day, Ousmane Fusi, DO, 10 mL at 03/09/22 0837    sodium chloride flush 0.9 % injection 5-40 mL, 5-40 mL, IntraVENous, PRN, Ousmane Fusi, DO    0.9 % sodium chloride infusion, 25 mL, IntraVENous, PRN, Ousmane Fusi, DO    aspirin chewable tablet 81 mg, 81 mg, Oral, Daily, Ousmane Fusi, DO, 81 mg at 03/09/22 7951    benzonatate (TESSALON) capsule 100 mg, 100 mg, Oral, TID PRN, Ousmane Fusi, DO    insulin glargine (LANTUS) injection vial 20 Units, 20 Units, SubCUTAneous, Nightly, Ousmane Fusi, DO, 20 Units at 03/08/22 2036    levothyroxine (SYNTHROID) tablet 200 mcg, 200 mcg, Oral, Daily, Ousmane Fusi, DO, 200 mcg at 03/09/22 0610    pantoprazole (PROTONIX) tablet 40 mg, 40 mg, Oral, QAM AC, Ismail U Khoa, DO, 40 mg at 03/09/22 0610    predniSONE (DELTASONE) tablet 20 mg, 20 mg, Oral, Daily, Ismail U Khoa, DO, 20 mg at 03/09/22 0836    lactulose (CHRONULAC) 10 GM/15ML solution 20 g, 20 g, Oral, 4 times per day, Jayden Powell MD, 20 g at 03/09/22 0609    0.9% NaCl with KCl 20 mEq infusion, , IntraVENous, Continuous, Colette Lamp, DO, Last Rate: 50 mL/hr at 03/08/22 1833, Rate Verify at 03/08/22 1833    cefTRIAXone (ROCEPHIN) 2,000 mg in sterile water 20 mL IV syringe, 2,000 mg, IntraVENous, Q24H, Ismail U Khoa, DO, 2,000 mg at 03/08/22 1814    insulin lispro (HUMALOG) injection vial 0-12 Units, 0-12 Units, SubCUTAneous, TID WC, Ousmane Fusi, DO, 6 Units at 03/08/22 1700    insulin lispro (HUMALOG) injection vial 0-6 Units, 0-6 Units, SubCUTAneous, Nightly, Ousmane Fusi, DO, 5 Units at 03/08/22 2034    IR US GUIDED PARACENTESIS   Final Result Successful paracentesis. US GALLBLADDER RUQ   Final Result   1. Status post cholecystectomy, no indication for positive ultrasound March   sign in the right upper quadrant at the time of the study. 2.  Advanced liver cirrhosis. See above comments and recommendations. 3.  No dilatation of the biliary tree. 4.  Limited evaluation of the pancreas. The left upper quadrant not included   on this study. The see report CT abdomen pelvis of March 4, 2022.      5.  Moderate large size ascites present in the abdomen. CT Head WO Contrast   Final Result   No acute intracranial abnormality. There is age-appropriate atrophy and   small-vessel ischemic disease. Sinus disease in the maxillary sinuses. RECOMMENDATIONS:   Unavailable         CT ABDOMEN PELVIS WO CONTRAST Additional Contrast? None   Final Result   1. Mild apparent MURAL THICKENING IN THE COLON may be an artifact of under   distension, cystitis or manifestation of portal hypertensive colopathy. 2. Liver cirrhosis with mild splenomegaly and large volume ascites. 3. The small hypodense nodules previously seen in the liver are not evident   on this study due to beam hardening artifact resulting from patient's arms   within the gantry. 4. Interval increase in size of multiple pulmonary nodules since 02/24/2022   (2 weeks ago) concerning for PROGRESSIVE METASTATIC DISEASE. 5. Grossly stable nonspecific mildly prominent left para-aortic lymph nodes. The      RECOMMENDATIONS:   Unavailable         XR CHEST PORTABLE   Final Result   Stable chest series. No acute cardiopulmonary pathology. 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 He is admitted with hepatic encephalopathy which has improved.  He has not yet started treatment for Hodgkin's at [facility]. He has worsening thrombocytopenia since admission which may be due to heparin. A/P:  1. Stop heparin, send HIT antibody, start arixtra 2.mg SQ daily for DVt prophylaxis while admitted  2. Patient to follow-up with [facility] for recommendations, thrombocytopenia likely to hinder treatments    Thank you for allowing us to participate in the care of Adithya Sellers. Christin Gimenez PA-C  [phone]    Electronically signed by MICHAEL Hills on 3/9/2022 at 10:21 AM    Note: This report was completed using [employer] voiced recognition software. Every effort has been made to ensure accuracy; however, inadvertent computerized transcription errors may be present.

## 2022-03-09 NOTE — PLAN OF CARE
Problem: Falls - Risk of:  Goal: Will remain free from falls  Description: Will remain free from falls  3/9/2022 1110 by Blanka Jay RN  Outcome: Completed  3/9/2022 0655 by Negin Salazar RN  Outcome: Met This Shift  Goal: Absence of physical injury  Description: Absence of physical injury  3/9/2022 1110 by Blanka Jay RN  Outcome: Completed  3/9/2022 0655 by Negin Salazar RN  Outcome: Met This Shift

## 2022-03-10 ENCOUNTER — TELEPHONE (OUTPATIENT)
Dept: FAMILY MEDICINE CLINIC | Age: 72
End: 2022-03-10

## 2022-03-10 ENCOUNTER — CARE COORDINATION (OUTPATIENT)
Dept: CASE MANAGEMENT | Age: 72
End: 2022-03-10

## 2022-03-10 DIAGNOSIS — K72.90 DECOMPENSATION OF CIRRHOSIS OF LIVER (HCC): Primary | ICD-10-CM

## 2022-03-10 DIAGNOSIS — K74.60 DECOMPENSATION OF CIRRHOSIS OF LIVER (HCC): Primary | ICD-10-CM

## 2022-03-10 LAB
BLOOD CULTURE, ROUTINE: NORMAL
CULTURE, BLOOD 2: NORMAL

## 2022-03-10 PROCEDURE — 1111F DSCHRG MED/CURRENT MED MERGE: CPT | Performed by: FAMILY MEDICINE

## 2022-03-10 NOTE — TELEPHONE ENCOUNTER
[facility] states patients blood sugar was 432 this morning,    He has not had his insulin yet today and only ate a little bit of eggs and some coffee with a little bit of cream and sugar.

## 2022-03-10 NOTE — CARE COORDINATION
[facility] Transitions Initial Follow Up Call    Call within 2 business days of discharge: Yes    Patient: Vickie Campos Patient : 1950   MRN: [id number]  Reason for Admission: Decompensation of cirrhosis of liver  Discharge Date: 3/9/22 RARS: Readmission Risk Score: 22.3 ( )      Last Discharge [facility]       Complaint Diagnosis Description Type Department Provider    3/4/22 Altered Mental Status Altered mental status, unspecified altered mental status type . .. ED to Hosp-Admission (Discharged) (ADMITTED) [facility] [address] [address] [facility] Clare Menendez DO; Dennis Pardo. .. Transitions of Care Initial Call    Was this an external facility discharge? No Discharge Facility: N/A    Challenges to be reviewed by the provider   Additional needs identified to be addressed with provider: No  none             Method of communication with provider : none      Advance Care Planning:   Does patient have an Advance Directive: reviewed and current. Was this a readmission? Yes  Patient stated reason for admission: AMS  Patients top risk factors for readmission: functional physical ability  level of motivation  medical condition-DM and Liver Cirrhosis  polypharmacy    Care Transition Nurse (CTN) contacted the patient by telephone to perform post hospital discharge assessment. Verified name and  with patient as identifiers. Provided introduction to self, and explanation of the CTN role. CTN reviewed discharge instructions, medical action plan and red flags with patient who verbalized understanding. Patient given an opportunity to ask questions and does not have any further questions or concerns at this time. Were discharge instructions available to patient? Yes. Reviewed appropriate site of care based on symptoms and resources available to patient including: PCP, Urgent care clinics, Home health and When to call 911. The patient agrees to contact the PCP office for questions related to their healthcare. Medication reconciliation was performed with patient, who verbalizes understanding of administration of home medications. Advised obtaining a 90-day supply of all daily and as-needed medications. Covid Risk Education     Educated patient about risk for severe COVID-19 due to risk factors according to CDC guidelines. CTN reviewed discharge instructions, medical action plan and red flag symptoms with the patient who verbalized understanding. Discussed COVID vaccination status: Yes. Education provided on COVID-19 vaccination as appropriate. Discussed exposure protocols and quarantine with CDC Guidelines. Patient was given an opportunity to verbalize any questions and concerns and agrees to contact CTN or health care provider for questions related to their healthcare. Reviewed and educated patient on any new and changed medications related to discharge diagnosis. Was patient discharged with a pulse oximeter? No Discussed and confirmed pulse oximeter discharge instructions and when to notify provider or seek emergency care. CTN provided contact information. Plan for follow-up call in 5-7 days based on severity of symptoms and risk factors. Plan for next call: symptom management-Has weakness improved?  follow up appointment-Did patient get scheduled for HFU appt with Dr. Jessica Mccormick (PCP)? Non-face-to-face services provided:  Scheduled appointment with PCP-CTN confirmed with patient his wife will call and schedule HFU appt with Dr. Jessica Mccormick (PCP).  CTn will route message to office  advisng of the need for fappt  Obtained and reviewed discharge summary and/or continuity of care documents  Education of patient/family/caregiver/guardian to support self-management-Discussed DM zone tool and importance to be consistent with checking BG, taking medicaitons and following a low sugar/low carb diet to help manage DM  Establishment or re-establishment of referrals-CTN confirmed with patient wife Qamar Keyr) that [facility] is scheduled for visit today for RAMU. Care Transitions 24 Hour Call    Schedule Follow Up Appointment with PCP: Declined  Do you have any ongoing symptoms?: Yes  Patient-reported symptoms: Weakness  Do you have a copy of your discharge instructions?: Yes  Do you have all of your prescriptions and are they filled?: Yes  Have you been contacted by a [address]?: No  Have you scheduled your follow up appointment?: No  Were you discharged with any Home Care or Post Acute Services: Yes  Post Acute Services: Home Health (Comment: RAMU with [address])  Do you feel like you have everything you need to keep you well at home?: Yes  Care Transitions Interventions       Spoke with patient and wife Qamar Bran) today 3/10/22 for TCM/hospital discharge follow up for decompensation of cirrhosis of liver. Patient wife complains of patient having ongoing weakness especially in legs when standing or ambulating. States patient uses a walker and is res[facility]. Confirmed patient was recently discharged from [address] [place] [address] on 2/28/22 for the same issue but returned d/t AMS. Noted initial Ammonia level on admission was 108 which decreased to 23 on discharge. CTN emphasized to patient the importance to take Lactulose as directed to help keep Ammonia level WNL which he verbalizes understanding. Patient states he had a bout of chest pain last night when trying to get up which was non-radiating in nature. States chest pain was felt in the center of chest. States he has bouts of chest pain off/on that is chronic in nature and advises it spontaneously resolved. Denies any current shortness of breath, chest pain, chest discomfort, abdominal pain/swelling, nausea, vomiting, chills or fever. CTN reiterated chest pain protocol and knowing when to seek immediate medical attention.      Patient states he did not check BG as he is just getting up but admits he monitors twice daily. States BG last night was 200. Denies any s/s of hyperglycemia. Provided a review of home meds with patient who confirms he obtained Mag-Ox ordered on discharge. 1111F code entered. Confirmed with patient his wife will call and schedule HFU appt with Dr. Miki Palmer (PCP). Confirmed patient recently seen PCP on 3/3/22 for previous hospital discharge. CTN will route note to PCP office  advising of the need to schedule HFU appt. Denies any other complaints at this time. Patient/wife are receptive for subsequent call. CTN will continue to follow for Care Transition. Follow Up  No future appointments.     JYOTI Peralta

## 2022-03-10 NOTE — TELEPHONE ENCOUNTER
Give Humalog 8 Units SC then use sliding scale in the future   S\Check BG in 3 hours          Glucose: Dose:    No Insulin   140-199 1 Unit   200-249 2 Units   250-299 3 Units   300-349 4 Units   350-399 5 Units   400 and above 6 Units

## 2022-03-11 LAB
AMYLASE FLUID: 74 U/L
FLUID TYPE: NORMAL
HEPARIN PF4 ANTIBODY: 0.03 OD

## 2022-03-13 ENCOUNTER — APPOINTMENT (OUTPATIENT)
Dept: GENERAL RADIOLOGY | Age: 72
DRG: 441 | End: 2022-03-13
Payer: MEDICARE

## 2022-03-13 ENCOUNTER — HOSPITAL ENCOUNTER (INPATIENT)
Age: 72
LOS: 4 days | Discharge: NURSING, REHAB OR LONG-TERM CARE | DRG: 441 | End: 2022-03-17
Attending: EMERGENCY MEDICINE | Admitting: INTERNAL MEDICINE
Payer: MEDICARE

## 2022-03-13 DIAGNOSIS — E87.1 HYPONATREMIA: ICD-10-CM

## 2022-03-13 DIAGNOSIS — U07.1 COVID-19: Primary | ICD-10-CM

## 2022-03-13 DIAGNOSIS — C81.73 OTHER CLASSICAL HODGKIN LYMPHOMA OF INTRA-ABDOMINAL LYMPH NODES (HCC): ICD-10-CM

## 2022-03-13 DIAGNOSIS — E87.20 LACTIC ACIDOSIS: ICD-10-CM

## 2022-03-13 LAB
ALBUMIN SERPL-MCNC: 2.8 G/DL (ref 3.5–5.2)
ALP BLD-CCNC: 467 U/L (ref 40–129)
ALT SERPL-CCNC: 40 U/L (ref 0–40)
AMMONIA: 31 UMOL/L (ref 16–60)
ANION GAP SERPL CALCULATED.3IONS-SCNC: 17 MMOL/L (ref 7–16)
ANISOCYTOSIS: ABNORMAL
AST SERPL-CCNC: 44 U/L (ref 0–39)
BACTERIA: ABNORMAL /HPF
BASOPHILS ABSOLUTE: 0 E9/L (ref 0–0.2)
BASOPHILS RELATIVE PERCENT: 0 % (ref 0–2)
BETA-HYDROXYBUTYRATE: 0.16 MMOL/L (ref 0.02–0.27)
BILIRUB SERPL-MCNC: 2.3 MG/DL (ref 0–1.2)
BILIRUBIN URINE: NEGATIVE
BLOOD, URINE: ABNORMAL
BUN BLDV-MCNC: 56 MG/DL (ref 6–23)
CALCIUM SERPL-MCNC: 9.5 MG/DL (ref 8.6–10.2)
CHLORIDE BLD-SCNC: 89 MMOL/L (ref 98–107)
CHP ED QC CHECK: NORMAL
CLARITY: CLEAR
CO2: 17 MMOL/L (ref 22–29)
COLOR: YELLOW
CREAT SERPL-MCNC: 1.1 MG/DL (ref 0.7–1.2)
EOSINOPHILS ABSOLUTE: 0 E9/L (ref 0.05–0.5)
EOSINOPHILS RELATIVE PERCENT: 0 % (ref 0–6)
EPITHELIAL CELLS, UA: ABNORMAL /HPF
GFR AFRICAN AMERICAN: >60
GFR NON-AFRICAN AMERICAN: >60 ML/MIN/1.73
GLUCOSE BLD-MCNC: 259 MG/DL
GLUCOSE BLD-MCNC: 271 MG/DL (ref 74–99)
GLUCOSE URINE: NEGATIVE MG/DL
HCT VFR BLD CALC: 27.7 % (ref 37–54)
HEMOGLOBIN: 8.6 G/DL (ref 12.5–16.5)
KETONES, URINE: NEGATIVE MG/DL
LACTIC ACID: 4.2 MMOL/L (ref 0.5–2.2)
LACTIC ACID: 4.8 MMOL/L (ref 0.5–2.2)
LEUKOCYTE ESTERASE, URINE: ABNORMAL
LIPASE: 78 U/L (ref 13–60)
LYMPHOCYTES ABSOLUTE: 0.74 E9/L (ref 1.5–4)
LYMPHOCYTES RELATIVE PERCENT: 10 % (ref 20–42)
MCH RBC QN AUTO: 28.6 PG (ref 26–35)
MCHC RBC AUTO-ENTMCNC: 31 % (ref 32–34.5)
MCV RBC AUTO: 92 FL (ref 80–99.9)
METER GLUCOSE: 252 MG/DL (ref 74–99)
METER GLUCOSE: 259 MG/DL (ref 74–99)
MONOCYTES ABSOLUTE: 0.52 E9/L (ref 0.1–0.95)
MONOCYTES RELATIVE PERCENT: 7 % (ref 2–12)
NEUTROPHILS ABSOLUTE: 6.14 E9/L (ref 1.8–7.3)
NEUTROPHILS RELATIVE PERCENT: 83 % (ref 43–80)
NITRITE, URINE: NEGATIVE
OVALOCYTES: ABNORMAL
PDW BLD-RTO: 19.9 FL (ref 11.5–15)
PH UA: 5.5 (ref 5–9)
PH VENOUS: 7.27 (ref 7.35–7.45)
PLATELET # BLD: 80 E9/L (ref 130–450)
PLATELET CONFIRMATION: NORMAL
PMV BLD AUTO: 11.6 FL (ref 7–12)
POIKILOCYTES: ABNORMAL
POLYCHROMASIA: ABNORMAL
POTASSIUM REFLEX MAGNESIUM: 4.9 MMOL/L (ref 3.5–5)
PRO-BNP: 1962 PG/ML (ref 0–125)
PROTEIN UA: NEGATIVE MG/DL
RBC # BLD: 3.01 E12/L (ref 3.8–5.8)
RBC UA: ABNORMAL /HPF (ref 0–2)
SARS-COV-2, NAAT: DETECTED
SODIUM BLD-SCNC: 123 MMOL/L (ref 132–146)
SPECIFIC GRAVITY UA: 1.01 (ref 1–1.03)
TOTAL PROTEIN: 4.4 G/DL (ref 6.4–8.3)
TROPONIN, HIGH SENSITIVITY: 29 NG/L (ref 0–11)
UROBILINOGEN, URINE: 0.2 E.U./DL
WBC # BLD: 7.4 E9/L (ref 4.5–11.5)
WBC UA: ABNORMAL /HPF (ref 0–5)
YEAST: PRESENT /HPF

## 2022-03-13 PROCEDURE — 85025 COMPLETE CBC W/AUTO DIFF WBC: CPT

## 2022-03-13 PROCEDURE — 93005 ELECTROCARDIOGRAM TRACING: CPT | Performed by: STUDENT IN AN ORGANIZED HEALTH CARE EDUCATION/TRAINING PROGRAM

## 2022-03-13 PROCEDURE — 82140 ASSAY OF AMMONIA: CPT

## 2022-03-13 PROCEDURE — 82800 BLOOD PH: CPT

## 2022-03-13 PROCEDURE — P9047 ALBUMIN (HUMAN), 25%, 50ML: HCPCS | Performed by: INTERNAL MEDICINE

## 2022-03-13 PROCEDURE — 83880 ASSAY OF NATRIURETIC PEPTIDE: CPT

## 2022-03-13 PROCEDURE — 2060000000 HC ICU INTERMEDIATE R&B

## 2022-03-13 PROCEDURE — 71045 X-RAY EXAM CHEST 1 VIEW: CPT

## 2022-03-13 PROCEDURE — 96360 HYDRATION IV INFUSION INIT: CPT

## 2022-03-13 PROCEDURE — 83605 ASSAY OF LACTIC ACID: CPT

## 2022-03-13 PROCEDURE — 80053 COMPREHEN METABOLIC PANEL: CPT

## 2022-03-13 PROCEDURE — 6370000000 HC RX 637 (ALT 250 FOR IP): Performed by: INTERNAL MEDICINE

## 2022-03-13 PROCEDURE — 81001 URINALYSIS AUTO W/SCOPE: CPT

## 2022-03-13 PROCEDURE — 2580000003 HC RX 258: Performed by: STUDENT IN AN ORGANIZED HEALTH CARE EDUCATION/TRAINING PROGRAM

## 2022-03-13 PROCEDURE — 6360000002 HC RX W HCPCS: Performed by: INTERNAL MEDICINE

## 2022-03-13 PROCEDURE — 83690 ASSAY OF LIPASE: CPT

## 2022-03-13 PROCEDURE — 84484 ASSAY OF TROPONIN QUANT: CPT

## 2022-03-13 PROCEDURE — 82010 KETONE BODYS QUAN: CPT

## 2022-03-13 PROCEDURE — 36415 COLL VENOUS BLD VENIPUNCTURE: CPT

## 2022-03-13 PROCEDURE — 99284 EMERGENCY DEPT VISIT MOD MDM: CPT

## 2022-03-13 PROCEDURE — 87635 SARS-COV-2 COVID-19 AMP PRB: CPT

## 2022-03-13 PROCEDURE — 82962 GLUCOSE BLOOD TEST: CPT

## 2022-03-13 PROCEDURE — 2580000003 HC RX 258: Performed by: INTERNAL MEDICINE

## 2022-03-13 RX ORDER — NICOTINE POLACRILEX 4 MG
15 LOZENGE BUCCAL PRN
Status: DISCONTINUED | OUTPATIENT
Start: 2022-03-13 | End: 2022-03-17 | Stop reason: HOSPADM

## 2022-03-13 RX ORDER — SODIUM CHLORIDE 0.9 % (FLUSH) 0.9 %
5-40 SYRINGE (ML) INJECTION EVERY 12 HOURS SCHEDULED
Status: DISCONTINUED | OUTPATIENT
Start: 2022-03-13 | End: 2022-03-17 | Stop reason: HOSPADM

## 2022-03-13 RX ORDER — 0.9 % SODIUM CHLORIDE 0.9 %
500 INTRAVENOUS SOLUTION INTRAVENOUS ONCE
Status: COMPLETED | OUTPATIENT
Start: 2022-03-13 | End: 2022-03-13

## 2022-03-13 RX ORDER — ACETAMINOPHEN 325 MG/1
650 TABLET ORAL EVERY 6 HOURS PRN
Status: DISCONTINUED | OUTPATIENT
Start: 2022-03-13 | End: 2022-03-17 | Stop reason: HOSPADM

## 2022-03-13 RX ORDER — ALBUMIN (HUMAN) 12.5 G/50ML
25 SOLUTION INTRAVENOUS ONCE
Status: COMPLETED | OUTPATIENT
Start: 2022-03-13 | End: 2022-03-13

## 2022-03-13 RX ORDER — SODIUM CHLORIDE 9 MG/ML
25 INJECTION, SOLUTION INTRAVENOUS PRN
Status: DISCONTINUED | OUTPATIENT
Start: 2022-03-13 | End: 2022-03-17 | Stop reason: HOSPADM

## 2022-03-13 RX ORDER — ONDANSETRON 2 MG/ML
4 INJECTION INTRAMUSCULAR; INTRAVENOUS EVERY 6 HOURS PRN
Status: DISCONTINUED | OUTPATIENT
Start: 2022-03-13 | End: 2022-03-17 | Stop reason: HOSPADM

## 2022-03-13 RX ORDER — MAGNESIUM OXIDE 400 MG/1
400 TABLET ORAL 2 TIMES DAILY
Status: DISCONTINUED | OUTPATIENT
Start: 2022-03-13 | End: 2022-03-17 | Stop reason: HOSPADM

## 2022-03-13 RX ORDER — PROPOFOL 10 MG/ML
120 INJECTION, EMULSION INTRAVENOUS ONCE
Status: DISCONTINUED | OUTPATIENT
Start: 2022-03-13 | End: 2022-03-13

## 2022-03-13 RX ORDER — PANTOPRAZOLE SODIUM 40 MG/1
40 TABLET, DELAYED RELEASE ORAL DAILY
Status: DISCONTINUED | OUTPATIENT
Start: 2022-03-14 | End: 2022-03-17 | Stop reason: HOSPADM

## 2022-03-13 RX ORDER — INSULIN GLARGINE 100 [IU]/ML
20 INJECTION, SOLUTION SUBCUTANEOUS NIGHTLY
Status: DISCONTINUED | OUTPATIENT
Start: 2022-03-13 | End: 2022-03-17 | Stop reason: HOSPADM

## 2022-03-13 RX ORDER — ASPIRIN 81 MG/1
81 TABLET, CHEWABLE ORAL DAILY
Status: DISCONTINUED | OUTPATIENT
Start: 2022-03-14 | End: 2022-03-17 | Stop reason: HOSPADM

## 2022-03-13 RX ORDER — LEVOTHYROXINE SODIUM 0.2 MG/1
200 TABLET ORAL DAILY
Status: DISCONTINUED | OUTPATIENT
Start: 2022-03-14 | End: 2022-03-17 | Stop reason: HOSPADM

## 2022-03-13 RX ORDER — ACETAMINOPHEN 650 MG/1
650 SUPPOSITORY RECTAL EVERY 6 HOURS PRN
Status: DISCONTINUED | OUTPATIENT
Start: 2022-03-13 | End: 2022-03-17 | Stop reason: HOSPADM

## 2022-03-13 RX ORDER — DEXTROSE MONOHYDRATE 50 MG/ML
100 INJECTION, SOLUTION INTRAVENOUS PRN
Status: DISCONTINUED | OUTPATIENT
Start: 2022-03-13 | End: 2022-03-17 | Stop reason: HOSPADM

## 2022-03-13 RX ORDER — SODIUM CHLORIDE 0.9 % (FLUSH) 0.9 %
5-40 SYRINGE (ML) INJECTION PRN
Status: DISCONTINUED | OUTPATIENT
Start: 2022-03-13 | End: 2022-03-17 | Stop reason: HOSPADM

## 2022-03-13 RX ORDER — DEXTROSE MONOHYDRATE 25 G/50ML
12.5 INJECTION, SOLUTION INTRAVENOUS PRN
Status: DISCONTINUED | OUTPATIENT
Start: 2022-03-13 | End: 2022-03-13 | Stop reason: CLARIF

## 2022-03-13 RX ORDER — POLYETHYLENE GLYCOL 3350 17 G/17G
17 POWDER, FOR SOLUTION ORAL DAILY PRN
Status: DISCONTINUED | OUTPATIENT
Start: 2022-03-13 | End: 2022-03-17 | Stop reason: HOSPADM

## 2022-03-13 RX ORDER — ONDANSETRON 4 MG/1
4 TABLET, ORALLY DISINTEGRATING ORAL EVERY 8 HOURS PRN
Status: DISCONTINUED | OUTPATIENT
Start: 2022-03-13 | End: 2022-03-17 | Stop reason: HOSPADM

## 2022-03-13 RX ADMIN — ALBUMIN (HUMAN) 25 G: 0.25 INJECTION, SOLUTION INTRAVENOUS at 22:59

## 2022-03-13 RX ADMIN — INSULIN GLARGINE 20 UNITS: 100 INJECTION, SOLUTION SUBCUTANEOUS at 23:30

## 2022-03-13 RX ADMIN — MAGNESIUM OXIDE 400 MG: 400 TABLET ORAL at 23:02

## 2022-03-13 RX ADMIN — SODIUM CHLORIDE 500 ML: 9 INJECTION, SOLUTION INTRAVENOUS at 16:23

## 2022-03-13 RX ADMIN — SODIUM CHLORIDE 500 ML: 9 INJECTION, SOLUTION INTRAVENOUS at 17:17

## 2022-03-13 RX ADMIN — SODIUM CHLORIDE, PRESERVATIVE FREE 10 ML: 5 INJECTION INTRAVENOUS at 22:56

## 2022-03-13 ASSESSMENT — ENCOUNTER SYMPTOMS
COUGH: 0
ABDOMINAL PAIN: 0
SHORTNESS OF BREATH: 0
BACK PAIN: 0
SORE THROAT: 0
NAUSEA: 0

## 2022-03-13 ASSESSMENT — PAIN SCALES - GENERAL: PAINLEVEL_OUTOF10: 0

## 2022-03-13 ASSESSMENT — PAIN DESCRIPTION - PAIN TYPE: TYPE: CHRONIC PAIN

## 2022-03-13 NOTE — LETTER
[place] Department Medicaid  CERTIFICATION OF NECESSITY  FOR NON-EMERGENCY TRANSPORTATION   BY GROUND AMBULANCE      Individual Information   1. Name: Roxie Mohr 2. [place] Medicaid Billing Number:    3. Address: [address]      Transportation Provider Information   4. Provider Name: Prachi Ya   5. [place] Medicaid Provider Number:  National Provider Identifier (NPI):      Certification  7. Criteria:  During transport, this individual requires:  [] Medical treatment or continuous     supervision by an EMT. [] The administration or regulation of oxygen by another person. [] Supervised protective restraint. 8. Period Beginning Date: 03/17/2021   9. Length  [x] Not more than 1 DAY  [] One Year     Additional Information Relevant to Certification   10. Comments or Explanations, If Necessary or Appropriate: FIBROSIS, ALTERED MENTAL STATUS, GENERALIZED MALAISE AND FATIGUE, HEPATIC ENCEPHALOPATHY D/T  CIRRHOSIS, LEUKEMIA, MI, ANXIETY        Certifying Practitioner Information   11. Name of Practitioner: DR Marisol Brantley DO   12. [place] Medicaid Provider Number, If Applicable:  [address] Provider Identifier (NPI):   [id number]     Signature Information   14. Date of Signature: 03/17/2022 15. Name of Person Signing:   Sandro Jackson   16. Signature and Professional Designation: Arturo Cope. [address] [place] [address].3/17/2022.10:50 AM.      ODM [phone]  Rev. 7/2015    [facility] Encounter Date/Time: 3/13/2022 Kavitha Sanchez Account: [de-identified]    MRN: [id number]    Patient: Roxie Mohr    Contact Serial #: [id number]      ENCOUNTER          Patient Class: I Private Enc?   No Unit  BD: [address] [facility] [facility] 0620/0620-01   Hospital Service: MED   Encounter DX: Lactic acidosis [E87.2]   ADM Provider: Radha Fragoso DO   Procedure:     ATT Provider: Radha Fragoso DO   REF Provider:        Admission DX: Lactic acidosis, Hyponatremia, COVID-19 and DX codes: E87.2, E87.1, U07.1      PATIENT Name: Ranulfo Dumont : 1950 (71 yrs)   Address: [address] [place] [address] Sex: Male   [place] city: [place] [address]         Marital Status:    Employer: [employer]         Denominational: Bahai   Primary Care Provider: Vera Okeefe DO         Primary Phone: [phone]   EMERGENCY CONTACT   Contact Name Legal Guardian? Relationship to Patient Home Phone Work Phone   1. Melanie San  2. Kai San      Spouse  Child [phone]              GUARANTOR            Guarantor: Ranulfo Dmuont     : 1950   Address: [address] [place] [address] Sex: Male     [place] [id number]     Relation to Patient: Self       Home Phone: [phone]   Guarantor ID: [id number]       Work Phone: [phone]   Guarantor Employer: [employer]         Status: RETIRED      COVERAGE        PRIMARY INSURANCE   Payor: MEDICARE Plan: MEDICARE PART A AND B   Payor Address: [facility] [address],  [place] [address]       Group Number:   Insurance Type: INDEMNITY   Subscriber Name: Dian Lora : 1950   Subscriber ID: [id number] Pat. Rel. to Sub: Self   SECONDARY INSURANCE   Payor: UNITED HE[employer]CARE Plan: [employer] Address:  [facility] [id number], [place] [id number]          Group Number: Plan [id number]  Insurance Type: INDEMNITY   Subscriber Name: Dian Lora : 1950   Subscriber ID: [id number] Pat.  Rel. to Sub: SELF           CSN: [id number]

## 2022-03-13 NOTE — ED PROVIDER NOTES
79-year-old male with a history of AML, insulin-dependent diabetes, and liver cirrhosis presenting for fatigue. Patient states that he has felt very lethargic today and has been having problems with his memory. He states that his wife has no questions and he was apparently not making much sense. On exam, patient is AAO x3. He complains of feeling very lethargic. He denies any fever, chills, chest pain, shortness of breath, or abdominal pain. On chart review, patient was recently hospitalized for hepatic encephalopathy secondary to medication noncompliance. He is supposed to be on lactulose. Patient states he does not think he has been taking it. He states he has been taking medications through his fingers and that his wife wanted him to come in to get his fingers scanned. Review of Systems   Constitutional: Positive for fatigue. Negative for fever. HENT: Negative for congestion and sore throat. Respiratory: Negative for cough and shortness of breath. Cardiovascular: Negative for chest pain. Gastrointestinal: Negative for abdominal pain and nausea. Musculoskeletal: Negative for back pain. Skin: Negative for rash and wound. Neurological: Negative for headaches. All other systems reviewed and are negative. Physical Exam  Constitutional:       General: He is not in acute distress. Appearance: He is not toxic-appearing. Comments: Appears tired; chronically ill-appearing   HENT:      Head: Normocephalic and atraumatic. Right Ear: External ear normal.      Left Ear: External ear normal.      Nose: Nose normal.      Mouth/Throat:      Mouth: Mucous membranes are dry. Eyes:      Extraocular Movements: Extraocular movements intact. Conjunctiva/sclera: Conjunctivae normal.      Pupils: Pupils are equal, round, and reactive to light. Cardiovascular:      Rate and Rhythm: Normal rate and regular rhythm.    Pulmonary:      Effort: Pulmonary effort is normal. Comments: Faint rales bilateral bases  Abdominal:      General: There is distension. Palpations: Abdomen is soft. Tenderness: There is no abdominal tenderness. There is no guarding. Musculoskeletal:      Comments: 1-2+ pitting edema bilateral lower extremities   Neurological:      General: No focal deficit present. Mental Status: He is alert and oriented to person, place, and time. Cranial Nerves: No cranial nerve deficit. Sensory: No sensory deficit. Motor: No weakness. Psychiatric:         Mood and Affect: Mood normal.         Behavior: Behavior normal.          Procedures     MDM  Number of Diagnoses or Management Options  COVID-19  Hyponatremia  Lactic acidosis  Diagnosis management comments: 77-year-old male with a history of AML, insulin-dependent diabetes, and liver cirrhosis presenting for fatigue. Rapid Covid positive. Labs remarkable for sodium 123, lactate 4.8, total bilirubin 2.3. Patient with no abdominal tenderness on exam, do not feel that imaging is warranted. Patient was given fluids. He was not hypoxic in the ED at any time. I did talk to his wife who stated that he he has been tired today. He had nausea and emesis earlier today as well. She states she has been giving him his lactulose 3 times a day and that yesterday he had a bowel movement that was not diarrhea. He is supposed to be going to [facility] to start chemo for liver cancer. Due to hyponatremia and lactic acidosis, decision was made to admit patient for further evaluation and treatment. Patient was admitted to telemetry in stable condition.        Amount and/or Complexity of Data Reviewed  Clinical lab tests: reviewed  Tests in the radiology section of CPT®: reviewed  Tests in the medicine section of CPT®: reviewed  Decide to obtain previous medical records or to obtain history from someone other than the patient: yes --------------------------------------------- PAST HISTORY ---------------------------------------------  Past Medical History:  has a past medical history of AML (acute myeloblastic leukemia) ([facility] AML (acute myeloblastic leukemia) ([facility] Cancer ([facility] Chronic kidney disease, Diabetes mellitus ([place] [address] GERD (gastroesophageal reflux disease), Hx of blood clots, Hyperlipidemia, Liver disease, MI, old, Other disorders of kidney and ureter, Other sleep disturbances, Thyroid disease, and Type II or unspecified type diabetes mellitus without mention of complication, not stated as uncontrolled. Past Surgical History:  has a past surgical history that includes Appendectomy; Cholecystectomy; Foot neuroma surgery; Colonoscopy; Endoscopy, colon, diagnostic; Upper gastrointestinal endoscopy; bone marrow transplant; CT NEEDLE BIOPSY LIVER PERCUTANEOUS (1/17/2022); and Catheter Insertion (N/A, 2/28/2022). Social History:  reports that he quit smoking about 45 years ago. He has a 5.00 pack-year smoking history. He quit smokeless tobacco use about 23 years ago. He reports current alcohol use of about 1.0 standard drink of alcohol per week. He reports that he does not use drugs. Family History: family history includes Cancer in his maternal grandmother and other family members; Heart Disease in his father and mother; High Blood Pressure in his father. The patients home medications have been reviewed.     Allergies: Pneumococcal vaccines, Ciprofloxacin, Diatrizoate, Dye [iodides], Epinephrine, Iodine, Metformin and related, Penicillin g, Prednisone, Morphine, Other, and Oxycodone    -------------------------------------------------- RESULTS -------------------------------------------------    LABS:  Results for orders placed or performed during the hospital encounter of 03/13/22   COVID-19, Rapid    Specimen: Nasopharyngeal Swab   Result Value Ref Range    SARS-CoV-2, NAAT DETECTED (A) Not Detected   CBC with Auto Differential   Result Value Ref Range    WBC 7.4 4.5 - 11.5 E9/L    RBC 3.01 (L) 3.80 - 5.80 E12/L    Hemoglobin 8.6 (L) 12.5 - 16.5 g/dL    Hematocrit 27.7 (L) 37.0 - 54.0 %    MCV 92.0 80.0 - 99.9 fL    MCH 28.6 26.0 - 35.0 pg    MCHC 31.0 (L) 32.0 - 34.5 %    RDW 19.9 (H) 11.5 - 15.0 fL    Platelets 80 (L) 318 - 450 E9/L    MPV 11.6 7.0 - 12.0 fL    Neutrophils % 83.0 (H) 43.0 - 80.0 %    Lymphocytes % 10.0 (L) 20.0 - 42.0 %    Monocytes % 7.0 2.0 - 12.0 %    Eosinophils % 0.0 0.0 - 6.0 %    Basophils % 0.0 0.0 - 2.0 %    Neutrophils Absolute 6.14 1.80 - 7.30 E9/L    Lymphocytes Absolute 0.74 (L) 1.50 - 4.00 E9/L    Monocytes Absolute 0.52 0.10 - 0.95 E9/L    Eosinophils Absolute 0.00 (L) 0.05 - 0.50 E9/L    Basophils Absolute 0.00 0.00 - 0.20 E9/L    Anisocytosis 2+     Polychromasia 1+     Poikilocytes 1+     Ovalocytes 1+    Comprehensive Metabolic Panel w/ Reflex to MG   Result Value Ref Range    Sodium 123 (L) 132 - 146 mmol/L    Potassium reflex Magnesium 4.9 3.5 - 5.0 mmol/L    Chloride 89 (L) 98 - 107 mmol/L    CO2 17 (L) 22 - 29 mmol/L    Anion Gap 17 (H) 7 - 16 mmol/L    Glucose 271 (H) 74 - 99 mg/dL    BUN 56 (H) 6 - 23 mg/dL    CREATININE 1.1 0.7 - 1.2 mg/dL    GFR Non-African American >60 >=60 mL/min/1.73    GFR African American >60     Calcium 9.5 8.6 - 10.2 mg/dL    Total Protein 4.4 (L) 6.4 - 8.3 g/dL    Albumin 2.8 (L) 3.5 - 5.2 g/dL    Total Bilirubin 2.3 (H) 0.0 - 1.2 mg/dL    Alkaline Phosphatase 467 (H) 40 - 129 U/L    ALT 40 0 - 40 U/L    AST 44 (H) 0 - 39 U/L   Troponin   Result Value Ref Range    Troponin, High Sensitivity 29 (H) 0 - 11 ng/L   Lipase   Result Value Ref Range    Lipase 78 (H) 13 - 60 U/L   Brain Natriuretic Peptide   Result Value Ref Range    Pro-BNP 1,962 (H) 0 - 125 pg/mL   Urinalysis with Microscopic   Result Value Ref Range    Color, UA Yellow Straw/Yellow    Clarity, UA Clear Clear    Glucose, Ur Negative Negative mg/dL    Bilirubin Urine Negative Negative    Ketones, Urine Negative Negative mg/dL    Specific Gravity, UA 1.015 1.005 - 1.030    Blood, Urine TRACE (A) Negative    pH, UA 5.5 5.0 - 9.0    Protein, UA Negative Negative mg/dL    Urobilinogen, Urine 0.2 <2.0 E.U./dL    Nitrite, Urine Negative Negative    Leukocyte Esterase, Urine TRACE (A) Negative    WBC, UA 2-5 0 - 5 /HPF    RBC, UA NONE 0 - 2 /HPF    Epithelial Cells, UA RARE /HPF    Bacteria, UA RARE (A) None Seen /HPF    Yeast, UA Present (A) None Seen /HPF   Lactic Acid   Result Value Ref Range    Lactic Acid 4.8 (HH) 0.5 - 2.2 mmol/L   Ammonia   Result Value Ref Range    Ammonia 31.0 16.0 - 60.0 umol/L   Beta-Hydroxybutyrate   Result Value Ref Range    Beta-Hydroxybutyrate 0.16 0.02 - 0.27 mmol/L   PH, VENOUS   Result Value Ref Range    pH, Jeff 7.27 (L) 7.35 - 7.45   Platelet Confirmation   Result Value Ref Range    Platelet Confirmation CONFIRMED    Lactic Acid   Result Value Ref Range    Lactic Acid 4.2 (HH) 0.5 - 2.2 mmol/L   POCT Glucose   Result Value Ref Range    Glucose 259 mg/dL    QC OK? ok    POCT Glucose   Result Value Ref Range    Meter Glucose 259 (H) 74 - 99 mg/dL   EKG 12 Lead   Result Value Ref Range    Ventricular Rate 108 BPM    Atrial Rate 108 BPM    P-R Interval 170 ms    QRS Duration 136 ms    Q-T Interval 350 ms    QTc Calculation (Bazett) 469 ms    P Axis 87 degrees    R Axis -5 degrees    T Axis 143 degrees       RADIOLOGY:  XR CHEST PORTABLE   Final Result   No acute process. US GALLBLADDER RUQ    (Results Pending)       EKG: This EKG is signed and interpreted by me. Rate: 108  Rhythm: Sinus  Interpretation: Low voltage EKG, no acute ST elevations or depressions.   LBBB  Comparison: stable as compared to patient's most recent EKG      ------------------------- NURSING NOTES AND VITALS REVIEWED ---------------------------  Date / Time Roomed:  3/13/2022  3:48 PM  ED Bed Assignment:  0620/0620-01    The nursing notes within the ED encounter and vital signs as below have been reviewed. Patient Vitals for the past 24 hrs:   BP Temp Temp src Pulse Resp SpO2 Height Weight   03/13/22 2215 (!) 107/56 97.5 °F (36.4 °C) Axillary 99 15 99 % -- --   03/13/22 1848 (!) 94/57 -- -- 98 18 98 % -- --   03/13/22 1745 103/62 -- -- 98 18 96 % -- --   03/13/22 1552 94/63 98.3 °F (36.8 °C) Oral 107 18 96 % 5' 8\" (1.727 m) 158 lb (71.7 kg)       Oxygen Saturation Interpretation: Normal    ------------------------------------------ PROGRESS NOTES ------------------------------------------    Counseling:  I have spoken with the patient and discussed todays results, in addition to providing specific details for the plan of care and counseling regarding the diagnosis and prognosis. Their questions are answered at this time and they are agreeable with the plan of admission.    --------------------------------- ADDITIONAL PROVIDER NOTES ---------------------------------    This patient's ED course included: a personal history and physicial examination, re-evaluation prior to disposition, multiple bedside re-evaluations, IV medications, cardiac monitoring, continuous pulse oximetry and complex medical decision making and emergency management    This patient has remained hemodynamically stable during their ED course. Diagnosis:  1. COVID-19    2. Hyponatremia    3. Lactic acidosis        Disposition:  Patient's disposition: Admit to telemetry  Patient's condition is stable.          See Morales MD  Resident  03/14/22 9453

## 2022-03-13 NOTE — ED NOTES
Pt refused straihjt cath at this time and provided urine sample     Eliodoro Lanes, RN  03/13/22 3377

## 2022-03-13 NOTE — ED NOTES
Wife of patient came to nurses station following report making demands on the care of her . I assured her I would address her concerns with his new nurse upstairs and was preparing to transport. All the while she was within my personal space with her finger in my face using a demeaning loud tone. I walked away to attend to my patient as she followed me around for a prolonged period of time.        Kendal Dumont RN  03/13/22 9991

## 2022-03-13 NOTE — Clinical Note
Discharge Plan[de-identified] Other/[facility])   Telemetry/Cardiac Monitoring Required?: Yes

## 2022-03-13 NOTE — H&P
Department of Internal Medicine  History and Physical    PCP: Lorelei Lawrence DO   Admitting Physician: Dr. Margarita Avendano  Consultants:   Date of Service: 3/13/2022    CHIEF COMPLAINT: Fatigue and altered mental status    HISTORY OF PRESENT ILLNESS:    Patient is a 24-year-old male who presented to the ED due to fatigue and altered mental status. Patient was recently admitted and then discharged few days ago. He had paracentesis performed at that time. However he comes in today with increased fatigue and altered mental status. He also admits to multiple bouts of emesis. He denies any hematemesis or coffee-ground emesis. He does feel cold. Otherwise he denies any abdominal pain. He has not been taking his lactulose. Otherwise he denies any shortness of breath. Denies any cough. States he has decreased appetite.     PAST MEDICAL Hx:  Past Medical History:   Diagnosis Date    [facility]acute myel[facility]) [facility]     chemotherapy    AML (acute myeloblastic leukemia) ([facility] 2010    Cancer ([facility]     Chronic kidney disease     Diabetes mellitus ([facility]     GERD (gastroesophageal reflux disease)     Hx of blood clots 2012    legs    Hyperlipidemia     Liver disease     MI, old     Other disorders of kidney and ureter     Other sleep disturbances     Thyroid disease     Type II or unspecified type diabetes mellitus without mention of complication, not stated as uncontrolled        PAST SURGICAL Hx:   Past Surgical History:   Procedure Laterality Date    APPENDECTOMY      BONE MARROW TRANSPLANT      double core transplant    CATHETER INSERTION N/A 2/28/2022    MEDIPORT CATHETER INSERTION performed by Cayden Maravilla MD at [address] [place] [address]      CT NEEDLE BIOPSY LIVER PERCUTANEOUS  1/17/2022    CT NEEDLE BIOPSY LIVER PERCUTANEOUS    ENDOSCOPY, COLON, DIAGNOSTIC      FOOT NEUROMA SURGERY      UPPER GASTROINTESTINAL ENDOSCOPY         FAMILY Hx:  Family History Problem Relation Age of Onset    Heart Disease Mother     High Blood Pressure Father     Heart Disease Father     Cancer Maternal Grandmother     Cancer Other     Cancer Other        HOME MEDICATIONS:  Prior to Admission medications    Medication Sig Start Date End Date Taking? Authorizing Provider   blood glucose monitor kit and supplies Dispense sufficient amount for indicated testing frequency plus additional to accommodate PRN testing needs. Dispense all needed supplies to include: monitor, strips, lancing device, lancets, control solutions, alcohol swabs. 3/11/22   JYOTI Roa CNP   lactulose [facility]) 10 GM/15ML solution Take 30 mLs by mouth 3 times daily 3/9/22 4/8/22  Idella Gain, DO   magnesium oxide (MAG-OX) 400 MG tablet Take 1 tablet by mouth 2 times daily 3/9/22   Omerlla Gain, DO   furosemide (LASIX) 20 MG tablet Take 2 tablets by mouth daily 3/9/22   Annaa Kj, DO   albuterol (ACCUNEB) 0.63 MG/3ML nebulizer solution Take 1 ampule by nebulization every 6 hours as needed for Wheezing    Historical Provider, MD   blood glucose test strips (ONETOUCH ULTRA) strip TEST 3 TIMES DAILY 3/4/22   JYOTI Roa CNP   Continuous Blood Gluc  (FREESTYLE PAOLA 2 READER) [facility] Check sygar FAsting and ac (4 X daiy). 3/3/22   Vera Okeefe DO   Continuous Blood Gluc Sensor (FREESTYLE PAOLA 2 SENSOR) [facility] [place] sugar 5 X daily.  AM and [facility] 3/3/22   Vera Okeefe DO   ondansetron (ZOFRAN-ODT) 4 MG disintegrating tablet Take 1 tablet by mouth 3 times daily as needed for Nausea or Vomiting 2/24/22   Vera Okeefe DO   ferrous sulfate (IRON 325) 325 (65 Fe) MG tablet Take 1 tablet by mouth 2 times daily (with meals) 1/31/22   Vera Okeefe DO   levothyroxine (SYNTHROID) 200 MCG tablet Take 1 tablet by mouth Daily 12/28/21   JYOTI Brown CNP   sertraline (ZOLOFT) 50 MG tablet Take 1.5 tablets by mouth daily  Patient taking differently: Take 50 mg by mouth daily 1 tablet 21   Jennifer Sanchez, DO   pantoprazole (PROTONIX) 40 MG tablet TAKE 1 TABLET DAILY 21   Jennifer Sanchez, DO   insulin glargine (LANTUS SOLOSTAR) 100 UNIT/ML injection pen Inject 20 Units into the skin nightly 21   Jennifer Sanchez, DO   INS SYRINGE/NEEDLE .5CC/28G (AIMSCO INS SYR MX-COM .5CC/28G) 28G X 1/2\" 0.5 ML MISC Use three times daily 21   Jennifer Sanchez, DO   Insulin Pen Needle (BD PEN NEEDLE JENNIFER U/F) 32G X 4 MM MISC USE AS DIRECTED 21   Jennifer Sanchez, DO   insulin lispro, 1 Unit Dial, (HUMALOG KWIKPEN) 100 UNIT/ML SOPN INJECT 0-20 UNITS INTO THE SKIN THREE TIMES DAILY(BEFORE MEALS) 21   Yudelka Augustine, APRN - CNP   vitamin D (CHOLECALCIFEROL) 1000 UNIT TABS tablet Take 2,000 Units by mouth daily     Historical Provider, MD   aspirin 81 MG chewable tablet Take 81 mg by mouth daily    Historical Provider, MD       ALLERGIES:  Pneumococcal vaccines, Ciprofloxacin, Diatrizoate, Dye [iodides], Epinephrine, Iodine, Metformin and related, Penicillin g, Prednisone, Morphine, Other, and Oxycodone    SOCIAL Hx:  Social History     Socioeconomic History    Marital status:      Spouse name: Emma Larios Number of children: 1    Years of education: Not on file    Highest education level: Not on file   Occupational History    Not on file   Tobacco Use    Smoking status: Former Smoker     Packs/day: 1.00     Years: 5.00     Pack years: 5.00     Quit date: 1976     Years since quittin.9    Smokeless tobacco: Former User     Quit date: 1998   Substance and Sexual Activity    Alcohol use:  Yes     Alcohol/week: 1.0 standard drink     Types: 1 Cans of beer per week     Comment: weekly    Drug use: No    Sexual activity: Yes     Partners: Female   Other Topics Concern    Not on file   Social History Narrative    Not on file     Social Determinants of Health     Financial Resource Strain: Low Risk     Difficulty of Paying Living Expenses: Not very hard   Food Insecurity: No Food Insecurity    Worried About Running Out of Food in the Last Year: Never true    Ran Out of Food in the Last Year: Never true   Transportation Needs:     Lack of Transportation (Medical): Not on file    Lack of Transportation (Non-Medical): Not on file   Physical Activity:     Days of Exercise per Week: Not on file    Minutes of Exercise per Session: Not on file   Stress:     Feeling of Stress : Not on file   Social Connections:     Frequency of Communication with Friends and Family: Not on file    Frequency of Social Gatherings with Friends and Family: Not on file    Attends Shinto Services: Not on file    Active Member of [address] [place] [address] or Organizations: Not on file    Attends Club or Organization Meetings: Not on file    Marital Status: Not on file   Intimate Partner Violence:     Fear of Current or Ex-Partner: Not on file    Emotionally Abused: Not on file    Physically Abused: Not on file    Sexually Abused: Not on file   Housing Stability:     Unable to Pay for Housing in the Last Year: Not on file    Number of [place] in the Last Year: Not on file    Unstable Housing in the Last Year: Not on file       ROS: Positive in bold  General:   Denies chills, fatigue, fever, malaise, night sweats or weight loss    Psychological:   Denies anxiety, disorientation or hallucinations    ENT:    Denies epistaxis, headaches, vertigo or visual changes    Cardiovascular:   Denies any chest pain, irregular heartbeats, or palpitations. No paroxysmal nocturnal dyspnea. Respiratory:   Denies shortness of breath, coughing, sputum production, hemoptysis, or wheezing. No orthopnea. Gastrointestinal:   Denies nausea, vomiting, diarrhea, or constipation. Denies any abdominal pain. Denies change in bowel habits or stools. Genito-Urinary:    Denies any urgency, frequency, hematuria. Voiding without difficulty.     Musculoskeletal:   Denies joint pain, joint stiffness, joint swelling or muscle pain    Neurology:    Denies any headache or focal neurological deficits. No weakness or paresthesia. Derm:    Denies any rashes, ulcers, or excoriations. Denies bruising. Extremities:   Denies any lower extremity swelling or edema. PHYSICAL EXAM: Abnormal findings noted  VITALS:  Vitals:    03/13/22 1848   BP: (!) 94/57   Pulse: 98   Resp: 18   Temp:    SpO2: 98%         CONSTITUTIONAL:    Awake, alert, cooperative, no apparent distress, and appears stated age    EYES:     EOMI, sclera clear, conjunctiva normal    ENT:    Normocephalic, atraumatic,  External ears without lesions. NECK:    Supple, symmetrical, trachea midline, , no JVD    HEMATOLOGIC/LYMPHATICS:    No cervical lymphadenopathy and no supraclavicular lymphadenopathy    LUNGS:    Symmetric. No increased work of breathing, good air exchange, clear to auscultation bilaterally, no wheezes, rhonchi, or rales,     CARDIOVASCULAR:    Normal apical impulse, regular rate and rhythm, normal S1 and S2, no S3 or S4, and no murmur noted    ABDOMEN:     soft, non-distended, non-tender  Patient has abdominal distention    MUSCULOSKELETAL:    There is no redness, warmth, or swelling of the joints. NEUROLOGIC:    Awake, alert, oriented to name, place and time. SKIN:    No bruising or bleeding. No redness, warmth, or swelling   Patient is jaundiced    EXTREMITIES:    Peripheral pulses present. No edema, cyanosis, or swelling.     LINES/CATHETERS     LABORATORY DATA:  CBC with Differential:    Lab Results   Component Value Date    WBC 7.4 03/13/2022    RBC 3.01 03/13/2022    HGB 8.6 03/13/2022    HCT 27.7 03/13/2022    PLT 80 03/13/2022    MCV 92.0 03/13/2022    MCH 28.6 03/13/2022    MCHC 31.0 03/13/2022    RDW 19.9 03/13/2022    NRBC 1.0 03/06/2022    SEGSPCT 22 02/03/2014    METASPCT 0.9 03/08/2022    LYMPHOPCT 10.0 03/13/2022    MONOPCT 7.0 03/13/2022    MYELOPCT 1.8 03/08/2022    BASOPCT

## 2022-03-14 ENCOUNTER — APPOINTMENT (OUTPATIENT)
Dept: ULTRASOUND IMAGING | Age: 72
DRG: 441 | End: 2022-03-14
Payer: MEDICARE

## 2022-03-14 PROBLEM — U07.1 COVID-19: Status: ACTIVE | Noted: 2022-03-14

## 2022-03-14 LAB
ADENOVIRUS BY PCR: NOT DETECTED
ALBUMIN SERPL-MCNC: 3 G/DL (ref 3.5–5.2)
ALP BLD-CCNC: 407 U/L (ref 40–129)
ALT SERPL-CCNC: 35 U/L (ref 0–40)
ANION GAP SERPL CALCULATED.3IONS-SCNC: 14 MMOL/L (ref 7–16)
ANION GAP SERPL CALCULATED.3IONS-SCNC: 14 MMOL/L (ref 7–16)
ANION GAP SERPL CALCULATED.3IONS-SCNC: 15 MMOL/L (ref 7–16)
ANION GAP SERPL CALCULATED.3IONS-SCNC: 18 MMOL/L (ref 7–16)
ANISOCYTOSIS: ABNORMAL
APTT: 37.6 SEC (ref 24.5–35.1)
AST SERPL-CCNC: 40 U/L (ref 0–39)
BACTERIA: ABNORMAL /HPF
BASOPHILS ABSOLUTE: 0 E9/L (ref 0–0.2)
BASOPHILS RELATIVE PERCENT: 0 % (ref 0–2)
BILIRUB SERPL-MCNC: 2.6 MG/DL (ref 0–1.2)
BILIRUBIN URINE: NEGATIVE
BLOOD, URINE: ABNORMAL
BORDETELLA PARAPERTUSSIS BY PCR: NOT DETECTED
BORDETELLA PERTUSSIS BY PCR: NOT DETECTED
BUN BLDV-MCNC: 57 MG/DL (ref 6–23)
BUN BLDV-MCNC: 58 MG/DL (ref 6–23)
BURR CELLS: ABNORMAL
C-REACTIVE PROTEIN: 10.2 MG/DL (ref 0–0.4)
CALCIUM SERPL-MCNC: 9.1 MG/DL (ref 8.6–10.2)
CALCIUM SERPL-MCNC: 9.2 MG/DL (ref 8.6–10.2)
CALCIUM SERPL-MCNC: 9.3 MG/DL (ref 8.6–10.2)
CALCIUM SERPL-MCNC: 9.4 MG/DL (ref 8.6–10.2)
CHLAMYDOPHILIA PNEUMONIAE BY PCR: NOT DETECTED
CHLORIDE BLD-SCNC: 90 MMOL/L (ref 98–107)
CHLORIDE BLD-SCNC: 91 MMOL/L (ref 98–107)
CHLORIDE BLD-SCNC: 91 MMOL/L (ref 98–107)
CHLORIDE BLD-SCNC: 93 MMOL/L (ref 98–107)
CLARITY: CLEAR
CO2: 13 MMOL/L (ref 22–29)
CO2: 17 MMOL/L (ref 22–29)
CO2: 17 MMOL/L (ref 22–29)
CO2: 18 MMOL/L (ref 22–29)
COLOR: YELLOW
CORONAVIRUS 229E BY PCR: NOT DETECTED
CORONAVIRUS HKU1 BY PCR: NOT DETECTED
CORONAVIRUS NL63 BY PCR: NOT DETECTED
CORONAVIRUS OC43 BY PCR: DETECTED
CREAT SERPL-MCNC: 0.9 MG/DL (ref 0.7–1.2)
CREAT SERPL-MCNC: 1 MG/DL (ref 0.7–1.2)
CREATININE URINE: 70 MG/DL (ref 40–278)
D DIMER: 3773 NG/ML DDU
EKG ATRIAL RATE: 108 BPM
EKG P AXIS: 87 DEGREES
EKG P-R INTERVAL: 170 MS
EKG Q-T INTERVAL: 350 MS
EKG QRS DURATION: 136 MS
EKG QTC CALCULATION (BAZETT): 469 MS
EKG R AXIS: -5 DEGREES
EKG T AXIS: 143 DEGREES
EKG VENTRICULAR RATE: 108 BPM
EOSINOPHILS ABSOLUTE: 0 E9/L (ref 0.05–0.5)
EOSINOPHILS RELATIVE PERCENT: 0 % (ref 0–6)
FERRITIN: 1302 NG/ML
FIBRINOGEN: 570 MG/DL (ref 225–540)
GFR AFRICAN AMERICAN: >60
GFR NON-AFRICAN AMERICAN: >60 ML/MIN/1.73
GLUCOSE BLD-MCNC: 235 MG/DL (ref 74–99)
GLUCOSE BLD-MCNC: 241 MG/DL (ref 74–99)
GLUCOSE BLD-MCNC: 245 MG/DL (ref 74–99)
GLUCOSE BLD-MCNC: 253 MG/DL (ref 74–99)
GLUCOSE URINE: NEGATIVE MG/DL
HCT VFR BLD CALC: 24.4 % (ref 37–54)
HEMOGLOBIN: 7.6 G/DL (ref 12.5–16.5)
HUMAN METAPNEUMOVIRUS BY PCR: NOT DETECTED
HUMAN RHINOVIRUS/ENTEROVIRUS BY PCR: NOT DETECTED
INFLUENZA A BY PCR: NOT DETECTED
INFLUENZA B BY PCR: NOT DETECTED
INR BLD: 1.5
KETONES, URINE: NEGATIVE MG/DL
LACTATE DEHYDROGENASE: 169 U/L (ref 135–225)
LACTIC ACID: 3.5 MMOL/L (ref 0.5–2.2)
LEUKOCYTE ESTERASE, URINE: ABNORMAL
LYMPHOCYTES ABSOLUTE: 0.4 E9/L (ref 1.5–4)
LYMPHOCYTES RELATIVE PERCENT: 9 % (ref 20–42)
MAGNESIUM: 1.5 MG/DL (ref 1.6–2.6)
MCH RBC QN AUTO: 28.8 PG (ref 26–35)
MCHC RBC AUTO-ENTMCNC: 31.1 % (ref 32–34.5)
MCV RBC AUTO: 92.4 FL (ref 80–99.9)
METER GLUCOSE: 215 MG/DL (ref 74–99)
METER GLUCOSE: 232 MG/DL (ref 74–99)
METER GLUCOSE: 241 MG/DL (ref 74–99)
METER GLUCOSE: 243 MG/DL (ref 74–99)
MONOCYTES ABSOLUTE: 0.09 E9/L (ref 0.1–0.95)
MONOCYTES RELATIVE PERCENT: 2 % (ref 2–12)
MYCOPLASMA PNEUMONIAE BY PCR: NOT DETECTED
NEUTROPHILS ABSOLUTE: 3.92 E9/L (ref 1.8–7.3)
NEUTROPHILS RELATIVE PERCENT: 89 % (ref 43–80)
NITRITE, URINE: NEGATIVE
OSMOLALITY URINE: 414 MOSM/KG (ref 300–900)
OSMOLALITY: 289 MOSM/KG (ref 285–310)
OVALOCYTES: ABNORMAL
PARAINFLUENZA VIRUS 1 BY PCR: NOT DETECTED
PARAINFLUENZA VIRUS 2 BY PCR: NOT DETECTED
PARAINFLUENZA VIRUS 3 BY PCR: NOT DETECTED
PARAINFLUENZA VIRUS 4 BY PCR: NOT DETECTED
PDW BLD-RTO: 20.1 FL (ref 11.5–15)
PH UA: 5.5 (ref 5–9)
PHOSPHORUS: 3.4 MG/DL (ref 2.5–4.5)
PLATELET # BLD: 58 E9/L (ref 130–450)
PLATELET CONFIRMATION: NORMAL
PMV BLD AUTO: 10.8 FL (ref 7–12)
POIKILOCYTES: ABNORMAL
POLYCHROMASIA: ABNORMAL
POTASSIUM SERPL-SCNC: 4.7 MMOL/L (ref 3.5–5)
POTASSIUM SERPL-SCNC: 5 MMOL/L (ref 3.5–5)
POTASSIUM SERPL-SCNC: 5 MMOL/L (ref 3.5–5)
POTASSIUM SERPL-SCNC: 5.1 MMOL/L (ref 3.5–5)
PROTEIN UA: NEGATIVE MG/DL
PROTHROMBIN TIME: 17.9 SEC (ref 9.3–12.4)
RBC # BLD: 2.64 E12/L (ref 3.8–5.8)
RBC UA: ABNORMAL /HPF (ref 0–2)
RESPIRATORY SYNCYTIAL VIRUS BY PCR: NOT DETECTED
SARS-COV-2, PCR: DETECTED
SODIUM BLD-SCNC: 122 MMOL/L (ref 132–146)
SODIUM BLD-SCNC: 122 MMOL/L (ref 132–146)
SODIUM BLD-SCNC: 123 MMOL/L (ref 132–146)
SODIUM BLD-SCNC: 124 MMOL/L (ref 132–146)
SODIUM URINE: <20 MMOL/L
SPECIFIC GRAVITY UA: 1.01 (ref 1–1.03)
TOTAL PROTEIN: 4.5 G/DL (ref 6.4–8.3)
TROPONIN, HIGH SENSITIVITY: 25 NG/L (ref 0–11)
TSH SERPL DL<=0.05 MIU/L-ACNC: 3.69 UIU/ML (ref 0.27–4.2)
UREA NITROGEN, UR: 742 MG/DL (ref 800–1666)
URIC ACID, SERUM: 5.9 MG/DL (ref 3.4–7)
UROBILINOGEN, URINE: 0.2 E.U./DL
VITAMIN D 25-HYDROXY: 21 NG/ML (ref 30–100)
WBC # BLD: 4.4 E9/L (ref 4.5–11.5)
WBC UA: ABNORMAL /HPF (ref 0–5)
YEAST: PRESENT /HPF

## 2022-03-14 PROCEDURE — 76705 ECHO EXAM OF ABDOMEN: CPT

## 2022-03-14 PROCEDURE — 84540 ASSAY OF URINE/UREA-N: CPT

## 2022-03-14 PROCEDURE — 80053 COMPREHEN METABOLIC PANEL: CPT

## 2022-03-14 PROCEDURE — 83735 ASSAY OF MAGNESIUM: CPT

## 2022-03-14 PROCEDURE — 80048 BASIC METABOLIC PNL TOTAL CA: CPT

## 2022-03-14 PROCEDURE — 97530 THERAPEUTIC ACTIVITIES: CPT | Performed by: OCCUPATIONAL THERAPIST

## 2022-03-14 PROCEDURE — 84443 ASSAY THYROID STIM HORMONE: CPT

## 2022-03-14 PROCEDURE — 6370000000 HC RX 637 (ALT 250 FOR IP): Performed by: INTERNAL MEDICINE

## 2022-03-14 PROCEDURE — 0202U NFCT DS 22 TRGT SARS-COV-2: CPT

## 2022-03-14 PROCEDURE — 85730 THROMBOPLASTIN TIME PARTIAL: CPT

## 2022-03-14 PROCEDURE — 2580000003 HC RX 258: Performed by: INTERNAL MEDICINE

## 2022-03-14 PROCEDURE — 83615 LACTATE (LD) (LDH) ENZYME: CPT

## 2022-03-14 PROCEDURE — 36415 COLL VENOUS BLD VENIPUNCTURE: CPT

## 2022-03-14 PROCEDURE — 82728 ASSAY OF FERRITIN: CPT

## 2022-03-14 PROCEDURE — 85610 PROTHROMBIN TIME: CPT

## 2022-03-14 PROCEDURE — 84550 ASSAY OF BLOOD/URIC ACID: CPT

## 2022-03-14 PROCEDURE — 97165 OT EVAL LOW COMPLEX 30 MIN: CPT | Performed by: OCCUPATIONAL THERAPIST

## 2022-03-14 PROCEDURE — 85025 COMPLETE CBC W/AUTO DIFF WBC: CPT

## 2022-03-14 PROCEDURE — 85378 FIBRIN DEGRADE SEMIQUANT: CPT

## 2022-03-14 PROCEDURE — 6360000002 HC RX W HCPCS: Performed by: INTERNAL MEDICINE

## 2022-03-14 PROCEDURE — P9047 ALBUMIN (HUMAN), 25%, 50ML: HCPCS | Performed by: INTERNAL MEDICINE

## 2022-03-14 PROCEDURE — 2060000000 HC ICU INTERMEDIATE R&B

## 2022-03-14 PROCEDURE — 83935 ASSAY OF URINE OSMOLALITY: CPT

## 2022-03-14 PROCEDURE — 93010 ELECTROCARDIOGRAM REPORT: CPT | Performed by: INTERNAL MEDICINE

## 2022-03-14 PROCEDURE — 85384 FIBRINOGEN ACTIVITY: CPT

## 2022-03-14 PROCEDURE — 97161 PT EVAL LOW COMPLEX 20 MIN: CPT | Performed by: PHYSICAL THERAPIST

## 2022-03-14 PROCEDURE — 84484 ASSAY OF TROPONIN QUANT: CPT

## 2022-03-14 PROCEDURE — 0W9G3ZZ DRAINAGE OF PERITONEAL CAVITY, PERCUTANEOUS APPROACH: ICD-10-PCS | Performed by: INTERNAL MEDICINE

## 2022-03-14 PROCEDURE — 81001 URINALYSIS AUTO W/SCOPE: CPT

## 2022-03-14 PROCEDURE — 83930 ASSAY OF BLOOD OSMOLALITY: CPT

## 2022-03-14 PROCEDURE — 97110 THERAPEUTIC EXERCISES: CPT | Performed by: PHYSICAL THERAPIST

## 2022-03-14 PROCEDURE — 87088 URINE BACTERIA CULTURE: CPT

## 2022-03-14 PROCEDURE — 83605 ASSAY OF LACTIC ACID: CPT

## 2022-03-14 PROCEDURE — 82570 ASSAY OF URINE CREATININE: CPT

## 2022-03-14 PROCEDURE — 84300 ASSAY OF URINE SODIUM: CPT

## 2022-03-14 PROCEDURE — 82306 VITAMIN D 25 HYDROXY: CPT

## 2022-03-14 PROCEDURE — 84100 ASSAY OF PHOSPHORUS: CPT

## 2022-03-14 PROCEDURE — 82962 GLUCOSE BLOOD TEST: CPT

## 2022-03-14 PROCEDURE — 86140 C-REACTIVE PROTEIN: CPT

## 2022-03-14 RX ORDER — ZINC GLUCONATE 50 MG
50 TABLET ORAL DAILY
Status: DISCONTINUED | OUTPATIENT
Start: 2022-03-14 | End: 2022-03-17 | Stop reason: HOSPADM

## 2022-03-14 RX ORDER — VITAMIN B COMPLEX
2000 TABLET ORAL DAILY
Status: DISCONTINUED | OUTPATIENT
Start: 2022-03-14 | End: 2022-03-17 | Stop reason: HOSPADM

## 2022-03-14 RX ORDER — OXYCODONE HYDROCHLORIDE 5 MG/1
5 TABLET ORAL ONCE
Status: DISCONTINUED | OUTPATIENT
Start: 2022-03-14 | End: 2022-03-17 | Stop reason: HOSPADM

## 2022-03-14 RX ORDER — ALBUMIN (HUMAN) 12.5 G/50ML
50 SOLUTION INTRAVENOUS ONCE
Status: COMPLETED | OUTPATIENT
Start: 2022-03-14 | End: 2022-03-14

## 2022-03-14 RX ORDER — FERROUS SULFATE 325(65) MG
325 TABLET ORAL 2 TIMES DAILY WITH MEALS
Status: DISCONTINUED | OUTPATIENT
Start: 2022-03-14 | End: 2022-03-17 | Stop reason: HOSPADM

## 2022-03-14 RX ORDER — MAGNESIUM SULFATE 1 G/100ML
1000 INJECTION INTRAVENOUS ONCE
Status: COMPLETED | OUTPATIENT
Start: 2022-03-14 | End: 2022-03-14

## 2022-03-14 RX ORDER — FUROSEMIDE 40 MG/1
40 TABLET ORAL DAILY
Status: DISCONTINUED | OUTPATIENT
Start: 2022-03-14 | End: 2022-03-17 | Stop reason: HOSPADM

## 2022-03-14 RX ORDER — BUDESONIDE AND FORMOTEROL FUMARATE DIHYDRATE 160; 4.5 UG/1; UG/1
2 AEROSOL RESPIRATORY (INHALATION) 2 TIMES DAILY
Status: DISCONTINUED | OUTPATIENT
Start: 2022-03-14 | End: 2022-03-17 | Stop reason: HOSPADM

## 2022-03-14 RX ORDER — LACTULOSE 10 G/15ML
20 SOLUTION ORAL 3 TIMES DAILY
Status: DISCONTINUED | OUTPATIENT
Start: 2022-03-14 | End: 2022-03-17 | Stop reason: HOSPADM

## 2022-03-14 RX ORDER — SODIUM CHLORIDE 9 MG/ML
INJECTION, SOLUTION INTRAVENOUS CONTINUOUS
Status: DISCONTINUED | OUTPATIENT
Start: 2022-03-14 | End: 2022-03-15

## 2022-03-14 RX ORDER — ASCORBIC ACID 500 MG
1000 TABLET ORAL DAILY
Status: DISCONTINUED | OUTPATIENT
Start: 2022-03-14 | End: 2022-03-17 | Stop reason: HOSPADM

## 2022-03-14 RX ORDER — ONDANSETRON 4 MG/1
4 TABLET, ORALLY DISINTEGRATING ORAL 3 TIMES DAILY PRN
Status: DISCONTINUED | OUTPATIENT
Start: 2022-03-14 | End: 2022-03-14 | Stop reason: SDUPTHER

## 2022-03-14 RX ADMIN — SODIUM CHLORIDE, PRESERVATIVE FREE 10 ML: 5 INJECTION INTRAVENOUS at 11:03

## 2022-03-14 RX ADMIN — Medication 50 MG: at 09:48

## 2022-03-14 RX ADMIN — FERROUS SULFATE TAB 325 MG (65 MG ELEMENTAL FE) 325 MG: 325 (65 FE) TAB at 20:23

## 2022-03-14 RX ADMIN — MAGNESIUM OXIDE 400 MG: 400 TABLET ORAL at 20:23

## 2022-03-14 RX ADMIN — MAGNESIUM OXIDE 400 MG: 400 TABLET ORAL at 10:59

## 2022-03-14 RX ADMIN — LACTULOSE 20 G: 20 SOLUTION ORAL at 10:59

## 2022-03-14 RX ADMIN — Medication 2000 UNITS: at 10:59

## 2022-03-14 RX ADMIN — INSULIN GLARGINE 20 UNITS: 100 INJECTION, SOLUTION SUBCUTANEOUS at 20:31

## 2022-03-14 RX ADMIN — MAGNESIUM SULFATE HEPTAHYDRATE 1000 MG: 1 INJECTION, SOLUTION INTRAVENOUS at 21:11

## 2022-03-14 RX ADMIN — LEVOTHYROXINE SODIUM 200 MCG: 0.2 TABLET ORAL at 06:56

## 2022-03-14 RX ADMIN — SODIUM CHLORIDE: 9 INJECTION, SOLUTION INTRAVENOUS at 21:23

## 2022-03-14 RX ADMIN — ASPIRIN 81 MG 81 MG: 81 TABLET ORAL at 10:59

## 2022-03-14 RX ADMIN — OXYCODONE HYDROCHLORIDE AND ACETAMINOPHEN 1000 MG: 500 TABLET ORAL at 10:59

## 2022-03-14 RX ADMIN — SERTRALINE 50 MG: 50 TABLET, FILM COATED ORAL at 10:59

## 2022-03-14 RX ADMIN — ALBUMIN (HUMAN) 50 G: 0.25 INJECTION, SOLUTION INTRAVENOUS at 10:58

## 2022-03-14 RX ADMIN — ACETAMINOPHEN 650 MG: 325 TABLET ORAL at 19:16

## 2022-03-14 RX ADMIN — FUROSEMIDE 40 MG: 40 TABLET ORAL at 11:10

## 2022-03-14 RX ADMIN — PANTOPRAZOLE SODIUM 40 MG: 40 TABLET, DELAYED RELEASE ORAL at 10:59

## 2022-03-14 RX ADMIN — LACTULOSE 20 G: 20 SOLUTION ORAL at 20:23

## 2022-03-14 ASSESSMENT — PAIN SCALES - GENERAL
PAINLEVEL_OUTOF10: 0
PAINLEVEL_OUTOF10: 0
PAINLEVEL_OUTOF10: 8
PAINLEVEL_OUTOF10: 0

## 2022-03-14 ASSESSMENT — PAIN SCALES - WONG BAKER
WONGBAKER_NUMERICALRESPONSE: 0
WONGBAKER_NUMERICALRESPONSE: 0

## 2022-03-14 ASSESSMENT — PAIN DESCRIPTION - PAIN TYPE: TYPE: CHRONIC PAIN

## 2022-03-14 NOTE — PROGRESS NOTES
Internal Medicine Progress Note    [facility]    Ophelia Mckeon. Brian Manuel, PRASANNAOJESUS Alonso D.O., LIZ Renteria, MSN, APRN, NP-C  Wei Menard. Sarah Acevedo, MSN, APRN-CNP     Primary Care Physician: Yoly Langley DO   Admitting Physician:  Jamie Sood DO  Admission date and time: 3/13/2022  3:48 PM    Room:  [address] [place] [address]  Admitting diagnosis: Lactic acidosis [E87.2]  Hyponatremia [E87.1]  COVID-19 [U07.1]    Patient Name: Jsosy Williamson  MRN: [id number]    Date of Service: 3/14/2022     Subjective: Torsten Cruz is a 73 y.o. male who was seen and examined today,3/14/2022, at the bedside. Torsten Cruz presented to [facility] emergency department yesterday for the evaluation of confusion. He is certainly more oriented during my examination than on initial presentation yesterday. There is questionable compliance with cirrhotic medications as an outpatient. He has now tested positive for Covid but denies any overt respiratory symptomatology. His wife was not present during my examination today. Review of System:   Constitutional:   Admits to generalized malaise and fatigue  HEENT:   Denies ear pain, sore throat, sinus or eye problems. Cardiovascular:   Denies any chest pain, irregular heartbeats, or palpitations. Respiratory:   Denies shortness of breath, coughing, sputum production, hemoptysis, or wheezing. Gastrointestinal:   Admits to abdominal distention compatible with ascites. Genitourinary:    Denies any urgency, frequency, hematuria. Voiding without difficulty. Extremities:   Denies lower extremity swelling, edema or cyanosis. Neurology:    Denies any headache or focal neurological deficits, Denies generalized weakness or memory difficulty. Psch:   Denies being anxious or depressed. Musculoskeletal:    Denies  myalgias, joint complaints or back pain.    Integumentary:   Denies any rashes, ulcers, or excoriations. Denies bruising. Hematologic/Lymphatic:  Denies bruising or bleeding. Physical Exam:  I/O this shift:  In: 200 [P.O.:100; IV Piggyback:100]  Out: -     Intake/Output Summary (Last 24 hours) at 3/14/2022 1031  Last data filed at 3/14/2022 0800  Gross per 24 hour   Intake 205 ml   Output 180 ml   Net 25 ml   I/O last 3 completed shifts: In: 5 [I.V.:5]  Out: 180 [Urine:180]  Patient Vitals for the past 96 hrs (Last 3 readings):   Weight   03/14/22 0800 183 lb 1.6 oz (83.1 kg)   03/14/22 0700 106 lb (48.1 kg)   03/13/22 1552 158 lb (71.7 kg)     Vital Signs:   Blood pressure 110/60, pulse 90, temperature 98.2 °F (36.8 °C), temperature source Oral, resp. rate 18, height 5' 8\" (1.727 m), weight 183 lb 1.6 oz (83.1 kg), SpO2 98 %. General appearance:  Alert, responsive, oriented to person, place, and time. Well preserved, alert, no distress. Head:  Normocephalic. No masses, lesions or tenderness. Eyes:  PERRLA. EOMI. Sclera clear. Buccal mucosa moist.  ENT:  Ears normal. Mucosa normal.  Neck:    Supple. Trachea midline. No thyromegaly. No JVD. No bruits. Heart:    Rhythm regular. Rate controlled. No murmurs. Lungs:    Symmetrical. Clear to auscultation bilaterally. No wheezes. No rhonchi. No rales. Abdomen:   Abdominal distention compatible with ascites. Extremities:    Peripheral pulses present. No peripheral edema. No ulcers. No cyanosis. No clubbing. Neurologic:    Alert x 3. No focal deficit. Cranial nerves grossly intact. No focal weakness. Very mildly confused but easily redirectable. Psych:   Behavior is normal. Mood appears normal. Speech is not rapid and/or pressured. Musculoskeletal:   Spine ROM normal. Muscular strength intact. Gait not assessed. Integumentary:  No rashes  Skin normal color and texture.   Genitalia/Breast:  Deferred    Medication:  Scheduled Meds:   budesonide-formoterol  2 puff Inhalation BID    albuterol-ipratropium  1 puff Inhalation Q6H    ascorbic acid  1,000 mg Oral Daily    vitamin D  2,000 Units Oral Daily    zinc gluconate  50 mg Oral Daily    ferrous sulfate  325 mg Oral BID WC    furosemide  40 mg Oral Daily    lactulose  20 g Oral TID    albumin human  50 g IntraVENous Once    sodium chloride flush  5-40 mL IntraVENous 2 times per day    aspirin  81 mg Oral Daily    insulin glargine  20 Units SubCUTAneous Nightly    levothyroxine  200 mcg Oral Daily    magnesium oxide  400 mg Oral BID    pantoprazole  40 mg Oral Daily    sertraline  50 mg Oral Daily     Continuous Infusions:   dextrose      sodium chloride         Objective Data:  CBC with Differential:    Lab Results   Component Value Date    WBC 4.4 03/14/2022    RBC 2.64 03/14/2022    HGB 7.6 03/14/2022    HCT 24.4 03/14/2022    PLT 58 03/14/2022    MCV 92.4 03/14/2022    MCH 28.8 03/14/2022    MCHC 31.1 03/14/2022    RDW 20.1 03/14/2022    NRBC 1.0 03/06/2022    SEGSPCT 22 02/03/2014    METASPCT 0.9 03/08/2022    LYMPHOPCT 9.0 03/14/2022    MONOPCT 2.0 03/14/2022    MYELOPCT 1.8 03/08/2022    BASOPCT 0.0 03/14/2022    MONOSABS 0.09 03/14/2022    LYMPHSABS 0.40 03/14/2022    EOSABS 0.00 03/14/2022    BASOSABS 0.00 03/14/2022     CMP:    Lab Results   Component Value Date     03/14/2022    K 4.7 03/14/2022    K 4.9 03/13/2022    CL 90 03/14/2022    CO2 18 03/14/2022    BUN 57 03/14/2022    CREATININE 1.0 03/14/2022    GFRAA >60 03/14/2022    LABGLOM >60 03/14/2022    GLUCOSE 241 03/14/2022    GLUCOSE 318 04/30/2012    PROT 4.5 03/14/2022    LABALBU 3.0 03/14/2022    LABALBU 4.0 04/30/2012    CALCIUM 9.2 03/14/2022    BILITOT 2.6 03/14/2022    ALKPHOS 407 03/14/2022    AST 40 03/14/2022    ALT 35 03/14/2022         Assessment:  1. Hepatic encephalopathy in the setting of decompensated cirrhosis and large volume ascites partially due to medication noncompliance  2. COVID-19 infection without overt respiratory or GI symptomatology  3.  Acute on chronic kidney disease stage III complicated by outpatient diuretic therapy and overall decreased oral intake  4. Previous history of AML with current findings of classic Hodgkin's lymphoma  5. Acute on chronic anemia  6. Nondisplaced sacral fracture  7. Insulin-dependent diabetes mellitus type II  8. History of DVT  9. History of MI  10. Hypothyroidism-TSH 5.310  11. Hyperlipidemia  12. Hx of tobacco abuse       Plan: This remains an overall complicated case. This is the patient's third hospitalization in the past 2 weeks for the same issue. He does well while in the hospital but then decompensates upon returning home. There is high clinical suspicion for medication noncompliance despite his wife stating the opposite. He has now tested positive for Covid and will receive appropriate vitamin supplementation. He is relatively asymptomatic and is not requiring nasal cannula oxygen. We have resumed cirrhotic medications and we will move forward with paracentesis today with additional albumin supplementation. I will discuss the case with his wife when she presents to the bedside. As I have stated on previous hospitalizations, I do not feel the patient is functioning well at home and they have been unable to follow-up with their primary oncology team in [facility][1] secondary to frequent hospitalizations. Continue current therapy. See orders for further plan of care. More than 50% of my  time was spent at the bedside counseling/coordinating care with the patient and/or family with face to face contact. This time was spent reviewing notes and laboratory data as well as instructing and counseling the patient. Time I spent with the family or surrogate(s) is included only if the patient was incapable of providing the necessary information or participating in medical decisions. I also discussed the differential diagnosis and all of the proposed management plans with the patient and individuals accompanying the patient.     Natalio Hassan requires this high level of physician care and nursing on the IMC/Telemetry unit due the complexity of decision management and chance of rapid decline or death. Continued cardiac monitoring and higher level of nursing are required. I am readily available for any further decision-making and intervention.      Adina Almeida DO, F.A.C.O.I.  3/14/2022  10:31 AM

## 2022-03-14 NOTE — PROGRESS NOTES
[facility] [facility]  [address], [address]         Date:3/14/2022                                                   Patient Name: Gale Santos     MRN: [id number]     : 1950     Room: [address] [place] [address]       Evaluating OT: Naomi Quintana, YANYR/L; [id number]       Referring Provider and Orders/Date:   OT eval and treat Start: 22, End: 22, ONE TIME, Standing Count: 1 Occurrences, R    Heavenly Bruno DO     Diagnosis:   1. COVID-19    2. Hyponatremia    3.  Lactic acidosis       Pertinent Medical History:        Past Medical History:   Diagnosis Date    AML (acute myeloblastic [facility]) ([facility]     chemotherapy    AML (acute myeloblastic leukemia) ([facility] 2010    Cancer ([facility]     Chronic kidney disease     Diabetes mellitus ([facility]     GERD (gastroesophageal reflux disease)     Hx of blood clots 2012    legs    Hyperlipidemia     Liver disease     MI, old     Other disorders of kidney and ureter     Other sleep disturbances     Thyroid disease     Type II or unspecified type diabetes mellitus without mention of complication, not stated as uncontrolled           Past Surgical History:   Procedure Laterality Date    APPENDECTOMY      BONE MARROW TRANSPLANT      double core transplant    CATHETER INSERTION N/A 2022    MEDIPORT CATHETER INSERTION performed by Mar Topete MD at [address] [place] [address]      CT NEEDLE BIOPSY LIVER PERCUTANEOUS  2022    CT NEEDLE BIOPSY LIVER PERCUTANEOUS    ENDOSCOPY, COLON, DIAGNOSTIC      FOOT NEUROMA SURGERY      UPPER GASTROINTESTINAL ENDOSCOPY         Precautions:  Fall Risk, covid+ with droplet    Recommended placement: subacute    Assessment of current deficits     [x] Functional mobility  [x]ADLs  [x] Strength               [x]Cognition     [x] Functional transfers   [x] IADLs         [x] Safety Awareness [x]Endurance     [x] Fine Coordination              [x] Balance      [] Vision/perception   []Sensation      [x]Gross Motor Coordination  [] ROM  [] Delirium                   [] Motor Control     OT PLAN OF CARE   OT POC based on physician orders, patient diagnosis and results of clinical assessment    Frequency/Duration 1-3 days/wk for 2 weeks PRN   Specific OT Treatment Interventions to include:   * Instruction/training on adapted ADL techniques and AE recommendations to increase functional independence within precautions       * Training on energy conservation strategies, correct breathing pattern and techniques to improve independence/tolerance for self-care routine  * Functional transfer/mobility training/DME recommendations for increased independence, safety, and fall prevention  * Patient/Family education to increase follow through with safety techniques and functional independence  * Recommendation of environmental modifications for increased safety with functional transfers/mobility and ADLs  * Cognitive retraining/development of therapeutic activities to improve problem solving, judgement, memory, and attention for increased safety/participation in ADL/IADL tasks  * Therapeutic exercise to improve motor endurance, ROM, and functional strength for ADLs/functional transfers  * Therapeutic activities to facilitate/challenge dynamic balance, stand tolerance for increased safety and independence with ADLs  * Therapeutic activities to facilitate gross/fine motor skills for increased independence with ADLs  * Neuro-muscular re-education: facilitation of righting/equilibrium reactions, midline orientation, scapular stability/mobility, normalization of muscle tone, and facilitation of volitional active controled movement  * Positioning to improve skin integrity, interaction with environment and functional independence     Recommended Adaptive Equipment/DME: TBD      Home Living: Pt is a poor historian and unsure if report is accurate. Patient Benita Sarabia a ranch home with 3 steps  to enter with Rail. Wife can provide 24/7 support. No steps to the basement. Son lives local and can assist.     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[employer], body shop   Enjoys: watching TV, \"busy with the chemo\", social with friends and family, going back to body shop     Pain Level: none  Cognition: A&O: 3/4 with confusion to situation; Follows 2 step directions              Memory:  Fair-              Sequencing:  Fair-              Problem solving:  Fair-              Judgement/safety:  Poor+    AM-PAC Daily Activity Inpatient   How much help for putting on and taking off regular lower body clothing?: Total  How much help for Bathing?: A Lot  How much help for Toileting?: Total  How much help for putting on and taking off regular upper body clothing?: A Lot  How much help for taking care of personal grooming?: A Lot  How much help for eating meals?: A Little  AM-PAC Inpatient Daily Activity Raw Score: 11  AM-PAC Inpatient ADL T-Scale Score : 29.04  ADL Inpatient CMS 0-100% Score: 70.42  ADL Inpatient CMS G-Code Modifier : CL     Functional Assessment:     Initial Eval Status  Date: 3/14/2022   Treatment Status  Date: STGs = LTGs  Time frame: 10-14 days   Feeding Min Assist due to coordination and spillage. Required repositioning to EOB  SBA   Grooming Moderate Assist with oral care due to coordination and some confusion. Stand by Assist    UB Dressing Maximal Assist sitting EOB  Minimal Assist    LB Dressing Dependent to don mary socks from sitting EOB  Moderate Assist    Bathing Maximal Assist from supine level for safety.  Pt able to wash arms and chest.   Moderate Assist    Toileting Dependent with incontinence of urine upon arrival. Educated pt on importance of hygiene for skin integrity and to prevent infection. Educated on importance of directing care making staff aware of incontinence episodes. Maximal Assist    Bed Mobility  Supine to sit: Minimal Assist   Sit to supine: Minimal Assist     Supine to sit: Stand by Assist   Sit to supine: Stand by Assist    Functional Transfers Maximal Assist with hand held assist and confusion as the biggest noted deficit. Completed x 4  Minimal Assist    Functional Mobility Maximal Assist with multiple attempts for side stepping to [facility], but very limited with confusion and reported fatigue. Only 1-2 steps taken at a time. Minimal Assist    Balance Sitting:     Static:  Fair-    Dynamic:poor+  Standing: poor  Sitting:     Static:  Fair+    Dynamic:fair  Standing: fair-   Activity Tolerance Vitals with activity:room air but pulse ox unable to read during session. No SOB noted. Fair- tolerance overall sitting EOB for 5min period with ADLs and meal. Standing for <10sec  Increase standing tolerance for >3min with stable vital signs for carry over into toileting, functional tranfers and indep in ADLs   Visual/  Perceptual Glasses: Present; WFL    Reports change in vision since admission: No     NA   Rommel UE Strengthening  4-/5 generally  4+/5MMT generally for carry over into self care, functional transfers and functional mobility with AD. Hand Dominance  [x] Right  [] Left    AROM (PROM) Strength Additional Info:    RUE  WFL 4-/5 good  and fair-  FMC/dexterity noted during ADL tasks-tremor  Opposition [x] Intact [] Impaired  Finger to nose [] Intact [x] Impaired     LUE WFL 4-/5 good  and fair- FMC/dexterity noted during ADL tasks-tremor  Opposition [x] Intact [] Impaired  Finger to nose [] Intact [x] Impaired     Hearing: WFL  Sensation:  No c/o numbness or tingling   Tone: WFL  Edema: none    Comments: Upon arrival patient supine. Pt required max A for most UB ADLs and dep A LB ADLs tasks.  Limited with max A for standing during LB ADLs and functional transfers. The biggest barriers reflect that of functional transfers, functional mobility, UB/LB ADLs, cognition, activity tolerance, balance, safety and strengthening. At end of session, patient supine with call light and phone within reach, all lines and tubes intact. Overall patient demonstrated decreased independence and safety during completion of ADL/functional transfer/mobility tasks compared to PLOF. Nursing updated on pt position and status following OT eval. Pt would benefit from continued skilled OT to increase safety and independence with completion of ADL/IADL tasks for functional independence and quality of life. Treatment: OT treatment provided this date includes:   Instruction, education and training on safe facilitation and adapted techniques for completion of ADLs. These include neuromuscular reeducation to facilitate balance/righting reactions,safe functional transfer techniques, proper positioning/alignment to improve interaction with environment and overall function and on adapted techniques/work simplification for completion of ADLs. Education provided on hand/feet placement with bed rails and body mechanics for fall prevention. Extended time to complete all tasks, including skilled monitoring of patient's response during treatment session and vital signs. Prior to and at the end of session, environmental modifications / line management completed for patients safety and efficiency of treatment session. See above for further details. Rehab Potential: Fair for established goals     Patient / Family Goal: none reported       Patient and/or family were instructed on functional diagnosis, prognosis/goals and OT plan of care. Demonstrated fair- understanding.      Eval Complexity: Low  · History: Brief review of medical records and additional review of physical, cognitive, or psychosocial history related to current functional performance  · Exam: 3+ performance deficits  · Assistance/Modification: Mod assistance or modifications required to perform tasks. May have comorbidities that affect occupational performance. Time In: 0921  Time Out: 2605  Total Treatment Time: 11    Min Units   OT Eval Low 97165  x  1   OT Eval Medium 18785      OT Eval High 30515      OT Re-Eval [id number]       Therapeutic Ex 63107       Therapeutic Activities 83137  11 1    ADL/Self Care 99721       Orthotic Management 95099       Manual [id number]     Neuro Re-Ed 83413       Non-Billable Time          Evaluation Time additionally includes thorough review of current medical information, gathering information on past medical history/social history and prior level of function, interpretation of standardized testing/informal observation of tasks, assessment of data and development of plan of care and goals.       Gary Smart OTR/L; [id number]

## 2022-03-14 NOTE — PROGRESS NOTES
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 Hx AML, DM. Pt w/ severe malnutrition and increased nutrient needs. Malnutrition Assessment:  Malnutrition Status:  Severe malnutrition    Context:  Chronic Illness     Findings of the 6 clinical characteristics of malnutrition:  Energy Intake:  7 - 75% or less estimated energy requirements for 1 month or longer  Weight Loss:  Unable to assess     Body Fat Loss:   (moderate fat loss) Orbital,Buccal region   Muscle Mass Loss:  7 - Severe muscle mass loss Temples (temporalis),Clavicles (pectoralis & deltoids)  Fluid Accumulation:  No significant fluid accumulation     Strength:  Not Performed    Estimated Daily Nutrient Needs:  Energy (kcal):  4961-0585 (o29-60ancf/kg); Weight Used for Energy Requirements:  Current     Protein (g):  80-90 (x1.3-1.5gm/kg); Weight Used for Protein Requirements:  Adjusted (Adj #/62kg)        Fluid (ml/day):  8974-8587; Method Used for Fluid Requirements:  1 ml/kcal      Nutrition Related Findings:  A/ox1, hyperactive BS, Abd Round/distended, BLEBUE +2 edema, I/O WNL, Ammonia level WNL, Bun(H), bilirunin 2. 6(H)      Wounds:  Surgical Incision       Current Nutrition Therapies:    ADULT DIET; Regular  ADULT ORAL NUTRITION SUPPLEMENT; Breakfast, Lunch, Dinner; Other Oral Supplement; GELATEIN 20    Anthropometric Measures:  · Height: 5' 8\" (172.7 cm)  · Current Body Weight: 183 lb (83 kg) (3/14 bed)   · Usual Body Weight:  (MALATHI for weight changes d/t fluid fluxs, wt discrepancy)     · Ideal Body Weight: 154 lbs; % Ideal Body Weight 118.8 %   · BMI: 27.8   · BMI Categories: Overweight (BMI 25.0-29. 9)       Nutrition Diagnosis:   · Severe malnutrition,In context of chronic illness related to catabolic illness (Cancer) as evidenced by intake 0-25%,poor intake prior to admission,moderate loss of subcutaneous fat,severe muscle loss      Nutrition Interventions:   Food and/or Nutrient Delivery:  Continue Current Diet,Start Oral Nutrition Supplement  Nutrition Education/Counseling:  No recommendation at this time   Coordination of Nutrition Care:  Continue to monitor while inpatient    Goals:  Consume >50%meals/ONS       Nutrition Monitoring and Evaluation:   Behavioral-Environmental Outcomes:  None Identified   Food/Nutrient Intake Outcomes:  Food and Nutrient Intake,Supplement Intake  Physical Signs/Symptoms Outcomes:  Biochemical Data,Fluid Status or Edema,Nutrition Focused Physical Findings,Skin,Weight     Discharge Planning:     Too soon to determine     Electronically signed by Jeni Guzman RD, LD on 3/14/22 at 12:11 PM EDT    Contact: [phone]

## 2022-03-14 NOTE — PROGRESS NOTES
Pt pulled out IV - wife called and stated he has chest pain - when questioning pt he states he has tailbone pain not chest pain. Attempted to medicate with APAP - pt finally took med.  Pt is asymptomatic for chest pain dr Trinidad Schuster notified of condition

## 2022-03-14 NOTE — PLAN OF CARE
Problem: Falls - Risk of:  Goal: Will remain free from falls  Description: Will remain free from falls  Outcome: Met This Shift  Goal: Absence of physical injury  Description: Absence of physical injury  Outcome: Met This Shift     Problem: Airway Clearance - Ineffective  Goal: Achieve or maintain patent airway  Outcome: Met This Shift     Problem: Gas Exchange - Impaired  Goal: Absence of hypoxia  Outcome: Met This Shift  Goal: Promote optimal lung function  Outcome: Met This Shift     Problem: Breathing Pattern - Ineffective  Goal: Ability to achieve and maintain a regular respiratory rate  Outcome: Met This Shift     Problem:  Body Temperature -  Risk of, Imbalanced  Goal: Ability to maintain a body temperature within defined limits  Outcome: Met This Shift  Goal: Will regain or maintain usual level of consciousness  Outcome: Met This Shift  Goal: Complications related to the disease process, condition or treatment will be avoided or minimized  Outcome: Met This Shift     Problem: Isolation Precautions - Risk of Spread of Infection  Goal: Prevent transmission of infection  Outcome: Met This Shift     Problem: Risk for Fluid Volume Deficit  Goal: Maintain normal heart rhythm  Outcome: Met This Shift  Goal: Maintain absence of muscle cramping  Outcome: Met This Shift     Problem: Loneliness or Risk for Loneliness  Goal: Demonstrate positive use of time alone when socialization is not possible  Outcome: Met This Shift     Problem: Patient Education: Go to Patient Education Activity  Goal: Patient/Family Education  Outcome: Met This Shift     Problem: Skin Integrity:  Goal: Will show no infection signs and symptoms  Description: Will show no infection signs and symptoms  Outcome: Met This Shift  Goal: Absence of new skin breakdown  Description: Absence of new skin breakdown  Outcome: Met This Shift     Problem: Risk for Fluid Volume Deficit  Goal: Maintain normal serum potassium, sodium, calcium, phosphorus, and

## 2022-03-14 NOTE — ACP (ADVANCE CARE PLANNING)
Advance Care Planning     Advance Care Planning Activator (Inpatient)  Conversation Note      Date of ACP Conversation: 3/14/2022     Conversation Conducted with: Patient with Decision Making Capacity   Healthcare Decision Maker: Next of Kin by law (only applies in absence of above) (name) wife, Charlene Pacheco states she has [employer] paperwork but not in chart. ACP Activator: WILMAR Vick    Health Care Decision Maker:     Current Designated Health Care Decision Maker:     Primary Decision Maker: Joya Lainez Spouse - [phone]    Secondary Decision Maker: Chelsey Hernandez - Child - [phone]  Click here to complete Healthcare Decision Makers including section of the Healthcare Decision Maker Relationship (ie \"Primary\")  Today we documented Decision Maker(s) consistent with Legal Next of Kin hierarchy. Wife Charlene Pacheco states she has POA for Healthcare completed by  but that none of his physicians or hospitals have it. Sw encouraged her to bring copy to the hospital to make it part of record. Care Preferences    Ventilation: \"If you were in your present state of health and suddenly became very ill and were unable to breathe on your own, what would your preference be about the use of a ventilator (breathing machine) if it were available to you? \"      Would the patient desire the use of ventilator (breathing machine)?: yes    \"If your health worsens and it becomes clear that your chance of recovery is unlikely, what would your preference be about the use of a ventilator (breathing machine) if it were available to you? \"     Would the patient desire the use of ventilator (breathing machine)?: Yes      Resuscitation  \"CPR works best to restart the heart when there is a sudden event, like a heart attack, in someone who is otherwise healthy. Unfortunately, CPR does not typically restart the heart for people who have serious health conditions or who are very sick. \"    \"In the event your heart stopped as a result of an underlying serious health condition, would you want attempts to be made to restart your heart (answer \"yes\" for attempt to resuscitate) or would you prefer a natural death (answer \"no\" for do not attempt to resuscitate)? \" yes       [] Yes   [] No   Educated Patient / Breann Ruiz regarding differences between Advance Directives and portable DNR orders.     Length of ACP Conversation in minutes:      Conversation Outcomes:  [x] ACP discussion completed  [] Existing advance directive reviewed with patient; no changes to patient's previously recorded wishes  [] New Advance Directive completed  [] Portable Do Not Rescitate prepared for Provider review and signature  [] POLST/POST/MOLST/MOST prepared for Provider review and signature      Follow-up plan:    [] Schedule follow-up conversation to continue planning  [] Referred individual to Provider for additional questions/concerns   [] Advised patient/agent/surrogate to review completed ACP document and update if needed with changes in condition, patient preferences or care setting    [] This note routed to one or more involved healthcare providers

## 2022-03-14 NOTE — PROGRESS NOTES
Physical Therapy  Physical Therapy Initial Evaluation/Plan of Care    Room #:  0620/0620-01  Patient Name: Court Damon  YOB: 1950  MRN: [id number]    Date of Service: 3/14/2022     Tentative placement recommendation: Subacute vs Home Health Physical Therapy if patient meets goals  Equipment recommendation: Patient has needed equipment       Evaluating Physical Therapist: Brad Cox, Student Physical Therapist      Specific Provider Orders/Date/Referring Provider : 03/13/22 2230   PT eval and treat Start: 03/13/22 2230, End: 03/13/22 2230, ONE TIME, Standing Count: 1 Occurrences, R    [employer], DO  Admitting Diagnosis:   Lactic acidosis [E87.2]  Hyponatremia [E87.1]  COVID-19 [U07.1]    Admitted with fatigue  Surgery: none  Visit Diagnoses       Codes    Lactic acidosis     E87.2          Patient Active Problem List   Diagnosis    Bronchitis with bronchospasm    Anxiety    Post herpetic neuralgia    Type 2 diabetes mellitus without complication, with long-term current use of insulin ([address]    Cellulitis of left lower extremity    Plantar wart    Inguinal hernia    Acute myeloid leukemia in remission ([address]    Pneumonia due to infectious organism    Cellulitis    Pharyngitis, acute    Thrombocytopenia ([address]    GVHD (graft versus host disease) ([address]    Portal hypertension ([address]    Post-COVID chronic fatigue    Uncontrolled type 2 diabetes mellitus with hyperglycemia ([address]    Type 2 diabetes mellitus with hyperglycemia    Type 2 diabetes mellitus with chronic kidney disease    Decompensation of cirrhosis of liver ([address]    AMS (altered mental status)    Severe protein-calorie malnutrition ([address]    Hyponatremia    COVID-19        ASSESSMENT of Current Deficits Patient exhibits decreased strength, balance and endurance impairing functional mobility, transfers, gait  and gait distance. Patient was slightly confused, however he was pleasant.  Patient able to maintain stable vitals throughout session. Patient requires cueing for safe use of wheeled walker regarding hand placement, and walker negotiation. Unsteadiness with ambulation, and weakness are are barriers to d/c and require skilled intervention during hospital stay to attain pre hospital level of function. Decreased strength, balance and endurance  increases patient's risk for fall. PHYSICAL THERAPY  PLAN OF CARE       Physical therapy plan of care is established based on physician order,  patient diagnosis and clinical assessment    Current Treatment Recommendations:    -Bed Mobility: Lower extremity exercises  and Upper extremity exercises   -Sitting Balance: Incorporate reaching activities to activate trunk muscles  and Hands on support to maintain midline   -Standing Balance: Perform strengthening exercises in standing to promote motor control with or without upper extremity support , Challenge balance utilizing reaching  activities beyond center of gravity   and Perform sit to stand activities maintaining FWB (full weight bearing) weightbearing Bilateral   -Transfers: Provide instruction on proper hand and foot position for adequate transfer of weight onto lower extremities and use of gait device if needed and Cues for hand placement, technique and safety. Provide stabilization to prevent fall   -Gait: Gait training, Standing activities to improve: base of support, weight shift, weight bearing  and Use of Assistive device for FWB (full weight bearing) weight bearing Bilateral     -Endurance: Utilize Supervised activities to increase level of endurance to allow for safe functional mobility including transfers and gait   -Stairs: Stair training with instruction on proper technique and hand placement on rail    PT long term treatment goals are located in below grid    Patient and or family understand(s) diagnosis, prognosis, and plan of care. Frequency of treatments: Patient will be seen  daily.          Prior Level of Function: Patient ambulated with wheeled walker   Rehab Potential: good for baseline    Past medical history:   Past Medical History:   Diagnosis Date    [facility]     chemotherapy    AML (acute myeloblastic leukemia) ([facility] 2010    Cancer [facility]     Chronic kidney disease     Diabetes mellitus ([facility]     GERD (gastroesophageal reflux disease)     Hx of blood clots 2012    legs    Hyperlipidemia     Liver disease     MI, old     Other disorders of kidney and ureter     Other sleep disturbances     Thyroid disease     Type II or unspecified type diabetes mellitus without mention of complication, not stated as uncontrolled      Past Surgical History:   Procedure Laterality Date    APPENDECTOMY      BONE MARROW TRANSPLANT      double core transplant    CATHETER INSERTION N/A 2/28/2022    MEDIPORT CATHETER INSERTION performed by Sarita Cabrera MD at [facility] [address]      CT NEEDLE BIOPSY LIVER PERCUTANEOUS  1/17/2022    CT NEEDLE BIOPSY LIVER PERCUTANEOUS    ENDOSCOPY, COLON, DIAGNOSTIC      FOOT NEUROMA SURGERY      UPPER GASTROINTESTINAL ENDOSCOPY         SUBJECTIVE:    Precautions:  Up with assistance and Check Pulse Oximetry while ambulating , falls, alarm, Droplet plus/COVID-19 and confusion ,   Social history: Patient lives with spouse in a ranch home  with 3 steps  to enter with Rail  Tub shower built in shower chair    Equipment owned: Indira Francis,      [address] [facility] [address] [facility]   How much difficulty turning over in bed?: A Little  How much difficulty sitting down on / standing up from a chair with arms?: A Little  How much difficulty moving from lying on back to sitting on side of bed?: A Little  How much help from another person moving to and from a bed to a chair?: A Lot  How much help from another person needed to walk in hospital room?: A Lot  How much help from another person for climbing 3-5 steps with a railing?: A Lot  AM-PAC Inpatient Mobility Raw Score : 15  AM-PAC Inpatient T-Scale Score : 39.45  Mobility Inpatient CMS 0-100% Score: 57.7  Mobility Inpatient CMS G-Code Modifier : CK    Nursing cleared patient for PT evaluation. The admitting diagnosis and active problem list as listed above have been reviewed prior to the initiation of this evaluation. OBJECTIVE;   Initial Evaluation  Date: 3/14/2022 Treatment Date:     Short Term/ Long Term   Goals   Was pt agreeable to Eval/treatment? Yes  To be met in 4 days   Pain level   4/10  Lower back     Bed Mobility    Rolling: Minimal assist of 1    Supine to sit: Minimal assist of 1    Sit to supine: Moderate assist of 1    Scooting: Minimal assist of 1    Rolling: Independent    Supine to sit:  Independent    Sit to supine: Independent    Scooting: Independent     Transfers Sit to stand: Minimal assist of 1 3x  Sit to stand: Independent    Ambulation    2x5 feet using  wheeled walker with Moderate assist of 1  and cues for upright posture, walker approximation, safety, proper hand placement and pacing   30 feet using  wheeled walker with Supervision     Stair negotiation: ascended and descended   Not assessed     3 steps with rail supervision   ROM Within functional limits    Increase range of motion 10% of affected joints    Strength BUE:  refer to OT eval  RLE:  4/5  LLE:  4/5  Increase strength in affected mm groups by 1/3 grade   Balance Sitting EOB:  fair   Dynamic Standing:  fair -  Sitting EOB:  good   Dynamic Standing: good      Patient is Alert & Oriented x person and situation and follows directions    Sensation:  Patient  denies numbness/tingling   Edema:  no   Endurance: fair -    Vitals: room air   Blood Pressure at rest  Blood Pressure during session    Heart Rate at rest 101 Heart Rate during session 103-112   SPO2 at rest 99%  SPO2 during session 97-99%     Patient education  Patient educated on role of Physical Therapy, risks of immobility, safety and plan of care, energy conservation,  importance of mobility while in hospital , ankle pumps, quad set and glut set for edema control, blood clot prevention and safety      Patient response to education:   Pt verbalized understanding Pt demonstrated skill Pt requires further education in this area   Yes Partial Yes      Treatment:  Patient practiced and was instructed/facilitated in the following treatment: Patient Sat edge of bed 5 minutes with Supervision  to increase dynamic sitting balance and activity tolerance. Patient performed 3x Sit to Stand transfers with cueing for hand placement, and reaching back before sitting. Patient ambulated short distances and side stepped along bed with cues for walker negotiation, and upright posture. Therapeutic Exercises:  heel slide, hip abduction/adduction and straight leg raise  x 10-15 reps. At end of session, patient in bed with alarm call light and phone within reach,  all lines and tubes intact, nursing notified. Patient would benefit from continued skilled Physical Therapy to improve functional independence and quality of life. Patient's/ family goals   home    Time in  1  Time out  1036    Total Treatment Time  11 minutes    Evaluation time includes thorough review of current medical information, gathering information on past medical history/social history and prior level of function, completion of standardized testing/informal observation of tasks, assessment of data, and development of Plan of care and goals.      CPT codes:  Low Complexity PT evaluation (16149)  Therapeutic exercises (75095)   11 minutes  1 unit(s)  Mery Tello, Student Physical Therapist

## 2022-03-14 NOTE — CARE COORDINATION
SOCIAL WORK / DISCHARGE PLANNING:  RESP PANEL IN PROCESS, COVID positive 3/13. Sw spoke with pt's wife, Alvarez Rubi via phone. She states she returned pt to hospital due to not eating. Pt recently discharged home with [facility]. She states he will return home again and is anxious for this to happen as she remains focused on pt platelets and getting to [facility] for chemo. She states pt was doing well, [place] [facility] had been out and that he had received all of his meds. He will need resume HHC order at [place] for [facility]. Pt has all necessary dme, no home O2. They will transport him via car at discharge. ACP completed, wife states she is POA for Healthcare, has paperwork but no copies at healthcare facilities. Sw encouraged her to bring in copy for medical record. She was very adamant pt was full code, wanting intubation and CPR.                    Electronically signed by WILMAR Hoffman on 3/14/2022 at 1:54 PM

## 2022-03-14 NOTE — PLAN OF CARE
Problem: Falls - Risk of:  Goal: Will remain free from falls  Description: Will remain free from falls  3/14/2022 1539 by Fabi Godfrey RN  Outcome: Met This Shift  3/14/2022 0508 by Storm Uribe RN  Outcome: Met This Shift  Goal: Absence of physical injury  Description: Absence of physical injury  3/14/2022 1539 by Fabi Godfrey RN  Outcome: Met This Shift  3/14/2022 0508 by Storm Uribe RN  Outcome: Met This Shift     Problem: Airway Clearance - Ineffective  Goal: Achieve or maintain patent airway  3/14/2022 1539 by Fabi Godfrey RN  Outcome: Met This Shift  3/14/2022 0508 by Storm Uribe RN  Outcome: Met This Shift     Problem: Gas Exchange - Impaired  Goal: Absence of hypoxia  3/14/2022 1539 by Fabi Godfrey RN  Outcome: Met This Shift  3/14/2022 0508 by Storm Uribe RN  Outcome: Met This Shift  Goal: Promote optimal lung function  3/14/2022 1539 by Fabi Godfrey RN  Outcome: Ongoing  3/14/2022 0508 by Storm Uribe RN  Outcome: Met This Shift     Problem: Breathing Pattern - Ineffective  Goal: Ability to achieve and maintain a regular respiratory rate  3/14/2022 1539 by Fabi Godfrey RN  Outcome: Completed  3/14/2022 0508 by Storm Uribe RN  Outcome: Met This Shift     Problem: Body Temperature -  Risk of, Imbalanced  Goal: Ability to maintain a body temperature within defined limits  3/14/2022 1539 by Fabi Godfrey RN  Outcome: Completed  3/14/2022 0508 by Storm Uribe RN  Outcome: Met This Shift  Goal: Will regain or maintain usual level of consciousness  3/14/2022 1539 by Fabi Godfrey RN  Outcome: Completed  3/14/2022 0508 by Storm Uribe RN  Outcome: Met This Shift  Goal: Complications related to the disease process, condition or treatment will be avoided or minimized  3/14/2022 1539 by Fabi Godfrey RN  Outcome: Ongoing  3/14/2022 0508 by Storm Uribe RN  Outcome: Met This Shift     Problem:  Body Temperature -  Risk of, Imbalanced  Goal: Will regain or maintain usual level of consciousness  3/14/2022 1539 by Forest Hirsch RN  Outcome: Completed  3/14/2022 0508 by Cassidy Jimenez RN  Outcome: Met This Shift     Problem: Isolation Precautions - Risk of Spread of Infection  Goal: Prevent transmission of infection  3/14/2022 1539 by Forest Hirsch RN  Outcome: Ongoing  3/14/2022 0508 by Cassidy Jimenez RN  Outcome: Met This Shift     Problem: Nutrition Deficits  Goal: Optimize nutritional status  Outcome: Not Met This Shift     Problem: Risk for Fluid Volume Deficit  Goal: Maintain normal heart rhythm  3/14/2022 1539 by Forest Hirsch RN  Outcome: Ongoing  3/14/2022 0508 by Cassidy Jimenez RN  Outcome: Met This Shift  Goal: Maintain absence of muscle cramping  3/14/2022 1539 by Forest Hirsch RN  Outcome: Completed  3/14/2022 0508 by Cassidy Jimenez RN  Outcome: Met This Shift  Goal: Maintain normal serum potassium, sodium, calcium, phosphorus, and pH  3/14/2022 1539 by Forest Hirsch RN  Outcome: Ongoing  3/14/2022 0508 by Cassidy Jimenez RN  Outcome: Not Met This Shift     Problem: Loneliness or Risk for Loneliness  Goal: Demonstrate positive use of time alone when socialization is not possible  3/14/2022 0508 by Cassidy Jimenez RN  Outcome: Met This Shift     Problem: Fatigue  Goal: Verbalize increase energy and improved vitality  3/14/2022 1539 by Forest Hirsch RN  Outcome: Ongoing  3/14/2022 0508 by Cassidy Jimenez RN  Outcome: Not Met This Shift     Problem: Patient Education: Go to Patient Education Activity  Goal: Patient/Family Education  3/14/2022 1539 by Forest Hirsch RN  Outcome: Ongoing  3/14/2022 0508 by Cassidy Jimenez RN  Outcome: Met This Shift     Problem: Skin Integrity:  Goal: Will show no infection signs and symptoms  Description: Will show no infection signs and symptoms  3/14/2022 1539 by Forest Hirsch RN  Outcome: Ongoing  3/14/2022 0508 by Cassidy Jimenez RN  Outcome: Met This Shift  Goal: Absence of new skin breakdown  Description: Absence of new skin breakdown  3/14/2022 1539 by Marcus Cid RN  Outcome: Ongoing  3/14/2022 0508 by Sarah Padilla RN  Outcome: Met This Shift     Problem: Malnutrition  (NI-5.2)  Goal: Food and/or Nutrient Delivery  Description: Individualized approach for food/nutrient provision.   3/14/2022 1210 by Tory Adorno RD, LD  Outcome: Met This Shift

## 2022-03-14 NOTE — PROGRESS NOTES
Pharmacy Consultation Note    Consult date: 3/14/2022  Physician/provider: Dr. Alivia Tony has been consulted to evaluate criteria for Baricitinib, Remdesivir or Tocilizumab therapy. The patient DOES NOT currently meet [facility] P&T approved Covid-19 treatment criteria for Baricitinib, Remdesivir or Tocilizumab due to oxygen requirements. Please re-consult if the clinical condition changes and patient meets criteria for initiation based on [facility] P&T approved criteria for use.     Thank you for the consult,  Ashok Palencia, PharmD, BCPS 3/14/2022 10:43 AM   Ext: [phone]

## 2022-03-14 NOTE — DISCHARGE INSTR - COC
Triv, inactivated, subunit, adjuvanted, IM (Fluad 65 yrs and older) 2019    MMR 2014, 2016    Pneumococcal Conjugate 13-valent (Hfhetoh84) 2017    Pneumococcal Polysaccharide (Lulxqmlwl30) 2020    Polio IPV (IPOL) 2013, 2013, 2013    Unknown Immunization 2020       Active Problems:  Patient Active Problem List   Diagnosis Code    Bronchitis with bronchospasm J20.9    Anxiety F41.9    Post herpetic neuralgia B02.29    Type 2 diabetes mellitus without complication, with long-term current use of insulin ([facility]) E11.9, Z79.4    Cellulitis of left lower extremity L03.116    Plantar wart B07.0    Inguinal hernia K40.90    Acute myeloid leukemia in remission ([facility] C92.01    Pneumonia due to infectious organism J18.9    Cellulitis L03.90    Pharyngitis, acute J02.9    Thrombocytopenia ([facility]) D69.6    GVHD (graft versus host disease) ([facility] D89.813    Portal hypertension ([facility] K76.6    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, U09.9    Uncontrolled type 2 diabetes mellitus with hyperglycemia ([facility]) E11.65    Type 2 diabetes mellitus with hyperglycemia E11.65    Type 2 diabetes mellitus with chronic kidney disease E11.22    Decompensation of cirrhosis of liver ([facility]) K72.90, K74.60    AMS (altered mental status) R41.82    Severe protein-calorie malnutrition ([facility] E43    Hyponatremia E87.1       Isolation/Infection:   Isolation            Droplet Plus  Droplet Plus          Patient Infection Status       Infection Onset Added Last Indicated Last Indicated By Review Planned Expiration Resolved Resolved By    COVID-19 22 COVID-19, Rapid 22      Resolved    COVID-19 (Rule Out) 22 COVID-19, Rapid (Ordered)   22 Rule-Out Test Resulted    COVID-19 21 Covid-19 Ambulatory   10/12/21     COVID-19 (Rule Out) 21 COVID-19 Ambulatory (Ordered)   21 Rule-Out Test Resulted Nurse Assessment:  Last Vital Signs: BP (!) 107/56   Pulse 99   Temp 97.5 °F (36.4 °C) (Axillary)   Resp 15   Ht 5' 8\" (1.727 m)   Wt 106 lb (48.1 kg)   SpO2 99%   BMI 16.12 kg/m²     Last documented pain score (0-10 scale): Pain Level: 0  Last Weight:   Wt Readings from Last 1 Encounters:   03/14/22 106 lb (48.1 kg)     Mental Status:  disoriented and alert    IV Access:  - None    Nursing Mobility/ADLs:  Walking   Assisted  Transfer  Assisted  Bathing  Assisted  Dressing  Assisted  Toileting  Assisted  Feeding  Assisted  Med Admin  Dependent  Med Delivery   prefers mixed with applesauce    Wound Care Documentation and Therapy:        Elimination:  Continence: Bowel: No  Bladder: No  Urinary Catheter: None   Colostomy/Ileostomy/Ileal Conduit: No       Date of Last BM: 3/15/22    Intake/Output Summary (Last 24 hours) at 3/14/2022 0755  Last data filed at 3/14/2022 0713  Gross per 24 hour   Intake 105 ml   Output 180 ml   Net -75 ml     I/O last 3 completed shifts: In: 5 [I.V.:5]  Out: 180 [Urine:180]    Safety Concerns: At Risk for Falls    Impairments/Disabilities:      None    Nutrition Therapy:  Current Nutrition Therapy:   - Oral Diet:  General    Routes of Feeding: Oral  Liquids: Thin Liquids  Daily Fluid Restriction: no  Last Modified Barium Swallow with Video (Video Swallowing Test): not done    Treatments at the Time of Hospital Discharge:   Respiratory Treatments: ***  Oxygen Therapy:  is not on home oxygen therapy.   Ventilator:    - No ventilator support    Rehab Therapies: Physical Therapy and Occupational Therapy  Weight Bearing Status/Restrictions: No weight bearing restrictions  Other Medical Equipment (for information only, NOT a DME order):  hospital bed  Other Treatments: ***    Patient's personal belongings (please select all that are sent with patient):  Glasses    RN SIGNATURE:  Electronically signed by Michaela Clark RN on 3/17/22 at 12:28 PM EDT    CASE MANAGEMENT/SOCIAL WORK SECTION    Inpatient Status Date: ***    Readmission Risk Assessment Score:  Readmission Risk              Risk of Unplanned Readmission:  28           Discharging to Facility/ Agency   Name:   Address:  Phone:  Fax:    Dialysis Facility (if applicable)   Name:  Address:  Dialysis Schedule:  Phone:  Fax:    / signature: {Esignature:[id number]}    PHYSICIAN SECTION    Prognosis: {Prognosis:1397423035}    Condition at Discharge: [facility] Patient Condition:[id number]}    Rehab Potential (if transferring to Rehab): {Prognosis:0118209388}    Recommended Labs or Other Treatments After Discharge: ***    Physician Certification: I certify the above information and transfer of Aleisha Kay  is necessary for the continuing treatment of the diagnosis listed and that he requires {Admit to Appropriate Level of Care:92517} for {GREATER/LESS:187216742} 30 days.      Update Admission H&P: {CHP DME Changes in [facility]:179566456}    PHYSICIAN SIGNATURE:  Electronically signed by Tamie Warren DO on 3/17/22 at 11:16 AM EDT

## 2022-03-15 ENCOUNTER — APPOINTMENT (OUTPATIENT)
Dept: INTERVENTIONAL RADIOLOGY/VASCULAR | Age: 72
DRG: 441 | End: 2022-03-15
Payer: MEDICARE

## 2022-03-15 LAB
ALBUMIN SERPL-MCNC: 2.8 G/DL (ref 3.5–5.2)
ALP BLD-CCNC: 480 U/L (ref 40–129)
ALT SERPL-CCNC: 36 U/L (ref 0–40)
ANION GAP SERPL CALCULATED.3IONS-SCNC: 14 MMOL/L (ref 7–16)
ANISOCYTOSIS: ABNORMAL
AST SERPL-CCNC: 47 U/L (ref 0–39)
BASOPHILS ABSOLUTE: 0 E9/L (ref 0–0.2)
BASOPHILS RELATIVE PERCENT: 0.2 % (ref 0–2)
BILIRUB SERPL-MCNC: 2.9 MG/DL (ref 0–1.2)
BUN BLDV-MCNC: 55 MG/DL (ref 6–23)
C-REACTIVE PROTEIN: 10.7 MG/DL (ref 0–0.4)
CALCIUM SERPL-MCNC: 9.1 MG/DL (ref 8.6–10.2)
CHLORIDE BLD-SCNC: 93 MMOL/L (ref 98–107)
CO2: 18 MMOL/L (ref 22–29)
CREAT SERPL-MCNC: 0.9 MG/DL (ref 0.7–1.2)
D DIMER: 3755 NG/ML DDU
EOSINOPHILS ABSOLUTE: 0.04 E9/L (ref 0.05–0.5)
EOSINOPHILS RELATIVE PERCENT: 0.9 % (ref 0–6)
GFR AFRICAN AMERICAN: >60
GFR NON-AFRICAN AMERICAN: >60 ML/MIN/1.73
GLUCOSE BLD-MCNC: 212 MG/DL (ref 74–99)
HCT VFR BLD CALC: 24.3 % (ref 37–54)
HEMOGLOBIN: 7.6 G/DL (ref 12.5–16.5)
LACTIC ACID: 2.9 MMOL/L (ref 0.5–2.2)
LYMPHOCYTES ABSOLUTE: 0.47 E9/L (ref 1.5–4)
LYMPHOCYTES RELATIVE PERCENT: 10.4 % (ref 20–42)
MACRO-OVALOCYTES: ABNORMAL
MCH RBC QN AUTO: 28.9 PG (ref 26–35)
MCHC RBC AUTO-ENTMCNC: 31.3 % (ref 32–34.5)
MCV RBC AUTO: 92.4 FL (ref 80–99.9)
METER GLUCOSE: 162 MG/DL (ref 74–99)
METER GLUCOSE: 188 MG/DL (ref 74–99)
METER GLUCOSE: 190 MG/DL (ref 74–99)
MONOCYTES ABSOLUTE: 0.19 E9/L (ref 0.1–0.95)
MONOCYTES RELATIVE PERCENT: 3.5 % (ref 2–12)
NEUTROPHILS ABSOLUTE: 4 E9/L (ref 1.8–7.3)
NEUTROPHILS RELATIVE PERCENT: 85.2 % (ref 43–80)
OVALOCYTES: ABNORMAL
PDW BLD-RTO: 20.7 FL (ref 11.5–15)
PLATELET # BLD: 70 E9/L (ref 130–450)
PLATELET CONFIRMATION: NORMAL
PMV BLD AUTO: 11.3 FL (ref 7–12)
POIKILOCYTES: ABNORMAL
POLYCHROMASIA: ABNORMAL
POTASSIUM SERPL-SCNC: 4.6 MMOL/L (ref 3.5–5)
RBC # BLD: 2.63 E12/L (ref 3.8–5.8)
SCHISTOCYTES: ABNORMAL
SODIUM BLD-SCNC: 125 MMOL/L (ref 132–146)
TOTAL PROTEIN: 4.5 G/DL (ref 6.4–8.3)
WBC # BLD: 4.7 E9/L (ref 4.5–11.5)

## 2022-03-15 PROCEDURE — C1729 CATH, DRAINAGE: HCPCS

## 2022-03-15 PROCEDURE — 97530 THERAPEUTIC ACTIVITIES: CPT | Performed by: PHYSICAL THERAPIST

## 2022-03-15 PROCEDURE — 82962 GLUCOSE BLOOD TEST: CPT

## 2022-03-15 PROCEDURE — 97116 GAIT TRAINING THERAPY: CPT | Performed by: PHYSICAL THERAPIST

## 2022-03-15 PROCEDURE — 94640 AIRWAY INHALATION TREATMENT: CPT

## 2022-03-15 PROCEDURE — 83605 ASSAY OF LACTIC ACID: CPT

## 2022-03-15 PROCEDURE — 94664 DEMO&/EVAL PT USE INHALER: CPT

## 2022-03-15 PROCEDURE — 85025 COMPLETE CBC W/AUTO DIFF WBC: CPT

## 2022-03-15 PROCEDURE — 36415 COLL VENOUS BLD VENIPUNCTURE: CPT

## 2022-03-15 PROCEDURE — 2060000000 HC ICU INTERMEDIATE R&B

## 2022-03-15 PROCEDURE — 6370000000 HC RX 637 (ALT 250 FOR IP): Performed by: INTERNAL MEDICINE

## 2022-03-15 PROCEDURE — 85378 FIBRIN DEGRADE SEMIQUANT: CPT

## 2022-03-15 PROCEDURE — 49083 ABD PARACENTESIS W/IMAGING: CPT

## 2022-03-15 PROCEDURE — 80053 COMPREHEN METABOLIC PANEL: CPT

## 2022-03-15 PROCEDURE — 86140 C-REACTIVE PROTEIN: CPT

## 2022-03-15 RX ADMIN — IPRATROPIUM BROMIDE AND ALBUTEROL 1 PUFF: 20; 100 SPRAY, METERED RESPIRATORY (INHALATION) at 07:11

## 2022-03-15 RX ADMIN — BUDESONIDE AND FORMOTEROL FUMARATE DIHYDRATE 2 PUFF: 160; 4.5 AEROSOL RESPIRATORY (INHALATION) at 07:12

## 2022-03-15 RX ADMIN — IPRATROPIUM BROMIDE AND ALBUTEROL 1 PUFF: 20; 100 SPRAY, METERED RESPIRATORY (INHALATION) at 12:17

## 2022-03-15 RX ADMIN — IPRATROPIUM BROMIDE AND ALBUTEROL 1 PUFF: 20; 100 SPRAY, METERED RESPIRATORY (INHALATION) at 18:20

## 2022-03-15 RX ADMIN — LEVOTHYROXINE SODIUM 200 MCG: 0.2 TABLET ORAL at 06:49

## 2022-03-15 RX ADMIN — BUDESONIDE AND FORMOTEROL FUMARATE DIHYDRATE 2 PUFF: 160; 4.5 AEROSOL RESPIRATORY (INHALATION) at 18:21

## 2022-03-15 RX ADMIN — IPRATROPIUM BROMIDE AND ALBUTEROL 1 PUFF: 20; 100 SPRAY, METERED RESPIRATORY (INHALATION) at 23:03

## 2022-03-15 ASSESSMENT — PAIN SCALES - WONG BAKER: WONGBAKER_NUMERICALRESPONSE: 0

## 2022-03-15 ASSESSMENT — PAIN SCALES - GENERAL
PAINLEVEL_OUTOF10: 0

## 2022-03-15 NOTE — PROGRESS NOTES
Physician Progress Note      PATIENT:               Raheem Washburn  [facility] #:                  [id number]  :                       1950  ADMIT DATE:       3/4/2022 8:13 PM  Jimi Benjamin [place] DATE:        3/9/2022 7:06 PM  RESPONDING  PROVIDER #:        Jamar Adams TEXT:    Dear Dr. Yu Garcia,    Pt admitted with AMS and documented to have hepatic encephalopathy. Patient   was started on Lactulose and Xifaxan with improved mental status. Documentation indicates the hepatic encephalopathy is related to cirrhosis. Noted documentation of known alcoholic cirrhosis on 8981 consult note by   ordered GI consultant. If possible, please document in progress notes and   discharge summary:    The medical record reflects the following:  Risk Factors: DM, AML, CKD, HLD, anemia  Clinical Indicators: per GI consult note: \"readmitted to the hospital for   hepatic encephalopathy. He is known to have alcoholic cirrhosis\"  Treatment: IMCU monitoring, GI consult, Lactulose and Xifaxan, paracentesis    Thank You,  Taniya De Santiago RN, BSN, CDS  Clinical Documentation Improvement Specialist  [phone]  Options provided:  -- Alcoholic cirrhosis confirmed present on admission  -- Alcoholic cirrhosis ruled out  -- Other - I will add my own diagnosis  -- Disagree - Not applicable / Not valid  -- Disagree - Clinically unable to determine / Unknown  -- Refer to Clinical Documentation Reviewer    PROVIDER RESPONSE TEXT:    The diagnosis of alcoholic cirrhosis was ruled out.     Query created by: Jayne Bedoya on 3/15/2022 4:31 PM      Electronically signed by:  Ricki Natarajan DO 3/15/2022 6:35 PM

## 2022-03-15 NOTE — PROGRESS NOTES
Physical Therapy  Physical Therapy Treatment Note/Plan of Care    Room #:  0620/0620-01  Patient Name: Court Damon  YOB: 1950  MRN: [id number]    Date of Service: 3/15/2022     Tentative placement recommendation: Subacute vs Home Health Physical Therapy if patient meets goals  Equipment recommendation: Patient has needed equipment       Evaluating Physical Therapist: Brad Cox, Student Physical Therapist      Specific Provider Orders/Date/Referring Provider : 03/13/22 2230   PT eval and treat Start: 03/13/22 2230, End: 03/13/22 2230, ONE TIME, Standing Count: 1 Occurrences, R    Zavala Prabhud, DO  Admitting Diagnosis:   Lactic acidosis [E87.2]  Hyponatremia [E87.1]  COVID-19 [U07.1]    Admitted with fatigue  Surgery: none  Visit Diagnoses       Codes    Lactic acidosis     E87.2          Patient Active Problem List   Diagnosis    Bronchitis with bronchospasm    Anxiety    Post herpetic neuralgia    Type 2 diabetes mellitus without complication, with long-term current use of insulin ([address]    Cellulitis of left lower extremity    Plantar wart    Inguinal hernia    Acute myeloid leukemia in remission ([address]    Pneumonia due to infectious organism    Cellulitis    Pharyngitis, acute    Thrombocytopenia ([address]    GVHD (graft versus host disease) ([address]    Portal hypertension ([address]    Post-COVID chronic fatigue    Uncontrolled type 2 diabetes mellitus with hyperglycemia ([address]    Type 2 diabetes mellitus with hyperglycemia    Type 2 diabetes mellitus with chronic kidney disease    Decompensation of cirrhosis of liver ([address]    AMS (altered mental status)    Severe protein-calorie malnutrition ([address]    Hyponatremia    COVID-19        ASSESSMENT of Current Deficits Patient exhibits decreased strength, balance and endurance impairing functional mobility, transfers, gait  and gait distance. Patient was slightly confused, however he was pleasant.  Patient able to maintain stable vitals throughout session. Patient requires cueing for safe use of wheeled walker regarding hand placement, and walker negotiation. Patient displays unsteadiness, and requires significant assistance for safety due to increased weakness during functional mobility. Patient will continue to require skilled therapy to improve overall strength and endurance. PHYSICAL THERAPY  PLAN OF CARE       Physical therapy plan of care is established based on physician order,  patient diagnosis and clinical assessment    Current Treatment Recommendations:    -Bed Mobility: Lower extremity exercises  and Upper extremity exercises   -Sitting Balance: Incorporate reaching activities to activate trunk muscles  and Hands on support to maintain midline   -Standing Balance: Perform strengthening exercises in standing to promote motor control with or without upper extremity support , Challenge balance utilizing reaching  activities beyond center of gravity   and Perform sit to stand activities maintaining FWB (full weight bearing) weightbearing Bilateral   -Transfers: Provide instruction on proper hand and foot position for adequate transfer of weight onto lower extremities and use of gait device if needed and Cues for hand placement, technique and safety. Provide stabilization to prevent fall   -Gait: Gait training, Standing activities to improve: base of support, weight shift, weight bearing  and Use of Assistive device for FWB (full weight bearing) weight bearing Bilateral     -Endurance: Utilize Supervised activities to increase level of endurance to allow for safe functional mobility including transfers and gait   -Stairs: Stair training with instruction on proper technique and hand placement on rail    PT long term treatment goals are located in below grid    Patient and or family understand(s) diagnosis, prognosis, and plan of care. Frequency of treatments: Patient will be seen  daily.          Prior Level of Function: Patient ambulated with wheeled walker   Rehab Potential: good for baseline    Past medical history:   Past Medical History:   Diagnosis Date    AML ([facility] myel[facility]) [facility]     chemotherapy    AML (acute myeloblastic leukemia) ([facility] 2010    Cancer ([facility]     Chronic kidney disease     Diabetes mellitus ([facility]     GERD (gastroesophageal reflux disease)     Hx of blood clots 2012    legs    Hyperlipidemia     Liver disease     MI, old     Other disorders of kidney and ureter     Other sleep disturbances     Thyroid disease     Type II or unspecified type diabetes mellitus without mention of complication, not stated as uncontrolled      Past Surgical History:   Procedure Laterality Date    APPENDECTOMY      BONE MARROW TRANSPLANT      double core transplant    CATHETER INSERTION N/A 2/28/2022    MEDIPORT CATHETER INSERTION performed by Jacklyn Rocha MD at [facility]      CT NEEDLE BIOPSY LIVER PERCUTANEOUS  1/17/2022    CT NEEDLE BIOPSY LIVER PERCUTANEOUS    ENDOSCOPY, COLON, DIAGNOSTIC      FOOT NEUROMA SURGERY      UPPER GASTROINTESTINAL ENDOSCOPY         SUBJECTIVE:    Precautions:  Up with assistance and Check Pulse Oximetry while ambulating , falls, alarm, Droplet plus/COVID-19 and confusion ,   Social history: Patient lives with spouse in a ranch home  with 3 steps  to enter with Rail  Tub shower built in shower chair    Equipment owned: Alexis Amin,      [address]   How much difficulty turning over in bed?: A Little  How much difficulty sitting down on / standing up from a chair with arms?: A Little  How much difficulty moving from lying on back to sitting on side of bed?: A Little  How much help from another person moving to and from a bed to a chair?: A Lot  How much help from another person needed to walk in hospital room?: A Lot  How much help from another person for climbing 3-5 steps with a railing?: A Lot  AM-PAC Inpatient Mobility Raw Score : 15  AM-PAC Inpatient T-Scale Score : 39.45  Mobility Inpatient CMS 0-100% Score: 57.7  Mobility Inpatient CMS G-Code Modifier : CK    Nursing cleared patient for PT treatment. for strengthening and mobility   OBJECTIVE;   Initial Evaluation  Date: 3/14/2022 Treatment Date:  3/15/2022     Short Term/ Long Term   Goals   Was pt agreeable to Eval/treatment? Yes Yes To be met in 4 days   Pain level   4/10  Lower back 3/10 Lower back    Bed Mobility    Rolling: Minimal assist of 1    Supine to sit: Minimal assist of 1    Sit to supine: Moderate assist of 1    Scooting: Minimal assist of 1   Rolling: Minimal assist of 1   Supine to sit: Minimal assist of 1   Sit to supine: Minimal assist of 1   Scooting: Minimal assist of 1  Rolling: Independent    Supine to sit:  Independent    Sit to supine: Independent    Scooting: Independent     Transfers Sit to stand: Minimal assist of 1 3x Sit to stand: Minimal assist of 1    Sit to stand: Independent    Ambulation    2x5 feet using  wheeled walker with Moderate assist of 1  and cues for upright posture, walker approximation, safety, proper hand placement and pacing 1x5 sidesteps, 1x5 feet using  wheeled walker with Moderate assist of 1   cues for sequencing, upright posture, walker approximation, safety and proper hand placement  30 feet using  wheeled walker with Supervision     Stair negotiation: ascended and descended   Not assessed  Not assessed   3 steps with rail supervision   ROM Within functional limits    Increase range of motion 10% of affected joints    Strength BUE:  refer to OT eval  RLE:  4/5  LLE:  4/5  Increase strength in affected mm groups by 1/3 grade   Balance Sitting EOB:  fair   Dynamic Standing:  fair - Sitting EOB: fair   Dynamic Standing: fair - Sitting EOB:  good   Dynamic Standing: good      Patient is Alert & Oriented x person and situation and follows directions    Sensation:  Patient  denies numbness/tingling   Edema:  no   Endurance: fair -    Vitals: room air   Blood Pressure at rest  Blood Pressure during session    Heart Rate at rest 98 Heart Rate during session    SPO2 at rest 99%  SPO2 during session 96-99%     Patient education  Patient educated on role of Physical Therapy, risks of immobility, safety and plan of care, energy conservation,  importance of mobility while in hospital , ankle pumps, quad set and glut set for edema control, blood clot prevention and safety      Patient response to education:   Pt verbalized understanding Pt demonstrated skill Pt requires further education in this area   Yes Partial Yes      Treatment:  Patient practiced and was instructed/facilitated in the following treatment: Patient Sat edge of bed 5 minutes with Supervision  to increase dynamic sitting balance and activity tolerance. Patient performed 3x Sit to Stand transfers with cueing for hand placement, and reaching back before sitting. Patient ambulated short distances and side stepped along bed with cues for walker negotiation, and upright posture. Therapeutic Exercises:  long arc quad and seated marching  x 10 reps. At end of session, patient in bed with alarm call light and phone within reach,  all lines and tubes intact, nursing notified. Patient would benefit from continued skilled Physical Therapy to improve functional independence and quality of life.        Patient's/ family goals   home    Time in  930  Time out  954  Total Treatment Time  24 minutes  CPT codes:  Therapeutic activities (61348)   14 minutes  1 unit(s)  Gait Training (10068) 10 minutes 1 unit(s)  Humza Wallis, Student Physical Therapist

## 2022-03-15 NOTE — CARE COORDINATION
SOCIAL WORK / DISCHARGE PLANNING:  COVID positive 3/14 & 3/13. Wife to get COVID test today. She continues to insist she is taking him to [place] for his chemo. She remains focused on paracentesis need and that she is taking pt home despite he is quite weak. She has all necessary dme to care for him. Active with [facility], will need resume [facility] orders at dc. Wife continues to state she will transport.                    Electronically signed by WILMAR Hoffman on 3/15/2022 at 11:11 AM

## 2022-03-15 NOTE — PROGRESS NOTES
Patient here for ultrasound guided paracentesis. Instructions given and questions answered. Consent signed. Abdomen scanned and marked under the guidance of procedural Radiologist.     1538  start procedure VS 99/55  112  20  99% on room air    1604  end procedure VS 90/54  114  22  98% on room air      4750 cc drained of cloudy pink tinged colored ascites fluid. Dry sterile dressing of 2x2 and foam tape to RLQ     Nurse to nurse given to YAMILET Rockwell    Pt sent back to the floor.

## 2022-03-15 NOTE — PROGRESS NOTES
Internal Medicine Progress Note    [facility]    [facility]. Titus Russ., F.GRACIELAOBRIANNE. Jamar Huffman D.O., LIZ Gross, MSN, APRN, NP-C  Latha Okeefe. Antonia Arzola, MSN, APRN-CNP     Primary Care Physician: Lillie Zambrano DO   Admitting Physician:  Chencho Rodney DO  Admission date and time: 3/13/2022  3:48 PM    Room:  [address] [place] [address]  Admitting diagnosis: Lactic acidosis [E87.2]  Hyponatremia [E87.1]  COVID-19 [U07.1]    Patient Name: Johan Grewal  MRN: [id number]    Date of Service: 3/15/2022     Subjective: Trino Green is a 73 y.o. male who was seen and examined today,3/15/2022, at the bedside. Trino Green remains confused during my examination today. He is easily redirectable but becomes agitated when left alone. He has been pulling out his IVs sites. I spent a great deal of time on telephone with his wife updating her accordingly. She has a very poor understanding and grasp of the gravity of the situation. Review of System:   Constitutional:   Admits to generalized malaise and fatigue  HEENT:   Denies ear pain, sore throat, sinus or eye problems. Cardiovascular:   Denies any chest pain, irregular heartbeats, or palpitations. Respiratory:   Denies shortness of breath, coughing, sputum production, hemoptysis, or wheezing. Gastrointestinal:   Admits to abdominal distention compatible with ascites. Genitourinary:    Denies any urgency, frequency, hematuria. Voiding without difficulty. Extremities:   Denies lower extremity swelling, edema or cyanosis. Neurology:    Denies any headache or focal neurological deficits, Denies generalized weakness or memory difficulty. Psch:   Denies being anxious or depressed. Musculoskeletal:    Denies  myalgias, joint complaints or back pain. Integumentary:   Denies any rashes, ulcers, or excoriations. Denies bruising. Hematologic/Lymphatic:  Denies bruising or bleeding.     Physical Oral Daily    lactulose  20 g Oral TID    oxyCODONE  5 mg Oral Once    sodium chloride flush  5-40 mL IntraVENous 2 times per day    aspirin  81 mg Oral Daily    insulin glargine  20 Units SubCUTAneous Nightly    levothyroxine  200 mcg Oral Daily    magnesium oxide  400 mg Oral BID    pantoprazole  40 mg Oral Daily    sertraline  50 mg Oral Daily     Continuous Infusions:   dextrose      sodium chloride         Objective Data:  CBC with Differential:    Lab Results   Component Value Date    WBC 4.7 03/15/2022    RBC 2.63 03/15/2022    HGB 7.6 03/15/2022    HCT 24.3 03/15/2022    PLT 70 03/15/2022    MCV 92.4 03/15/2022    MCH 28.9 03/15/2022    MCHC 31.3 03/15/2022    RDW 20.7 03/15/2022    NRBC 1.0 03/06/2022    SEGSPCT 22 02/03/2014    METASPCT 0.9 03/08/2022    LYMPHOPCT 10.4 03/15/2022    MONOPCT 3.5 03/15/2022    MYELOPCT 1.8 03/08/2022    BASOPCT 0.2 03/15/2022    MONOSABS 0.19 03/15/2022    LYMPHSABS 0.47 03/15/2022    EOSABS 0.04 03/15/2022    BASOSABS 0.00 03/15/2022     CMP:    Lab Results   Component Value Date     03/15/2022    K 4.6 03/15/2022    K 4.9 03/13/2022    CL 93 03/15/2022    CO2 18 03/15/2022    BUN 55 03/15/2022    CREATININE 0.9 03/15/2022    GFRAA >60 03/15/2022    LABGLOM >60 03/15/2022    GLUCOSE 212 03/15/2022    GLUCOSE 318 04/30/2012    PROT 4.5 03/15/2022    LABALBU 2.8 03/15/2022    LABALBU 4.0 04/30/2012    CALCIUM 9.1 03/15/2022    BILITOT 2.9 03/15/2022    ALKPHOS 480 03/15/2022    AST 47 03/15/2022    ALT 36 03/15/2022         Assessment:  1. Hepatic encephalopathy in the setting of decompensated cirrhosis and large volume ascites partially due to medication noncompliance  2. COVID-19 infection without overt respiratory or GI symptomatology  3. Acute on chronic kidney disease stage III complicated by outpatient diuretic therapy and overall decreased oral intake  4. Previous history of AML with current findings of classic Hodgkin's lymphoma  5.  Acute on chronic anemia  6. Nondisplaced sacral fracture  7. Insulin-dependent diabetes mellitus type II  8. History of DVT  9. History of MI  10. Hypothyroidism-TSH 5.310  11. Hyperlipidemia  12. Hx of tobacco abuse       Plan:   I discussed the case extensively via the telephone with his wife Verna Jorge. Unfortunately, she has a very poor understanding of the gravity of the situation. I do not believe he is doing well enough to return home. It is my recommendation that he be considered for skilled nursing facility placement and she reluctantly agrees. He continues to receive maximal COVID protocol but is relatively stable from a respiratory standpoint. Ammonia level will be assessed today. Paracentesis is planned for today. He requires extensive work with the therapy teams. He cannot be considered for chemotherapy for a multitude of issues. Family is adamant that cardiology provide consultation. More than 50% of my  time was spent at the bedside counseling/coordinating care with the patient and/or family with face to face contact. This time was spent reviewing notes and laboratory data as well as instructing and counseling the patient. Time I spent with the family or surrogate(s) is included only if the patient was incapable of providing the necessary information or participating in medical decisions. I also discussed the differential diagnosis and all of the proposed management plans with the patient and individuals accompanying the patient. Gavin Sandra requires this high level of physician care and nursing on the IMC/Telemetry unit due the complexity of decision management and chance of rapid decline or death. Continued cardiac monitoring and higher level of nursing are required. I am readily available for any further decision-making and intervention.      Tierney Shepard DO, F.A.C.O.I.  3/15/2022  11:31 AM

## 2022-03-15 NOTE — CARE COORDINATION
SOCIAL WORK / DISCHARGE PLANNING:  COVID positive 3/14 & 3/13. Sw spoke with wife about dc planning. She has spoken to Dr Judy Paredes who is recommending rehab stay for pt . SW explained that limited choices due to newly COVID positive. Sw [facility] facilities [facility]- 1) [facility] - permission obtained to make referral, made to [facility], [address] [place], [place] [address] 2) [facility]- permission to make referral, spoke with Patricia Draper, rep no beds currently. 3) [facility]- declines referral at this time. Await review for acceptance, NO PRECERT needed. Wife also brought POA to copy but after review it was a POA for financial no healthcare POA. Sw will follow. Addendum: 237pm Wife came to desk, speaking on phone with son, first choice is [facility]. Addendum; 330pm [facility]. NO PRECERT needed. CHANDNI will need signed by physician. HENs will need completed prior to dc.      Electronically signed by WILMAR Oliveros on 3/15/2022 at 1:58 PM

## 2022-03-16 LAB
ALBUMIN SERPL-MCNC: 2.8 G/DL (ref 3.5–5.2)
ALP BLD-CCNC: 493 U/L (ref 40–129)
ALT SERPL-CCNC: 35 U/L (ref 0–40)
AMMONIA: 22 UMOL/L (ref 16–60)
ANION GAP SERPL CALCULATED.3IONS-SCNC: 16 MMOL/L (ref 7–16)
ANISOCYTOSIS: ABNORMAL
AST SERPL-CCNC: 45 U/L (ref 0–39)
BASOPHILS ABSOLUTE: 0 E9/L (ref 0–0.2)
BASOPHILS RELATIVE PERCENT: 0 % (ref 0–2)
BILIRUB SERPL-MCNC: 3.1 MG/DL (ref 0–1.2)
BUN BLDV-MCNC: 57 MG/DL (ref 6–23)
BURR CELLS: ABNORMAL
CALCIUM SERPL-MCNC: 9.3 MG/DL (ref 8.6–10.2)
CHLORIDE BLD-SCNC: 93 MMOL/L (ref 98–107)
CO2: 17 MMOL/L (ref 22–29)
CREAT SERPL-MCNC: 0.8 MG/DL (ref 0.7–1.2)
EOSINOPHILS ABSOLUTE: 0 E9/L (ref 0.05–0.5)
EOSINOPHILS RELATIVE PERCENT: 0 % (ref 0–6)
GFR AFRICAN AMERICAN: >60
GFR NON-AFRICAN AMERICAN: >60 ML/MIN/1.73
GLUCOSE BLD-MCNC: 264 MG/DL (ref 74–99)
HCT VFR BLD CALC: 25.2 % (ref 37–54)
HEMOGLOBIN: 7.8 G/DL (ref 12.5–16.5)
LYMPHOCYTES ABSOLUTE: 0.2 E9/L (ref 1.5–4)
LYMPHOCYTES RELATIVE PERCENT: 3.5 % (ref 20–42)
MACRO-OVALOCYTES: ABNORMAL
MCH RBC QN AUTO: 28.7 PG (ref 26–35)
MCHC RBC AUTO-ENTMCNC: 31 % (ref 32–34.5)
MCV RBC AUTO: 92.6 FL (ref 80–99.9)
METER GLUCOSE: 267 MG/DL (ref 74–99)
MONOCYTES ABSOLUTE: 0.2 E9/L (ref 0.1–0.95)
MONOCYTES RELATIVE PERCENT: 3.5 % (ref 2–12)
MYELOCYTE PERCENT: 1.7 % (ref 0–0)
NEUTROPHILS ABSOLUTE: 4.74 E9/L (ref 1.8–7.3)
NEUTROPHILS RELATIVE PERCENT: 91.3 % (ref 43–80)
ORGANISM: ABNORMAL
OVALOCYTES: ABNORMAL
PDW BLD-RTO: 21.2 FL (ref 11.5–15)
PLATELET # BLD: 87 E9/L (ref 130–450)
PLATELET CONFIRMATION: NORMAL
PMV BLD AUTO: 11.4 FL (ref 7–12)
POIKILOCYTES: ABNORMAL
POLYCHROMASIA: ABNORMAL
POTASSIUM SERPL-SCNC: 5 MMOL/L (ref 3.5–5)
PROCALCITONIN: 0.77 NG/ML (ref 0–0.08)
RBC # BLD: 2.72 E12/L (ref 3.8–5.8)
SODIUM BLD-SCNC: 126 MMOL/L (ref 132–146)
TEAR DROP CELLS: ABNORMAL
TOTAL PROTEIN: 4.4 G/DL (ref 6.4–8.3)
URINE CULTURE, ROUTINE: ABNORMAL
WBC # BLD: 5.1 E9/L (ref 4.5–11.5)

## 2022-03-16 PROCEDURE — 94640 AIRWAY INHALATION TREATMENT: CPT

## 2022-03-16 PROCEDURE — 82962 GLUCOSE BLOOD TEST: CPT

## 2022-03-16 PROCEDURE — 2060000000 HC ICU INTERMEDIATE R&B

## 2022-03-16 PROCEDURE — 85025 COMPLETE CBC W/AUTO DIFF WBC: CPT

## 2022-03-16 PROCEDURE — 80053 COMPREHEN METABOLIC PANEL: CPT

## 2022-03-16 PROCEDURE — 97110 THERAPEUTIC EXERCISES: CPT | Performed by: PHYSICAL THERAPIST

## 2022-03-16 PROCEDURE — 97530 THERAPEUTIC ACTIVITIES: CPT | Performed by: PHYSICAL THERAPIST

## 2022-03-16 PROCEDURE — 84145 PROCALCITONIN (PCT): CPT

## 2022-03-16 PROCEDURE — 82140 ASSAY OF AMMONIA: CPT

## 2022-03-16 PROCEDURE — 36415 COLL VENOUS BLD VENIPUNCTURE: CPT

## 2022-03-16 PROCEDURE — 6370000000 HC RX 637 (ALT 250 FOR IP): Performed by: INTERNAL MEDICINE

## 2022-03-16 RX ADMIN — IPRATROPIUM BROMIDE AND ALBUTEROL 1 PUFF: 20; 100 SPRAY, METERED RESPIRATORY (INHALATION) at 13:44

## 2022-03-16 RX ADMIN — IPRATROPIUM BROMIDE AND ALBUTEROL 1 PUFF: 20; 100 SPRAY, METERED RESPIRATORY (INHALATION) at 07:28

## 2022-03-16 RX ADMIN — PANTOPRAZOLE SODIUM 40 MG: 40 TABLET, DELAYED RELEASE ORAL at 08:58

## 2022-03-16 RX ADMIN — Medication 50 MG: at 08:58

## 2022-03-16 RX ADMIN — LACTULOSE 20 G: 20 SOLUTION ORAL at 08:57

## 2022-03-16 RX ADMIN — Medication 2000 UNITS: at 08:57

## 2022-03-16 RX ADMIN — ACETAMINOPHEN 650 MG: 325 TABLET ORAL at 18:24

## 2022-03-16 RX ADMIN — LACTULOSE 20 G: 20 SOLUTION ORAL at 12:46

## 2022-03-16 RX ADMIN — MAGNESIUM OXIDE 400 MG: 400 TABLET ORAL at 19:59

## 2022-03-16 RX ADMIN — BUDESONIDE AND FORMOTEROL FUMARATE DIHYDRATE 2 PUFF: 160; 4.5 AEROSOL RESPIRATORY (INHALATION) at 18:26

## 2022-03-16 RX ADMIN — MAGNESIUM OXIDE 400 MG: 400 TABLET ORAL at 08:57

## 2022-03-16 RX ADMIN — SERTRALINE 50 MG: 50 TABLET, FILM COATED ORAL at 08:58

## 2022-03-16 RX ADMIN — OXYCODONE HYDROCHLORIDE AND ACETAMINOPHEN 1000 MG: 500 TABLET ORAL at 08:58

## 2022-03-16 RX ADMIN — FERROUS SULFATE TAB 325 MG (65 MG ELEMENTAL FE) 325 MG: 325 (65 FE) TAB at 08:58

## 2022-03-16 RX ADMIN — IPRATROPIUM BROMIDE AND ALBUTEROL 1 PUFF: 20; 100 SPRAY, METERED RESPIRATORY (INHALATION) at 18:26

## 2022-03-16 RX ADMIN — BUDESONIDE AND FORMOTEROL FUMARATE DIHYDRATE 2 PUFF: 160; 4.5 AEROSOL RESPIRATORY (INHALATION) at 07:28

## 2022-03-16 RX ADMIN — LEVOTHYROXINE SODIUM 200 MCG: 0.2 TABLET ORAL at 05:13

## 2022-03-16 RX ADMIN — ASPIRIN 81 MG 81 MG: 81 TABLET ORAL at 08:58

## 2022-03-16 RX ADMIN — FUROSEMIDE 40 MG: 40 TABLET ORAL at 08:57

## 2022-03-16 RX ADMIN — IPRATROPIUM BROMIDE AND ALBUTEROL 1 PUFF: 20; 100 SPRAY, METERED RESPIRATORY (INHALATION) at 22:44

## 2022-03-16 ASSESSMENT — PAIN SCALES - WONG BAKER: WONGBAKER_NUMERICALRESPONSE: 0

## 2022-03-16 ASSESSMENT — PAIN SCALES - GENERAL
PAINLEVEL_OUTOF10: 0
PAINLEVEL_OUTOF10: 0
PAINLEVEL_OUTOF10: 3
PAINLEVEL_OUTOF10: 0
PAINLEVEL_OUTOF10: 0

## 2022-03-16 NOTE — PROGRESS NOTES
Pt has refused BS again. Pt keeps removing monitor leads even with wife present. Pt educated on need for both and still not accepting. Pt eventually let me place back on the monitor. Wife at bedside and aware.

## 2022-03-16 NOTE — PROGRESS NOTES
Internal Medicine Progress Note    [facility]    Ana Stapleton. Law Wright., F.A.CMatthewOMatthewI. Wu Padilla D.O., PRASANNAOJESUS Mcdonough D.O. Ally Thomas, MSN, APRN, NP-C  [facility] [place]. Angel Ivory, MSN, APRN-CNP     Primary Care Physician: Aunsha Davenport DO   Admitting Physician:  Jojo Still DO  Admission date and time: 3/13/2022  3:48 PM    Room:  [address] [place] [address]  Admitting diagnosis: Lactic acidosis [E87.2]  Hyponatremia [E87.1]  COVID-19 [U07.1]    Patient Name: Fatmata Siddiqi  MRN: [id number]    Date of Service: 3/16/2022     Subjective: Neal Okeefe is a 73 y.o. male who was seen and examined today,3/16/2022, at the bedside. Unfortunately, this remains a rather complicated situation particular regarding plan of action moving forward. He is relatively stable at this point and is approaching his baseline from a mentation standpoint. Review of System:   Constitutional:   Admits to generalized malaise and fatigue  HEENT:   Denies ear pain, sore throat, sinus or eye problems. Cardiovascular:   Denies any chest pain, irregular heartbeats, or palpitations. Respiratory:   Denies shortness of breath, coughing, sputum production, hemoptysis, or wheezing. Gastrointestinal:   Improved abdominal distention following paracentesis. Genitourinary:    Denies any urgency, frequency, hematuria. Voiding without difficulty. Extremities:   Denies lower extremity swelling, edema or cyanosis. Neurology:    Denies any headache or focal neurological deficits, admits to persistent generalized weakness and deconditioning. Psch:   Denies being anxious or depressed. Musculoskeletal:    Denies  myalgias, joint complaints or back pain. Integumentary:   Denies any rashes, ulcers, or excoriations. Denies bruising. Hematologic/Lymphatic:  Denies bruising or bleeding. Physical Exam:  No intake/output data recorded.     Intake/Output Summary (Last 24 hours) at 3/16/2022 1240  Last data filed at 3/15/2022 1710  Gross per 24 hour   Intake 0 ml   Output --   Net 0 ml   I/O last 3 completed shifts: In: 220 [P.O.:120; I.V.:100]  Out: -   Patient Vitals for the past 96 hrs (Last 3 readings):   Weight   03/16/22 0007 174 lb (78.9 kg)   03/15/22 0645 180 lb 8 oz (81.9 kg)   03/14/22 0800 183 lb 1.6 oz (83.1 kg)     Vital Signs:   Blood pressure 100/64, pulse 108, temperature 98.3 °F (36.8 °C), resp. rate 18, height 5' 8\" (1.727 m), weight 174 lb (78.9 kg), SpO2 97 %. General appearance:  Alert, responsive, oriented to person, place, and time. Chronically ill-appearing. Head:  Normocephalic. No masses, lesions or tenderness. Eyes:  PERRLA. EOMI. Sclera clear. Buccal mucosa moist.  ENT:  Ears normal. Mucosa normal.  Nasal cannula oxygen is in place. Neck:    Supple. Trachea midline. No thyromegaly. No JVD. No bruits. Heart:    Rhythm regular. Rate controlled. No murmurs. Lungs:    Symmetrical. Clear to auscultation bilaterally. No wheezes. No rhonchi. No rales. Abdomen:   Far less abdominal distention and ascites following paracentesis yesterday. Extremities:    Chronic edema. Neurologic:    More awake and alert today. Oriented x3. Psych: Far less agitated today. Musculoskeletal:   Spine ROM normal. Muscular strength intact. Gait not assessed. Integumentary:  No rashes  Skin normal color and texture.   Genitalia/Breast:  Deferred    Medication:  Scheduled Meds:   budesonide-formoterol  2 puff Inhalation BID    albuterol-ipratropium  1 puff Inhalation Q6H    ascorbic acid  1,000 mg Oral Daily    Vitamin D  2,000 Units Oral Daily    zinc gluconate  50 mg Oral Daily    ferrous sulfate  325 mg Oral BID WC    furosemide  40 mg Oral Daily    lactulose  20 g Oral TID    oxyCODONE  5 mg Oral Once    sodium chloride flush  5-40 mL IntraVENous 2 times per day    aspirin  81 mg Oral Daily    insulin glargine  20 Units SubCUTAneous Nightly    levothyroxine  200 mcg Oral Daily  magnesium oxide  400 mg Oral BID    pantoprazole  40 mg Oral Daily    sertraline  50 mg Oral Daily     Continuous Infusions:   dextrose      sodium chloride         Objective Data:  CBC with Differential:    Lab Results   Component Value Date    WBC 5.1 03/16/2022    RBC 2.72 03/16/2022    HGB 7.8 03/16/2022    HCT 25.2 03/16/2022    PLT 87 03/16/2022    MCV 92.6 03/16/2022    MCH 28.7 03/16/2022    MCHC 31.0 03/16/2022    RDW 21.2 03/16/2022    NRBC 1.0 03/06/2022    SEGSPCT 22 02/03/2014    METASPCT 0.9 03/08/2022    LYMPHOPCT 3.5 03/16/2022    MONOPCT 3.5 03/16/2022    MYELOPCT 1.7 03/16/2022    BASOPCT 0.0 03/16/2022    MONOSABS 0.20 03/16/2022    LYMPHSABS 0.20 03/16/2022    EOSABS 0.00 03/16/2022    BASOSABS 0.00 03/16/2022     CMP:    Lab Results   Component Value Date     03/16/2022    K 5.0 03/16/2022    K 4.9 03/13/2022    CL 93 03/16/2022    CO2 17 03/16/2022    BUN 57 03/16/2022    CREATININE 0.8 03/16/2022    GFRAA >60 03/16/2022    LABGLOM >60 03/16/2022    GLUCOSE 264 03/16/2022    GLUCOSE 318 04/30/2012    PROT 4.4 03/16/2022    LABALBU 2.8 03/16/2022    LABALBU 4.0 04/30/2012    CALCIUM 9.3 03/16/2022    BILITOT 3.1 03/16/2022    ALKPHOS 493 03/16/2022    AST 45 03/16/2022    ALT 35 03/16/2022         Assessment:  1. Hepatic encephalopathy in the setting of decompensated cirrhosis and large volume ascites partially due to medication noncompliance  2. COVID-19 infection without overt respiratory or GI symptomatology  3. Acute on chronic kidney disease stage III complicated by outpatient diuretic therapy and overall decreased oral intake  4. Previous history of AML with current diagnosis of classic Hodgkin's lymphoma  5. Acute on chronic anemia  6. Nondisplaced sacral fracture  7. Insulin-dependent diabetes mellitus type II  8. History of DVT  9. History of MI  10. Hypothyroidism-TSH 5.310  11. Hyperlipidemia  12. Hx of tobacco abuse       Plan:    This is a very complicated situation directly related to unrealistic family expectations. I met with his wife, Dustin at the bedside today. She is very agitated and appears manic. At one point, she wants to take the patient home so that he can get to [place] for chemotherapy. At another time, she is requesting returning home with hospice. She then considers temporary skilled nursing facility placement. She cries multiple times throughout the examination and oftentimes becomes angry. I have explained the gravity of the situation to both the patient and his wife at the bedside today. He is in no condition to be considered for chemotherapy at this point. I have recommended discharging to a skilled nursing facility if they wish to pursue all aggressive intervention moving forward. If they opt for comfort measures, it would be my recommendation that he return home with hospice. We will ask the hospice team to provide consultation to provide information regarding services offered at home. The patient's wife is very difficult to keep on task. The patient himself, is doing much better following paracentesis. His mentation seems to be approaching baseline. His cirrhotic medications are maximized. I do not believe the patient requires hospitalization any further. I do believe that he if he returns home without hospice, he will be right back in the hospital secondary to noncompliance and overall failure to thrive. More than 50% of my  time was spent at the bedside counseling/coordinating care with the patient and/or family with face to face contact. This time was spent reviewing notes and laboratory data as well as instructing and counseling the patient. Time I spent with the family or surrogate(s) is included only if the patient was incapable of providing the necessary information or participating in medical decisions.  I also discussed the differential diagnosis and all of the proposed management plans with the patient and individuals

## 2022-03-16 NOTE — CARE COORDINATION
SOCIAL WORK / DISCHARGE PLANNING:  COVID positive 3/14. This am wife told this Sw that she had spoken to [address] [place] [address] and pt had 2 months to live and she wanted to take him home. Sw offered Hospice and she wished to speak with the physicians first. She spoke with Dr Liam Morin and Hospice consult placed. Wife then took some time away from unit and upon return told Geo Serna and this Sw that she wanted pt to discharge to [facility], [facility] [phone], Fax [phone], for some rehab. Sw called [facility] bed remains available and can accept. NO PRECERT needed. Wife states she wants to get him stronger then to have some chemo locally so she can say she tried. Sw updated Dr Liam Morin. CHANDNI will need signed by physician. HENS form will need completed prior to dc. Addendum; 207pm Plan dc next date per Dr Liam Morin to [facility], [facility] [phone], Fax [phone], NO PRECERT needed. Update Mary Jarquin. CHANDNI will need signed by physician. HENS form initiated, will need completed when dc order obtained.                Electronically signed by WILMAR Ferrer on 3/16/2022 at 1:49 PM

## 2022-03-16 NOTE — PROGRESS NOTES
Pt allowed this nurse and STNA to perform incontinence care and repositioning. Pt also allowed this nurse to check blood glucose level and administer ordered levothyroxine.

## 2022-03-16 NOTE — PROGRESS NOTES
Physical Therapy  Physical Therapy Treatment Note/Plan of Care    Room #:  0620/0620-01  Patient Name: Seb Mason  YOB: 1950  MRN: [id number]    Date of Service: 3/16/2022     Tentative placement recommendation: Subacute rehab  Equipment recommendation: Patient has needed equipment       Evaluating Physical Therapist: Jhonathan Husain, Student Physical Therapist      Specific Provider Orders/Date/Referring Provider : 03/13/22 2230   PT eval and treat Start: 03/13/22 2230, End: 03/13/22 2230, ONE TIME, Standing Count: 1 Occurrences, R    Alpheus Erp, DO  Admitting Diagnosis:   Lactic acidosis [E87.2]  Hyponatremia [E87.1]  COVID-19 [U07.1]    Admitted with fatigue  Surgery: paracentesis 3/15  Visit Diagnoses       Codes    Lactic acidosis     E87.2          Patient Active Problem List   Diagnosis    Bronchitis with bronchospasm    Anxiety    Post herpetic neuralgia    Type 2 diabetes mellitus without complication, with long-term current use of insulin ([address]    Cellulitis of left lower extremity    Plantar wart    Inguinal hernia    Acute myeloid leukemia in remission ([address]    Pneumonia due to infectious organism    Cellulitis    Pharyngitis, acute    Thrombocytopenia ([address]    GVHD (graft versus host disease) ([address]    Portal hypertension ([address]    Post-COVID chronic fatigue    Uncontrolled type 2 diabetes mellitus with hyperglycemia ([address]    Type 2 diabetes mellitus with hyperglycemia    Type 2 diabetes mellitus with chronic kidney disease    Decompensation of cirrhosis of liver ([address]    AMS (altered mental status)    Severe protein-calorie malnutrition ([address]    Hyponatremia    COVID-19        ASSESSMENT of Current Deficits Patient exhibits decreased strength, balance and endurance impairing functional mobility, transfers, gait  and gait distance. Patient was slightly confused, however he was pleasant.   Patient requires cueing for safe use of wheeled walker regarding hand placement, and walker negotiation. Patient displays unsteadiness, and requires significant assistance for safety due to increased weakness during functional mobility. Patient with increased difficulty and decreased distance when ambulating during treatment today. Per social work chart and patient, they are considering a rehab stay at discharge. Patient will continue to require skilled therapy to improve overall strength and endurance. PHYSICAL THERAPY  PLAN OF CARE       Physical therapy plan of care is established based on physician order,  patient diagnosis and clinical assessment    Current Treatment Recommendations:    -Bed Mobility: Lower extremity exercises  and Upper extremity exercises   -Sitting Balance: Incorporate reaching activities to activate trunk muscles  and Hands on support to maintain midline   -Standing Balance: Perform strengthening exercises in standing to promote motor control with or without upper extremity support , Challenge balance utilizing reaching  activities beyond center of gravity   and Perform sit to stand activities maintaining FWB (full weight bearing) weightbearing Bilateral   -Transfers: Provide instruction on proper hand and foot position for adequate transfer of weight onto lower extremities and use of gait device if needed and Cues for hand placement, technique and safety. Provide stabilization to prevent fall   -Gait: Gait training, Standing activities to improve: base of support, weight shift, weight bearing  and Use of Assistive device for FWB (full weight bearing) weight bearing Bilateral     -Endurance: Utilize Supervised activities to increase level of endurance to allow for safe functional mobility including transfers and gait   -Stairs: Stair training with instruction on proper technique and hand placement on rail    PT long term treatment goals are located in below grid    Patient and or family understand(s) diagnosis, prognosis, and plan of care.     Frequency of treatments: Patient will be seen  daily. Prior Level of Function: Patient ambulated with wheeled walker   Rehab Potential: good for baseline    Past medical history:   Past Medical History:   Diagnosis Date    AML [facility]acute myeloblastic [facility]) [facility]     chemotherapy    AML (acute myeloblastic leukemia) ([facility] 2010    Cancer ([facility]     Chronic kidney disease     Diabetes mellitus ([facility]     GERD (gastroesophageal reflux disease)     Hx of blood clots 2012    legs    Hyperlipidemia     Liver disease     MI, old     Other disorders of kidney and ureter     Other sleep disturbances     Thyroid disease     Type II or unspecified type diabetes mellitus without mention of complication, not stated as uncontrolled      Past Surgical History:   Procedure Laterality Date    APPENDECTOMY      BONE MARROW TRANSPLANT      double core transplant    CATHETER INSERTION N/A 2/28/2022    MEDIPORT CATHETER INSERTION performed by Chaz Baker MD at [address] [place] [address]      CT NEEDLE BIOPSY LIVER PERCUTANEOUS  1/17/2022    CT NEEDLE BIOPSY LIVER PERCUTANEOUS    ENDOSCOPY, COLON, DIAGNOSTIC      FOOT NEUROMA SURGERY      UPPER GASTROINTESTINAL ENDOSCOPY         SUBJECTIVE:    Precautions:  Up with assistance and Check Pulse Oximetry while ambulating , falls, alarm, Droplet plus/COVID-19 and confusion ,   Social history: Patient lives with spouse in a ranch home  with 3 steps  to enter with Rail  Tub shower built in shower chair    Equipment owned: [employer],      [address]   How much difficulty turning over in bed?: A Little  How much difficulty sitting down on / standing up from a chair with arms?: A Little  How much difficulty moving from lying on back to sitting on side of bed?: A Little  How much help from another person moving to and from a bed to a chair?: A Lot  How much help from another person needed to walk in hospital room?: A Lot  How much help from another person for climbing 3-5 steps with a railing?: A Lot  AM-PAC Inpatient Mobility Raw Score : 15  AM-PAC Inpatient T-Scale Score : 39.45  Mobility Inpatient CMS 0-100% Score: 57.7  Mobility Inpatient CMS G-Code Modifier : CK    Nursing cleared patient for PT treatment. for strengthening and mobility   OBJECTIVE;   Initial Evaluation  Date: 3/14/2022 Treatment Date:  3/16/2022     Short Term/ Long Term   Goals   Was pt agreeable to Eval/treatment? Yes Yes To be met in 4 days   Pain level   4/10  Lower back 4/10 Lower back    Bed Mobility    Rolling: Minimal assist of 1    Supine to sit: Minimal assist of 1    Sit to supine: Moderate assist of 1    Scooting: Minimal assist of 1   Rolling: Minimal assist of 1   Supine to sit: Minimal assist of 1   Sit to supine: Minimal assist of 1   Scooting: Minimal assist of 1  Rolling: Independent    Supine to sit:  Independent    Sit to supine: Independent    Scooting: Independent     Transfers Sit to stand: Minimal assist of 1 3x Sit to stand: Minimal assist of 1    Sit to stand: Independent    Ambulation    2x5 feet using  wheeled walker with Moderate assist of 1  and cues for upright posture, walker approximation, safety, proper hand placement and pacing 2x3 side steps using  wheeled walker with Moderate assist of 1   cues for sequencing, upright posture, walker approximation, safety and proper hand placement  30 feet using  wheeled walker with Supervision     Stair negotiation: ascended and descended   Not assessed  Not assessed   3 steps with rail supervision   ROM Within functional limits    Increase range of motion 10% of affected joints    Strength BUE:  refer to OT eval  RLE:  4/5  LLE:  4/5  Increase strength in affected mm groups by 1/3 grade   Balance Sitting EOB:  fair   Dynamic Standing:  fair - Sitting EOB: fair - posterior lean  Dynamic Standing: fair - Sitting EOB:  good   Dynamic Standing: good      Patient is Alert & Oriented x person and situation and follows directions    Sensation:  Patient  denies numbness/tingling   Edema:  no   Endurance: fair -    Vitals: room air   Blood Pressure at rest  Blood Pressure during session    Heart Rate at rest 92 Heart Rate during session    SPO2 at rest 99%  SPO2 during session 97-98%     Patient education  Patient educated on role of Physical Therapy, risks of immobility, safety and plan of care, energy conservation,  importance of mobility while in hospital , ankle pumps, quad set and glut set for edema control, blood clot prevention and safety      Patient response to education:   Pt verbalized understanding Pt demonstrated skill Pt requires further education in this area   Yes Partial Yes      Treatment:  Patient practiced and was instructed/facilitated in the following treatment: Patient Sat edge of bed 8 minutes with Minimal assist of 1 to increase dynamic sitting balance and activity tolerance. patient performed Sit to Stand transfers and sidestepped along edge of bed with cues for walker negotiation and safety. Therapeutic Exercises:  hip abduction/adduction, straight leg raise and long arc quad  x 10-15 reps. At end of session, patient in bed with alarm call light and phone within reach,  all lines and tubes intact, nursing notified. Patient would benefit from continued skilled Physical Therapy to improve functional independence and quality of life.        Patient's/ family goals   home    Time in  1140  Time out  1204  Total Treatment Time  24 minutes  CPT codes:  Therapeutic activities (88091)   14 minutes  1 unit(s)  Therapeutic exercises (76542)   10 minutes  1 unit(s)  Aline Patel, Student Physical Therapist

## 2022-03-16 NOTE — PROGRESS NOTES
Pt refusing to turn on side with red buttocks. States understanding of education but remains supine. Pt wife present and stating she will be taking him home today and does not want him transferred to rehab. Dr. Liam Morin notified. Pt wife refusing to wear n95, gown or gloves in the room with patient. She was educated on covid precautions and still refused.

## 2022-03-16 NOTE — PLAN OF CARE
Problem: Falls - Risk of:  Goal: Will remain free from falls  Description: Will remain free from falls  Outcome: Ongoing  Goal: Absence of physical injury  Description: Absence of physical injury  Outcome: Ongoing     Problem: Airway Clearance - Ineffective  Goal: Achieve or maintain patent airway  Outcome: Ongoing     Problem: Gas Exchange - Impaired  Goal: Absence of hypoxia  Outcome: Ongoing  Goal: Promote optimal lung function  Outcome: Ongoing     Problem:  Body Temperature -  Risk of, Imbalanced  Goal: Complications related to the disease process, condition or treatment will be avoided or minimized  Outcome: Ongoing     Problem: Isolation Precautions - Risk of Spread of Infection  Goal: Prevent transmission of infection  Outcome: Ongoing     Problem: Nutrition Deficits  Goal: Optimize nutritional status  Outcome: Ongoing     Problem: Risk for Fluid Volume Deficit  Goal: Maintain normal heart rhythm  Outcome: Ongoing  Goal: Maintain normal serum potassium, sodium, calcium, phosphorus, and pH  Outcome: Ongoing     Problem: Loneliness or Risk for Loneliness  Goal: Demonstrate positive use of time alone when socialization is not possible  Outcome: Ongoing     Problem: Fatigue  Goal: Verbalize increase energy and improved vitality  Outcome: Ongoing     Problem: Patient Education: Go to Patient Education Activity  Goal: Patient/Family Education  Outcome: Ongoing     Problem: Skin Integrity:  Goal: Will show no infection signs and symptoms  Description: Will show no infection signs and symptoms  Outcome: Ongoing  Goal: Absence of new skin breakdown  Description: Absence of new skin breakdown  Outcome: Ongoing

## 2022-03-16 NOTE — PROGRESS NOTES
Pt refusing all medications. Pt removed peripheral IV for previous shift. Would not let this nurse attempt to access chest port to reestablish IV access. Refused blood glucose check at HS. Pt remains alert to self and place only. Disoriented to time. Confused. No complaints of pain or discomfort at this time. Pt would allow this nurse and STNA to provide incontinence care and reposition in bed.

## 2022-03-16 NOTE — CONSULTS
Blood pressure 100/64, pulse around 100, temperature 98. 3. NECK:  No JVD. HEART:  Regular S1 and S2. No S3.  1/6 systolic murmur heard best at  the left sternal border. LUNGS:  Diminished breath sounds in the bases. I could not hear rales. ABDOMEN:  Distended but soft. EXTREMITIES:  Trace ankle edema bilaterally. NEUROLOGIC:  Generalized body weakness. No focal deficits. EKG on admission showing sinus tachycardia with heart rate of 108 a  minute with left bundle-branch block. LABORATORY DATA:  On admission; WBC 7.4, hemoglobin 8.6, platelet count  80. Sodium 123, potassium 4.9, BUN 56, creatinine 1.1. Troponin  high-sensitivity 29. Ammonia level 31. Repeat troponin is 25. IMPRESSION:  1. Altered mental status. That can be related to hepatic  encephalopathy. The patient is being evaluated and treated for that. 2.  COVID-19 infection. The patient is being treated for that. 3.  History of sinus bradycardia from Aldactone and Inderal.  No more  episodes of sinus bradycardia after stopping these medicines. He has  borderline sinus tachycardia, but I still recommend avoiding any  beta-blockers or calcium channel blockers in view of his profound  bradycardia 2 months ago requiring temporary pacemaker.         Greg Muniz MD    D: 03/16/2022 16:23:34       T: 03/16/2022 16:27:12     [id number]  Job#: [id number]     Doc#: [id number]    CC:

## 2022-03-17 VITALS
TEMPERATURE: 97.7 F | RESPIRATION RATE: 16 BRPM | WEIGHT: 174 LBS | DIASTOLIC BLOOD PRESSURE: 53 MMHG | BODY MASS INDEX: 26.37 KG/M2 | HEIGHT: 68 IN | SYSTOLIC BLOOD PRESSURE: 104 MMHG | OXYGEN SATURATION: 97 % | HEART RATE: 120 BPM

## 2022-03-17 LAB
ALBUMIN SERPL-MCNC: 2.7 G/DL (ref 3.5–5.2)
ALP BLD-CCNC: 502 U/L (ref 40–129)
ALT SERPL-CCNC: 38 U/L (ref 0–40)
ANION GAP SERPL CALCULATED.3IONS-SCNC: 14 MMOL/L (ref 7–16)
ANISOCYTOSIS: ABNORMAL
AST SERPL-CCNC: 45 U/L (ref 0–39)
BASOPHILS ABSOLUTE: 0 E9/L (ref 0–0.2)
BASOPHILS RELATIVE PERCENT: 0 % (ref 0–2)
BILIRUB SERPL-MCNC: 3.8 MG/DL (ref 0–1.2)
BUN BLDV-MCNC: 54 MG/DL (ref 6–23)
BURR CELLS: ABNORMAL
CALCIUM SERPL-MCNC: 9.4 MG/DL (ref 8.6–10.2)
CHLORIDE BLD-SCNC: 93 MMOL/L (ref 98–107)
CO2: 17 MMOL/L (ref 22–29)
CREAT SERPL-MCNC: 0.8 MG/DL (ref 0.7–1.2)
EOSINOPHILS ABSOLUTE: 0 E9/L (ref 0.05–0.5)
EOSINOPHILS RELATIVE PERCENT: 0 % (ref 0–6)
GFR AFRICAN AMERICAN: >60
GFR NON-AFRICAN AMERICAN: >60 ML/MIN/1.73
GLUCOSE BLD-MCNC: 303 MG/DL (ref 74–99)
HCT VFR BLD CALC: 25 % (ref 37–54)
HEMOGLOBIN: 7.9 G/DL (ref 12.5–16.5)
HYPOCHROMIA: ABNORMAL
LYMPHOCYTES ABSOLUTE: 0.22 E9/L (ref 1.5–4)
LYMPHOCYTES RELATIVE PERCENT: 4.4 % (ref 20–42)
MCH RBC QN AUTO: 28.9 PG (ref 26–35)
MCHC RBC AUTO-ENTMCNC: 31.6 % (ref 32–34.5)
MCV RBC AUTO: 91.6 FL (ref 80–99.9)
METAMYELOCYTES RELATIVE PERCENT: 0.9 % (ref 0–1)
METER GLUCOSE: 308 MG/DL (ref 74–99)
METER GLUCOSE: 335 MG/DL (ref 74–99)
MONOCYTES ABSOLUTE: 0.27 E9/L (ref 0.1–0.95)
MONOCYTES RELATIVE PERCENT: 5.3 % (ref 2–12)
NEUTROPHILS ABSOLUTE: 4.86 E9/L (ref 1.8–7.3)
NEUTROPHILS RELATIVE PERCENT: 89.5 % (ref 43–80)
OVALOCYTES: ABNORMAL
PDW BLD-RTO: 21.4 FL (ref 11.5–15)
PLATELET # BLD: 77 E9/L (ref 130–450)
PLATELET CONFIRMATION: NORMAL
PMV BLD AUTO: 10.9 FL (ref 7–12)
POIKILOCYTES: ABNORMAL
POLYCHROMASIA: ABNORMAL
POTASSIUM SERPL-SCNC: 4.8 MMOL/L (ref 3.5–5)
RBC # BLD: 2.73 E12/L (ref 3.8–5.8)
SODIUM BLD-SCNC: 124 MMOL/L (ref 132–146)
TOTAL PROTEIN: 4.3 G/DL (ref 6.4–8.3)
WBC # BLD: 5.4 E9/L (ref 4.5–11.5)

## 2022-03-17 PROCEDURE — 80053 COMPREHEN METABOLIC PANEL: CPT

## 2022-03-17 PROCEDURE — 85025 COMPLETE CBC W/AUTO DIFF WBC: CPT

## 2022-03-17 PROCEDURE — 6370000000 HC RX 637 (ALT 250 FOR IP): Performed by: INTERNAL MEDICINE

## 2022-03-17 PROCEDURE — 94640 AIRWAY INHALATION TREATMENT: CPT

## 2022-03-17 PROCEDURE — 36415 COLL VENOUS BLD VENIPUNCTURE: CPT

## 2022-03-17 PROCEDURE — 97110 THERAPEUTIC EXERCISES: CPT

## 2022-03-17 PROCEDURE — 97530 THERAPEUTIC ACTIVITIES: CPT

## 2022-03-17 PROCEDURE — 82962 GLUCOSE BLOOD TEST: CPT

## 2022-03-17 RX ORDER — BUDESONIDE AND FORMOTEROL FUMARATE DIHYDRATE 160; 4.5 UG/1; UG/1
2 AEROSOL RESPIRATORY (INHALATION) 2 TIMES DAILY
Qty: 10.2 G | Refills: 3 | Status: ON HOLD | OUTPATIENT
Start: 2022-03-17 | End: 2022-03-23 | Stop reason: HOSPADM

## 2022-03-17 RX ORDER — OXYCODONE HYDROCHLORIDE 5 MG/1
5 TABLET ORAL ONCE
Qty: 10 TABLET | Refills: 0 | Status: SHIPPED | OUTPATIENT
Start: 2022-03-17 | End: 2022-03-17

## 2022-03-17 RX ORDER — ZINC GLUCONATE 50 MG
50 TABLET ORAL DAILY
Qty: 30 TABLET | Refills: 3 | Status: ON HOLD | OUTPATIENT
Start: 2022-03-17 | End: 2022-03-23 | Stop reason: HOSPADM

## 2022-03-17 RX ORDER — INSULIN LISPRO 100 [IU]/ML
INJECTION, SOLUTION INTRAVENOUS; SUBCUTANEOUS
Qty: 5 EACH | Refills: 3 | Status: ON HOLD | OUTPATIENT
Start: 2022-03-17 | End: 2022-03-23 | Stop reason: HOSPADM

## 2022-03-17 RX ADMIN — PANTOPRAZOLE SODIUM 40 MG: 40 TABLET, DELAYED RELEASE ORAL at 08:29

## 2022-03-17 RX ADMIN — MAGNESIUM OXIDE 400 MG: 400 TABLET ORAL at 08:29

## 2022-03-17 RX ADMIN — Medication 50 MG: at 14:53

## 2022-03-17 RX ADMIN — ACETAMINOPHEN 650 MG: 325 TABLET ORAL at 10:24

## 2022-03-17 RX ADMIN — IPRATROPIUM BROMIDE AND ALBUTEROL 1 PUFF: 20; 100 SPRAY, METERED RESPIRATORY (INHALATION) at 06:34

## 2022-03-17 RX ADMIN — FUROSEMIDE 40 MG: 40 TABLET ORAL at 08:29

## 2022-03-17 RX ADMIN — OXYCODONE HYDROCHLORIDE AND ACETAMINOPHEN 1000 MG: 500 TABLET ORAL at 08:29

## 2022-03-17 RX ADMIN — IPRATROPIUM BROMIDE AND ALBUTEROL 1 PUFF: 20; 100 SPRAY, METERED RESPIRATORY (INHALATION) at 10:47

## 2022-03-17 RX ADMIN — ASPIRIN 81 MG 81 MG: 81 TABLET ORAL at 08:29

## 2022-03-17 RX ADMIN — LACTULOSE 20 G: 20 SOLUTION ORAL at 14:54

## 2022-03-17 RX ADMIN — LEVOTHYROXINE SODIUM 200 MCG: 0.2 TABLET ORAL at 06:19

## 2022-03-17 RX ADMIN — FERROUS SULFATE TAB 325 MG (65 MG ELEMENTAL FE) 325 MG: 325 (65 FE) TAB at 08:29

## 2022-03-17 RX ADMIN — SERTRALINE 50 MG: 50 TABLET, FILM COATED ORAL at 08:29

## 2022-03-17 RX ADMIN — Medication 2000 UNITS: at 08:29

## 2022-03-17 RX ADMIN — BUDESONIDE AND FORMOTEROL FUMARATE DIHYDRATE 2 PUFF: 160; 4.5 AEROSOL RESPIRATORY (INHALATION) at 06:34

## 2022-03-17 ASSESSMENT — PAIN SCALES - GENERAL
PAINLEVEL_OUTOF10: 0
PAINLEVEL_OUTOF10: 0
PAINLEVEL_OUTOF10: 4

## 2022-03-17 NOTE — PLAN OF CARE
Problem: Falls - Risk of:  Goal: Will remain free from falls  Description: Will remain free from falls  Outcome: Met This Shift  Goal: Absence of physical injury  Description: Absence of physical injury  Outcome: Met This Shift     Problem: Airway Clearance - Ineffective  Goal: Achieve or maintain patent airway  Outcome: Met This Shift     Problem: Gas Exchange - Impaired  Goal: Absence of hypoxia  Outcome: Met This Shift  Goal: Promote optimal lung function  Outcome: Met This Shift     Problem:  Body Temperature -  Risk of, Imbalanced  Goal: Complications related to the disease process, condition or treatment will be avoided or minimized  Outcome: Met This Shift     Problem: Isolation Precautions - Risk of Spread of Infection  Goal: Prevent transmission of infection  Outcome: Met This Shift     Problem: Nutrition Deficits  Goal: Optimize nutritional status  Outcome: Met This Shift     Problem: Risk for Fluid Volume Deficit  Goal: Maintain normal heart rhythm  Outcome: Met This Shift  Goal: Maintain normal serum potassium, sodium, calcium, phosphorus, and pH  Outcome: Met This Shift     Problem: Loneliness or Risk for Loneliness  Goal: Demonstrate positive use of time alone when socialization is not possible  Outcome: Met This Shift     Problem: Fatigue  Goal: Verbalize increase energy and improved vitality  Outcome: Met This Shift     Problem: Patient Education: Go to Patient Education Activity  Goal: Patient/Family Education  Outcome: Met This Shift     Problem: Skin Integrity:  Goal: Will show no infection signs and symptoms  Description: Will show no infection signs and symptoms  Outcome: Met This Shift  Goal: Absence of new skin breakdown  Description: Absence of new skin breakdown  Outcome: Met This Shift

## 2022-03-17 NOTE — CARE COORDINATION
SS NOTE: Arrangements completed for pt to be transferrd to [facility] today at [facility] by [facility]. Pt and his wife are aware of this plan. For the SNF placement pt will need NO PRECERT, he will need a signed CHANDNI, and SW completed the HENs. WILMAR Monk.3/17/2022.11:08 AM.    ADDENDUM TO ABOVE NOTE: Wife met with this SW and insists on investigating [address] tried to explain that [facility] does not have a COVID unit. Wife does not believe pt has COVID and insisting on retesting. SW completed a referral to [facility] and obtained an official denial of the admission. SS will explain to pt's wife once again. WILMAR Monk.3/17/2022.11:51 AM.

## 2022-03-17 NOTE — DISCHARGE SUMMARY
Internal Medicine Progress Note     [facility]     Josyln Hanson. Brandon Tavarez., F.A.C.OMatthewI. Arcenio Sparks D.O., ELIASCMatthewOMatthewI. Reino Jeans, D.O. Edie Faye, MSN, APRN, NP-C  Kevin Cervantes. Danielle Damon, MSN, [id number] [facility]       Internal Medicine  Discharge Summary    NAME: Alyssa Mclean  :  1950  MRN:  [id number]  [address] [facility]  ADMITTED: 3/13/2022      DISCHARGED: 3/17/22    ADMITTING PHYSICIAN: Joslyn Menendez DO    CONSULTANT(S):   IP CONSULT TO PHARMACY  IP CONSULT TO CARDIOLOGY  IP CONSULT TO HOSPICE     ADMITTING DIAGNOSIS:   Lactic acidosis [E87.2]  Hyponatremia [E87.1]  COVID-19 [U07.1]     DISCHARGE DIAGNOSES:   1. Hepatic encephalopathy in the setting of decompensated cirrhosis and large volume ascites partially due to medication noncompliance  2. COVID-19 infection without overt respiratory or GI symptomatology  3. Acute on chronic kidney disease stage III complicated by outpatient diuretic therapy and overall decreased oral intake  4. Previous history of AML with current diagnosis of classic Hodgkin's lymphoma  5. Acute on chronic anemia  6. Nondisplaced sacral fracture  7. Insulin-dependent diabetes mellitus type II  8. History of DVT  9. History of MI  10. Hypothyroidism-TSH 5.310  11. Hyperlipidemia  12. Hx of tobacco abuse     BRIEF HISTORY OF PRESENT ILLNESS:   Patient is a 81-year-old male who presented to the ED due to fatigue and altered mental status. Patient was recently admitted and then discharged few days ago. He had paracentesis performed at that time. However he comes in today with increased fatigue and altered mental status. He also admits to multiple bouts of emesis. He denies any hematemesis or coffee-ground emesis. He does feel cold. Otherwise he denies any abdominal pain. He has not been taking his lactulose. Otherwise he denies any shortness of breath. Denies any cough.   States he has decreased appetite. LABS[de-identified]  Lab Results   Component Value Date    WBC 5.4 03/17/2022    HGB 7.9 (L) 03/17/2022    HCT 25.0 (L) 03/17/2022    PLT 77 (L) 03/17/2022     (L) 03/17/2022    K 4.8 03/17/2022    CL 93 (L) 03/17/2022    CREATININE 0.8 03/17/2022    BUN 54 (H) 03/17/2022    CO2 17 (L) 03/17/2022    GLUCOSE 303 (H) 03/17/2022    ALT 38 03/17/2022    AST 45 (H) 03/17/2022    INR 1.5 03/14/2022     Lab Results   Component Value Date    INR 1.5 03/14/2022    INR 1.6 03/06/2022    INR 1.5 03/04/2022    PROTIME 17.9 (H) 03/14/2022    PROTIME 18.6 (H) 03/06/2022    PROTIME 17.2 (H) 03/04/2022      Lab Results   Component Value Date    TSH 3.690 03/14/2022     Lab Results   Component Value Date    TRIG 230 (H) 04/28/2020    TRIG 327 (H) 07/05/2018     Lab Results   Component Value Date    HDL 22 04/28/2020    HDL 17 07/05/2018     Lab Results   Component Value Date    LDLCALC 63 04/28/2020    LDLCALC 48 07/05/2018     Lab Results   Component Value Date    LABA1C 8.4 12/08/2021       IMAGING:  CT ABDOMEN PELVIS WO CONTRAST Additional Contrast? None    Result Date: 3/4/2022  EXAMINATION: CT OF THE ABDOMEN AND PELVIS WITHOUT CONTRAST 3/4/2022 9:42 pm TECHNIQUE: CT of the abdomen and pelvis was performed without the administration of intravenous contrast. Multiplanar reformatted images are provided for review. Dose modulation, iterative reconstruction, and/or weight based adjustment of the mA/kV was utilized to reduce the radiation dose to as low as reasonably achievable. COMPARISON: CT of the abdomen and pelvis, 02/24/2022.  HISTORY: ORDERING SYSTEM PROVIDED HISTORY: Liver cirrhosis with recurrent liver CA, in with AMS nausea and vomiting TECHNOLOGIST PROVIDED HISTORY: Reason for exam:->Liver cirrhosis with recurrent liver CA, in with AMS nausea and vomiting Additional Contrast?->None FINDINGS: Lower Chest: Compared to the recent CT from 02/24/2022 (2 weeks prior) there is interval enlargement of a left lower lobe pulmonary nodule from 9-13 mm (series 6, image 45). Another nodule in the right lower lobe has increased in size from 6-11 mm (image 50). Multiple other smaller nodules are seen, which are stable to minimally enlarged. The heart is normal in size. Small pericardial effusion. Small right pleural effusion. Organs: Liver: Markedly cirrhotic liver with an irregular contour. Due to increased beam hardening artifact resulting from patient's arms within the gantry, the multiple small nodules previously identified the liver cannot be visualized on current study. Gallbladder: Surgically absent. Pancreas: Unremarkable. Spleen:  Mildly enlarged, measuring 16 cm in the maximum AP dimension. Adrenals: Unremarkable. Kidneys: Small bilateral renal calculi, with the largest on the left measuring approximately 5 mm. No hydronephrosis. Small left renal cyst. GI/Bowel: There is mild mural thickening in the colon which is nonspecific. No evidence of bowel obstruction. Pelvis: The urinary bladder and the prostate are grossly unremarkable. Small fat containing inguinal hernias. Peritoneum/Retroperitoneum: Large volume ascites. No free air Mild calcified atherosclerosis is seen in the aorta. No aneurysm. Mildly prominent retroperitoneal lymph nodes Bones/Soft Tissues: The visualized bones are intact without fracture or focal lesion. 1. Mild apparent MURAL THICKENING IN THE COLON may be an artifact of under distension, cystitis or manifestation of portal hypertensive colopathy. 2. Liver cirrhosis with mild splenomegaly and large volume ascites. 3. The small hypodense nodules previously seen in the liver are not evident on this study due to beam hardening artifact resulting from patient's arms within the gantry. 4. Interval increase in size of multiple pulmonary nodules since 02/24/2022 (2 weeks ago) concerning for PROGRESSIVE METASTATIC DISEASE. 5. Grossly stable nonspecific mildly prominent left para-aortic lymph nodes.  The RECOMMENDATIONS: Unavailable     CT ABDOMEN PELVIS WO CONTRAST Additional Contrast? None    Result Date: 2/24/2022  EXAMINATION: CT OF THE ABDOMEN AND PELVIS WITHOUT CONTRAST 2/24/2022 1:19 pm TECHNIQUE: CT of the abdomen and pelvis was performed without the administration of intravenous contrast. Multiplanar reformatted images are provided for review. Dose modulation, iterative reconstruction, and/or weight based adjustment of the mA/kV was utilized to reduce the radiation dose to as low as reasonably achievable. COMPARISON: February 5, 2022 HISTORY: ORDERING SYSTEM PROVIDED HISTORY: abdominal distention and low back pain/ttp s/p fall TECHNOLOGIST PROVIDED HISTORY: Reason for exam:->abdominal distention and low back pain/ttp s/p fall Additional Contrast?->None Decision Support Exception - unselect if not a suspected or confirmed emergency medical condition->Emergency Medical Condition (MA) FINDINGS: Lower Chest: There is chronic elevation of the right hemidiaphragm. Heart size is within normal limits. Coronary artery calcifications are noted, potentially a marker for coronary artery disease. Trace pericardial fluid is present. Moderate to severe bilateral gynecomastia has a symmetric appearance. There are multiple sub 5 mm pulmonary nodules in the lower lobes bilaterally. Several of these nodules are new from the prior study. Additionally, there is a 9 mm nodule in the left lower lobe on axial image 49, increased in size from the prior study when it measured approximately 4 mm. Organs: The liver is cirrhotic and contains multiple hypodense lesions scattered throughout the left and right lobes. Evaluation of these lesions is limited due to lack of IV contrast material.  There are sequelae of portal hypertension including splenomegaly, with a cranial to caudal spleen size of 13 cm. A linear hypodensity extending from the anterior to posterior aspect of the upper pole of the spleen is stable in appearance. The unenhanced pancreas is normal in appearance. The kidneys are without hydronephrosis. Bilateral nonobstructing renal calculi are stable from the prior study, measuring up to 4 mm in the right kidney and 2-3 mm in the left kidney. GI/Bowel: No evidence of a bowel obstruction, free air or pneumatosis. Portions the colon are decompressed, limiting assessment for mucosal based abnormalities. There is diverticulosis without evidence of diverticulitis. There is a short segment of colonic wall thickening along the proximal and mid segments of the transverse colon. Proximal ascending colon also demonstrates a short segment circumferential wall thickening. The appendix is not confidently visualized. No obvious pericecal inflammatory changes to suggest acute appendicitis. The stomach and duodenal sweep are under distended, limiting assessment. No obvious gastric or duodenal abnormality within this limitation. Pelvis: The urinary bladder is distended, with a subcentimeter diverticulum arising from the anterior wall near the upper margin of the bladder on axial image 190. The prostate gland is heterogeneous but nonenlarged. There is no evidence of pelvic lymphadenopathy. A large volume of ascites is present in the pelvis. Peritoneum/Retroperitoneum: There is a large volume of abdominal ascites. An increased number of visible, top-normal and mildly enlarged mesenteric lymph nodes are present, along with mesenteric edema, not significantly changed in appearance compared with the February 5, 2022 exam.  The abdominal aorta is normal in caliber. The abdominal aorta is heavily calcified but normal in caliber. There are several top-normal sized periaortic/retroperitoneal lymph nodes. A retroperitoneal lymph node on axial image 106 is enlarged measuring 12 mm in short axis. Bones/Soft Tissues: Bones are diffusely osteopenic.   There is a nondisplaced fracture through the S3 segment of the sacrum, best demonstrated on the sagittal view. No additional fractures are identified. Moderate degenerative changes involve the lower lumbar spine. Nondisplaced sacral fracture as above. No additional acute osseous abnormalities. Cirrhotic liver morphology with sequelae of portal hypertension including splenomegaly and large volume of ascites in the abdomen and pelvis. Multiple hypodense lesions scattered throughout the liver which may reflect primary liver cancer or metastatic disease, among other etiologies. Numerous subcentimeter pulmonary nodules at both lung bases, increased in number and size from the prior study. Findings are highly suspicious for metastatic disease. Additional, incidental findings as above. XR CHEST INSPIRATION AND EXPIRATION    Result Date: 2/24/2022  EXAMINATION: ONE XRAY VIEW OF THE CHEST IN INSPIRATION 2/24/2022 2:49 pm COMPARISON: The previous study performed earlier in the day at 12:59 p.m.. HISTORY: ORDERING SYSTEM PROVIDED HISTORY: r/o pneumothorax TECHNOLOGIST PROVIDED HISTORY: Reason for exam:->r/o pneumothorax FINDINGS: The inspiration study again reveals the lungs to be hypoinflated. There is no evidence of acute consolidation or infiltrate. There is again elevation of the right hemidiaphragm. The curvilinear radiolucency in the right upper lung field is less prominently noted but still suggested to be present, especially in the inspiration view. There appears to be extension beyond the chest wall and into the soft tissues, which would indicate a skinfold. There is no definite evidence to suggest pneumothorax. The cardiac silhouette and mediastinal structures are without significant interval change. Curvilinear radiolucency in the right upper lung field noted on the prior examination performed earlier in the day is less prominently seen on this study but still suggested to be present, especially in the inspiration view.  There appears to be extension beyond the chest wall and into the Exception - unselect if not a suspected or confirmed emergency medical condition->Emergency Medical Condition (MA) FINDINGS: BRAIN/VENTRICLES: There is no acute intracranial hemorrhage, mass effect or midline shift. No abnormal extra-axial fluid collection. The gray-white differentiation is maintained without evidence of an acute infarct. There is no evidence of hydrocephalus. ORBITS: The visualized portion of the orbits demonstrate no acute abnormality. SINUSES: There is soft tissue density  in the maxillary sinuses. SOFT TISSUES/SKULL:  No acute abnormality of the visualized skull or soft tissues. No acute intracranial abnormality. There is age-appropriate atrophy and small-vessel ischemic disease. Sinus disease in the maxillary sinuses. RECOMMENDATIONS: Unavailable     US GALLBLADDER RUQ    Result Date: 3/14/2022  EXAMINATION: RIGHT UPPER QUADRANT ULTRASOUND 3/14/2022 8:05 am COMPARISON: None. HISTORY: ORDERING SYSTEM PROVIDED HISTORY: elevated bilirubin and alk phos TECHNOLOGIST PROVIDED HISTORY: Reason for exam:->elevated bilirubin and alk phos What reading provider will be dictating this exam?->CRC FINDINGS: LIVER: There is a peripheral nodular appearance of the liver. There is increased echogenicity. The findings are consistent with cirrhosis. No hepatic mass is noted. There is perihepatic ascites. BILIARY SYSTEM:  The gallbladder is surgically absent. The common bile duct is normal in caliber. Common bile duct measures 3.7 mm. RIGHT KIDNEY: The right kidney measures 10.3 x 6.5 x 4.9 cm. There are a couple of tiny nonobstructing stones seen within the right kidney. PANCREAS:  Visualized portions of the pancreas are unremarkable. OTHER: No evidence of right upper quadrant ascites. There are findings of cirrhosis. No hepatic mass is noted Perihepatic ascites, mild in severity.  Previous cholecystectomy     US GALLBLADDER RUQ    Result Date: 3/6/2022  EXAMINATION: RIGHT UPPER QUADRANT ULTRASOUND 3/6/2022 8:27 am COMPARISON: None. HISTORY: ORDERING SYSTEM PROVIDED HISTORY: elevated alk phos and lipase TECHNOLOGIST PROVIDED HISTORY: Reason for exam:->elevated alk phos and lipase What reading provider will be dictating this exam?->CRC FINDINGS: There is a heterogeneous coarse echotexture for the liver parenchyma. There is some atrophy of the liver with lobularity of the outlines, these indicates advanced stage for liver parenchymal disease as seen with liver cirrhosis. Patient had previous cholecystectomy. The biliary tree is not dilated. The CBD measures 4 mm. The intrahepatic biliary radicles are not dilated. Due overlying bowel contents evaluation of the pancreas was suboptimal.  The body of the pancreas has normal size the thickness and a hyperechoic texture. Could not see dilated pancreatic duct in the body of the pancreas. Evaluation of the head and tail of the pancreas are limited on the present study. The pancreas was seen on the CT abdomen pelvis done without IV contrast of March 4. See report of the CT abdomen pelvis. There is a fair amount of ascites present in the right upper quadrant, and in the right lower quadrant as well. The right kidney has normal size, measures 10 x 4.8 cm and in the mid 3rd of the right kidney there is a focus of hyperechogenicity compatible with a renal calculus measures 10 mm. There is no hydronephrosis changes in the right kidney. The The left upper quadrant is not evaluated on this study. The spleen is seen on the recent CT abdomen pelvis of March 4 measures 16 by 6.8 cm which indicates splenomegaly. See report of the previous CT abdomen pelvis of March 4.  With ultrasound there are areas of hypoechogenicity peripherally located the in the anterior aspect of interface between the right lobe and the left lobe at least 1 is identified measures 2.6 x 1.5 cm which can be suspicious for focal lesions in addition to the diffuse background of liver cirrhosis. Proper evaluation of the liver will required a multiphase contrast study, CT or MRI being MRI a likely in better advantage than CT for additional tissue characterization. 1.  Status post cholecystectomy, no indication for positive ultrasound March sign in the right upper quadrant at the time of the study. 2.  Advanced liver cirrhosis. See above comments and recommendations. 3.  No dilatation of the biliary tree. 4.  Limited evaluation of the pancreas. The left upper quadrant not included on this study. The see report CT abdomen pelvis of March 4, 2022. 5.  Moderate large size ascites present in the abdomen. XR CHEST PORTABLE    Result Date: 3/13/2022  EXAMINATION: ONE XRAY VIEW OF THE CHEST 3/13/2022 4:51 pm COMPARISON: 03/04/2022 HISTORY: ORDERING SYSTEM PROVIDED HISTORY: r/o pneumonia TECHNOLOGIST PROVIDED HISTORY: Reason for exam:->r/o pneumonia FINDINGS: The lungs are without acute focal process. There is no effusion or pneumothorax. The cardiomediastinal silhouette is without acute process. The osseous structures are without acute process. Left-sided port a catheter tip in the proximal SVC. No acute process. XR CHEST PORTABLE    Result Date: 3/4/2022  EXAMINATION: ONE XRAY VIEW OF THE CHEST 3/4/2022 8:59 pm COMPARISON: Chest series from February 28, 2022 HISTORY: ORDERING SYSTEM PROVIDED HISTORY: AMS TECHNOLOGIST PROVIDED HISTORY: Reason for exam:->AMS FINDINGS: Left chest wall MediPort with distal tip projecting in the proximal superior vena cava. Stable elevation of the right hemidiaphragm. No new airspace disease, pleural effusions, or pneumothoraces. Stable appearance of the cardiomediastinal silhouette. Normal pulmonary vascularity. Osseous and thoracic soft tissue structures demonstrate no acute findings. Cholecystectomy clips in the right upper quadrant. Stable chest series. No acute cardiopulmonary pathology.      XR CHEST PORTABLE    Result Date: 2/28/2022  EXAMINATION: ONE XRAY VIEW OF THE CHEST 2/28/2022 1:08 pm COMPARISON: 02/24/2022 HISTORY: ORDERING SYSTEM PROVIDED HISTORY: [facility] TECHNOLOGIST PROVIDED HISTORY: Reason for exam:->mediport FINDINGS: There is elevation the right hemidiaphragm. There is some scarring in the right lower lobe. Left lung is clear. There is a left-sided chest port. Heart size is normal.  Depth of inspiration is not ideal.     No definite acute process     US ABDOMEN LIMITED Specify organ? LIVER, SPLEEN    Result Date: 2/25/2022  EXAMINATION: RIGHT UPPER QUADRANT ULTRASOUND AND WITH LIVER DOPPLER 2/25/2022 10:11 am COMPARISON: CT ABDOMEN AND PELVIS WITHOUT CONTRAST 02/05/2022 HISTORY: ORDERING SYSTEM PROVIDED HISTORY: Portal and hepatic veins TECHNOLOGIST PROVIDED HISTORY: Reason for exam:->Portal and hepatic veins Specify organ?->LIVER Specify organ?->SPLEEN What reading provider will be dictating this exam?->CRC TECHNIQUE: Duplex ultrasound using B-mode/gray scaled imaging, Doppler spectral analysis and color flow Doppler was obtained of the liver and right upper quadrant abdomen. FINDINGS: Severe cirrhosis with a shrunken liver and prominent nodular patent contour. Recent paracentesis and with current findings of small to moderate ascites. The gallbladder is surgically absent. No intrahepatic or extrahepatic biliary dilatation. The common bile duct measures 0.4 cm in depth diameter which is normal. The liver shows a coarsened echotexture pattern as seen with cirrhosis. The central liver shows an equivocal 1.6 cm hypoechoic lesion which is poorly defined. This area may be artifactual.  No dominant lesion is seen. By Doppler evaluation, the portal and hepatic veins are patent. Physiologic hepatopetal portal blood flow is present and with portal vein velocity up to 22.1 cm/sec (normal 20-40 cm/sec). The splenic vein remains patent. The right kidney is visualized and appears normal.  No hydronephrosis.   The right kidney measures 10.7 cm in length. 1.  Shrunken liver with severe cirrhosis and small to moderate ascites. Equivocal 1.6 cm hypoechoic lesion within the central liver. 2.  Patent portal and hepatic veins. Physiologic hepatopetal portal flow. IR US GUIDED PARACENTESIS    Result Date: 3/16/2022  PROCEDURE: PARACENTESIS WITHOUT IMAGE GUIDANCE US ABDOMEN LIMITED 3/15/2022 HISTORY: ORDERING SYSTEM PROVIDED HISTORY: Paracentesis TECHNOLOGIST PROVIDED HISTORY: Reason for exam:->Paracentesis TECHNIQUE: Informed consent was obtained from the patient's wife (given the patient's altered mental status at the time of the procedure) after a detailed explanation of the procedure including risks, benefits, and alternatives. Universal protocol was followed. A limited ultrasound of the abdomen was performed. The right abdomen was prepped and draped in sterile fashion and local anesthesia was achieved with lidocaine. A 5 [place] needle sheath was advanced into ascites and paracentesis was performed. The patient tolerated the procedure well. FINDINGS: Limited ultrasound of the abdomen demonstrates ascites. A total of 4750 cc of straw-colored ascitic fluid was removed. Status post paracentesis. IR US GUIDED PARACENTESIS    Result Date: 3/7/2022  PROCEDURE: PARACENTESIS WITHOUT IMAGE GUIDANCE US ABDOMEN LIMITED 3/7/2022 HISTORY: ORDERING SYSTEM PROVIDED HISTORY: paracentesis TECHNOLOGIST PROVIDED HISTORY: Reason for exam:->paracentesis TECHNIQUE: Informed consent was obtained after a detailed explanation of the procedure including risks, benefits, and alternatives. Universal protocol was followed. A limited ultrasound of the abdomen was performed. The right abdomen was prepped and draped in sterile fashion and local anesthesia was achieved with lidocaine. An 8 Citizen [place] needle sheath was advanced into ascites and paracentesis was performed. The patient tolerated the procedure well.  FINDINGS: Limited ultrasound of the abdomen demonstrates ascites. A total of 4800 cc was removed. Successful paracentesis. IR US GUIDED PARACENTESIS    Result Date: 2/25/2022  PROCEDURE: PARACENTESIS WITHOUT IMAGE GUIDANCE US ABDOMEN LIMITED 2/25/2022 HISTORY: ORDERING SYSTEM PROVIDED HISTORY: paracentesis TECHNOLOGIST PROVIDED HISTORY: Reason for exam:->paracentesis TECHNIQUE: Informed consent was obtained after a detailed explanation of the procedure including risks, benefits, and alternatives. Universal protocol was followed. A limited ultrasound of the abdomen was performed. The right abdomen was prepped and draped in sterile fashion and local anesthesia was achieved with lidocaine. A 5 [place] needle sheath was advanced into ascites and paracentesis was performed. The patient tolerated the procedure well. FINDINGS: Limited ultrasound of the abdomen demonstrates ascites. A total of 6850 cc of straw-colored ascitic fluid was removed. Status post paracentesis. US DUP ABD PEL RETRO SCROT LIMITED    Result Date: 2/25/2022  EXAMINATION: RIGHT UPPER QUADRANT ULTRASOUND AND WITH LIVER DOPPLER 2/25/2022 10:11 am COMPARISON: CT ABDOMEN AND PELVIS WITHOUT CONTRAST 02/05/2022 HISTORY: ORDERING SYSTEM PROVIDED HISTORY: Portal and hepatic veins TECHNOLOGIST PROVIDED HISTORY: Reason for exam:->Portal and hepatic veins Specify organ?->LIVER Specify organ?->SPLEEN What reading provider will be dictating this exam?->CRC TECHNIQUE: Duplex ultrasound using B-mode/gray scaled imaging, Doppler spectral analysis and color flow Doppler was obtained of the liver and right upper quadrant abdomen. FINDINGS: Severe cirrhosis with a shrunken liver and prominent nodular patent contour. Recent paracentesis and with current findings of small to moderate ascites. The gallbladder is surgically absent. No intrahepatic or extrahepatic biliary dilatation.   The common bile duct measures 0.4 cm in depth diameter which is normal. The liver shows a coarsened echotexture pattern as seen with cirrhosis. The central liver shows an equivocal 1.6 cm hypoechoic lesion which is poorly defined. This area may be artifactual.  No dominant lesion is seen. By Doppler evaluation, the portal and hepatic veins are patent. Physiologic hepatopetal portal blood flow is present and with portal vein velocity up to 22.1 cm/sec (normal 20-40 cm/sec). The splenic vein remains patent. The right kidney is visualized and appears normal.  No hydronephrosis. The right kidney measures 10.7 cm in length. 1.  Shrunken liver with severe cirrhosis and small to moderate ascites. Equivocal 1.6 cm hypoechoic lesion within the central liver. 2.  Patent portal and hepatic veins. Physiologic hepatopetal portal flow. US GUIDED PARACENTESIS    Result Date: 3/9/2022  PROCEDURE: PARACENTESIS WITHOUT IMAGE GUIDANCE US ABDOMEN LIMITED 3/9/2022 HISTORY: ORDERING SYSTEM PROVIDED HISTORY: Paracentesis TECHNOLOGIST PROVIDED HISTORY: Reason for exam:->Paracentesis What reading provider will be dictating this exam?->CRC TECHNIQUE: Informed consent was obtained after a detailed explanation of the procedure including risks, benefits, and alternatives. Universal protocol was followed. A limited ultrasound of the abdomen was performed. The right abdomen was prepped and draped in sterile fashion and local anesthesia was achieved with lidocaine. An 8 Indonesian needle sheath was advanced into ascites and paracentesis was performed. The patient tolerated the procedure well. FINDINGS: Limited ultrasound of the abdomen demonstrates ascites. A total of 7800 cc was removed. Successful paracentesis. HOSPITAL COURSE:   Gerri Daugherty had a rather complicated stay. He has presented back to the hospital on 3 separate occasions in the setting of outpatient noncompliance with his cirrhotic medications in the setting of disease progression.   Complicating the matters, the patient's wife is extraordinarily unrealistic regarding treatment options moving forward. She is very emotional throughout the hospitalization and has a very difficult time making decisions. We have offered many different modes of treatment including discharge home with hospice to be made comfortable. We have also offered temporary skilled nursing facility placement and this is what she has chosen. She believes he would get strong enough to undergo chemotherapy at [facility]. I do not believe the patient will be capable of doing this at any point in the future and I have described this to her extensively. She has a difficult time grasping the gravity of the situation in the setting of the patient's underlying malignant process and severe cirrhosis. In fact, today he again refused lactulose therapy which is what he typically does at home. Our hands are largely tied at this point. I believe the patient would be better suited with comfort measures at home under the care of hospice. The oncology team has provided consultation as has the cardiovascular team at the request of the family. He will be discharged to the nursing home facility today. I suspect he will continue his noncompliant ways and with his disease progression, I anticipate hospitalization again in the near future. His prognosis is certainly poor at this point. BRIEF PHYSICAL EXAMINATION AND LABORATORIES ON DAY OF DISCHARGE:  VITALS:  BP (!) 104/53   Pulse 120   Temp 97.7 °F (36.5 °C) (Infrared)   Resp 16   Ht 5' 8\" (1.727 m)   Wt 174 lb (78.9 kg)   SpO2 97%   BMI 26.46 kg/m²     General appearance:  Alert, responsive, oriented to person, place, and time. Chronically ill-appearing. Head:  Normocephalic. No masses, lesions or tenderness. Eyes:  PERRLA. EOMI. Sclera clear. Buccal mucosa moist.  ENT:  Ears normal. Mucosa normal.  Nasal cannula oxygen is in place. Neck:    Supple. Trachea midline. No thyromegaly. No JVD. No bruits. Heart:    Rhythm regular. Rate controlled. No murmurs. Lungs:    Symmetrical. Clear to auscultation bilaterally. No wheezes. No rhonchi. No rales. Abdomen:   Far less abdominal distention and ascites following paracentesis yesterday. Extremities:    Chronic edema. Neurologic:    More awake and alert today. Oriented x3. Psych: Far less agitated today. Musculoskeletal:   Spine ROM normal. Muscular strength intact. Gait not assessed. Integumentary:  No rashes  Skin normal color and texture. Genitalia/Breast:  Deferred      DISPOSITION:  The patient's condition is fair. At this time the patient is without objective evidence of an acute process requiring continuing hospitalization or inpatient management. They are stable for discharge with outpatient follow-up. I have spoken with the patient and discussed the results of the current hospitalization, in addition to providing specific details for the plan of care and counseling regarding the diagnosis and prognosis. The plan has been discussed in detail and they are aware of the specific conditions for emergent return, as well as the importance of follow-up. Their questions are answered at this time and they are agreeable with the plan for discharge to nursing home    DISCHARGE MEDICATIONS:   Current Discharge Medication List           Details   albuterol-ipratropium (COMBIVENT RESPIMAT)  MCG/ACT AERS inhaler Inhale 1 puff into the lungs every 6 hours      ascorbic acid (VITAMIN C) 1000 MG tablet Take 1 tablet by mouth daily  Qty: 30 tablet, Refills: 3      budesonide-formoterol (SYMBICORT) 160-4.5 MCG/ACT AERO Inhale 2 puffs into the lungs 2 times daily  Qty: 10.2 g, Refills: 3      oxyCODONE (ROXICODONE) 5 MG immediate release tablet Take 1 tablet by mouth once for 1 dose.   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 Dispense all needed supplies to include: monitor, strips, lancing device, lancets, control solutions, alcohol swabs. Qty: 1 kit, Refills: 0    Comments: Brand per patient preference. May round up to next available package size. lactulose (CHRONULAC) 10 GM/15ML solution Take 30 mLs by mouth 3 times daily  Qty: 2700 mL, Refills: 0      magnesium oxide (MAG-OX) 400 MG tablet Take 1 tablet by mouth 2 times daily  Qty: 30 tablet, Refills: 1      furosemide (LASIX) 20 MG tablet Take 2 tablets by mouth daily  Qty: 60 tablet, Refills: 0      blood glucose test strips (ONETOUCH ULTRA) strip TEST 3 TIMES DAILY  Qty: 300 strip, Refills: 5    Comments: DIAGNOSIS IS E11.9 !! Associated Diagnoses: Type 2 diabetes mellitus without complication, with long-term current use of insulin (HCC)      Continuous Blood Gluc  (FREESTYLE PAOLA 2 READER) FLORESITA Check sygar FAsting and ac (4 X daiy). Qty: 1 each, Refills: 0    Associated Diagnoses: Type 2 diabetes mellitus without complication, with long-term current use of insulin ([address] Uncontrolled type 2 diabetes mellitus with hyperglycemia (HCC)      Continuous Blood Gluc Sensor (FREESTYLE PAOLA 2 SENSOR) MISC Check sugar 5 X daily. AM and AC  Qty: 6 each, Refills: 1    Associated Diagnoses: Type 2 diabetes mellitus without complication, with long-term current use of insulin ([address]  Uncontrolled type 2 diabetes mellitus with hyperglycemia (HCC)      ondansetron (ZOFRAN-ODT) 4 MG disintegrating tablet Take 1 tablet by mouth 3 times daily as needed for Nausea or Vomiting  Qty: 21 tablet, Refills: 0    Associated Diagnoses: Nausea      ferrous sulfate (IRON 325) 325 (65 Fe) MG tablet Take 1 tablet by mouth 2 times daily (with meals)  Qty: 180 tablet, Refills: 0    Associated Diagnoses: Iron deficiency      levothyroxine (SYNTHROID) 200 MCG tablet Take 1 tablet by mouth Daily  Qty: 90 tablet, Refills: 1    Associated Diagnoses: Hypothyroidism, unspecified type      sertraline (ZOLOFT) 50 MG tablet Take 1.5 tablets by mouth daily  Qty: 30 tablet, Refills: 3    Associated Diagnoses: Anxiety      pantoprazole (PROTONIX) 40 MG tablet TAKE 1 TABLET DAILY  Qty: 90 tablet, Refills: 3    Associated Diagnoses: Gastroesophageal reflux disease without esophagitis      insulin glargine (LANTUS SOLOSTAR) 100 UNIT/ML injection pen Inject 20 Units into the skin nightly  Qty: 5 pen, Refills: 3    Associated Diagnoses: Type 2 diabetes mellitus without complication, with long-term current use of insulin ([facility])      INS SYRINGE/NEEDLE .5CC/28G (untapt INS SYR MX-COM .5CC/28G) 28G X 1/2\" 0.5 ML MISC Use three times daily  Qty: 100 each, Refills: 3    Associated Diagnoses: Type 2 diabetes mellitus without complication, with long-term current use of insulin ([facility])      Insulin Pen Needle (BD PEN NEEDLE JENNIFER U/F) 32G X 4 MM MISC USE AS DIRECTED  Qty: 100 each, Refills: 3      vitamin D (CHOLECALCIFEROL) 1000 UNIT TABS tablet Take 2,000 Units by mouth daily       aspirin 81 MG chewable tablet Take 81 mg by mouth daily             FOLLOW UP/INSTRUCTIONS:  · This patient is instructed to follow-up with his primary care physician. · Patient is instructed to follow-up with the consults listed above as directed by them. · he is instructed to resume home medications and take new medications as indicated in the list above. · If the patient has a recurrence of symptoms, he is instructed to go to the ED.     Preparing for this patient's discharge, including paperwork, orders, instructions, and meeting with patient did require > 40 minutes.     Bertrand Lemos DO   3/17/2022  11:16 AM

## 2022-03-17 NOTE — PROGRESS NOTES
Pt refused IV access, blood glucose monitoring, and lactulose. Pt did take magnesium and allowed this nurse to perform incontinence care. Pt stated he was comfortable, in no pain, and had no needs at this time.

## 2022-03-17 NOTE — PROGRESS NOTES
Physical Therapy  Physical Therapy Treatment Note/Plan of Care    Room #:  0620/0620-01  Patient Name: Matthew Muro  YOB: 1950  MRN: [id number]    Date of Service: 3/17/2022     Tentative placement recommendation: Subacute rehab  Equipment recommendation: Patient has needed equipment       Evaluating Physical Therapist: Nancy An Physical Therapist      Specific Provider Orders/Date/Referring Provider : 03/13/22 2230   PT eval and treat Start: 03/13/22 2230, End: 03/13/22 2230, ONE TIME, Standing Count: 1 Occurrences, R    Jermain Pollack DO  Admitting Diagnosis:   Lactic acidosis [E87.2]  Hyponatremia [E87.1]  COVID-19 [U07.1]    Admitted with fatigue  Surgery: paracentesis 3/15  Visit Diagnoses       Codes    Lactic acidosis     E87.2    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 ([address]     C81.73          Patient Active Problem List   Diagnosis    Bronchitis with bronchospasm    Anxiety    Post herpetic neuralgia    Type 2 diabetes mellitus without complication, with long-term current use of insulin ([address]    Cellulitis of left lower extremity    Plantar wart    Inguinal hernia    Acute myeloid leukemia in remission ([address]    Pneumonia due to infectious organism    Cellulitis    Pharyngitis, acute    Thrombocytopenia ([address]    GVHD (graft versus host disease) ([address]    Portal hypertension ([address]    Post-COVID chronic fatigue    Uncontrolled type 2 diabetes mellitus with hyperglycemia ([address]    Type 2 diabetes mellitus with hyperglycemia    Type 2 diabetes mellitus with chronic kidney disease    Decompensation of cirrhosis of liver ([address]    AMS (altered mental status)    Severe protein-calorie malnutrition ([address]    Hyponatremia    COVID-19        ASSESSMENT of Current Deficits Patient exhibits decreased strength, balance and endurance impairing functional mobility, transfers, gait  and gait distance.  Patient was slightly confused, however he was pleasant. Patient requires inc encouragement to participate with therapy today. Once sitting edge of bed, he states he has to lay back down, limiting further function today. Patient tolerates supine exercises with max encouragement and assist. Patient unsafe for out of bed activities d/t confusion. Patient will continue to require skilled therapy to improve overall strength and endurance in order to decrease risk of falls. PHYSICAL THERAPY  PLAN OF CARE       Physical therapy plan of care is established based on physician order,  patient diagnosis and clinical assessment    Current Treatment Recommendations:    -Bed Mobility: Lower extremity exercises  and Upper extremity exercises   -Sitting Balance: Incorporate reaching activities to activate trunk muscles  and Hands on support to maintain midline   -Standing Balance: Perform strengthening exercises in standing to promote motor control with or without upper extremity support , Challenge balance utilizing reaching  activities beyond center of gravity   and Perform sit to stand activities maintaining FWB (full weight bearing) weightbearing Bilateral   -Transfers: Provide instruction on proper hand and foot position for adequate transfer of weight onto lower extremities and use of gait device if needed and Cues for hand placement, technique and safety. Provide stabilization to prevent fall   -Gait: Gait training, Standing activities to improve: base of support, weight shift, weight bearing  and Use of Assistive device for FWB (full weight bearing) weight bearing Bilateral     -Endurance: Utilize Supervised activities to increase level of endurance to allow for safe functional mobility including transfers and gait   -Stairs: Stair training with instruction on proper technique and hand placement on rail    PT long term treatment goals are located in below grid    Patient and or family understand(s) diagnosis, prognosis, and plan of care.     Frequency of treatments: Patient will be seen  daily. Prior Level of Function: Patient ambulated with wheeled walker   Rehab Potential: good for baseline    Past medical history:   Past Medical History:   Diagnosis Date    [facility]) [facility]     chemotherapy    AML (acute myeloblastic leukemia) ([facility] 2010    Cancer [facility]     Chronic kidney disease     Diabetes mellitus ([facility]     GERD (gastroesophageal reflux disease)     Hx of blood clots 2012    legs    Hyperlipidemia     Liver disease     MI, old     Other disorders of kidney and ureter     Other sleep disturbances     Thyroid disease     Type II or unspecified type diabetes mellitus without mention of complication, not stated as uncontrolled      Past Surgical History:   Procedure Laterality Date    APPENDECTOMY      BONE MARROW TRANSPLANT      double core transplant    CATHETER INSERTION N/A 2/28/2022    MEDIPORT CATHETER INSERTION performed by Mathew Shahid MD at [address]      CT NEEDLE BIOPSY LIVER PERCUTANEOUS  1/17/2022    CT NEEDLE BIOPSY LIVER PERCUTANEOUS    ENDOSCOPY, COLON, DIAGNOSTIC      FOOT NEUROMA SURGERY      UPPER GASTROINTESTINAL ENDOSCOPY         SUBJECTIVE:    Precautions:  Up with assistance and Check Pulse Oximetry while ambulating , falls, alarm, Droplet plus/COVID-19 and confusion ,   Social history: Patient lives with spouse in a ranch home  with 3 steps  to enter with Rail  Tub shower built in shower chair    Equipment owned: Mandeep and Indira Davies,      [address] [place], [place] [address]w   How much difficulty turning over in bed?: A Little  How much difficulty sitting down on / standing up from a chair with arms?: A Lot  How much difficulty moving from lying on back to sitting on side of bed?: A Lot  How much help from another person moving to and from a bed to a chair?: A Lot  How much help from another person needed to walk in hospital room?: A Lot  How much help from another person for climbing 3-5 steps with a railing?: Total  AM-PAC Inpatient Mobility Raw Score : 12  AM-PAC Inpatient T-Scale Score : 35.33  Mobility Inpatient CMS 0-100% Score: 68.66  Mobility Inpatient CMS G-Code Modifier : CL    Nursing cleared patient for PT treatment. Patient's wife present during session. OBJECTIVE;   Initial Evaluation  Date: 3/14/2022 Treatment Date:  3/17/2022     Short Term/ Long Term   Goals   Was pt agreeable to Eval/treatment? Yes Yes To be met in 4 days   Pain level   4/10  Lower back 0/10    Bed Mobility    Rolling: Minimal assist of 1    Supine to sit: Minimal assist of 1    Sit to supine: Moderate assist of 1    Scooting: Minimal assist of 1   Rolling: Minimal assist of 1   Supine to sit: Moderate assist of 1   Sit to supine: Moderate assist of 1   Scooting: Minimal assist of 1  Rolling: Independent    Supine to sit:  Independent    Sit to supine: Independent    Scooting: Independent     Transfers Sit to stand: Minimal assist of 1 3x Sit to stand: Not assessed     Sit to stand: Independent    Ambulation    2x5 feet using  wheeled walker with Moderate assist of 1  and cues for upright posture, walker approximation, safety, proper hand placement and pacing not assessed   30 feet using  wheeled walker with Supervision     Stair negotiation: ascended and descended   Not assessed  Not assessed   3 steps with rail supervision   ROM Within functional limits    Increase range of motion 10% of affected joints    Strength BUE:  refer to OT eval  RLE:  4/5  LLE:  4/5  Increase strength in affected mm groups by 1/3 grade   Balance Sitting EOB:  fair   Dynamic Standing:  fair - Sitting EOB: poor   Dynamic Standing: not assessed  Sitting EOB:  good   Dynamic Standing: good      Patient is Alert & Oriented x person and situation and follows directions    Sensation:  Patient  denies numbness/tingling   Edema:  no   Endurance: fair -    Vitals: room air   Blood Pressure at rest  Blood Pressure during session    Heart Rate at rest  Heart Rate during session    SPO2 at rest %  SPO2 during session %     Patient education  Patient educated on role of Physical Therapy, risks of immobility, safety and plan of care, energy conservation,  importance of mobility while in hospital , ankle pumps, quad set and glut set for edema control, blood clot prevention and safety      Patient response to education:   Pt verbalized understanding Pt demonstrated skill Pt requires further education in this area   Yes Partial Yes      Treatment:  Patient practiced and was instructed/facilitated in the following treatment: Patient assisted to edge of bed, however immediately requests to get back to bed. Patient assisted with supine exercises UE/LE in bed. Therapeutic Exercises:  ankle pumps, heel slide, hip abduction/adduction and straight leg raise  x 10-15 reps. At end of session, patient in bed with alarm and spouse present call light and phone within reach,  all lines and tubes intact, nursing notified. Patient would benefit from continued skilled Physical Therapy to improve functional independence and quality of life.        Patient's/ family goals   home    Time in  914  Time out  940  Total Treatment Time  26 minutes  CPT codes:  Therapeutic activities (46429)   10 minutes  1 unit(s)  Therapeutic exercises (95633)   16 minutes  1 unit(s)  Skyler Mccall PTA  #[id number]

## 2022-03-17 NOTE — PROGRESS NOTES
Cardiology  Progress Note      SUBJECTIVE: No acute distress. Generalized body weakness.     Current Inpatient Medications  Current Facility-Administered Medications: budesonide-formoterol (SYMBICORT) 160-4.5 MCG/ACT inhaler 2 puff, 2 puff, Inhalation, BID  albuterol-ipratropium (COMBIVENT RESPIMAT)  MCG/ACT inhaler 1 puff, 1 puff, Inhalation, Q6H  ascorbic acid (VITAMIN C) tablet 1,000 mg, 1,000 mg, Oral, Daily  vitamin D (CHOLECALCIFEROL) tablet 2,000 Units, 2,000 Units, Oral, Daily  zinc gluconate tablet 50 mg, 50 mg, Oral, Daily  ferrous sulfate (IRON 325) tablet 325 mg, 325 mg, Oral, BID WC  furosemide (LASIX) tablet 40 mg, 40 mg, Oral, Daily  lactulose (CHRONULAC) 10 GM/15ML solution 20 g, 20 g, Oral, TID  oxyCODONE (ROXICODONE) immediate release tablet 5 mg, 5 mg, Oral, Once  glucose (GLUTOSE) 40 % oral gel 15 g, 15 g, Oral, PRN  glucagon (rDNA) injection 1 mg, 1 mg, IntraMUSCular, PRN  dextrose 5 % solution, 100 mL/hr, IntraVENous, PRN  sodium chloride flush 0.9 % injection 5-40 mL, 5-40 mL, IntraVENous, 2 times per day  sodium chloride flush 0.9 % injection 5-40 mL, 5-40 mL, IntraVENous, PRN  0.9 % sodium chloride infusion, 25 mL, IntraVENous, PRN  polyethylene glycol (GLYCOLAX) packet 17 g, 17 g, Oral, Daily PRN  acetaminophen (TYLENOL) tablet 650 mg, 650 mg, Oral, Q6H PRN **OR** acetaminophen (TYLENOL) suppository 650 mg, 650 mg, Rectal, Q6H PRN  ondansetron (ZOFRAN-ODT) disintegrating tablet 4 mg, 4 mg, Oral, Q8H PRN **OR** ondansetron (ZOFRAN) injection 4 mg, 4 mg, IntraVENous, Q6H PRN  aspirin chewable tablet 81 mg, 81 mg, Oral, Daily  insulin glargine (LANTUS) injection vial 20 Units, 20 Units, SubCUTAneous, Nightly  levothyroxine (SYNTHROID) tablet 200 mcg, 200 mcg, Oral, Daily  magnesium oxide (MAG-OX) tablet 400 mg, 400 mg, Oral, BID  pantoprazole (PROTONIX) tablet 40 mg, 40 mg, Oral, Daily  sertraline (ZOLOFT) tablet 50 mg, 50 mg, Oral, Daily  dextrose bolus (hypoglycemia) 10% 125 mL, 125 mL, IntraVENous, PRN **OR** dextrose bolus (hypoglycemia) 10% 250 mL, 250 mL, IntraVENous, PRN      Physical  VITALS:  BP (!) 104/53   Pulse 120   Temp 97.7 °F (36.5 °C) (Infrared)   Resp 16   Ht 5' 8\" (1.727 m)   Wt 174 lb (78.9 kg)   SpO2 97%   BMI 26.46 kg/m²   CURRENT TEMPERATURE:  Temp: 97.7 °F (36.5 °C)  CONSTITUTIONAL: No acute distress. EYES: Vision is intact. ENT: No sore throat. No ear drainage. NECK: No JVD. BACK: Symmetric. LUNGS:  diminished breath sounds right base and left base  CARDIOVASCULAR:  normal S1 and S2, no S3 and no S4  ABDOMEN:  non-tender, distended  NEUROLOGIC: No focal deficits. EXTREMITIES: +1 edema. DATA:      Cardiology Labs:  BMP:    Lab Results   Component Value Date     03/17/2022    K 4.8 03/17/2022    K 4.9 03/13/2022    CL 93 03/17/2022    CO2 17 03/17/2022    BUN 54 03/17/2022     CBC:    Lab Results   Component Value Date    WBC 5.4 03/17/2022    RBC 2.73 03/17/2022    HGB 7.9 03/17/2022    HCT 25.0 03/17/2022    MCV 91.6 03/17/2022    RDW 21.4 03/17/2022    PLT 77 03/17/2022     PT/INR:  No results found for: PTINR  TROPONIN:  No components found for: TROP    ASSESSMENT    1.altered mental status. Patient refused to take lactulose for his advanced liver cirrhosis. 2.  History of sinus bradycardia. No further episodes of sinus bradycardia after stopping Aldactone and Inderal few weeks ago. 3.COVID-19 infection. PLAN  As per orders. Nothing to add from cardiac standpoint.

## 2022-03-18 DIAGNOSIS — F41.9 ANXIETY: ICD-10-CM

## 2022-03-21 ENCOUNTER — HOSPITAL ENCOUNTER (INPATIENT)
Age: 72
LOS: 2 days | Discharge: HOSPICE | DRG: 441 | End: 2022-03-23
Attending: EMERGENCY MEDICINE | Admitting: INTERNAL MEDICINE
Payer: MEDICARE

## 2022-03-21 ENCOUNTER — TELEPHONE (OUTPATIENT)
Dept: FAMILY MEDICINE CLINIC | Age: 72
End: 2022-03-21

## 2022-03-21 ENCOUNTER — APPOINTMENT (OUTPATIENT)
Dept: GENERAL RADIOLOGY | Age: 72
DRG: 441 | End: 2022-03-21
Payer: MEDICARE

## 2022-03-21 ENCOUNTER — APPOINTMENT (OUTPATIENT)
Dept: CT IMAGING | Age: 72
DRG: 441 | End: 2022-03-21
Payer: MEDICARE

## 2022-03-21 DIAGNOSIS — K74.60 CIRRHOSIS OF LIVER WITH ASCITES, UNSPECIFIED HEPATIC CIRRHOSIS TYPE (HCC): ICD-10-CM

## 2022-03-21 DIAGNOSIS — R79.89 ELEVATED LACTIC ACID LEVEL: ICD-10-CM

## 2022-03-21 DIAGNOSIS — Z51.5 HOSPICE PROGRAM CARE: ICD-10-CM

## 2022-03-21 DIAGNOSIS — R18.8 CIRRHOSIS OF LIVER WITH ASCITES, UNSPECIFIED HEPATIC CIRRHOSIS TYPE (HCC): ICD-10-CM

## 2022-03-21 DIAGNOSIS — K76.82 HEPATIC ENCEPHALOPATHY: Primary | ICD-10-CM

## 2022-03-21 DIAGNOSIS — N17.9 AKI (ACUTE KIDNEY INJURY) (HCC): ICD-10-CM

## 2022-03-21 DIAGNOSIS — R11.0 NAUSEA: ICD-10-CM

## 2022-03-21 DIAGNOSIS — C81.90 HODGKIN LYMPHOMA, UNSPECIFIED HODGKIN LYMPHOMA TYPE, UNSPECIFIED BODY REGION (HCC): ICD-10-CM

## 2022-03-21 DIAGNOSIS — I95.9 HYPOTENSION, UNSPECIFIED HYPOTENSION TYPE: ICD-10-CM

## 2022-03-21 DIAGNOSIS — D69.6 THROMBOCYTOPENIA (HCC): ICD-10-CM

## 2022-03-21 DIAGNOSIS — E87.5 HYPERKALEMIA: ICD-10-CM

## 2022-03-21 DIAGNOSIS — D64.9 ACUTE ON CHRONIC ANEMIA: ICD-10-CM

## 2022-03-21 LAB
ALBUMIN SERPL-MCNC: 2.6 G/DL (ref 3.5–5.2)
ALP BLD-CCNC: 539 U/L (ref 40–129)
ALT SERPL-CCNC: 44 U/L (ref 0–40)
AMMONIA: 176 UMOL/L (ref 16–60)
ANION GAP SERPL CALCULATED.3IONS-SCNC: 21 MMOL/L (ref 7–16)
ANISOCYTOSIS: ABNORMAL
AST SERPL-CCNC: 53 U/L (ref 0–39)
BASOPHILS ABSOLUTE: 0 E9/L (ref 0–0.2)
BASOPHILS RELATIVE PERCENT: 0 % (ref 0–2)
BILIRUB SERPL-MCNC: 5.9 MG/DL (ref 0–1.2)
BUN BLDV-MCNC: 86 MG/DL (ref 6–23)
BURR CELLS: ABNORMAL
CALCIUM SERPL-MCNC: 9.5 MG/DL (ref 8.6–10.2)
CHLORIDE BLD-SCNC: 96 MMOL/L (ref 98–107)
CO2: 15 MMOL/L (ref 22–29)
CREAT SERPL-MCNC: 1.5 MG/DL (ref 0.7–1.2)
EOSINOPHILS ABSOLUTE: 0 E9/L (ref 0.05–0.5)
EOSINOPHILS RELATIVE PERCENT: 0 % (ref 0–6)
GFR AFRICAN AMERICAN: 56
GFR NON-AFRICAN AMERICAN: 46 ML/MIN/1.73
GLUCOSE BLD-MCNC: 238 MG/DL (ref 74–99)
HCT VFR BLD CALC: 30.1 % (ref 37–54)
HEMOGLOBIN: 9 G/DL (ref 12.5–16.5)
HYPOCHROMIA: ABNORMAL
INR BLD: 1.9
LACTIC ACID: 6 MMOL/L (ref 0.5–2.2)
LACTIC ACID: 6.7 MMOL/L (ref 0.5–2.2)
LIPASE: 170 U/L (ref 13–60)
LYMPHOCYTES ABSOLUTE: 0.32 E9/L (ref 1.5–4)
LYMPHOCYTES RELATIVE PERCENT: 4 % (ref 20–42)
MCH RBC QN AUTO: 29 PG (ref 26–35)
MCHC RBC AUTO-ENTMCNC: 29.9 % (ref 32–34.5)
MCV RBC AUTO: 97.1 FL (ref 80–99.9)
MONOCYTES ABSOLUTE: 0.16 E9/L (ref 0.1–0.95)
MONOCYTES RELATIVE PERCENT: 2 % (ref 2–12)
NEUTROPHILS ABSOLUTE: 7.52 E9/L (ref 1.8–7.3)
NEUTROPHILS RELATIVE PERCENT: 94 % (ref 43–80)
OVALOCYTES: ABNORMAL
PDW BLD-RTO: 22.5 FL (ref 11.5–15)
PLATELET # BLD: 72 E9/L (ref 130–450)
PLATELET CONFIRMATION: NORMAL
PMV BLD AUTO: 12.1 FL (ref 7–12)
POIKILOCYTES: ABNORMAL
POLYCHROMASIA: ABNORMAL
POTASSIUM REFLEX MAGNESIUM: 5.7 MMOL/L (ref 3.5–5)
PROTHROMBIN TIME: 22.6 SEC (ref 9.3–12.4)
RBC # BLD: 3.1 E12/L (ref 3.8–5.8)
SARS-COV-2, NAAT: NOT DETECTED
SODIUM BLD-SCNC: 132 MMOL/L (ref 132–146)
TARGET CELLS: ABNORMAL
TEAR DROP CELLS: ABNORMAL
TOTAL PROTEIN: 4.5 G/DL (ref 6.4–8.3)
WBC # BLD: 8 E9/L (ref 4.5–11.5)

## 2022-03-21 PROCEDURE — 99285 EMERGENCY DEPT VISIT HI MDM: CPT

## 2022-03-21 PROCEDURE — 87040 BLOOD CULTURE FOR BACTERIA: CPT

## 2022-03-21 PROCEDURE — 36591 DRAW BLOOD OFF VENOUS DEVICE: CPT

## 2022-03-21 PROCEDURE — 80053 COMPREHEN METABOLIC PANEL: CPT

## 2022-03-21 PROCEDURE — 83605 ASSAY OF LACTIC ACID: CPT

## 2022-03-21 PROCEDURE — 96375 TX/PRO/DX INJ NEW DRUG ADDON: CPT

## 2022-03-21 PROCEDURE — 71045 X-RAY EXAM CHEST 1 VIEW: CPT

## 2022-03-21 PROCEDURE — C9113 INJ PANTOPRAZOLE SODIUM, VIA: HCPCS | Performed by: EMERGENCY MEDICINE

## 2022-03-21 PROCEDURE — 87635 SARS-COV-2 COVID-19 AMP PRB: CPT

## 2022-03-21 PROCEDURE — 85610 PROTHROMBIN TIME: CPT

## 2022-03-21 PROCEDURE — 82140 ASSAY OF AMMONIA: CPT

## 2022-03-21 PROCEDURE — 85025 COMPLETE CBC W/AUTO DIFF WBC: CPT

## 2022-03-21 PROCEDURE — 2060000000 HC ICU INTERMEDIATE R&B

## 2022-03-21 PROCEDURE — 83690 ASSAY OF LIPASE: CPT

## 2022-03-21 PROCEDURE — 74176 CT ABD & PELVIS W/O CONTRAST: CPT

## 2022-03-21 PROCEDURE — 36415 COLL VENOUS BLD VENIPUNCTURE: CPT

## 2022-03-21 PROCEDURE — 6360000002 HC RX W HCPCS: Performed by: EMERGENCY MEDICINE

## 2022-03-21 PROCEDURE — 96365 THER/PROPH/DIAG IV INF INIT: CPT

## 2022-03-21 PROCEDURE — 96367 TX/PROPH/DG ADDL SEQ IV INF: CPT

## 2022-03-21 PROCEDURE — P9047 ALBUMIN (HUMAN), 25%, 50ML: HCPCS | Performed by: EMERGENCY MEDICINE

## 2022-03-21 PROCEDURE — 2580000003 HC RX 258: Performed by: EMERGENCY MEDICINE

## 2022-03-21 RX ORDER — ALBUMIN (HUMAN) 12.5 G/50ML
25 SOLUTION INTRAVENOUS EVERY 6 HOURS
Status: COMPLETED | OUTPATIENT
Start: 2022-03-21 | End: 2022-03-22

## 2022-03-21 RX ORDER — ACETAMINOPHEN 650 MG/1
650 SUPPOSITORY RECTAL EVERY 6 HOURS PRN
Status: DISCONTINUED | OUTPATIENT
Start: 2022-03-21 | End: 2022-03-23 | Stop reason: HOSPADM

## 2022-03-21 RX ORDER — SODIUM CHLORIDE 0.9 % (FLUSH) 0.9 %
5-40 SYRINGE (ML) INJECTION PRN
Status: DISCONTINUED | OUTPATIENT
Start: 2022-03-21 | End: 2022-03-23 | Stop reason: HOSPADM

## 2022-03-21 RX ORDER — HYDROMORPHONE HYDROCHLORIDE 1 MG/ML
0.5 INJECTION, SOLUTION INTRAMUSCULAR; INTRAVENOUS; SUBCUTANEOUS ONCE
Status: COMPLETED | OUTPATIENT
Start: 2022-03-21 | End: 2022-03-22

## 2022-03-21 RX ORDER — BUDESONIDE 0.5 MG/2ML
0.5 INHALANT ORAL 2 TIMES DAILY
Status: DISCONTINUED | OUTPATIENT
Start: 2022-03-21 | End: 2022-03-23 | Stop reason: HOSPADM

## 2022-03-21 RX ORDER — ARFORMOTEROL TARTRATE 15 UG/2ML
15 SOLUTION RESPIRATORY (INHALATION) 2 TIMES DAILY
Status: DISCONTINUED | OUTPATIENT
Start: 2022-03-21 | End: 2022-03-23 | Stop reason: HOSPADM

## 2022-03-21 RX ORDER — ALBUMIN (HUMAN) 12.5 G/50ML
25 SOLUTION INTRAVENOUS ONCE
Status: COMPLETED | OUTPATIENT
Start: 2022-03-21 | End: 2022-03-21

## 2022-03-21 RX ORDER — DEXTROSE MONOHYDRATE 25 G/50ML
12.5 INJECTION, SOLUTION INTRAVENOUS PRN
Status: DISCONTINUED | OUTPATIENT
Start: 2022-03-21 | End: 2022-03-21 | Stop reason: CLARIF

## 2022-03-21 RX ORDER — SODIUM CHLORIDE 9 MG/ML
25 INJECTION, SOLUTION INTRAVENOUS PRN
Status: DISCONTINUED | OUTPATIENT
Start: 2022-03-21 | End: 2022-03-23 | Stop reason: HOSPADM

## 2022-03-21 RX ORDER — SODIUM CHLORIDE 0.9 % (FLUSH) 0.9 %
5-40 SYRINGE (ML) INJECTION EVERY 12 HOURS SCHEDULED
Status: DISCONTINUED | OUTPATIENT
Start: 2022-03-21 | End: 2022-03-23 | Stop reason: HOSPADM

## 2022-03-21 RX ORDER — 0.9 % SODIUM CHLORIDE 0.9 %
1000 INTRAVENOUS SOLUTION INTRAVENOUS ONCE
Status: COMPLETED | OUTPATIENT
Start: 2022-03-21 | End: 2022-03-21

## 2022-03-21 RX ORDER — NICOTINE POLACRILEX 4 MG
15 LOZENGE BUCCAL PRN
Status: DISCONTINUED | OUTPATIENT
Start: 2022-03-21 | End: 2022-03-23 | Stop reason: HOSPADM

## 2022-03-21 RX ORDER — POLYETHYLENE GLYCOL 3350 17 G/17G
17 POWDER, FOR SOLUTION ORAL DAILY PRN
Status: DISCONTINUED | OUTPATIENT
Start: 2022-03-21 | End: 2022-03-23 | Stop reason: HOSPADM

## 2022-03-21 RX ORDER — ACETAMINOPHEN 325 MG/1
650 TABLET ORAL EVERY 6 HOURS PRN
Status: DISCONTINUED | OUTPATIENT
Start: 2022-03-21 | End: 2022-03-23 | Stop reason: HOSPADM

## 2022-03-21 RX ORDER — DEXTROSE MONOHYDRATE 50 MG/ML
100 INJECTION, SOLUTION INTRAVENOUS PRN
Status: DISCONTINUED | OUTPATIENT
Start: 2022-03-21 | End: 2022-03-23 | Stop reason: HOSPADM

## 2022-03-21 RX ORDER — ONDANSETRON 2 MG/ML
4 INJECTION INTRAMUSCULAR; INTRAVENOUS ONCE
Status: COMPLETED | OUTPATIENT
Start: 2022-03-21 | End: 2022-03-21

## 2022-03-21 RX ORDER — SODIUM CHLORIDE 0.9 % (FLUSH) 0.9 %
SYRINGE (ML) INJECTION
Status: COMPLETED
Start: 2022-03-21 | End: 2022-03-22

## 2022-03-21 RX ORDER — LACTULOSE 10 G/15ML
20 SOLUTION ORAL ONCE
Status: DISCONTINUED | OUTPATIENT
Start: 2022-03-21 | End: 2022-03-21

## 2022-03-21 RX ORDER — ONDANSETRON 2 MG/ML
4 INJECTION INTRAMUSCULAR; INTRAVENOUS EVERY 6 HOURS PRN
Status: DISCONTINUED | OUTPATIENT
Start: 2022-03-21 | End: 2022-03-23 | Stop reason: HOSPADM

## 2022-03-21 RX ORDER — BUDESONIDE AND FORMOTEROL FUMARATE DIHYDRATE 160; 4.5 UG/1; UG/1
2 AEROSOL RESPIRATORY (INHALATION) 2 TIMES DAILY
Status: DISCONTINUED | OUTPATIENT
Start: 2022-03-21 | End: 2022-03-21 | Stop reason: CLARIF

## 2022-03-21 RX ADMIN — SODIUM CHLORIDE 1000 ML: 9 INJECTION, SOLUTION INTRAVENOUS at 16:41

## 2022-03-21 RX ADMIN — ONDANSETRON 4 MG: 2 INJECTION INTRAMUSCULAR; INTRAVENOUS at 14:45

## 2022-03-21 RX ADMIN — SODIUM CHLORIDE 1000 ML: 9 INJECTION, SOLUTION INTRAVENOUS at 15:25

## 2022-03-21 RX ADMIN — SODIUM CHLORIDE 80 MG: 9 INJECTION, SOLUTION INTRAVENOUS at 15:34

## 2022-03-21 RX ADMIN — ALBUMIN (HUMAN) 25 G: 0.25 INJECTION, SOLUTION INTRAVENOUS at 18:37

## 2022-03-21 ASSESSMENT — PAIN SCALES - GENERAL: PAINLEVEL_OUTOF10: 0

## 2022-03-21 ASSESSMENT — ENCOUNTER SYMPTOMS
ABDOMINAL PAIN: 1
COLOR CHANGE: 1
COUGH: 0
EYE REDNESS: 0
EYE ITCHING: 0
TROUBLE SWALLOWING: 0
RHINORRHEA: 0
NAUSEA: 1
BACK PAIN: 0
FACIAL SWELLING: 0
SORE THROAT: 0
VOMITING: 1
EYE PAIN: 0
ABDOMINAL DISTENTION: 1
CONSTIPATION: 1
SHORTNESS OF BREATH: 0

## 2022-03-21 NOTE — TELEPHONE ENCOUNTER
----- Message from Ulysses Boots sent at 3/21/2022 10:13 AM EDT -----  Subject: Message to Provider    QUESTIONS  Information for Provider? Patients wife would like Dr. Tang Duncan to call   [facility] to start nursing care for Mr. Donald Chase. A nurse can   come and evaluate him. Also family would like to start Hospice to be   started for Mr. Donald Chase. Also wants to know if they should give him   Prednisone. Also he has thrush and would like to have Nystatin called in   Pt has been vomiting this morning now with dry heaves. RAFA/ [address] Phone number [phone]  ---------------------------------------------------------------------------  --------------  Nohelia MCMANUS  What is the best way for the office to contact you? OK to leave message on   voicemail  Preferred Call Back Phone Number? [phone]  ---------------------------------------------------------------------------  --------------  SCRIPT ANSWERS  Relationship to Patient? Third Party  Representative Name?  Kashif Conway

## 2022-03-21 NOTE — ED PROVIDER NOTES
Name: Josue Arreola   MRN: [id number]     --------------------------------------------- History of Present Illness ---------------------------------------------  3/21/22, Time: 2:21 PM EDT   Chief Complaint   Patient presents with    Hypotension     Hx of Liver CA    Emesis    Positive For Covid-19     times one week, per EMS      HPI    Josue Arreola is a 73 y.o. male, with hx of AML with current Hodkins lymphoma, liver cancer with mets to bone, liver cirrhosis, ascites, who presents to the ED today for weakness and vomiting, which began several months ago. The patient describes this as constant and moderate to severe in severity. Nothing makes it better or worse. Daughter relates that he has had increased confusion as well as has not been able to eat or drink much. He is also had several episodes of vomiting this morning. She tried to give him Zofran which she believes he threw up. The pt denies any associated fever, lightheadedness, dizziness, HA, n/v, chest pain, shortness of breath, or  complaints.      Allg: Pneumococcal vaccines, Ciprofloxacin, Diatrizoate, Dye [iodides], Epinephrine, Iodine, Metformin and related, Penicillin g, Prednisone, Morphine, Other, and Oxycodone   PCP: Leticia Posey DO.    Meds:   Current Facility-Administered Medications:     insulin lispro (HUMALOG) injection vial 0-6 Units, 0-6 Units, SubCUTAneous, TID WC, Ismail U Khoa, DO    insulin lispro (HUMALOG) injection vial 0-3 Units, 0-3 Units, SubCUTAneous, Nightly, Ismail U Khoa, DO    sodium zirconium cyclosilicate (LOKELMA) oral suspension 5 g, 5 g, Oral, Once, [employer], DO    glucose (GLUTOSE) 40 % oral gel 15 g, 15 g, Oral, PRN, [employer], DO    glucagon (rDNA) injection 1 mg, 1 mg, IntraMUSCular, PRN, Ismail U Khoa, DO    dextrose 5 % solution, 100 mL/hr, IntraVENous, PRN, [employer], DO    sodium chloride flush 0.9 % injection 5-40 mL, 5-40 mL, IntraVENous, 2 times per day, Brissa Reef, DO, 10 mL at 03/22/22 0029    sodium chloride flush 0.9 % injection 5-40 mL, 5-40 mL, IntraVENous, PRN, Brissa Clements, DO    0.9 % sodium chloride infusion, 25 mL, IntraVENous, PRN, Brissa Clements, DO    polyethylene glycol (GLYCOLAX) packet 17 g, 17 g, Oral, Daily PRN, Brissa Reef, DO    acetaminophen (TYLENOL) tablet 650 mg, 650 mg, Oral, Q6H PRN **OR** acetaminophen (TYLENOL) suppository 650 mg, 650 mg, Rectal, Q6H PRN, Brissa Reef, DO    albumin human 25 % IV solution 25 g, 25 g, IntraVENous, Q6H, Ismail U DO Khoa, Stopped at 03/22/22 0100    ondansetron (ZOFRAN) injection 4 mg, 4 mg, IntraVENous, Q6H PRN, Brissa Reef, DO    miconazole (MICOTIN) 2 % powder, , Topical, BID, Brissa Reef, , Given at 03/22/22 0008    dextrose bolus (hypoglycemia) 10% 125 mL, 125 mL, IntraVENous, PRN, Stopped at 03/22/22 0403 **OR** dextrose bolus (hypoglycemia) 10% 250 mL, 250 mL, IntraVENous, PRN, Brissa Reef, DO    Arformoterol Tartrate (BROVANA) nebulizer solution 15 mcg, 15 mcg, Nebulization, BID **AND** budesonide (PULMICORT) nebulizer suspension 500 mcg, 0.5 mg, Nebulization, BID, Brissa Reef,      Review of Systems   Constitutional: Positive for fatigue. Negative for chills and fever. HENT: Negative for congestion, facial swelling, rhinorrhea, sore throat and trouble swallowing. Eyes: Negative for pain, redness and itching. Respiratory: Negative for cough and shortness of breath. Cardiovascular: Negative for chest pain and palpitations. Gastrointestinal: Positive for abdominal distention, abdominal pain, constipation, nausea and vomiting. Endocrine: Negative for polyuria. Genitourinary: Negative for difficulty urinating, dysuria, flank pain and hematuria. Musculoskeletal: Negative for arthralgias, back pain, myalgias and neck pain. Skin: Positive for color change (jaundice). Negative for pallor, rash and wound.    Neurological: Positive for ([facility] [place] [address]  Hodgkin lymphoma, unspecified Hodgkin lymphoma type, unspecified body region ([facility] [place] [address]  Hyperkalemia  Hypotension, unspecified hypotension type  Thrombocytopenia ([facility] [place] [address]  Diagnosis management comments: Mr. Ailyn Mallory 30-year-old male patient with history of liver cancer with mets to the bone as well as liver cirrhosis (has not started chemotherapy yet) who presents today for failure to thrive, generalized weakness, nausea and vomiting and abdominal distention. He is full code. On physical exam, patient is alert however confused and responds with moans and groans. BP 85/56, heart rate 110, other vitals within normal limits. Afebrile. Patient is jaundiced with mild scleral icterus. The abdomen is obese, distended, positive fluid wave, generalized mild tenderness. Lung sounds clear and equal bilaterally. Heart rate tachycardic, regular. Radial pulses +2. No lower extremity edema appreciated. Concern this time for abdominal ascites, constipation versus obstruction, intractable nausea and vomiting, hepatic encephalopathy, electrolyte abnormalities, arrhythmia. Lab work, EKG, blood cultures, UA were ordered for further evaluation. IV fluids, Protonix, Zofran given. Mild hyperkalemia with potassium 5.7, low, was given. Worsening renal function and lactic 6.7, creatinine 1.5, prerenal azotemia likely due to dehydration, hypoalbuminemia and volume depletion, fluids are being given at this time. Patient has worsening anemia and thrombocytopenia, however levels not low enough for transfusion at this time. Ammonia was elevated at 176 likely causing hepatic encephalopathy, lactulose was ordered. Pt was signed out to Dr. Hemanth Nelson, EM PGY-1. Non-con CT of Abd showed colitis, diverticulosis, cirrhotic liver with numerous lesions, splenomegaly, moderate ascites, and nodules in lung bases. Dr Pamela Hagan, hospitalist, was contacted and will admit patient under Dr. Derrick Granger service for further care.  Pt has been borderline hypotensive in the ED however did have improvement with fluids. Amount and/or Complexity of Data Reviewed  Decide to obtain previous medical records or to obtain history from someone other than the patient: yes         ED Course as of 03/22/22 0543   Mon Mar 21, 2022   1552 Potassium(!): 5.7  Lokelma ordered. [PW]   1601 Creatinine(!): 1.5  LISSETTE & prerenal azotemia [PW]   1601 AMMONIA, PLASMA, 780777(!): 176.0  Lactulose ordered. [PW]   1602 Albumin(!): 2.6  Hypoalbuminemia, slightly lower than previous studies. [PW]   1603 Bilirubin(!): 5.9  Worsening bilirubin [PW]   1603 Lactic Acid(!!): 6.7 [PW]   1615 BP 80/54. Receiving fluids now. Will reevaluate. [PW]   3761 Signed out by Dr. Iván Auguste. Liver cirrhosis/failure. TLC likely [PP]      ED Course User Index  [PP] Rohan Pineda DO  [PW] Charlene Baca DO          --------------------------------------------- PAST HISTORY ---------------------------------------------  Past Medical History:  has a past medical history of AML (acute myeloblastic leukemia) ([facility] [address] AML (acute myeloblastic leukemia) ([facility] [address] Cancer ([facility] [address] Chronic kidney disease, Diabetes mellitus ([facility] [address] GERD (gastroesophageal reflux disease), Hx of blood clots, Hyperlipidemia, Liver disease, MI, old, Other disorders of kidney and ureter, Other sleep disturbances, Thyroid disease, and Type II or unspecified type diabetes mellitus without mention of complication, not stated as uncontrolled. Past Surgical History:  has a past surgical history that includes Appendectomy; Cholecystectomy; Foot neuroma surgery; Colonoscopy; Endoscopy, colon, diagnostic; Upper gastrointestinal endoscopy; bone marrow transplant; CT NEEDLE BIOPSY LIVER PERCUTANEOUS (1/17/2022); and Catheter Insertion (N/A, 2/28/2022). Social History:  reports that he quit smoking about 45 years ago. He has a 5.00 pack-year smoking history. He quit smokeless tobacco use about 23 years ago.  He reports current alcohol use of about 1.0 standard drink of alcohol per week. He reports that he does not use drugs. Family History: family history includes Cancer in his maternal grandmother and other family members; Heart Disease in his father and mother; High Blood Pressure in his father. The patients home medications have been reviewed.     Allergies: Pneumococcal vaccines, Ciprofloxacin, Diatrizoate, Dye [iodides], Epinephrine, Iodine, Metformin and related, Penicillin g, Prednisone, Morphine, Other, and Oxycodone    -------------------------------------------------- RESULTS -------------------------------------------------    LABS:  Results for orders placed or performed during the hospital encounter of 03/21/22   COVID-19, Rapid    Specimen: Nasopharyngeal Swab   Result Value Ref Range    SARS-CoV-2, NAAT Not Detected Not Detected   Comprehensive Metabolic Panel w/ Reflex to MG   Result Value Ref Range    Sodium 132 132 - 146 mmol/L    Potassium reflex Magnesium 5.7 (H) 3.5 - 5.0 mmol/L    Chloride 96 (L) 98 - 107 mmol/L    CO2 15 (L) 22 - 29 mmol/L    Anion Gap 21 (H) 7 - 16 mmol/L    Glucose 238 (H) 74 - 99 mg/dL    BUN 86 (H) 6 - 23 mg/dL    CREATININE 1.5 (H) 0.7 - 1.2 mg/dL    GFR Non-African American 46 >=60 mL/min/1.73    GFR African American 56     Calcium 9.5 8.6 - 10.2 mg/dL    Total Protein 4.5 (L) 6.4 - 8.3 g/dL    Albumin 2.6 (L) 3.5 - 5.2 g/dL    Total Bilirubin 5.9 (H) 0.0 - 1.2 mg/dL    Alkaline Phosphatase 539 (H) 40 - 129 U/L    ALT 44 (H) 0 - 40 U/L    AST 53 (H) 0 - 39 U/L   CBC with Auto Differential   Result Value Ref Range    WBC 8.0 4.5 - 11.5 E9/L    RBC 3.10 (L) 3.80 - 5.80 E12/L    Hemoglobin 9.0 (L) 12.5 - 16.5 g/dL    Hematocrit 30.1 (L) 37.0 - 54.0 %    MCV 97.1 80.0 - 99.9 fL    MCH 29.0 26.0 - 35.0 pg    MCHC 29.9 (L) 32.0 - 34.5 %    RDW 22.5 (H) 11.5 - 15.0 fL    Platelets 72 (L) 328 - 450 E9/L    MPV 12.1 (H) 7.0 - 12.0 fL    Neutrophils % 94.0 (H) 43.0 - 80.0 %    Lymphocytes % 4.0 (L) 20.0 - 42.0 %    Monocytes % 2.0 2.0 - 12.0 %    Eosinophils % 0.0 0.0 - 6.0 %    Basophils % 0.0 0.0 - 2.0 %    Neutrophils Absolute 7.52 (H) 1.80 - 7.30 E9/L    Lymphocytes Absolute 0.32 (L) 1.50 - 4.00 E9/L    Monocytes Absolute 0.16 0.10 - 0.95 E9/L    Eosinophils Absolute 0.00 (L) 0.05 - 0.50 E9/L    Basophils Absolute 0.00 0.00 - 0.20 E9/L    Anisocytosis 3+     Polychromasia 1+     Hypochromia 1+     Poikilocytes 1+     [place] Cells 1+     Ovalocytes 1+     Target Cells 1+     Tear Drop Cells 1+    Lactic Acid   Result Value Ref Range    Lactic Acid 6.7 (HH) 0.5 - 2.2 mmol/L   Lipase   Result Value Ref Range    Lipase 170 (H) 13 - 60 U/L   Ammonia   Result Value Ref Range    Ammonia 176.0 (H) 16.0 - 60.0 umol/L   Urinalysis with Microscopic   Result Value Ref Range    Color, UA DKYELLOW Straw/Yellow    Clarity, UA Clear Clear    Glucose, Ur Negative Negative mg/dL    Bilirubin Urine SMALL (A) Negative    Ketones, Urine Negative Negative mg/dL    Specific Gravity, UA 1.020 1.005 - 1.030    Blood, Urine Negative Negative    pH, UA 5.0 5.0 - 9.0    Protein, UA 30 (A) Negative mg/dL    Urobilinogen, Urine 0.2 <2.0 E.U./dL    Nitrite, Urine Negative Negative    Leukocyte Esterase, Urine Negative Negative    WBC, UA 5-10 (A) 0 - 5 /HPF    RBC, UA NONE 0 - 2 /HPF    Bacteria, UA NONE SEEN None Seen /HPF    Yeast, UA Present (A) None Seen /HPF   Platelet Confirmation   Result Value Ref Range    Platelet Confirmation CONFIRMED    Lactic Acid   Result Value Ref Range    Lactic Acid 6.0 (HH) 0.5 - 2.2 mmol/L   Protime-INR   Result Value Ref Range    Protime 22.6 (H) 9.3 - 12.4 sec    INR 1.9    Ammonia   Result Value Ref Range    Ammonia 112.0 (H) 16.0 - 60.0 umol/L   CBC with Auto Differential   Result Value Ref Range    WBC 3.3 (L) 4.5 - 11.5 E9/L    RBC 2.26 (L) 3.80 - 5.80 E12/L    Hemoglobin 6.7 (LL) 12.5 - 16.5 g/dL    Hematocrit 22.4 (L) 37.0 - 54.0 %    MCV 99.1 80.0 - 99.9 fL    MCH 29.6 26.0 - 35.0 pg    MCHC 29.9 (L) nephrolithiasis. 10. Stable low-density lesion or infarction in the superior aspect of the   spleen. XR CHEST PORTABLE   Final Result   No acute process. ------------------------- NURSING NOTES AND VITALS REVIEWED ---------------------------  Date / Time Roomed:  3/21/2022  1:59 PM  ED Bed Assignment:  [id number]    The nursing notes within the ED encounter and vital signs as below have been reviewed. Patient Vitals for the past 24 hrs:   BP Temp Temp src Pulse Resp SpO2 Height Weight   03/22/22 0445 (!) 109/57 97.2 °F (36.2 °C) Infrared 101 14 100 % -- --   03/22/22 0015 106/61 -- -- 101 14 98 % -- --   03/21/22 2100 87/63 96.8 °F (36 °C) Infrared 99 13 100 % -- --   03/21/22 1908 (!) 93/58 -- -- -- -- -- -- --   03/21/22 1817 (!) 81/53 -- -- 96 20 97 % -- --   03/21/22 1736 (!) 93/54 -- -- 97 18 97 % -- --   03/21/22 1642 (!) 80/54 -- -- 100 16 96 % -- --   03/21/22 1538 (!) 87/59 -- -- 105 18 96 % -- --   03/21/22 1405 (!) 85/56 96.8 °F (36 °C) Axillary 110 20 97 % 5' 8\" (1.727 m) 170 lb (77.1 kg)       Oxygen Saturation Interpretation: normal    ------------------------------------------ PROGRESS NOTES ------------------------------------------    Counseling:  I have spoken with the patient and family and discussed todays results, in addition to providing specific details for the plan of care and counseling regarding the diagnosis and prognosis. Their questions are answered at this time and they are agreeable with the plan of admission.    --------------------------------- ADDITIONAL PROVIDER NOTES ---------------------------------  Consultations:  Time: 2000. Spoke with Dr. Arsenio Ray. Discussed case. They will admit the patient. This patient's ED course included: history and physical, monitor, multiple reevaluations, complex medical decision making, IV medications, and admission to hospital for further care.     This patient has remained hemodynamically stable during their ED course. Diagnosis:  1. Hepatic encephalopathy ([facility] [address]    2. Cirrhosis of liver with ascites, unspecified hepatic cirrhosis type ([address]    3. Hyperkalemia    4. LISSETTE (acute kidney injury) ([facility] [address]    5. Acute on chronic anemia    6. Thrombocytopenia (HCC)    7. Elevated lactic acid level    8. Hypotension, unspecified hypotension type    9. Hodgkin lymphoma, unspecified Hodgkin lymphoma type, unspecified body region [facility])        Disposition:  Patient's disposition: Admit to medicine w/ tele  Patient's condition is poor.          Jax Kellogg DO  Resident  03/22/22 2752

## 2022-03-21 NOTE — H&P
Department of Internal Medicine  History and Physical    PCP: Chapito Ward DO  Admitting Physician: Dr. Lovell Pac  Consultants:   Date of Service: 3/21/2022    CHIEF COMPLAINT: Altered mental status    HISTORY OF PRESENT ILLNESS:    Patient is a 62-year-old male who presented from nursing facility due to altered mental status. Daughter. Wife and son are present at bedside and provides most of the history as patient is nonverbal currently. Patient does appear to be in distress and does verbalize in the form of pneumonia. Otherwise patient has been more altered and confused today. Family states that patient was talking yesterday.   However today he is nonverbal.  Patient    PAST MEDICAL Hx:  Past Medical History:   Diagnosis Date    AML [facility]acute myeloblastic [facility]) ([facility]     chemotherapy    AML (acute myeloblastic leukemia) ([facility] 2010    Cancer ([facility]     Chronic kidney disease     Diabetes mellitus ([facility]     GERD (gastroesophageal reflux disease)     Hx of blood clots 2012    legs    Hyperlipidemia     Liver disease     MI, old     Other disorders of kidney and ureter     Other sleep disturbances     Thyroid disease     Type II or unspecified type diabetes mellitus without mention of complication, not stated as uncontrolled        PAST SURGICAL Hx:   Past Surgical History:   Procedure Laterality Date    APPENDECTOMY      BONE MARROW TRANSPLANT      double core transplant    CATHETER INSERTION N/A 2/28/2022    MEDIPORT CATHETER INSERTION performed by Rhina Zimmerman MD at [address] [place] [address]       CT NEEDLE BIOPSY LIVER PERCUTANEOUS  1/17/2022    CT NEEDLE BIOPSY LIVER PERCUTANEOUS    ENDOSCOPY, COLON, DIAGNOSTIC      FOOT NEUROMA SURGERY      UPPER GASTROINTESTINAL ENDOSCOPY         FAMILY Hx:  Family History   Problem Relation Age of Onset    Heart Disease Mother     High Blood Pressure Father     Heart Disease Father     Cancer Maternal Grandmother  Cancer Other     Cancer Other        HOME MEDICATIONS:  Prior to Admission medications    Medication Sig Start Date End Date Taking? Authorizing Provider   albuterol-ipratropium (COMBIVENT RESPIMAT)  MCG/ACT AERS inhaler Inhale 1 puff into the lungs every 6 hours 3/17/22   Colette Lamp, DO   ascorbic acid (VITAMIN C) 1000 MG tablet Take 1 tablet by mouth daily 3/18/22   Colette Lamp, DO   budesonide-formoterol [facility]) 160-4.5 MCG/ACT AERO Inhale 2 puffs into the lungs 2 times daily 3/17/22   Colette Lamp, DO   zinc gluconate 50 MG tablet Take 1 tablet by mouth daily 3/17/22   Colette Lamp, DO   insulin lispro (HUMALOG) 100 UNIT/ML injection cartridge **Corrective Low Dose Algorithm**  Glucose: Dose:               No Insulin  140-199 1 Unit  200-249 2 Units  250-299 3 Units  300-349 4 Units  350-399 5 Units  Over 399 6 Units 3/17/22   Colette Lamp, DO   blood glucose monitor kit and supplies Dispense sufficient amount for indicated testing frequency plus additional to accommodate PRN testing needs. Dispense all needed supplies to include: monitor, strips, lancing device, lancets, control solutions, alcohol swabs. 3/11/22   JYOTI Paredes CNP   lactulose [facility]) 10 GM/15ML solution Take 30 mLs by mouth 3 times daily 3/9/22 4/8/22  Colette Lamp, DO   magnesium oxide (MAG-OX) 400 MG tablet Take 1 tablet by mouth 2 times daily 3/9/22   Colette Lamp, DO   furosemide (LASIX) 20 MG tablet Take 2 tablets by mouth daily 3/9/22   Colette Lamp, DO   blood glucose test strips (ONETOUCH ULTRA) strip TEST 3 TIMES DAILY 3/4/22   JYOTI Paredes CNP   Continuous Blood Gluc  (FREESTYLE PAOLA 2 READER) FLORESITA Check sygar FAsting and ac (4 X daiy). 3/3/22   Shahid Regalado, DO   Continuous Blood Gluc Sensor (FREESTYLE PAOLA 2 SENSOR) MISC Check sugar 5 X daily.  [facility] 3/3/22   Shahid Regalado, DO   ondansetron (ZOFRAN-ODT) 4 MG disintegrating tablet Take 1 tablet by mouth 3 times daily as needed for Nausea or Vomiting 22   Mitra Lyle, DO   ferrous sulfate (IRON 325) 325 (65 Fe) MG tablet Take 1 tablet by mouth 2 times daily (with meals) 22   Mitra Lyle DO   levothyroxine (SYNTHROID) 200 MCG tablet Take 1 tablet by mouth Daily 21   Bee JYOTI Butler CNP   sertraline (ZOLOFT) 50 MG tablet Take 1.5 tablets by mouth daily  Patient taking differently: Take 50 mg by mouth daily 1 tablet 21   Mitra Lyle DO   pantoprazole (PROTONIX) 40 MG tablet TAKE 1 TABLET DAILY 21   Mitra Lyle DO   insulin glargine (LANTUS SOLOSTAR) 100 UNIT/ML injection pen Inject 20 Units into the skin nightly 21   Mitra Lyle, DO   INS SYRINGE/NEEDLE .5CC/28G ([employer] INS SYR MX-COM .5CC/28G) 28G X 1/2\" 0.5 ML MISC Use three times daily 21   Mitra Lyle DO   Insulin Pen Needle (BD PEN NEEDLE JENNIFER U/F) 32G X 4 MM MISC USE AS DIRECTED 21   Mitra Lyle, DO   vitamin D (CHOLECALCIFEROL) 1000 UNIT TABS tablet Take 2,000 Units by mouth daily     Historical Provider, MD   aspirin 81 MG chewable tablet Take 81 mg by mouth daily    Historical Provider, MD       ALLERGIES:  Pneumococcal vaccines, Ciprofloxacin, Diatrizoate, Dye [iodides], Epinephrine, Iodine, Metformin and related, Penicillin g, Prednisone, Morphine, Other, and Oxycodone    SOCIAL Hx:  Social History     Socioeconomic History    Marital status:      Spouse name: Lora Jamil Number of children: 1    Years of education: Not on file    Highest education level: Not on file   Occupational History    Not on file   Tobacco Use    Smoking status: Former Smoker     Packs/day: 1.00     Years: 5.00     Pack years: 5.00     Quit date: 1976     Years since quittin.9    Smokeless tobacco: Former User     Quit date: 1998   Substance and Sexual Activity    Alcohol use:  Yes     Alcohol/week: 1.0 standard drink     Types: 1 Cans of beer per week Comment: weekly    Drug use: No    Sexual activity: Yes     Partners: Female   Other Topics Concern    Not on file   Social History Narrative    Not on file     Social Determinants of Health     Financial Resource Strain: Low Risk     Difficulty of Paying Living Expenses: Not very hard   Food Insecurity: No Food Insecurity    Worried About Running Out of Food in the Last Year: Never true    Diane of Food in the Last Year: Never true   Transportation Needs:     Lack of Transportation (Medical): Not on file    Lack of Transportation (Non-Medical): Not on file   Physical Activity:     Days of Exercise per Week: Not on file    Minutes of Exercise per Session: Not on file   Stress:     Feeling of Stress : Not on file   Social Connections:     Frequency of Communication with Friends and Family: Not on file    Frequency of Social Gatherings with Friends and Family: Not on file    Attends Mu-ism Services: Not on file    Active Member of [address] [place] [address] or Organizations: Not on file    Attends Club or Organization Meetings: Not on file    Marital Status: Not on file   Intimate Partner Violence:     Fear of Current or Ex-Partner: Not on file    Emotionally Abused: Not on file    Physically Abused: Not on file    Sexually Abused: Not on file   Housing Stability:     Unable to Pay for Housing in the Last Year: Not on file    Number of [place] in the Last Year: Not on file    Unstable Housing in the Last Year: Not on file       ROS: Positive in bold  General:   Denies chills, fatigue, fever, malaise, night sweats or weight loss    Psychological:   Denies anxiety, disorientation or hallucinations    ENT:    Denies epistaxis, headaches, vertigo or visual changes    Cardiovascular:   Denies any chest pain, irregular heartbeats, or palpitations. No paroxysmal nocturnal dyspnea. Respiratory:   Denies shortness of breath, coughing, sputum production, hemoptysis, or wheezing.   No orthopnea. Gastrointestinal:   Denies nausea, vomiting, diarrhea, or constipation. Denies any abdominal pain. Denies change in bowel habits or stools. Genito-Urinary:    Denies any urgency, frequency, hematuria. Voiding without difficulty. Musculoskeletal:   Denies joint pain, joint stiffness, joint swelling or muscle pain    Neurology:    Denies any headache or focal neurological deficits. No weakness or paresthesia. Derm:    Denies any rashes, ulcers, or excoriations. Denies bruising. Extremities:   Denies any lower extremity swelling or edema. PHYSICAL EXAM: Abnormal findings noted  VITALS:  Vitals:    03/21/22 1908   BP: (!) 93/58   Pulse:    Resp:    Temp:    SpO2:          CONSTITUTIONAL:    Awake, alert, cooperative, no apparent distress, and appears stated age  Patient appears to be in distress    EYES:    EOMI, sclera clear, conjunctiva normal    ENT:    Normocephalic, atraumatic,  External ears without lesions. NECK:    Supple, symmetrical, trachea midline,  no JVD    HEMATOLOGIC/LYMPHATICS:    No cervical lymphadenopathy and no supraclavicular lymphadenopathy    LUNGS:    Symmetric. No increased work of breathing, good air exchange, clear to auscultation bilaterally, no wheezes, rhonchi, or rales,     CARDIOVASCULAR:    Normal apical impulse, regular rate and rhythm, normal S1 and S2, no S3 or S4, and no murmur noted    ABDOMEN:     soft, non-distended, non-tender    MUSCULOSKELETAL:    There is no redness, warmth, or swelling of the joints. NEUROLOGIC:    Awake, alert, oriented to name, place and time. SKIN:    No bruising or bleeding. No redness, warmth, or swelling    EXTREMITIES:    Peripheral pulses present. No edema, cyanosis, or swelling.     LINES/CATHETERS     LABORATORY DATA:  CBC with Differential:    Lab Results   Component Value Date    WBC 8.0 03/21/2022    RBC 3.10 03/21/2022    HGB 9.0 03/21/2022    HCT 30.1 03/21/2022    PLT 72 03/21/2022    MCV 97.1 03/21/2022    MCH 29.0 03/21/2022    MCHC 29.9 03/21/2022    RDW 22.5 03/21/2022    NRBC 1.0 03/06/2022    SEGSPCT 22 02/03/2014    METASPCT 0.9 03/17/2022    LYMPHOPCT 4.0 03/21/2022    MONOPCT 2.0 03/21/2022    MYELOPCT 1.7 03/16/2022    BASOPCT 0.0 03/21/2022    MONOSABS 0.16 03/21/2022    LYMPHSABS 0.32 03/21/2022    EOSABS 0.00 03/21/2022    BASOSABS 0.00 03/21/2022     CMP:    Lab Results   Component Value Date     03/21/2022    K 5.7 03/21/2022    CL 96 03/21/2022    CO2 15 03/21/2022    BUN 86 03/21/2022    CREATININE 1.5 03/21/2022    GFRAA 56 03/21/2022    LABGLOM 46 03/21/2022    GLUCOSE 238 03/21/2022    GLUCOSE 318 04/30/2012    PROT 4.5 03/21/2022    LABALBU 2.6 03/21/2022    LABALBU 4.0 04/30/2012    CALCIUM 9.5 03/21/2022    BILITOT 5.9 03/21/2022    ALKPHOS 539 03/21/2022    AST 53 03/21/2022    ALT 44 03/21/2022       ASSESSMENT/PLAN:  1. Acute hepatic encephalopathy  2. LISSETTE  3. Moderate ascites  4. Decompensated cirrhosis  5. Splenomegaly with lesion/infarction to the superior aspect  6. COVID-19 infection without overt respiratory or GI symptomatology  7. Previous history of AML with current diagnosis of classic Hodgkin's lymphoma  8. Nondisplaced sacral fracture  9. Insulin-dependent diabetes mellitus type II  10. History of DVT  11. History of MI  15. Hypothyroidism  13. Hyperlipidemia  14. Hx of tobacco abuse      Patient presents with altered mental status. Ammonia elevated. Patient unable to tolerate oral intake. Lactulose will be given rectally. Continue to trend ammonia. Patient has not been taking his lactulose as an outpatient. Patient does have LISSETTE. Patient will be placed on albumin. Monitor intake and output. Currently patient will be n.p.o. Until mentation improved. Palliative care consulted.       Heath Jin [place]  7:48 PM  3/21/2022    Electronically signed by Hetah Jin DO on 3/21/22 at 7:48 PM EDT

## 2022-03-21 NOTE — Clinical Note
Discharge Plan[de-identified] Other/[facility])   Telemetry/Cardiac Monitoring Required?: Yes

## 2022-03-21 NOTE — TELEPHONE ENCOUNTER
Patients spouse states patient was put in a nursing home in [place] after leaving the hospital. The nursing home banned Hussein Richter and she was not aloud in anymore, They took patient back home. Hussein Richter states the nursing home was going to prescribe him something for thrush but did as they removed him from the home. She is also requesting something to help with mild tremors. Please advise.

## 2022-03-21 NOTE — TELEPHONE ENCOUNTER
----- Message from Quan Sanchez sent at 3/19/2022 10:50 AM EDT -----  Subject: Message to Provider    QUESTIONS  Information for Provider? Son of pt called on 3/19/2022 to have dad   released from facility [facility] today. Was sent to [facility] on-call   doctor. ---------------------------------------------------------------------------  --------------  Desire MCMANUS  What is the best way for the office to contact you? OK to leave message on   voicemail  Preferred Call Back Phone Number? [phone]  ---------------------------------------------------------------------------  --------------  SCRIPT ANSWERS  Relationship to Patient? Other  Representative Name? Shu Alvares Son  Is the Representative on the appropriate HIPAA document in Epic?  Yes

## 2022-03-22 LAB
ABO/RH: NORMAL
ALBUMIN SERPL-MCNC: 3 G/DL (ref 3.5–5.2)
ALP BLD-CCNC: 369 U/L (ref 40–129)
ALT SERPL-CCNC: 32 U/L (ref 0–40)
AMMONIA: 112 UMOL/L (ref 16–60)
ANION GAP SERPL CALCULATED.3IONS-SCNC: 18 MMOL/L (ref 7–16)
ANISOCYTOSIS: ABNORMAL
ANTIBODY SCREEN: NORMAL
AST SERPL-CCNC: 37 U/L (ref 0–39)
BACTERIA: ABNORMAL /HPF
BASOPHILS ABSOLUTE: 0 E9/L (ref 0–0.2)
BASOPHILS RELATIVE PERCENT: 0 % (ref 0–2)
BILIRUB SERPL-MCNC: 5.6 MG/DL (ref 0–1.2)
BILIRUBIN URINE: ABNORMAL
BLOOD BANK DISPENSE STATUS: NORMAL
BLOOD BANK PRODUCT CODE: NORMAL
BLOOD, URINE: NEGATIVE
BPU ID: NORMAL
BUN BLDV-MCNC: 91 MG/DL (ref 6–23)
BURR CELLS: ABNORMAL
CALCIUM SERPL-MCNC: 9 MG/DL (ref 8.6–10.2)
CHLORIDE BLD-SCNC: 97 MMOL/L (ref 98–107)
CLARITY: CLEAR
CO2: 16 MMOL/L (ref 22–29)
COLOR: ABNORMAL
CREAT SERPL-MCNC: 1.6 MG/DL (ref 0.7–1.2)
DESCRIPTION BLOOD BANK: NORMAL
EOSINOPHILS ABSOLUTE: 0 E9/L (ref 0.05–0.5)
EOSINOPHILS RELATIVE PERCENT: 0 % (ref 0–6)
GFR AFRICAN AMERICAN: 52
GFR NON-AFRICAN AMERICAN: 43 ML/MIN/1.73
GLUCOSE BLD-MCNC: 282 MG/DL (ref 74–99)
GLUCOSE URINE: NEGATIVE MG/DL
HCT VFR BLD CALC: 22.4 % (ref 37–54)
HCT VFR BLD CALC: 23.9 % (ref 37–54)
HEMOGLOBIN: 6.7 G/DL (ref 12.5–16.5)
HEMOGLOBIN: 7.6 G/DL (ref 12.5–16.5)
KETONES, URINE: NEGATIVE MG/DL
LACTIC ACID: 3.4 MMOL/L (ref 0.5–2.2)
LACTIC ACID: 3.9 MMOL/L (ref 0.5–2.2)
LACTIC ACID: 4.6 MMOL/L (ref 0.5–2.2)
LEUKOCYTE ESTERASE, URINE: NEGATIVE
LYMPHOCYTES ABSOLUTE: 0.13 E9/L (ref 1.5–4)
LYMPHOCYTES RELATIVE PERCENT: 4.3 % (ref 20–42)
MAGNESIUM: 1.9 MG/DL (ref 1.6–2.6)
MCH RBC QN AUTO: 29.6 PG (ref 26–35)
MCHC RBC AUTO-ENTMCNC: 29.9 % (ref 32–34.5)
MCV RBC AUTO: 99.1 FL (ref 80–99.9)
METER GLUCOSE: 217 MG/DL (ref 74–99)
METER GLUCOSE: 223 MG/DL (ref 74–99)
METER GLUCOSE: 238 MG/DL (ref 74–99)
METER GLUCOSE: 244 MG/DL (ref 74–99)
METER GLUCOSE: 246 MG/DL (ref 74–99)
METER GLUCOSE: 60 MG/DL (ref 74–99)
MONOCYTES ABSOLUTE: 0.16 E9/L (ref 0.1–0.95)
MONOCYTES RELATIVE PERCENT: 5.2 % (ref 2–12)
NEUTROPHILS ABSOLUTE: 2.97 E9/L (ref 1.8–7.3)
NEUTROPHILS RELATIVE PERCENT: 90.4 % (ref 43–80)
NITRITE, URINE: NEGATIVE
OVALOCYTES: ABNORMAL
PDW BLD-RTO: 22.2 FL (ref 11.5–15)
PH UA: 5 (ref 5–9)
PHOSPHORUS: 6.2 MG/DL (ref 2.5–4.5)
PLATELET # BLD: 55 E9/L (ref 130–450)
PLATELET CONFIRMATION: NORMAL
PMV BLD AUTO: 11.9 FL (ref 7–12)
POIKILOCYTES: ABNORMAL
POLYCHROMASIA: ABNORMAL
POTASSIUM SERPL-SCNC: 5.2 MMOL/L (ref 3.5–5)
PROCALCITONIN: 1.82 NG/ML (ref 0–0.08)
PROTEIN UA: 30 MG/DL
RBC # BLD: 2.26 E12/L (ref 3.8–5.8)
RBC UA: ABNORMAL /HPF (ref 0–2)
SODIUM BLD-SCNC: 131 MMOL/L (ref 132–146)
SPECIFIC GRAVITY UA: 1.02 (ref 1–1.03)
TOTAL PROTEIN: 4.3 G/DL (ref 6.4–8.3)
UROBILINOGEN, URINE: 0.2 E.U./DL
WBC # BLD: 3.3 E9/L (ref 4.5–11.5)
WBC UA: ABNORMAL /HPF (ref 0–5)
YEAST: PRESENT /HPF

## 2022-03-22 PROCEDURE — 82962 GLUCOSE BLOOD TEST: CPT

## 2022-03-22 PROCEDURE — 2580000003 HC RX 258: Performed by: INTERNAL MEDICINE

## 2022-03-22 PROCEDURE — 83735 ASSAY OF MAGNESIUM: CPT

## 2022-03-22 PROCEDURE — 85018 HEMOGLOBIN: CPT

## 2022-03-22 PROCEDURE — 83605 ASSAY OF LACTIC ACID: CPT

## 2022-03-22 PROCEDURE — 85025 COMPLETE CBC W/AUTO DIFF WBC: CPT

## 2022-03-22 PROCEDURE — 84100 ASSAY OF PHOSPHORUS: CPT

## 2022-03-22 PROCEDURE — 6360000002 HC RX W HCPCS: Performed by: INTERNAL MEDICINE

## 2022-03-22 PROCEDURE — 36415 COLL VENOUS BLD VENIPUNCTURE: CPT

## 2022-03-22 PROCEDURE — 6370000000 HC RX 637 (ALT 250 FOR IP): Performed by: INTERNAL MEDICINE

## 2022-03-22 PROCEDURE — 36430 TRANSFUSION BLD/BLD COMPNT: CPT

## 2022-03-22 PROCEDURE — 94664 DEMO&/EVAL PT USE INHALER: CPT

## 2022-03-22 PROCEDURE — 80053 COMPREHEN METABOLIC PANEL: CPT

## 2022-03-22 PROCEDURE — 2500000003 HC RX 250 WO HCPCS: Performed by: INTERNAL MEDICINE

## 2022-03-22 PROCEDURE — 85014 HEMATOCRIT: CPT

## 2022-03-22 PROCEDURE — 84145 PROCALCITONIN (PCT): CPT

## 2022-03-22 PROCEDURE — 36591 DRAW BLOOD OFF VENOUS DEVICE: CPT

## 2022-03-22 PROCEDURE — 81001 URINALYSIS AUTO W/SCOPE: CPT

## 2022-03-22 PROCEDURE — 97161 PT EVAL LOW COMPLEX 20 MIN: CPT | Performed by: PHYSICAL THERAPIST

## 2022-03-22 PROCEDURE — 86850 RBC ANTIBODY SCREEN: CPT

## 2022-03-22 PROCEDURE — P9016 RBC LEUKOCYTES REDUCED: HCPCS

## 2022-03-22 PROCEDURE — 82140 ASSAY OF AMMONIA: CPT

## 2022-03-22 PROCEDURE — 86923 COMPATIBILITY TEST ELECTRIC: CPT

## 2022-03-22 PROCEDURE — 99222 1ST HOSP IP/OBS MODERATE 55: CPT

## 2022-03-22 PROCEDURE — 86900 BLOOD TYPING SEROLOGIC ABO: CPT

## 2022-03-22 PROCEDURE — 86901 BLOOD TYPING SEROLOGIC RH(D): CPT

## 2022-03-22 PROCEDURE — 92610 EVALUATE SWALLOWING FUNCTION: CPT | Performed by: SPEECH-LANGUAGE PATHOLOGIST

## 2022-03-22 PROCEDURE — 94640 AIRWAY INHALATION TREATMENT: CPT

## 2022-03-22 PROCEDURE — 2060000000 HC ICU INTERMEDIATE R&B

## 2022-03-22 PROCEDURE — P9047 ALBUMIN (HUMAN), 25%, 50ML: HCPCS | Performed by: INTERNAL MEDICINE

## 2022-03-22 RX ORDER — SODIUM CHLORIDE 9 MG/ML
INJECTION, SOLUTION INTRAVENOUS PRN
Status: DISCONTINUED | OUTPATIENT
Start: 2022-03-22 | End: 2022-03-23 | Stop reason: HOSPADM

## 2022-03-22 RX ADMIN — Medication 10 ML: at 14:54

## 2022-03-22 RX ADMIN — ANTI-FUNGAL POWDER MICONAZOLE NITRATE TALC FREE: 1.42 POWDER TOPICAL at 21:05

## 2022-03-22 RX ADMIN — DEXTROSE MONOHYDRATE 125 ML: 100 INJECTION, SOLUTION INTRAVENOUS at 03:55

## 2022-03-22 RX ADMIN — WATER: 100 IRRIGANT IRRIGATION at 14:53

## 2022-03-22 RX ADMIN — Medication 10 ML: at 00:29

## 2022-03-22 RX ADMIN — WATER: 100 IRRIGANT IRRIGATION at 00:09

## 2022-03-22 RX ADMIN — ANTI-FUNGAL POWDER MICONAZOLE NITRATE TALC FREE: 1.42 POWDER TOPICAL at 09:58

## 2022-03-22 RX ADMIN — ALBUMIN (HUMAN) 25 G: 0.25 INJECTION, SOLUTION INTRAVENOUS at 06:25

## 2022-03-22 RX ADMIN — INSULIN LISPRO 1 UNITS: 100 INJECTION, SOLUTION INTRAVENOUS; SUBCUTANEOUS at 21:06

## 2022-03-22 RX ADMIN — HYDROMORPHONE HYDROCHLORIDE 0.5 MG: 1 INJECTION, SOLUTION INTRAMUSCULAR; INTRAVENOUS; SUBCUTANEOUS at 00:06

## 2022-03-22 RX ADMIN — INSULIN LISPRO 2 UNITS: 100 INJECTION, SOLUTION INTRAVENOUS; SUBCUTANEOUS at 11:17

## 2022-03-22 RX ADMIN — Medication 10 ML: at 21:06

## 2022-03-22 RX ADMIN — ALBUMIN (HUMAN) 25 G: 0.25 INJECTION, SOLUTION INTRAVENOUS at 13:16

## 2022-03-22 RX ADMIN — BUDESONIDE 500 MCG: 0.5 INHALANT RESPIRATORY (INHALATION) at 06:05

## 2022-03-22 RX ADMIN — ANTI-FUNGAL POWDER MICONAZOLE NITRATE TALC FREE: 1.42 POWDER TOPICAL at 00:08

## 2022-03-22 RX ADMIN — ALBUMIN (HUMAN) 25 G: 0.25 INJECTION, SOLUTION INTRAVENOUS at 00:00

## 2022-03-22 RX ADMIN — ARFORMOTEROL TARTRATE 15 MCG: 15 SOLUTION RESPIRATORY (INHALATION) at 06:05

## 2022-03-22 ASSESSMENT — PAIN SCALES - GENERAL
PAINLEVEL_OUTOF10: 0
PAINLEVEL_OUTOF10: 6
PAINLEVEL_OUTOF10: 0

## 2022-03-22 NOTE — CONSULTS
Palliative Care Department  [phone]  Palliative Care Initial Consult  Provider Curtis Martines, APRN - CNP      PATIENT: Katia Bowles  : 1950  MRN: [id number]  ADMISSION DATE: 3/21/2022  1:59 PM  Referring Provider: Michelle Del Castillo DO    Palliative Medicine was consulted on hospital day 1 for assistance with Goals of care, Code Status Discussion, Symptom management     HPI:     Malcolm Vera is a 73 y.o. y/o male with a history of AML, with current Hodgkin's lymphoma, liver cancer with mets to the bone, chronic kidney disease, diabetes mellitus, GERD, liver disease, MI, thyroid disease who [facility] to [facility] on 3/21/2022 with altered mental status. Per patient's family, he had increased confusion, poor p.o. intake, weakness and vomiting several months. At the emergency room laboratory work-up shows ammonia level of 176, potassium is 5.7, albumin 2.6, bilirubin 5.9, lactic acid 6.7. CT of the abdomen and pelvis showed collapsed ascending colon suggesting a wall thickening and pericolonic fat stranding, colonic diverticulosis cirrhotic liver with no mineral density lesions, splenomegaly small right pleural, scattered nodules in lung base, bilateral nonobstructing nephrolithiasis. On 3/20/2020, morning labs show lactic acid 4.6, hemoglobin 6.7, platelets 55, potassium 5.0, sodium 21 BUN 91, creatinine 1.6. Patient was noted to be hypotensive with systolic blood pressure in the low 90s. ASSESSMENT/PLAN:     Pertinent Hospital Diagnoses     · Hepatic encephalopathy  · Cirrhosis of the liver with ascites  · Hyperkalemia  · Acute on chronic anemia  · Lactic acidosis  · Acute kidney injury    Palliative Care Encounter / Counseling Regarding Goals of Care  Please see detailed goals of care discussion as below   At this time, Katia Bowles, Does Not have capacity for medical decision-making.   Capacity is time limited and situation/question specific   During encounter wife Adriana Hennessy was surrogate medical decision-make     Outcome of goals of care meeting:  o Continue with current medical treatment  o Wife having discussions with her son about CODE STATUS and goal of care  o Wife brought in [employer] documents to be copy and place in soft chart  o Requesting to have Dr. Wilkes , Oncology, recommendation about patient's prognosis and possible treatment. o Receptive to taking him home with hospice if he does not improve, or there is nothing else that can be done     Code status Full Code   o ,I reviewed with the Wife Adriana Hennessy that given multiple medical co-morbidities, advanced disease process, current severe acute illness and poor prognosis, in the event of cardiac arrest, the chances of surviving may likely be low. It was also reviewed that if they survived a cardiac arrest, a return to prior level of function also may be low. o    Advanced Directives: no POA or living will in epic   Surrogate/Legal NOK:  o Carie Kenneth (spouse/POA) [phone]  o Kendall Traore (child) [phone]    Spiritual assessment: no spiritual distress identified  Bereavement and grief: to be determined  Referrals to: none today    Thank you for the opportunity to participate in the care of Timmy Daniels. JYOTI Childs CNP  Palliative Medicine     SUBJECTIVE:     Details of Conversation:      Chart reviewed patient was seen at the bedside. Patient wife present in the room. Introduced palliative medicine. Patient was able to respond to voice, but unable to participate in a meaningful conversation. Patient's wife Adriana Hennessy, brought in [employer] documents that she is going to give to the bedside nurse and make copies for the soft chart. We discussed goal of care, she wishes for her  to be able to recover so he can be able to be transferred to [facility] to pursued chemotherapy treatment. She expressed understanding of his current condition he most likely will not be able to receive chemotherapy. has been made to ensure accuracy; however, inadvertent computerized transcription errors may be present.

## 2022-03-22 NOTE — PROGRESS NOTES
Internal Medicine Progress Note    [facility]    Camilo Monae, PRASANNAOJESUS Brian D.O., LIZ Mathis, MSN, APRN, NP-C  Kimberlyn Min. Suzy Gomez, MSN, APRN-CNP     Primary Care Physician: Sushma Acevedo DO   Admitting Physician:  Kanika Espinosa DO  Admission date and time: 3/21/2022  1:59 PM    Room:  [address] [place] [address]  Admitting diagnosis: Hepatic encephalopathy ([facility] [K72.90]  Hyperkalemia [E87.5]  Thrombocytopenia ([facility] [D69.6]  LISSETTE (acute kidney injury) ([facility] [N17.9]  Elevated lactic acid level [R79.89]  Hypotension, unspecified hypotension type [I95.9]  Cirrhosis of liver with ascites, unspecified hepatic cirrhosis type ([facility] [K74.60, R18.8]  Hodgkin lymphoma, unspecified Hodgkin lymphoma type, unspecified body region [facility]) [C81.90]  Acute on chronic anemia [D64.9]    Patient Name: Bear Bernabe  MRN: [id number]    Date of Service: 3/22/2022     Subjective: Alexander Cr is a 73 y.o. male who was seen and examined today,3/22/2022, at the bedside. Alexander Cr presented back to the hospital yesterday after the patient's wife withdrew him from the nursing home facility. He was not taking lactulose at home and presented to the hospital acutely altered with hepatic encephalopathy and worsening anemia. He is completely obtunded during the examination today. I again had a very jason conversation with the patient's wife suggesting that the patient return home under the care of hospice. She voiced understanding and agreement and we will be making these arrangements. Review of System:   Unable to be obtained in the patient's current condition    Physical Exam:  I/O this shift:  In: 382.5 [Blood:382.5]  Out: -     Intake/Output Summary (Last 24 hours) at 3/22/2022 1218  Last data filed at 3/22/2022 1207  Gross per 24 hour   Intake 387.5 ml   Output 200 ml   Net 187.5 ml   I/O last 3 completed shifts:   In: 5 [I.V.:5]  Out: 200 [Urine:200]  Patient Vitals for the past 96 hrs (Last 3 readings):   Weight   03/22/22 0600 166 lb 3.2 oz (75.4 kg)   03/21/22 1405 170 lb (77.1 kg)     Vital Signs:   Blood pressure (!) 101/56, pulse 99, temperature 97.3 °F (36.3 °C), temperature source Infrared, resp. rate 16, height 5' 8\" (1.727 m), weight 166 lb 3.2 oz (75.4 kg), SpO2 97 %. General appearance:  Completely obtunded. Mildly agitated. Head:  Normocephalic. No masses, lesions or tenderness. Eyes:  PERRLA. EOMI. Sclera clear. Buccal mucosa moist.  ENT:  Ears normal. Mucosa normal.  Neck:    Supple. Trachea midline. No thyromegaly. No JVD. No bruits. Heart:    Rhythm regular. Rate controlled. Systolic murmur. Lungs:    Symmetrical. Clear to auscultation bilaterally. No wheezes. No rhonchi. No rales. Abdomen:   Distended with underlying ascites  Extremities: Third spacing with peripheral edema. Neurologic:    Completely obtunded and encephalopathic. Psych:   Agitated. Musculoskeletal:   Spine ROM normal. Muscular strength intact. Gait not assessed.   Integumentary:  Jaundiced  Genitalia/Breast:  Deferred    Medication:  Scheduled Meds:   insulin lispro  0-6 Units SubCUTAneous TID WC    insulin lispro  0-3 Units SubCUTAneous Nightly    lactulose enema   Rectal BID    sodium zirconium cyclosilicate  5 g Oral Once    sodium chloride flush  5-40 mL IntraVENous 2 times per day    albumin human  25 g IntraVENous Q6H    miconazole   Topical BID    Arformoterol Tartrate  15 mcg Nebulization BID    And    budesonide  0.5 mg Nebulization BID     Continuous Infusions:   sodium chloride      dextrose      sodium chloride         Objective Data:  CBC with Differential:    Lab Results   Component Value Date    WBC 3.3 03/22/2022    RBC 2.26 03/22/2022    HGB 6.7 03/22/2022    HCT 22.4 03/22/2022    PLT 55 03/22/2022    MCV 99.1 03/22/2022    MCH 29.6 03/22/2022    MCHC 29.9 03/22/2022    RDW 22.2 03/22/2022    NRBC 1.0 03/06/2022 SEGSPCT 22 02/03/2014    METASPCT 0.9 03/17/2022    LYMPHOPCT 4.3 03/22/2022    MONOPCT 5.2 03/22/2022    MYELOPCT 1.7 03/16/2022    BASOPCT 0.0 03/22/2022    MONOSABS 0.16 03/22/2022    LYMPHSABS 0.13 03/22/2022    EOSABS 0.00 03/22/2022    BASOSABS 0.00 03/22/2022     CMP:    Lab Results   Component Value Date     03/22/2022    K 5.2 03/22/2022    K 5.7 03/21/2022    CL 97 03/22/2022    CO2 16 03/22/2022    BUN 91 03/22/2022    CREATININE 1.6 03/22/2022    GFRAA 52 03/22/2022    LABGLOM 43 03/22/2022    GLUCOSE 282 03/22/2022    GLUCOSE 318 04/30/2012    PROT 4.3 03/22/2022    LABALBU 3.0 03/22/2022    LABALBU 4.0 04/30/2012    CALCIUM 9.0 03/22/2022    BILITOT 5.6 03/22/2022    ALKPHOS 369 03/22/2022    AST 37 03/22/2022    ALT 32 03/22/2022       Assessment:  1. Hepatic encephalopathy in the setting of decompensated cirrhosis and large volume ascites partially due to medication noncompliance  2. COVID-19 infection without overt respiratory or GI symptomatology  3. Acute on chronic kidney disease stage III complicated by outpatient diuretic therapy and overall decreased oral intake  4. Previous history of AML with current diagnosis of classic Hodgkin's lymphoma  5. Acute on chronic anemia  6. Nondisplaced sacral fracture  7. Insulin-dependent diabetes mellitus type II  8. History of DVT  9. History of MI  10. Hypothyroidism-TSH 5.310  11. Hyperlipidemia  12. Hx of tobacco abuse       Plan:   The patient continues to deteriorate. His condition is terminal at this point and his wife has now come to accept this. She is requesting hospice measures and would like the patient to return home with hospice. Appropriate DME will be necessary. We are attempting lactulose enemas to help with the hyperammonemia in the setting of decompensated cirrhosis. His condition is terminal at this point and our goal is comfort measures. The patient will be transitioned to [facility]. Continue current therapy.   See orders for further plan of care. More than 50% of my  time was spent at the bedside counseling/coordinating care with the patient and/or family with face to face contact. This time was spent reviewing notes and laboratory data as well as instructing and counseling the patient. Time I spent with the family or surrogate(s) is included only if the patient was incapable of providing the necessary information or participating in medical decisions. I also discussed the differential diagnosis and all of the proposed management plans with the patient and individuals accompanying the patient. Mason Larios requires this high level of physician care and nursing on the IMC/Telemetry unit due the complexity of decision management and chance of rapid decline or death. Continued cardiac monitoring and higher level of nursing are required. I am readily available for any further decision-making and intervention.      Loretta Arshad DO, F.A.C.O.I.  3/22/2022  12:18 PM

## 2022-03-22 NOTE — PLAN OF CARE
Problem: Malnutrition  (NI-5.2)  Goal: Food and/or Nutrient Delivery  Description: Individualized approach for food/nutrient provision.   3/22/2022 1513 by Derian Llanes RD, LD  Outcome: Met This Shift  3/22/2022 1508 by Derian Llanes RD, LD  Outcome: Met This Shift

## 2022-03-22 NOTE — PROGRESS NOTES
SPEECH/LANGUAGE PATHOLOGY  CLINICAL ASSESSMENT OF SWALLOWING FUNCTION   and PLAN OF CARE    PATIENT NAME:  Nay Jackson  (male)     MRN:  [id number]    :  1950  (73 y.o.)  STATUS:  Inpatient: Room 17/0617-    TODAY'S DATE:  3/22/2022  REFERRING PROVIDER:    SLP eval and treat Start: 22 1230, End: 22 1230, ONE TIME, Standing Count: 1 Occurrences, R    Donn Bullocks, DO  REASON FOR REFERRAL: dysphagia    EVALUATING THERAPIST: RESHMA Fine                 RESULTS:    DYSPHAGIA DIAGNOSIS:   Clinical indicators of moderate-severe oropharyngeal phase dysphagia       DIET RECOMMENDATIONS:  NPO  When lethargic. Ok to have swabs for oral care and comfort due to poor initiation recommend swabs not be saturated with water only damp.       FEEDING RECOMMENDATIONS:     Assistance level:  Full assistance is needed during all oral intake      Compensatory strategies recommended: Not applicable      Discussed recommendations with nursing and/or faxed report to referring provider: No secondary to no diet/liquid change recommended     SPEECH THERAPY  PLAN OF CARE   The dysphagia POC is established based on physician order, dysphagia diagnosis and results of clinical assessment     Skilled SLP intervention for dysphagia management up to 5x per week until goals met, pt plateaus in function and/or discharged from hospital    Conditions Requiring Skilled Therapeutic Intervention for dysphagia:    Reduced oral control of bolus   Oral motor strength/coordination impairment    Specific dysphagia interventions to include:     ongoing skilled PO analysis to determine if PO diet can be initiated     Specific instructions for next treatment:  ongoing skilled PO analysis to determine if PO diet can be initiated   Patient Treatment Goals:    Short Term Goals:  Pt will participate in ongoing evaluation of swallow function to determine when PO diet can be safely initiated    Long Term Goals:   Pt will improve oropharyngeal swallow function to ensure airway protection during PO intake to maintain adequate nutrition/hydration and decrease signs/symptoms of aspiration to less than 1 x/day. Patient/family Goal:    Patient not able to accurately state due to impaired cognition and/or communication at time of eval     Plan of care discussed with Patient and Family   The Patient did not demonstrate complete understanding of the diagnosis, prognosis and plan of care and Family understand(s) the diagnosis, prognosis and plan of care     Rehabilitation Potential/Prognosis: poor                    ADMITTING DIAGNOSIS: Hepatic encephalopathy (HCC) [K72.90]  Hyperkalemia [E87.5]  Thrombocytopenia (HCC) [D69.6]  LISSETTE (acute kidney injury) ([address] [N17.9]  Elevated lactic acid level [R79.89]  Hypotension, unspecified hypotension type [I95.9]  Cirrhosis of liver with ascites, unspecified hepatic cirrhosis type ([address] [K74.60, R18.8]  Hodgkin lymphoma, unspecified Hodgkin lymphoma type, unspecified body region ([address] [C81.90]  Acute on chronic anemia [D64.9]    VISIT DIAGNOSIS:   Visit Diagnoses       Codes    Cirrhosis of liver with ascites, unspecified hepatic cirrhosis type ([address]     K74.60, R18.8    Hyperkalemia     E87.5    LISSETTE (acute kidney injury) ([address]     N17.9    Acute on chronic anemia     D64.9    Elevated lactic acid level     R79.89    Hypotension, unspecified hypotension type     I95.9    Hodgkin lymphoma, unspecified Hodgkin lymphoma type, unspecified body region ([facility]     C81.90           PATIENT REPORT/COMPLAINT: patient not able to accurately report  patient currently NPO pending results of this evaluation  RN cleared patient for participation in assessment     yes     PRIOR LEVEL OF SWALLOW FUNCTION:    PAST HISTORY OF DYSPHAGIA?: none reported    Home diet: Diet information not available     Current Diet Order:  ADULT DIET;  Regular    PROCEDURE:  Consistencies Administered During the Evaluation   Liquids: thin liquid   Solids: pureed foods      Method of Intake:   coated spoon  Fed by clinician      Position:   Seated, upright    CLINICAL ASSESSMENT:  Oral Stage:       Reduced oral acceptance from coated spoon  Impaired oral initiation  Delayed A-P transit due to: decreased ability for initiation   , reduced lingual strength  and cognitive function   Decreased bolus formation resulting in suspected premature pharyngeal spillage  Oral residuals were noted :  throughout the oral cavity  Multi-modal stimulation to initiate swallow onset      Pharyngeal Stage:    Delayed initiation of the pharyngeal swallow noted    Cognition:   Follows 1 - step directions appropriate for this assessment, Attention impaired, , Non-verbal during this assessment. and Needs frequent verbal cues to maintain adequate alertness    Oral Peripheral Examination   Generalized oral weakness    Current Respiratory Status    room air     Parameters of Speech Production  Respiration:  Adequate for speech production  Quality:   Could not test  Intensity: Could not test    Volitional Swallow: not able to elicit     Volitional Cough:   not able to elicit     Pain: No pain reported. EDUCATION:   The Speech Language Pathologist (SLP) completed education regarding results of evaluation and that intervention is warranted at this time. Learner: Patient and Family  Education: Reviewed results and recommendations of this evaluation  Evaluation of Education:  Mary Jane Robb understanding--daughter in law     This plan may be re-evaluated and revised as warranted. Evaluation Time includes thorough review of current medical information, gathering information on past medical history/social history and prior level of function, completion of standardized testing/informal observation of tasks, assessment of data and education on plan of care and goals. [x]The admitting diagnosis and active problem list, have been reviewed prior to initiation of this evaluation.         ACTIVE PROBLEM LIST:   Patient Active Problem List   Diagnosis    Bronchitis with bronchospasm    Anxiety    Post herpetic neuralgia    Type 2 diabetes mellitus without complication, with long-term current use of insulin (HCC)    Cellulitis of left lower extremity    Plantar wart    Inguinal hernia    Acute myeloid leukemia in remission ([address]    Pneumonia due to infectious organism    Cellulitis    Pharyngitis, acute    Thrombocytopenia (HCC)    GVHD (graft versus host disease) ([address]    Portal hypertension ([address]    Post-COVID chronic fatigue    Uncontrolled type 2 diabetes mellitus with hyperglycemia ([address]    Type 2 diabetes mellitus with hyperglycemia    Type 2 diabetes mellitus with chronic kidney disease    Decompensation of cirrhosis of liver ([address]    AMS (altered mental status)    Severe protein-calorie malnutrition ([address]    Hyponatremia    COVID-19    Hepatic encephalopathy ([address]         CPT code:  74717  bedside swallow sarita Valentin MSCCC/SLP  Speech Language Pathologist  [id number]

## 2022-03-22 NOTE — PROGRESS NOTES
Physical Therapy  Physical Therapy Initial Evaluation/Plan of Care    Room #:  9936/8966-35  Patient Name: Ranulfo Dumont  YOB: 1950  MRN: [id number]    Date of Service: 3/22/2022     Tentative placement recommendation: Subacute rehab  Equipment recommendation: Patient has needed equipment       Evaluating Physical Therapist: Mindy Wright, Student Physical Therapist      Specific Provider Orders/Date/Referring Provider : 03/21/22 2215   PT eval and treat Start: 03/21/22 2215, End: 03/21/22 2215, ONE TIME, Standing Count: 1 Occurrences, R    Guero Win DO      Admitting Diagnosis:   Hepatic encephalopathy ([facility] [K72.90]  Hyperkalemia [E87.5]  Thrombocytopenia ([facility] [D69.6]  LISSETTE (acute kidney injury) ([facility] [N17.9]  Elevated lactic acid level [R79.89]  Hypotension, unspecified hypotension type [I95.9]  Cirrhosis of liver with ascites, unspecified hepatic cirrhosis type ([facility] [K74.60, R18.8]  Hodgkin lymphoma, unspecified Hodgkin lymphoma type, unspecified body region [facility]) [C81.90]  Acute on chronic anemia [D64.9]    Admitted with [facility]  Surgery: none  Visit Diagnoses       Codes    Cirrhosis of liver with ascites, unspecified hepatic cirrhosis type ([facility]     K74.60, R18.8    Hyperkalemia     E87.5    LISSETTE (acute kidney injury) ([facility]     N17.9    Acute on chronic anemia     D64.9    Elevated lactic acid level     R79.89    Hypotension, unspecified hypotension type     I95.9    Hodgkin lymphoma, unspecified Hodgkin lymphoma type, unspecified body region [facility])     C81.90          Patient Active Problem List   Diagnosis    Bronchitis with bronchospasm    Anxiety    Post herpetic neuralgia    Type 2 diabetes mellitus without complication, with long-term current use of insulin ([facility]    Cellulitis of left lower extremity    Plantar wart    Inguinal hernia    Acute myeloid leukemia in remission ([facility]    Pneumonia due to infectious organism    Cellulitis    Pharyngitis, acute    Thrombocytopenia ([facility]    GVHD (graft versus host disease) ([facility]    Portal hypertension ([facility]    Post-COVID chronic fatigue    Uncontrolled type 2 diabetes mellitus with hyperglycemia (HCC)    Type 2 diabetes mellitus with hyperglycemia    Type 2 diabetes mellitus with chronic kidney disease    Decompensation of cirrhosis of liver ([facility]    AMS (altered mental status)    Severe protein-calorie malnutrition (HCC)    Hyponatremia    COVID-19    Hepatic encephalopathy (HCC)        ASSESSMENT of Current Deficits Patient exhibits decreased strength, balance and endurance impairing functional mobility, transfers, gait , gait distance, tolerance to activity and participation. Patient was very lethargic during treatment today, and was not able to verbalize his thoughts, however he was able to shake his head for yes or no questions. Patients wife was in room, and stated that they are discussing home with hospice or continuing rehab at discharge. Patient was at [facility] after last discharge prior to returning to the hospital. Patient requires significant tactile and verbal cueing for functional mobility. Weakness, deconditioning, and current medical complications are barriers to d/c and require skilled intervention during hospital stay to attain pre hospital level of function. Decreased strength, balance and endurance  increases patient's risk for fall.        PHYSICAL THERAPY  PLAN OF CARE       Physical therapy plan of care is established based on physician order,  patient diagnosis and clinical assessment    Current Treatment Recommendations:    -Bed Mobility: Lower extremity exercises  and Upper extremity exercises   -Sitting Balance: Incorporate reaching activities to activate trunk muscles  and Facilitate active trunk muscle engagement   -Standing Balance: Perform strengthening exercises in standing to promote motor control with or without upper extremity support  and Perform sit to stand activities maintaining FWB (full weight bearing) weightbearing Bilateral   -Transfers: Provide instruction on proper hand and foot position for adequate transfer of weight onto lower extremities and use of gait device if needed and Cues for hand placement, technique and safety. Provide stabilization to prevent fall   -Endurance: Utilize Supervised activities to increase level of endurance to allow for safe functional mobility including transfers and gait     PT long term treatment goals are located in below grid    Patient and or family understand(s) diagnosis, prognosis, and plan of care. Frequency of treatments: Patient will be seen  2-3 times/week. Prior Level of Function: Patient ambulated with wheeled walker per last PT chart  Rehab Potential: fair  for baseline    Past medical history:   Past Medical History:   Diagnosis Date    AML (acute myeloblastic leukemia) ([facility]     chemotherapy    AML (acute myeloblastic leukemia) ([facility] 2010    Cancer ([facility]     Chronic kidney disease     Diabetes mellitus ([facility]     GERD (gastroesophageal reflux disease)     Hx of blood clots 2012    legs    Hyperlipidemia     Liver disease     MI, old     Other disorders of kidney and ureter     Other sleep disturbances     Thyroid disease     Type II or unspecified type diabetes mellitus without mention of complication, not stated as uncontrolled      Past Surgical History:   Procedure Laterality Date    APPENDECTOMY      BONE MARROW TRANSPLANT      double core transplant    CATHETER INSERTION N/A 2/28/2022    MEDIPORT CATHETER INSERTION performed by Opal Ray MD at [facility] [address]      CT NEEDLE BIOPSY LIVER PERCUTANEOUS  1/17/2022    CT NEEDLE BIOPSY LIVER PERCUTANEOUS    ENDOSCOPY, COLON, DIAGNOSTIC      FOOT NEUROMA SURGERY      UPPER GASTROINTESTINAL ENDOSCOPY         SUBJECTIVE:    Precautions:  Up with assistance, falls, alarm, Droplet plus/COVID-19 and confusion,simmons  Social history: Patient lives with spouse in a ranch home  with 3 steps  to enter with Rail  Tub shower built in shower chair  Per last PT note. Was at [facility] prior to admission  Equipment owned: Bertha Fuentes and [address],  Per last PT chart    [address]   How much difficulty turning over in bed?: A Lot  How much difficulty sitting down on / standing up from a chair with arms?: Unable  How much difficulty moving from lying on back to sitting on side of bed?: A Lot  How much help from another person moving to and from a bed to a chair?: Total  How much help from another person needed to walk in hospital room?: Total  How much help from another person for climbing 3-5 steps with a railing?: Total  AM-PAC Inpatient Mobility Raw Score : 8  AM-PAC Inpatient T-Scale Score : 28.52  Mobility Inpatient CMS 0-100% Score: 86.62  Mobility Inpatient CMS G-Code Modifier : CM    Nursing cleared patient for PT evaluation. The admitting diagnosis and active problem list as listed above have been reviewed prior to the initiation of this evaluation. OBJECTIVE;   Initial Evaluation  Date: 3/22/2022 Treatment Date:     Short Term/ Long Term   Goals   Was pt agreeable to Eval/treatment?  Yes  To be met in 4 days   Pain level   None stated     Bed Mobility    Rolling: Maximal assist of 1    Supine to sit: Maximal assist of 1    Sit to supine: Maximal assist of 1    Scooting: Maximal assist of 1    Rolling: Minimal assist of 1    Supine to sit: Minimal assist of 1    Sit to supine: Minimal assist of 1    Scooting: Minimal assist of 1     Transfers Sit to stand: Not assessed    Sit to stand: Minimal assist of 1    Ambulation    not assessed    10 feet using  wheeled walker with Moderate assist of 1    Stair negotiation: ascended and descended   Not assessed     Not assessed    ROM Within functional limits    Increase range of motion 10% of affected joints    Strength BUE:  refer to OT eval  RLE:  3/5  LLE:  3/5 Increase strength in affected mm groups by 1/3 grade   Balance Sitting EOB:  poor posterior lean  Dynamic Standing:  not assessed   Sitting EOB:  fair   Dynamic Standing: fair      Patient is Alert & Oriented x person and does not follow directions    Sensation:  Patient  denies numbness/tingling   Edema:  yes bilateral lower extremities   Endurance: poor    Vitals: room air   Blood Pressure at rest  Blood Pressure during session    Heart Rate at rest 98 Heart Rate during session    SPO2 at rest 97%  SPO2 during session 91-97%     Patient education  Patient educated on role of Physical Therapy, risks of immobility, safety and plan of care, energy conservation,  importance of mobility while in hospital , ankle pumps, quad set and glut set for edema control, blood clot prevention and safety      Patient response to education:   Pt verbalized understanding Pt demonstrated skill Pt requires further education in this area   No Partial Yes      Treatment:  Patient practiced and was instructed/facilitated in the following treatment: Patient  Sat edge of bed 2 minutes with Maximal assist of 1 to increase dynamic sitting balance and activity tolerance. Patient rolled with cues for arm and leg positioning. Patient performed AAROM exercises in bed     Therapeutic Exercises:  ankle pumps, hip abduction/adduction and straight leg raise  x 10 reps. At end of session, patient in bed with spouse present call light and phone within reach,  all lines and tubes intact, nursing notified. Patient would benefit from continued skilled Physical Therapy to improve functional independence and quality of life.          Patient's/ family goals   none stated    Time in  1040  Time out  1055    Total Treatment Time  0 minutes    Evaluation time includes thorough review of current medical information, gathering information on past medical history/social history and prior level of function, completion of standardized testing/informal observation of tasks, assessment of data, and development of Plan of care and goals.      CPT codes:  Low Complexity PT evaluation (01266)  No treatment billed     Will Range, Student Physical Therapist

## 2022-03-22 NOTE — PROGRESS NOTES
[address] [place] [address]     Liaison Information Visit Note              Patient Name: Johan Shock   Admit date:  3/21/2022   Hospital Admitting Physician:  Chencho Rodney DO   PCP:  Lillie Zambrano DO  Primary Insurance: Payor: Kayleen Alejandrina /  /  /    Terminal Diagnosis IV hodgkins lymphoma with mets to the bone as confired by Dr. Grayson Cat Problem List:   Patient Active Problem List   Diagnosis Code    Bronchitis with bronchospasm J20.9    Anxiety F41.9    Post herpetic neuralgia B02.29    Type 2 diabetes mellitus without complication, with long-term current use of insulin (HCC) E11.9, Z79.4    Cellulitis of left lower extremity L03. 116    Plantar wart B07.0    Inguinal hernia K40.90    Acute myeloid leukemia in remission (HCC) C92.01    Pneumonia due to infectious organism J18.9    Cellulitis L03.90    Pharyngitis, acute J02.9    Thrombocytopenia (HCC) D69.6    GVHD (graft versus host disease) ([facility] D89.813    Portal hypertension (HCC) K76.6    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, U09.9    Uncontrolled type 2 diabetes mellitus with hyperglycemia (HCC) E11.65    Type 2 diabetes mellitus with hyperglycemia E11.65    Type 2 diabetes mellitus with chronic kidney disease E11.22    Decompensation of cirrhosis of liver (HCC) K72.90, K74.60    AMS (altered mental status) R41.82    Severe protein-calorie malnutrition (HCC) E43    Hyponatremia E87.1    COVID-19 U07.1    Hepatic encephalopathy (HCC) K72.90     Code Status Order: DNR-CC   Allergies:  Pneumococcal vaccines, Ciprofloxacin, Diatrizoate, Dye [iodides], Epinephrine, Iodine, Metformin and related, Penicillin g, Prednisone, Morphine, Other, and Oxycodone    The hospice benefit and philosophy were explained including that hospice is end of life care in which, per Medicare, a patient has a terminal diagnosis that life expectancy would be 6 months or less. Hospice care is a service that is covered by most insurance plans. The following levels of hospice care were discussed: routine level of hospice care at [facility] or [facility]- patient/family are responsible for any room and board fees at the facility, and general in patient level of care (GIP) at the [facility] for short term symptom management. Per Medicare guidelines, a patient is considered appropriate for GIP if they have uncontrolled symptoms such as pain, agitation, labored breathing or nausea/vomiting that are not being managed with current medication regimen. Once symptoms become managed, the patient would need to be moved to a lower level of care such as home with hospice, or ECF with hospice. Family informed that with the routine level of care at home or ECF,  the hospice team consists of the RN, a , and personal care team who make intermittent visits along with optional services of non-medical volunteers and Chaplains. Explained that at home in routine level of care, familles are responsible for the 24 hour care. Discussed that under hospice care patient would not receive chemotherapy, radiation, immune therapy, IV antibiotics, dialysis or blood transfusions. Explained that once in hospice care, all aggressive treatments would be stopped and allow nature to takes its course with focus on comfort care for the patient. Met with pts son and dil,  Family is understanding of hospice services and per the son the pt will be discharged home. Son is going to review hospice information tonight with his mother. Discussed DME needs, per son ok to setup for delivery tomorrow between 10am-2pm.  Advised him to call [facility] if his mother does not want DME and he verbalized that he will. Will place a call tomorrow to patients wife tomorrow to review hospice and setup discharge.       Please write for comfort scripts    Morphine Sulfate Concentrate 20mg/ml 5mg q4h PRN for pain or air hunger  Ativan 0.5 mg tabs q4h PRN for restlessness or agitation  Robinul 2mg

## 2022-03-22 NOTE — CARE COORDINATION
SOCIAL WORK / DISCHARGE PLANNING:  COVID neg 3/21. Pt readmitted from home, per Jg yancey pt was discharged from [address] due to wife refused to wear PPE. Discussed case with Dr Aleksey Rodríguez, pt will return home next date with Hospice services. Dr Aleksey Rodríguez choiced wife for [address]. They had spoken to wife last week and initially she was to take pt home with [facility]  but then changed her mind to take him to [place]. She becomes overwhelmed with many ideas and thoughts. She often has the same requests such as talking to the physicians regarding his prognosis and future treatments despite having already done that more than once. She has POA papers but they are not for Healthcare only related to financial but is Legal next of Kin in [facility]. Referral called to [facility] intake, await liaison to meet with wife, will need dme arrangements and probable plan dc Wednesday.                    Electronically signed by WILMAR Craig on 3/22/2022 at 1:06 PM

## 2022-03-22 NOTE — PROGRESS NOTES
Comprehensive Nutrition Assessment    Type and Reason for Visit:  Initial,Positive Nutrition Screen    Nutrition Recommendations/Plan:  Continue to provide comfort care measures. Dietitian available per consult. Nutrition Assessment:  Pt admits w/ recent discharge  AMS w/ hepatic encephalopathy 2/2 Cirrhosis/ascites w/ recent liver mass bx w/ hodgkin lymphoma w/ bone mets. Hx AML, DM. Pt w/ severe malnutrition and increased nutrient needs. Hospice is now consulted. Will start ONS and monitor    Malnutrition Assessment:  Malnutrition Status:  Severe malnutrition    Context:  Chronic Illness     Findings of the 6 clinical characteristics of malnutrition:  Energy Intake:  7 - 75% or less estimated energy requirements for 1 month or longer  Weight Loss:  7 - Greater than 7.5% over 3 months     Body Fat Loss:  7 - Severe body fat loss Orbital,Triceps   Muscle Mass Loss:  7 - Severe muscle mass loss Temples (temporalis),Clavicles (pectoralis & deltoids)  Fluid Accumulation:  No significant fluid accumulation     Strength:  Not Performed    Estimated Daily Nutrient Needs:  Energy (kcal):  ; Weight Used for Energy Requirements:  Current     Protein (g):   (x1.3-1.5gm/kg); Weight Used for Protein Requirements:  Ideal        Fluid (ml/day):  ; Method Used for Fluid Requirements:  1 ml/kcal      Nutrition Related Findings:  Disoriented x4, abd gross ascites, +BS, I/O WNL, BLE +2 edema, BUE +1 edema,Labs reviewed: Na+(L), K+(H), renal labs elevated, BGL elevated, Phosphorus(H)      Wounds:  None       Current Nutrition Therapies:    ADULT DIET;  Regular    Anthropometric Measures:  · Height: 5' 8\" (172.7 cm)  · Current Body Weight: 166 lb (75.3 kg) (3/22 bed scale)     · Usual Body Weight: 174 lb (78.9 kg) (3/16/22 bed scale: x1mo 2/24/22 165#, x3mo 12/28/21 199#)     · Ideal Body Weight: 154 lbs; % Ideal Body Weight 107.8 %   · BMI: 25.2  · Adjusted Body Weight:  ; No Adjustment   · BMI

## 2022-03-22 NOTE — ACP (ADVANCE CARE PLANNING)
Advance Care Planning     Advance Care Planning Activator (Inpatient)  Conversation Note      Date of ACP Conversation: 3/22/2022     Conversation Conducted with: Patient with Decision Making Capacity   Healthcare Decision Maker: Next of Kin by law (only applies in absence of above) (name) wife Alex Crawford     ACP Activator: WILMAR Galindo    {Health Care Decision Maker:     Current Designated Health Care Decision Maker:     Primary Decision Maker: Melanie San - Spouse - [phone]    Secondary Decision Maker: Yudy Nobles - Child - [phone]  Click here to complete [address] including section of the Healthcare Decision Maker Relationship (ie \"Primary\")  Today we documented Decision Maker(s) consistent with Legal Next of Kin hierarchy. Care Preferences    Ventilation: \"If you were in your present state of health and suddenly became very ill and were unable to breathe on your own, what would your preference be about the use of a ventilator (breathing machine) if it were available to you? \"      Would the patient desire the use of ventilator (breathing machine)?: no    \"If your health worsens and it becomes clear that your chance of recovery is unlikely, what would your preference be about the use of a ventilator (breathing machine) if it were available to you? \"     Would the patient desire the use of ventilator (breathing machine)?: No      Resuscitation  \"CPR works best to restart the heart when there is a sudden event, like a heart attack, in someone who is otherwise healthy. Unfortunately, CPR does not typically restart the heart for people who have serious health conditions or who are very sick. \"    \"In the event your heart stopped as a result of an underlying serious health condition, would you want attempts to be made to restart your heart (answer \"yes\" for attempt to resuscitate) or would you prefer a natural death (answer \"no\" for do not attempt to resuscitate)? \" no       [] Yes   [] No   Educated Patient / Gerianne Dawley regarding differences between Advance Directives and portable DNR orders.     Length of ACP Conversation in minutes:      Conversation Outcomes:  [x] ACP discussion completed  [] Existing advance directive reviewed with patient; no changes to patient's previously recorded wishes  [] New Advance Directive completed  [] Portable Do Not Rescitate prepared for Provider review and signature  [] POLST/POST/MOLST/MOST prepared for Provider review and signature      Follow-up plan:    [] Schedule follow-up conversation to continue planning  [] Referred individual to Provider for additional questions/concerns   [] Advised patient/agent/surrogate to review completed ACP document and update if needed with changes in condition, patient preferences or care setting    [] This note routed to one or more involved healthcare providers

## 2022-03-23 VITALS
BODY MASS INDEX: 26.41 KG/M2 | WEIGHT: 174.25 LBS | DIASTOLIC BLOOD PRESSURE: 67 MMHG | HEART RATE: 108 BPM | HEIGHT: 68 IN | SYSTOLIC BLOOD PRESSURE: 95 MMHG | TEMPERATURE: 97.2 F | OXYGEN SATURATION: 96 % | RESPIRATION RATE: 17 BRPM

## 2022-03-23 LAB
ALBUMIN SERPL-MCNC: 3.3 G/DL (ref 3.5–5.2)
ALP BLD-CCNC: 331 U/L (ref 40–129)
ALT SERPL-CCNC: 29 U/L (ref 0–40)
AMMONIA: 40 UMOL/L (ref 16–60)
ANION GAP SERPL CALCULATED.3IONS-SCNC: 19 MMOL/L (ref 7–16)
ANISOCYTOSIS: ABNORMAL
AST SERPL-CCNC: 43 U/L (ref 0–39)
BASOPHILS ABSOLUTE: 0 E9/L (ref 0–0.2)
BASOPHILS RELATIVE PERCENT: 0 % (ref 0–2)
BILIRUB SERPL-MCNC: 6.3 MG/DL (ref 0–1.2)
BUN BLDV-MCNC: 97 MG/DL (ref 6–23)
BURR CELLS: ABNORMAL
CALCIUM SERPL-MCNC: 9 MG/DL (ref 8.6–10.2)
CHLORIDE BLD-SCNC: 101 MMOL/L (ref 98–107)
CO2: 17 MMOL/L (ref 22–29)
CREAT SERPL-MCNC: 1.5 MG/DL (ref 0.7–1.2)
EOSINOPHILS ABSOLUTE: 0 E9/L (ref 0.05–0.5)
EOSINOPHILS RELATIVE PERCENT: 0 % (ref 0–6)
GFR AFRICAN AMERICAN: 56
GFR NON-AFRICAN AMERICAN: 46 ML/MIN/1.73
GLUCOSE BLD-MCNC: 225 MG/DL (ref 74–99)
HCT VFR BLD CALC: 25.9 % (ref 37–54)
HEMOGLOBIN: 7.9 G/DL (ref 12.5–16.5)
LACTIC ACID: 3.1 MMOL/L (ref 0.5–2.2)
LACTIC ACID: 3.3 MMOL/L (ref 0.5–2.2)
LYMPHOCYTES ABSOLUTE: 0.08 E9/L (ref 1.5–4)
LYMPHOCYTES RELATIVE PERCENT: 1.7 % (ref 20–42)
MCH RBC QN AUTO: 28.8 PG (ref 26–35)
MCHC RBC AUTO-ENTMCNC: 30.5 % (ref 32–34.5)
MCV RBC AUTO: 94.5 FL (ref 80–99.9)
METER GLUCOSE: 226 MG/DL (ref 74–99)
MONOCYTES ABSOLUTE: 0.16 E9/L (ref 0.1–0.95)
MONOCYTES RELATIVE PERCENT: 3.5 % (ref 2–12)
NEUTROPHILS ABSOLUTE: 3.9 E9/L (ref 1.8–7.3)
NEUTROPHILS RELATIVE PERCENT: 94.8 % (ref 43–80)
PDW BLD-RTO: 21.7 FL (ref 11.5–15)
PLATELET # BLD: 46 E9/L (ref 130–450)
PLATELET CONFIRMATION: NORMAL
PMV BLD AUTO: 12 FL (ref 7–12)
POIKILOCYTES: ABNORMAL
POLYCHROMASIA: ABNORMAL
POTASSIUM SERPL-SCNC: 4.3 MMOL/L (ref 3.5–5)
RBC # BLD: 2.74 E12/L (ref 3.8–5.8)
SODIUM BLD-SCNC: 137 MMOL/L (ref 132–146)
TOTAL PROTEIN: 4.4 G/DL (ref 6.4–8.3)
WBC # BLD: 4.1 E9/L (ref 4.5–11.5)

## 2022-03-23 PROCEDURE — 36415 COLL VENOUS BLD VENIPUNCTURE: CPT

## 2022-03-23 PROCEDURE — 83605 ASSAY OF LACTIC ACID: CPT

## 2022-03-23 PROCEDURE — 82140 ASSAY OF AMMONIA: CPT

## 2022-03-23 PROCEDURE — 82962 GLUCOSE BLOOD TEST: CPT

## 2022-03-23 PROCEDURE — 80053 COMPREHEN METABOLIC PANEL: CPT

## 2022-03-23 PROCEDURE — 6360000002 HC RX W HCPCS: Performed by: INTERNAL MEDICINE

## 2022-03-23 PROCEDURE — 94640 AIRWAY INHALATION TREATMENT: CPT

## 2022-03-23 PROCEDURE — 6370000000 HC RX 637 (ALT 250 FOR IP): Performed by: INTERNAL MEDICINE

## 2022-03-23 PROCEDURE — 2580000003 HC RX 258: Performed by: INTERNAL MEDICINE

## 2022-03-23 PROCEDURE — 85025 COMPLETE CBC W/AUTO DIFF WBC: CPT

## 2022-03-23 RX ORDER — ONDANSETRON 4 MG/1
4 TABLET, ORALLY DISINTEGRATING ORAL 3 TIMES DAILY PRN
Qty: 90 TABLET | Refills: 0 | Status: SHIPPED | OUTPATIENT
Start: 2022-03-23

## 2022-03-23 RX ORDER — LORAZEPAM 0.5 MG/1
0.5 TABLET ORAL EVERY 4 HOURS PRN
Qty: 30 TABLET | Refills: 0 | Status: SHIPPED | OUTPATIENT
Start: 2022-03-23 | End: 2022-03-24 | Stop reason: SDUPTHER

## 2022-03-23 RX ORDER — MORPHINE SULFATE 20 MG/5ML
5 SOLUTION ORAL EVERY 4 HOURS PRN
Qty: 37.5 ML | Refills: 0 | Status: SHIPPED | OUTPATIENT
Start: 2022-03-23 | End: 2022-03-25 | Stop reason: CLARIF

## 2022-03-23 RX ORDER — GLYCOPYRROLATE 1 MG/1
2 TABLET ORAL EVERY 6 HOURS PRN
Qty: 40 TABLET | Refills: 0 | Status: SHIPPED | OUTPATIENT
Start: 2022-03-23 | End: 2022-03-28

## 2022-03-23 RX ADMIN — Medication 10 ML: at 09:07

## 2022-03-23 RX ADMIN — BUDESONIDE 500 MCG: 0.5 INHALANT RESPIRATORY (INHALATION) at 06:40

## 2022-03-23 RX ADMIN — ANTI-FUNGAL POWDER MICONAZOLE NITRATE TALC FREE: 1.42 POWDER TOPICAL at 09:07

## 2022-03-23 RX ADMIN — ARFORMOTEROL TARTRATE 15 MCG: 15 SOLUTION RESPIRATORY (INHALATION) at 06:40

## 2022-03-23 RX ADMIN — INSULIN LISPRO 2 UNITS: 100 INJECTION, SOLUTION INTRAVENOUS; SUBCUTANEOUS at 09:04

## 2022-03-23 ASSESSMENT — PAIN SCALES - GENERAL
PAINLEVEL_OUTOF10: 0
PAINLEVEL_OUTOF10: 0

## 2022-03-23 NOTE — DISCHARGE INSTR - COC
Continuity of Care Form    Patient Name: Dylon Escobar   :  1950  MRN:  [id number]    [address] [place] [address] date:  3/21/2022  Discharge date:  ***    Code Status Order: DNR-CC   Advance Directives:      Admitting Physician:  Stacie Lee DO  PCP: Maicol Eugene DO    Discharging Nurse: [facility] Unit/Room#: 6823/7306-00  Discharging Unit Phone Number: ***    Emergency Contact:   Extended Emergency Contact Information  Primary Emergency Contact: Melanie San  Address: [address] [facility], [address] [place] [address] [address] Phone: [phone]  Mobile Phone: [phone]  Relation: Spouse  Secondary Emergency Contact: Kai San  Address: [address] [place] [address] Pat Thomas [address] [place] [address] Phone: [phone]  Mobile Phone: [phone]  Relation: Child    Past Surgical History:  Past Surgical History:   Procedure Laterality Date    APPENDECTOMY      BONE MARROW TRANSPLANT      double core transplant    CATHETER INSERTION N/A 2022    MEDIPORT CATHETER INSERTION performed by Jacklyn Rocha MD at [address] [place] [address],[address]      CT NEEDLE BIOPSY LIVER PERCUTANEOUS  2022    CT NEEDLE BIOPSY LIVER PERCUTANEOUS    ENDOSCOPY, COLON, DIAGNOSTIC      FOOT NEUROMA SURGERY      UPPER GASTROINTESTINAL ENDOSCOPY         Immunization History:   Immunization History   Administered Date(s) Administered    DTaP 2013, 2013, 2013    DTaP vaccine 2013, 2013, 2013    Hepatitis B (Recombivax HB) 2013, 2013, 2013    Hepatitis B Ped/Adol (Engerix-B, Recombivax HB) 2013, 2013, 2013    Hib vaccine 2013, 2013, 2013    Hib, unspecified 2013, 2013, 2013    Influenza, High Dose (Fluzone 65 yrs and older) 2017    Influenza, Quadv, IM, (6 mo and older Fluzone, Flulaval, Fluarix and 3 yrs and older Afluria) 2014    Influenza, Triv, inactivated, subunit, adjuvanted, IM (Fluad 65 yrs and older) 08/21/2019    MMR 09/18/2014, 06/02/2016    Pneumococcal Conjugate 13-valent (Cfxwzqa90) 06/18/2017    Pneumococcal Polysaccharide (Mqhqwpwlm26) 09/26/2020    Polio IPV (IPOL) 01/03/2013, 04/25/2013, 08/22/2013    Unknown Immunization 09/26/2020       Active Problems:  Patient Active Problem List   Diagnosis Code    Bronchitis with bronchospasm J20.9    Anxiety F41.9    Post herpetic neuralgia B02.29    Type 2 diabetes mellitus without complication, with long-term current use of insulin (HCC) E11.9, Z79.4    Cellulitis of left lower extremity L03.116    Plantar wart B07.0    Inguinal hernia K40.90    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    Pneumonia due to infectious organism J18.9    Cellulitis L03.90    Pharyngitis, acute J02.9    Thrombocytopenia (formerly [facility]) D69.6    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    Portal hypertension ([facility] K76.6    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, U09.9    Uncontrolled type 2 diabetes mellitus with hyperglycemia (HCC) E11.65    Type 2 diabetes mellitus with hyperglycemia E11.65    Type 2 diabetes mellitus with chronic kidney disease E11.22    Decompensation of cirrhosis of liver (HCC) K72.90, K74.60    AMS (altered mental status) R41.82    Severe protein-calorie malnutrition ([facility] E43    Hyponatremia E87.1    COVID-19 U07.1    Hepatic encephalopathy (HCC) K72.90       Isolation/Infection:   Isolation            Droplet Plus          Patient Infection Status       Infection Onset Added Last Indicated Last Indicated By Review Planned Expiration Resolved Resolved By    COVID-19 03/13/22 03/13/22 03/14/22 Respiratory Panel, Molecular, with COVID-19 (Restricted: peds pts or suitable admitted adults) 03/21/22 03/28/22      Resolved    COVID-19 (Rule Out) 03/13/22 03/13/22 03/13/22 COVID-19, Rapid (Ordered)   03/13/22 Rule-Out Test Resulted    COVID-19 09/28/21 09/30/21 09/28/21 Covid-19 Ambulatory   10/12/21     COVID-19 (Rule Out) 21 COVID-19 Ambulatory (Ordered)   21 Rule-Out Test Resulted            Nurse Assessment:  Last Vital Signs: BP 95/67   Pulse 108   Temp 97.2 °F (36.2 °C) (Infrared)   Resp 17   Ht 5' 8\" (1.727 m)   Wt 174 lb 4 oz (79 kg)   SpO2 96%   BMI 26.49 kg/m²     Last documented pain score (0-10 scale): Pain Level: 0  Last Weight:   Wt Readings from Last 1 Encounters:   22 174 lb 4 oz (79 kg)     Mental Status:  {IP PT MENTAL STATUS:}    IV Access:  { CHANDNI IV ACCESS:873368187}    Nursing Mobility/ADLs:  Walking   {CHP DME IUFB:336520914}  Transfer  {CHP DME UOZK:768028854}  Bathing  {CHP DME KYLY:091274714}  Dressing  {CHP DME ODTE:586527444}  Toileting  {CHP DME LEYW:651688013}  Feeding  {CHP DME WJHV:675150351}  Med Admin  {P DME UKWA:878496378}  Med Delivery   { CHANDNI MED Delivery:311457252}    Wound Care Documentation and Therapy:        Elimination:  Continence: Bowel: {YES / WO:82222}  Bladder: {YES / OV:35431}  Urinary Catheter: {Urinary Catheter:191562089}   Colostomy/Ileostomy/Ileal Conduit: {YES / WT:19507}       Date of Last BM: ***    Intake/Output Summary (Last 24 hours) at 3/23/2022 1332  Last data filed at 3/23/2022 0911  Gross per 24 hour   Intake 389.31 ml   Output 950 ml   Net -560.69 ml     I/O last 3 completed shifts: In: 776.8 [I.V.:117.7;  Blood:382.5; IV Piggyback:276.6]  Out: 1150 [Urine:1150]    Safety Concerns:     [address] - Biomolecular Determination CHANDNI Safety Concerns:020761468}    Impairments/Disabilities:      [address] Impairments/Disabilities:908382660}    Nutrition Therapy:  Current Nutrition Therapy:   [address] Diet List:065665403}    Routes of Feeding: {CHP DME Other Feedings:523348730}  Liquids: {Slp liquid thickness:80691}  Daily Fluid Restriction: {CHP DME Yes amt example:822790412}  Last Modified Barium Swallow with Video (Video Swallowing Test): {Done Not Done ZVTM:584167345}    Treatments at the Time of Hospital Discharge:   Respiratory Treatments: ***  Oxygen Therapy:  {Therapy; copd oxygen:24854}  Ventilator:    { CC Vent BQZE:032460968}    Rehab Therapies: {THERAPEUTIC INTERVENTION:3170943694}  Weight Bearing Status/Restrictions: 50Steve GUZMAN Weight Bearin}  Other Medical Equipment (for information only, NOT a DME order):  {EQUIPMENT:894667730}  Other Treatments: ***    Patient's personal belongings (please select all that are sent with patient):  {P DME Belongings:002968229}    RN SIGNATURE:  {Esignature:365131192}    CASE MANAGEMENT/SOCIAL WORK SECTION    Inpatient Status Date: ***    Readmission Risk Assessment Score:  Readmission Risk              Risk of Unplanned Readmission:  36           Discharging to Facility/ Agency   Name:   Address:  Phone:  Fax:    Dialysis Facility (if applicable)   Name:  Address:  Dialysis Schedule:  Phone:  Fax:    / signature: {Esignature:550432546}    PHYSICIAN SECTION    Prognosis: {Prognosis:8666406202}    Condition at Discharge: 50Steve Sotelo Patient Condition:433438229}    Rehab Potential (if transferring to Rehab): {Prognosis:8787958260}    Recommended Labs or Other Treatments After Discharge: ***    Physician Certification: I certify the above information and transfer of Sole Butler  is necessary for the continuing treatment of the diagnosis listed and that he requires {Admit to Appropriate Level of Care:84914} for {GREATER/LESS:627668734} 30 days.      Update Admission H&P: {CHP DME Changes in UIEUX:516989499}    PHYSICIAN SIGNATURE:  Electronically signed by Alpa Reddy DO on 3/23/22 at 1:32 PM EDT

## 2022-03-23 NOTE — PROGRESS NOTES
CLINICAL PHARMACY NOTE: MEDS TO BEDS    Total # of Prescriptions Filled: 3   The following medications were delivered to the patient:  · MORPHINE 20MG/ML  · ROBINOL 1 MG   · ATIVAN 0.5 MG     Additional Documentation:

## 2022-03-23 NOTE — PLAN OF CARE
Problem: Falls - Risk of:  Goal: Will remain free from falls  Description: Will remain free from falls  3/23/2022 1120 by Lionel Ibarra RN  Outcome: Met This Shift  3/23/2022 0354 by Trinity Reis RN  Outcome: Met This Shift  Goal: Absence of physical injury  Description: Absence of physical injury  3/23/2022 1120 by Lionel Ibarra RN  Outcome: Met This Shift  3/23/2022 0354 by Triniyt Reis RN  Outcome: Met This Shift     Problem: Airway Clearance - Ineffective  Goal: Achieve or maintain patent airway  3/23/2022 1120 by Lionel Ibarra RN  Outcome: Met This Shift  3/23/2022 0354 by Trinity Reis RN  Outcome: Met This Shift     Problem: Gas Exchange - Impaired  Goal: Absence of hypoxia  3/23/2022 1120 by Lionel Ibarra RN  Outcome: Met This Shift  3/23/2022 0354 by Trinity Reis RN  Outcome: Met This Shift  Goal: Promote optimal lung function  3/23/2022 1120 by Lionel Ibarra RN  Outcome: Met This Shift  3/23/2022 0354 by Trinity Reis RN  Outcome: Met This Shift     Problem: Breathing Pattern - Ineffective  Goal: Ability to achieve and maintain a regular respiratory rate  3/23/2022 1120 by Lionel Ibarra RN  Outcome: Met This Shift  3/23/2022 0354 by Trinity Reis RN  Outcome: Met This Shift     Problem:  Body Temperature -  Risk of, Imbalanced  Goal: Ability to maintain a body temperature within defined limits  3/23/2022 1120 by Lionel Ibarra RN  Outcome: Met This Shift  3/23/2022 0354 by Trinity Reis RN  Outcome: Met This Shift  Goal: Will regain or maintain usual level of consciousness  3/23/2022 1120 by Lionel Ibarra RN  Outcome: Met This Shift  3/23/2022 0354 by Trinity Reis RN  Outcome: Met This Shift  Goal: Complications related to the disease process, condition or treatment will be avoided or minimized  3/23/2022 1120 by Lionel Ibarra RN  Outcome: Met This Shift  3/23/2022 0354 by Trinity Reis RN  Outcome: Met This Shift     Problem: Isolation Precautions - Risk of Spread of Infection  Goal: Prevent transmission of infection  3/23/2022 1120 by Frank Tavares RN  Outcome: Met This Shift  3/23/2022 0354 by Quentin Clark RN  Outcome: Met This Shift     Problem: Nutrition Deficits  Goal: Optimize nutritional status  3/23/2022 1120 by Frank Tavares RN  Outcome: Met This Shift  3/23/2022 0354 by Quentin Clark RN  Outcome: Met This Shift     Problem: Risk for Fluid Volume Deficit  Goal: Maintain normal heart rhythm  3/23/2022 1120 by Frank Tavares RN  Outcome: Met This Shift  3/23/2022 0354 by Quentin Clark RN  Outcome: Met This Shift  Goal: Maintain absence of muscle cramping  3/23/2022 1120 by Frank Tavares RN  Outcome: Met This Shift  3/23/2022 0354 by Quentin Clark RN  Outcome: Met This Shift  Goal: Maintain normal serum potassium, sodium, calcium, phosphorus, and pH  3/23/2022 1120 by Frank Tavares RN  Outcome: Met This Shift  3/23/2022 0354 by Quentin Clark RN  Outcome: Met This Shift     Problem: Loneliness or Risk for Loneliness  Goal: Demonstrate positive use of time alone when socialization is not possible  3/23/2022 1120 by Frank Tavares RN  Outcome: Met This Shift  3/23/2022 0354 by Quentin Clark RN  Outcome: Met This Shift     Problem: Fatigue  Goal: Verbalize increase energy and improved vitality  3/23/2022 1120 by Frank Tavares RN  Outcome: Met This Shift  3/23/2022 0354 by Quentin Clark RN  Outcome: Met This Shift     Problem: Patient Education: Go to Patient Education Activity  Goal: Patient/Family Education  3/23/2022 1120 by Frank Tavares RN  Outcome: Met This Shift  3/23/2022 0354 by Quentin Clark RN  Outcome: Met This Shift     Problem: Skin Integrity:  Goal: Will show no infection signs and symptoms  Description: Will show no infection signs and symptoms  3/23/2022 1120 by Frank Tavares RN  Outcome: Met This Shift  3/23/2022 0354 by Quentin Clark RN  Outcome: Met This Shift  Goal: Absence of new skin breakdown  Description: Absence of new skin breakdown  3/23/2022 1120 by Frank Tavares, RN  Outcome: Met This Shift  3/23/2022 0354 by Jamar Levine RN  Outcome: Met This Shift

## 2022-03-23 NOTE — PLAN OF CARE
Problem: Falls - Risk of:  Goal: Will remain free from falls  Description: Will remain free from falls  3/23/2022 0354 by Janalyn Buerger, RN  Outcome: Met This Shift  3/22/2022 1356 by Felix Pretty RN  Outcome: Met This Shift  Goal: Absence of physical injury  Description: Absence of physical injury  3/23/2022 0354 by Janalyn Buerger, RN  Outcome: Met This Shift  3/22/2022 1356 by Felix Pretty RN  Outcome: Met This Shift     Problem: Airway Clearance - Ineffective  Goal: Achieve or maintain patent airway  3/23/2022 0354 by Janalyn Buerger, RN  Outcome: Met This Shift  3/22/2022 1356 by Felix Pretty RN  Outcome: Met This Shift     Problem: Gas Exchange - Impaired  Goal: Absence of hypoxia  3/23/2022 0354 by Janalyn Buerger, RN  Outcome: Met This Shift  3/22/2022 1356 by Felix Pretty RN  Outcome: Met This Shift  Goal: Promote optimal lung function  3/23/2022 0354 by Janalyn Buerger, RN  Outcome: Met This Shift  3/22/2022 1356 by Felix Pretty RN  Outcome: Met This Shift     Problem: Breathing Pattern - Ineffective  Goal: Ability to achieve and maintain a regular respiratory rate  3/23/2022 0354 by Janalyn Buerger, RN  Outcome: Met This Shift  3/22/2022 1356 by Felix Pretty RN  Outcome: Met This Shift     Problem:  Body Temperature -  Risk of, Imbalanced  Goal: Ability to maintain a body temperature within defined limits  3/23/2022 0354 by Janalyn Buerger, RN  Outcome: Met This Shift  3/22/2022 1356 by Felix Pretty RN  Outcome: Met This Shift  Goal: Will regain or maintain usual level of consciousness  3/23/2022 0354 by Janalyn Buerger, RN  Outcome: Met This Shift  3/22/2022 1356 by Felix Pretty RN  Outcome: Not Met This Shift  Goal: Complications related to the disease process, condition or treatment will be avoided or minimized  3/23/2022 0354 by Janalyn Buerger, RN  Outcome: Met This Shift  3/22/2022 1356 by Felix Pretty, RN  Outcome: Met This Shift     Problem: Isolation Precautions - Risk of Spread of Infection  Goal: Prevent transmission of infection  3/23/2022 0354 by Jamar Levine RN  Outcome: Met This Shift  3/22/2022 1356 by Kevin Head RN  Outcome: Met This Shift     Problem: Fatigue  Goal: Verbalize increase energy and improved vitality  3/23/2022 0354 by Jamar Levine RN  Outcome: Met This Shift  3/22/2022 1356 by Kevin Head RN  Outcome: Met This Shift     Problem: Skin Integrity:  Goal: Will show no infection signs and symptoms  Description: Will show no infection signs and symptoms  3/23/2022 0354 by Jamar Levine RN  Outcome: Met This Shift  3/22/2022 1356 by Kevin Head RN  Outcome: Met This Shift  Goal: Absence of new skin breakdown  Description: Absence of new skin breakdown  3/23/2022 0354 by Jamar Levine RN  Outcome: Met This Shift  3/22/2022 1356 by Kevin Head RN  Outcome: Met This Shift

## 2022-03-23 NOTE — PROGRESS NOTES
OT SESSION HOLD     Date:3/23/2022  Patient Name: Jennifer Oseguera  MRN: [id number]  : 1950  Room: [address] [place] [address]     Hold due to decline in medical status and DC home today with hospice services. Requesting new orders if pt is appropriate and able to participate with therapy evaluation and treatment.     Irmaorr  OTR/L; [id number]

## 2022-03-23 NOTE — DISCHARGE SUMMARY
Internal Medicine Progress Note     [facility]     Billy Marcus. Hailee Saul, F.A.CMatthewOMatthewI. Vilinda Epley, D.O., PRASANNAOJESUS Leal D.O. Anna Garcia, MSN, APRN, NP-C  Georgina Vega, MSN, [id number] [facility]       Internal Medicine  Discharge Summary    NAME: Gale Santos  :  1950  MRN:  [id number]  [address] [facility]  ADMITTED: 3/21/2022      DISCHARGED: 3/23/22    ADMITTING PHYSICIAN: Billy Menendez DO    CONSULTANT(S):   IP CONSULT TO PALLIATIVE CARE  IP CONSULT TO HOSPICE     ADMITTING DIAGNOSIS:   Hepatic encephalopathy ([address] [K72.90]  Hyperkalemia [E87.5]  Thrombocytopenia ([address] [D69.6]  LISSETTE (acute kidney injury) ([address] [N17.9]  Elevated lactic acid level [R79.89]  Hypotension, unspecified hypotension type [I95.9]  Cirrhosis of liver with ascites, unspecified hepatic cirrhosis type ([address] [K74.60, R18.8]  Hodgkin lymphoma, unspecified Hodgkin lymphoma type, unspecified body region ([address] [C81.90]  Acute on chronic anemia [D64.9]     DISCHARGE DIAGNOSES:   1. Transition to comfort measures with the patient discharging home with hospice today  2. Hepatic encephalopathy in the setting of decompensated cirrhosis and large volume ascites partially due to medication noncompliance  3. COVID-19 infection without overt respiratory or GI symptomatology  4. Acute on chronic kidney disease stage III complicated by outpatient diuretic therapy and overall decreased oral intake  5. Previous history of AML with current diagnosis of classic Hodgkin's lymphoma  6. Acute on chronic anemia  7. Nondisplaced sacral fracture  8. Insulin-dependent diabetes mellitus type II  9. History of DVT  10. History of MI  6. Hypothyroidism-TSH 5.310  12. Hyperlipidemia  13. Hx of tobacco abuse       BRIEF HISTORY OF PRESENT ILLNESS:   Patient is a 82-year-old male who presented from nursing facility due to altered mental status. Daughter.   Wife and son are present at bedside and provides most of the history as patient is nonverbal currently. Patient does appear to be in distress and does verbalize in the form of pneumonia. Otherwise patient has been more altered and confused today. Family states that patient was talking yesterday. However today he is nonverbal.     LABS[de-identified]  Lab Results   Component Value Date    WBC 4.1 (L) 03/23/2022    HGB 7.9 (L) 03/23/2022    HCT 25.9 (L) 03/23/2022    PLT 46 (L) 03/23/2022     03/23/2022    K 4.3 03/23/2022     03/23/2022    CREATININE 1.5 (H) 03/23/2022    BUN 97 (H) 03/23/2022    CO2 17 (L) 03/23/2022    GLUCOSE 225 (H) 03/23/2022    ALT 29 03/23/2022    AST 43 (H) 03/23/2022    INR 1.9 03/21/2022     Lab Results   Component Value Date    INR 1.9 03/21/2022    INR 1.5 03/14/2022    INR 1.6 03/06/2022    PROTIME 22.6 (H) 03/21/2022    PROTIME 17.9 (H) 03/14/2022    PROTIME 18.6 (H) 03/06/2022      Lab Results   Component Value Date    TSH 3.690 03/14/2022     Lab Results   Component Value Date    TRIG 230 (H) 04/28/2020    TRIG 327 (H) 07/05/2018     Lab Results   Component Value Date    HDL 22 04/28/2020    HDL 17 07/05/2018     Lab Results   Component Value Date    LDLCALC 63 04/28/2020    LDLCALC 48 07/05/2018     Lab Results   Component Value Date    LABA1C 8.4 12/08/2021       IMAGING:  CT ABDOMEN PELVIS WO CONTRAST Additional Contrast? None    Result Date: 3/21/2022  EXAMINATION: CT OF THE ABDOMEN AND PELVIS WITHOUT CONTRAST 3/21/2022 5:53 pm TECHNIQUE: CT of the abdomen and pelvis was performed without the administration of intravenous contrast. Multiplanar reformatted images are provided for review. Dose modulation, iterative reconstruction, and/or weight based adjustment of the mA/kV was utilized to reduce the radiation dose to as low as reasonably achievable.  COMPARISON: CT abdomen and pelvis 03/04/2022 HISTORY: ORDERING SYSTEM PROVIDED HISTORY: Liver CA, lactic acidosis, hypotension TECHNOLOGIST PROVIDED HISTORY: collapsed with suggestion of wall thickening and pericolonic fat stranding. This could be secondary to inflammatory, infectious, or ischemic colitis. 3. Colonic diverticulosis without evidence of diverticulitis. 4. Cirrhotic liver with numerous low-density lesions that are poorly characterized without intravenous contrast. 5. Splenomegaly may be associated with portal venous hypertension. 6. Moderate ascites. 7. Small right pleural effusion. 8. There are scattered nodules in the lung bases which are suspicious for metastatic disease and little changed compared to prior examination. 9. Bilateral nonobstructive nephrolithiasis. 10. Stable low-density lesion or infarction in the superior aspect of the spleen. CT ABDOMEN PELVIS WO CONTRAST Additional Contrast? None    Result Date: 3/4/2022  EXAMINATION: CT OF THE ABDOMEN AND PELVIS WITHOUT CONTRAST 3/4/2022 9:42 pm TECHNIQUE: CT of the abdomen and pelvis was performed without the administration of intravenous contrast. Multiplanar reformatted images are provided for review. Dose modulation, iterative reconstruction, and/or weight based adjustment of the mA/kV was utilized to reduce the radiation dose to as low as reasonably achievable. COMPARISON: CT of the abdomen and pelvis, 02/24/2022. HISTORY: ORDERING SYSTEM PROVIDED HISTORY: Liver cirrhosis with recurrent liver CA, in with AMS nausea and vomiting TECHNOLOGIST PROVIDED HISTORY: Reason for exam:->Liver cirrhosis with recurrent liver CA, in with AMS nausea and vomiting Additional Contrast?->None FINDINGS: Lower Chest: Compared to the recent CT from 02/24/2022 (2 weeks prior) there is interval enlargement of a left lower lobe pulmonary nodule from 9-13 mm (series 6, image 45). Another nodule in the right lower lobe has increased in size from 6-11 mm (image 50). Multiple other smaller nodules are seen, which are stable to minimally enlarged. The heart is normal in size. Small pericardial effusion.   Small right pleural effusion. Organs: Liver: Markedly cirrhotic liver with an irregular contour. Due to increased beam hardening artifact resulting from patient's arms within the gantry, the multiple small nodules previously identified the liver cannot be visualized on current study. Gallbladder: Surgically absent. Pancreas: Unremarkable. Spleen:  Mildly enlarged, measuring 16 cm in the maximum AP dimension. Adrenals: Unremarkable. Kidneys: Small bilateral renal calculi, with the largest on the left measuring approximately 5 mm. No hydronephrosis. Small left renal cyst. GI/Bowel: There is mild mural thickening in the colon which is nonspecific. No evidence of bowel obstruction. Pelvis: The urinary bladder and the prostate are grossly unremarkable. Small fat containing inguinal hernias. Peritoneum/Retroperitoneum: Large volume ascites. No free air Mild calcified atherosclerosis is seen in the aorta. No aneurysm. Mildly prominent retroperitoneal lymph nodes Bones/Soft Tissues: The visualized bones are intact without fracture or focal lesion. 1. Mild apparent MURAL THICKENING IN THE COLON may be an artifact of under distension, cystitis or manifestation of portal hypertensive colopathy. 2. Liver cirrhosis with mild splenomegaly and large volume ascites. 3. The small hypodense nodules previously seen in the liver are not evident on this study due to beam hardening artifact resulting from patient's arms within the gantry. 4. Interval increase in size of multiple pulmonary nodules since 02/24/2022 (2 weeks ago) concerning for PROGRESSIVE METASTATIC DISEASE. 5. Grossly stable nonspecific mildly prominent left para-aortic lymph nodes.  The RECOMMENDATIONS: Unavailable     CT ABDOMEN PELVIS WO CONTRAST Additional Contrast? None    Result Date: 2/24/2022  EXAMINATION: CT OF THE ABDOMEN AND PELVIS WITHOUT CONTRAST 2/24/2022 1:19 pm TECHNIQUE: CT of the abdomen and pelvis was performed without the administration of intravenous contrast. Multiplanar reformatted images are provided for review. Dose modulation, iterative reconstruction, and/or weight based adjustment of the mA/kV was utilized to reduce the radiation dose to as low as reasonably achievable. COMPARISON: February 5, 2022 HISTORY: ORDERING SYSTEM PROVIDED HISTORY: abdominal distention and low back pain/ttp s/p fall TECHNOLOGIST PROVIDED HISTORY: Reason for exam:->abdominal distention and low back pain/ttp s/p fall Additional Contrast?->None Decision Support Exception - unselect if not a suspected or confirmed emergency medical condition->Emergency Medical Condition (MA) FINDINGS: Lower Chest: There is chronic elevation of the right hemidiaphragm. Heart size is within normal limits. Coronary artery calcifications are noted, potentially a marker for coronary artery disease. Trace pericardial fluid is present. Moderate to severe bilateral gynecomastia has a symmetric appearance. There are multiple sub 5 mm pulmonary nodules in the lower lobes bilaterally. Several of these nodules are new from the prior study. Additionally, there is a 9 mm nodule in the left lower lobe on axial image 49, increased in size from the prior study when it measured approximately 4 mm. Organs: The liver is cirrhotic and contains multiple hypodense lesions scattered throughout the left and right lobes. Evaluation of these lesions is limited due to lack of IV contrast material.  There are sequelae of portal hypertension including splenomegaly, with a cranial to caudal spleen size of 13 cm. A linear hypodensity extending from the anterior to posterior aspect of the upper pole of the spleen is stable in appearance. The unenhanced pancreas is normal in appearance. The kidneys are without hydronephrosis. Bilateral nonobstructing renal calculi are stable from the prior study, measuring up to 4 mm in the right kidney and 2-3 mm in the left kidney.  GI/Bowel: No evidence of a bowel obstruction, free air or pneumatosis. Portions the colon are decompressed, limiting assessment for mucosal based abnormalities. There is diverticulosis without evidence of diverticulitis. There is a short segment of colonic wall thickening along the proximal and mid segments of the transverse colon. Proximal ascending colon also demonstrates a short segment circumferential wall thickening. The appendix is not confidently visualized. No obvious pericecal inflammatory changes to suggest acute appendicitis. The stomach and duodenal sweep are under distended, limiting assessment. No obvious gastric or duodenal abnormality within this limitation. Pelvis: The urinary bladder is distended, with a subcentimeter diverticulum arising from the anterior wall near the upper margin of the bladder on axial image 190. The prostate gland is heterogeneous but nonenlarged. There is no evidence of pelvic lymphadenopathy. A large volume of ascites is present in the pelvis. Peritoneum/Retroperitoneum: There is a large volume of abdominal ascites. An increased number of visible, top-normal and mildly enlarged mesenteric lymph nodes are present, along with mesenteric edema, not significantly changed in appearance compared with the February 5, 2022 exam.  The abdominal aorta is normal in caliber. The abdominal aorta is heavily calcified but normal in caliber. There are several top-normal sized periaortic/retroperitoneal lymph nodes. A retroperitoneal lymph node on axial image 106 is enlarged measuring 12 mm in short axis. Bones/Soft Tissues: Bones are diffusely osteopenic. There is a nondisplaced fracture through the S3 segment of the sacrum, best demonstrated on the sagittal view. No additional fractures are identified. Moderate degenerative changes involve the lower lumbar spine. Nondisplaced sacral fracture as above. No additional acute osseous abnormalities.  Cirrhotic liver morphology with sequelae of portal hypertension including splenomegaly and large volume of ascites in the abdomen and pelvis. Multiple hypodense lesions scattered throughout the liver which may reflect primary liver cancer or metastatic disease, among other etiologies. Numerous subcentimeter pulmonary nodules at both lung bases, increased in number and size from the prior study. Findings are highly suspicious for metastatic disease. Additional, incidental findings as above. XR CHEST INSPIRATION AND EXPIRATION    Result Date: 2/24/2022  EXAMINATION: ONE XRAY VIEW OF THE CHEST IN INSPIRATION 2/24/2022 2:49 pm COMPARISON: The previous study performed earlier in the day at 12:59 p.m.. HISTORY: ORDERING SYSTEM PROVIDED HISTORY: r/o pneumothorax TECHNOLOGIST PROVIDED HISTORY: Reason for exam:->r/o pneumothorax FINDINGS: The inspiration study again reveals the lungs to be hypoinflated. There is no evidence of acute consolidation or infiltrate. There is again elevation of the right hemidiaphragm. The curvilinear radiolucency in the right upper lung field is less prominently noted but still suggested to be present, especially in the inspiration view. There appears to be extension beyond the chest wall and into the soft tissues, which would indicate a skinfold. There is no definite evidence to suggest pneumothorax. The cardiac silhouette and mediastinal structures are without significant interval change. Curvilinear radiolucency in the right upper lung field noted on the prior examination performed earlier in the day is less prominently seen on this study but still suggested to be present, especially in the inspiration view. There appears to be extension beyond the chest wall and into the soft tissues, indicating a skin fold. No definite evidence to suggest pneumothorax. XR CHEST (2 VW)    Result Date: 2/24/2022  EXAMINATION: TWO XRAY VIEWS OF THE CHEST 2/24/2022 1:05 pm COMPARISON: The previous study performed 02/05/2022.  HISTORY: ORDERING SYSTEM PROVIDED HISTORY: shortness of breath TECHNOLOGIST PROVIDED HISTORY: Reason for exam:->shortness of breath FINDINGS: The lungs are again hypoinflated. There is again elevation of the right hemidiaphragm. A small right pleural effusion cannot be excluded. There is no evidence of acute consolidation or infiltrate. There is a small, curvilinear radiolucency seen involving the right upper lung field. Rule out skin fold versus small pneumothorax. Repeat chest radiographs, including inspiration and expiration are recommended. The cardiac silhouette and mediastinal structures are without significant interval change. Hypoinflated lungs again identified, when compared to the previous study performed 02/05/2022. Rule out skin fold versus small pneumothorax in the right upper lung field. Repeat chest radiographs, including inspiration and expiration are recommended. Elevated right hemidiaphragm again seen. Rule out small right pleural effusion. RECOMMENDATION: The findings were sent to the Radiology Results [id number] at 01:14 p.m. on 02/24/2022 to be communicated to a licensed caregiver. CT Head WO Contrast    Result Date: 3/4/2022  EXAMINATION: CT OF THE HEAD WITHOUT CONTRAST  3/4/2022 9:42 pm TECHNIQUE: CT of the head was performed without the administration of intravenous contrast. Dose modulation, iterative reconstruction, and/or weight based adjustment of the mA/kV was utilized to reduce the radiation dose to as low as reasonably achievable. COMPARISON: None. HISTORY: ORDERING SYSTEM PROVIDED HISTORY: [facility] TECHNOLOGIST PROVIDED HISTORY: Reason for exam:->AMS Has a \"code stroke\" or \"stroke alert\" been called? ->No Decision Support Exception - unselect if not a suspected or confirmed emergency medical condition->Emergency Medical Condition (MA) FINDINGS: BRAIN/VENTRICLES: There is no acute intracranial hemorrhage, mass effect or midline shift. No abnormal extra-axial fluid collection.   The gray-white differentiation is maintained without evidence of an acute infarct. There is no evidence of hydrocephalus. ORBITS: The visualized portion of the orbits demonstrate no acute abnormality. SINUSES: There is soft tissue density  in the maxillary sinuses. SOFT TISSUES/SKULL:  No acute abnormality of the visualized skull or soft tissues. No acute intracranial abnormality. There is age-appropriate atrophy and small-vessel ischemic disease. Sinus disease in the maxillary sinuses. RECOMMENDATIONS: Unavailable     US GALLBLADDER RUQ    Result Date: 3/14/2022  EXAMINATION: RIGHT UPPER QUADRANT ULTRASOUND 3/14/2022 8:05 am COMPARISON: None. HISTORY: ORDERING SYSTEM PROVIDED HISTORY: elevated bilirubin and alk phos TECHNOLOGIST PROVIDED HISTORY: Reason for exam:->elevated bilirubin and alk phos What reading provider will be dictating this exam?->CRC FINDINGS: LIVER: There is a peripheral nodular appearance of the liver. There is increased echogenicity. The findings are consistent with cirrhosis. No hepatic mass is noted. There is perihepatic ascites. BILIARY SYSTEM:  The gallbladder is surgically absent. The common bile duct is normal in caliber. Common bile duct measures 3.7 mm. RIGHT KIDNEY: The right kidney measures 10.3 x 6.5 x 4.9 cm. There are a couple of tiny nonobstructing stones seen within the right kidney. PANCREAS:  Visualized portions of the pancreas are unremarkable. OTHER: No evidence of right upper quadrant ascites. There are findings of cirrhosis. No hepatic mass is noted Perihepatic ascites, mild in severity. Previous cholecystectomy     US GALLBLADDER RUQ    Result Date: 3/6/2022  EXAMINATION: RIGHT UPPER QUADRANT ULTRASOUND 3/6/2022 8:27 am COMPARISON: None.  HISTORY: ORDERING SYSTEM PROVIDED HISTORY: elevated alk phos and lipase TECHNOLOGIST PROVIDED HISTORY: Reason for exam:->elevated alk phos and lipase What reading provider will be dictating this exam?->CRC FINDINGS: There is a heterogeneous coarse echotexture for the liver parenchyma. There is some atrophy of the liver with lobularity of the outlines, these indicates advanced stage for liver parenchymal disease as seen with liver cirrhosis. Patient had previous cholecystectomy. The biliary tree is not dilated. The CBD measures 4 mm. The intrahepatic biliary radicles are not dilated. Due overlying bowel contents evaluation of the pancreas was suboptimal.  The body of the pancreas has normal size the thickness and a hyperechoic texture. Could not see dilated pancreatic duct in the body of the pancreas. Evaluation of the head and tail of the pancreas are limited on the present study. The pancreas was seen on the CT abdomen pelvis done without IV contrast of March 4. See report of the CT abdomen pelvis. There is a fair amount of ascites present in the right upper quadrant, and in the right lower quadrant as well. The right kidney has normal size, measures 10 x 4.8 cm and in the mid 3rd of the right kidney there is a focus of hyperechogenicity compatible with a renal calculus measures 10 mm. There is no hydronephrosis changes in the right kidney. The The left upper quadrant is not evaluated on this study. The spleen is seen on the recent CT abdomen pelvis of March 4 measures 16 by 6.8 cm which indicates splenomegaly. See report of the previous CT abdomen pelvis of March 4. With ultrasound there are areas of hypoechogenicity peripherally located the in the anterior aspect of interface between the right lobe and the left lobe at least 1 is identified measures 2.6 x 1.5 cm which can be suspicious for focal lesions in addition to the diffuse background of liver cirrhosis. Proper evaluation of the liver will required a multiphase contrast study, CT or MRI being MRI a likely in better advantage than CT for additional tissue characterization.      1.  Status post cholecystectomy, no indication for positive ultrasound March sign in the right upper quadrant at the time of the study. 2.  Advanced liver cirrhosis. See above comments and recommendations. 3.  No dilatation of the biliary tree. 4.  Limited evaluation of the pancreas. The left upper quadrant not included on this study. The see report CT abdomen pelvis of March 4, 2022. 5.  Moderate large size ascites present in the abdomen. XR CHEST PORTABLE    Result Date: 3/21/2022  EXAMINATION: ONE XRAY VIEW OF THE CHEST 3/21/2022 3:01 pm COMPARISON: None. HISTORY: ORDERING SYSTEM PROVIDED HISTORY: n/v/ams TECHNOLOGIST PROVIDED HISTORY: Reason for exam:->n/v/ams FINDINGS: Patient is in suboptimal inspiration. Infusion port in place. The lungs are without acute focal process. There is no effusion or pneumothorax. The cardiomediastinal silhouette is without acute process. The osseous structures are without acute process. No acute process. XR CHEST PORTABLE    Result Date: 3/13/2022  EXAMINATION: ONE XRAY VIEW OF THE CHEST 3/13/2022 4:51 pm COMPARISON: 03/04/2022 HISTORY: ORDERING SYSTEM PROVIDED HISTORY: r/o pneumonia TECHNOLOGIST PROVIDED HISTORY: Reason for exam:->r/o pneumonia FINDINGS: The lungs are without acute focal process. There is no effusion or pneumothorax. The cardiomediastinal silhouette is without acute process. The osseous structures are without acute process. Left-sided port a catheter tip in the proximal SVC. No acute process. XR CHEST PORTABLE    Result Date: 3/4/2022  EXAMINATION: ONE XRAY VIEW OF THE CHEST 3/4/2022 8:59 pm COMPARISON: Chest series from February 28, 2022 HISTORY: ORDERING SYSTEM PROVIDED HISTORY: AMS TECHNOLOGIST PROVIDED HISTORY: Reason for exam:->AMS FINDINGS: Left chest wall MediPort with distal tip projecting in the proximal superior vena cava. Stable elevation of the right hemidiaphragm. No new airspace disease, pleural effusions, or pneumothoraces. Stable appearance of the cardiomediastinal silhouette. Normal pulmonary vascularity. Osseous and thoracic soft tissue structures demonstrate no acute findings. Cholecystectomy clips in the right upper quadrant. Stable chest series. No acute cardiopulmonary pathology. XR CHEST PORTABLE    Result Date: 2/28/2022  EXAMINATION: ONE XRAY VIEW OF THE CHEST 2/28/2022 1:08 pm COMPARISON: 02/24/2022 HISTORY: ORDERING SYSTEM PROVIDED HISTORY: [facility] TECHNOLOGIST PROVIDED HISTORY: Reason for exam:->mediport FINDINGS: There is elevation the right hemidiaphragm. There is some scarring in the right lower lobe. Left lung is clear. There is a left-sided chest port. Heart size is normal.  Depth of inspiration is not ideal.     No definite acute process     US ABDOMEN LIMITED Specify organ? LIVER, SPLEEN    Result Date: 2/25/2022  EXAMINATION: RIGHT UPPER QUADRANT ULTRASOUND AND WITH LIVER DOPPLER 2/25/2022 10:11 am COMPARISON: CT ABDOMEN AND PELVIS WITHOUT CONTRAST 02/05/2022 HISTORY: ORDERING SYSTEM PROVIDED HISTORY: Portal and hepatic veins TECHNOLOGIST PROVIDED HISTORY: Reason for exam:->Portal and hepatic veins Specify organ?->LIVER Specify organ?->SPLEEN What reading provider will be dictating this exam?->CRC TECHNIQUE: Duplex ultrasound using B-mode/gray scaled imaging, Doppler spectral analysis and color flow Doppler was obtained of the liver and right upper quadrant abdomen. FINDINGS: Severe cirrhosis with a shrunken liver and prominent nodular patent contour. Recent paracentesis and with current findings of small to moderate ascites. The gallbladder is surgically absent. No intrahepatic or extrahepatic biliary dilatation. The common bile duct measures 0.4 cm in depth diameter which is normal. The liver shows a coarsened echotexture pattern as seen with cirrhosis. The central liver shows an equivocal 1.6 cm hypoechoic lesion which is poorly defined. This area may be artifactual.  No dominant lesion is seen. By Doppler evaluation, the portal and hepatic veins are patent.   Physiologic hepatopetal portal blood flow is present and with portal vein velocity up to 22.1 cm/sec (normal 20-40 cm/sec). The splenic vein remains patent. The right kidney is visualized and appears normal.  No hydronephrosis. The right kidney measures 10.7 cm in length. 1.  Shrunken liver with severe cirrhosis and small to moderate ascites. Equivocal 1.6 cm hypoechoic lesion within the central liver. 2.  Patent portal and hepatic veins. Physiologic hepatopetal portal flow. IR US GUIDED PARACENTESIS    Result Date: 3/16/2022  PROCEDURE: PARACENTESIS WITHOUT IMAGE GUIDANCE US ABDOMEN LIMITED 3/15/2022 HISTORY: ORDERING SYSTEM PROVIDED HISTORY: Paracentesis TECHNOLOGIST PROVIDED HISTORY: Reason for exam:->Paracentesis TECHNIQUE: Informed consent was obtained from the patient's wife (given the patient's altered mental status at the time of the procedure) after a detailed explanation of the procedure including risks, benefits, and alternatives. Universal protocol was followed. A limited ultrasound of the abdomen was performed. The right abdomen was prepped and draped in sterile fashion and local anesthesia was achieved with lidocaine. A 5 Estonian needle sheath was advanced into ascites and paracentesis was performed. The patient tolerated the procedure well. FINDINGS: Limited ultrasound of the abdomen demonstrates ascites. A total of 4750 cc of straw-colored ascitic fluid was removed. Status post paracentesis. IR US GUIDED PARACENTESIS    Result Date: 3/7/2022  PROCEDURE: PARACENTESIS WITHOUT IMAGE GUIDANCE US ABDOMEN LIMITED 3/7/2022 HISTORY: ORDERING SYSTEM PROVIDED HISTORY: paracentesis TECHNOLOGIST PROVIDED HISTORY: Reason for exam:->paracentesis TECHNIQUE: Informed consent was obtained after a detailed explanation of the procedure including risks, benefits, and alternatives. Universal protocol was followed. A limited ultrasound of the abdomen was performed.  The right abdomen was prepped and draped in sterile fashion and local anesthesia was achieved with lidocaine. An 8 Fijian needle sheath was advanced into ascites and paracentesis was performed. The patient tolerated the procedure well. FINDINGS: Limited ultrasound of the abdomen demonstrates ascites. A total of 4800 cc was removed. Successful paracentesis. IR US GUIDED PARACENTESIS    Result Date: 2/25/2022  PROCEDURE: PARACENTESIS WITHOUT IMAGE GUIDANCE US ABDOMEN LIMITED 2/25/2022 HISTORY: ORDERING SYSTEM PROVIDED HISTORY: paracentesis TECHNOLOGIST PROVIDED HISTORY: Reason for exam:->paracentesis TECHNIQUE: Informed consent was obtained after a detailed explanation of the procedure including risks, benefits, and alternatives. Universal protocol was followed. A limited ultrasound of the abdomen was performed. The right abdomen was prepped and draped in sterile fashion and local anesthesia was achieved with lidocaine. A 5 Fijian needle sheath was advanced into ascites and paracentesis was performed. The patient tolerated the procedure well. FINDINGS: Limited ultrasound of the abdomen demonstrates ascites. A total of 6850 cc of straw-colored ascitic fluid was removed. Status post paracentesis. US DUP ABD PEL RETRO SCROT LIMITED    Result Date: 2/25/2022  EXAMINATION: RIGHT UPPER QUADRANT ULTRASOUND AND WITH LIVER DOPPLER 2/25/2022 10:11 am COMPARISON: CT ABDOMEN AND PELVIS WITHOUT CONTRAST 02/05/2022 HISTORY: ORDERING SYSTEM PROVIDED HISTORY: Portal and hepatic veins TECHNOLOGIST PROVIDED HISTORY: Reason for exam:->Portal and hepatic veins Specify organ?->LIVER Specify organ?->SPLEEN What reading provider will be dictating this exam?->CRC TECHNIQUE: Duplex ultrasound using B-mode/gray scaled imaging, Doppler spectral analysis and color flow Doppler was obtained of the liver and right upper quadrant abdomen. FINDINGS: Severe cirrhosis with a shrunken liver and prominent nodular patent contour.  Recent paracentesis and with current findings of small to moderate ascites. The gallbladder is surgically absent. No intrahepatic or extrahepatic biliary dilatation. The common bile duct measures 0.4 cm in depth diameter which is normal. The liver shows a coarsened echotexture pattern as seen with cirrhosis. The central liver shows an equivocal 1.6 cm hypoechoic lesion which is poorly defined. This area may be artifactual.  No dominant lesion is seen. By Doppler evaluation, the portal and hepatic veins are patent. Physiologic hepatopetal portal blood flow is present and with portal vein velocity up to 22.1 cm/sec (normal 20-40 cm/sec). The splenic vein remains patent. The right kidney is visualized and appears normal.  No hydronephrosis. The right kidney measures 10.7 cm in length. 1.  Shrunken liver with severe cirrhosis and small to moderate ascites. Equivocal 1.6 cm hypoechoic lesion within the central liver. 2.  Patent portal and hepatic veins. Physiologic hepatopetal portal flow. US GUIDED PARACENTESIS    Result Date: 3/9/2022  PROCEDURE: PARACENTESIS WITHOUT IMAGE GUIDANCE US ABDOMEN LIMITED 3/9/2022 HISTORY: ORDERING SYSTEM PROVIDED HISTORY: Paracentesis TECHNOLOGIST PROVIDED HISTORY: Reason for exam:->Paracentesis What reading provider will be dictating this exam?->CRC TECHNIQUE: Informed consent was obtained after a detailed explanation of the procedure including risks, benefits, and alternatives. Universal protocol was followed. A limited ultrasound of the abdomen was performed. The right abdomen was prepped and draped in sterile fashion and local anesthesia was achieved with lidocaine. An 8 Divehi needle sheath was advanced into ascites and paracentesis was performed. The patient tolerated the procedure well. FINDINGS: Limited ultrasound of the abdomen demonstrates ascites. A total of 7800 cc was removed. Successful paracentesis.        HOSPITAL COURSE:   Unfortunately, Kai's condition remains terminal.  During his last hospitalization he was discharged to the skilled nursing facility. Patient's wife then had him removed from the nursing home facility and he returned home where he again decompensated. He presented to the hospital entirely encephalopathic with hyperammonemia. He had not been receiving his cirrhotic medications at home. This condition remains terminal and has been so during previous hospitalizations. Family has been reluctant to consider comfort measures but are now agreeable. The patient's wife has spoken with the hospice team and appropriate comfort medications will be administered. I have spoken to the wife on a daily basis and she is in agreement with the patient returning home with hospice. She understands that the condition is terminal and that his death is imminent. BRIEF PHYSICAL EXAMINATION AND LABORATORIES ON DAY OF DISCHARGE:  VITALS:  BP 95/67   Pulse 108   Temp 97.2 °F (36.2 °C) (Infrared)   Resp 17   Ht 5' 8\" (1.727 m)   Wt 174 lb 4 oz (79 kg)   SpO2 96%   BMI 26.49 kg/m²       General appearance:   More awake and alert today. Intermittently lethargic. Head:  Normocephalic. No masses, lesions or tenderness. Eyes:  PERRLA. EOMI. Sclera clear. Buccal mucosa moist.  ENT:  Ears normal. Mucosa normal.  Neck:    Supple. Trachea midline. No thyromegaly. No JVD. No bruits. Heart:    Rhythm regular. Rate controlled. Systolic murmur. Lungs:    Symmetrical. Clear to auscultation bilaterally. No wheezes. No rhonchi. No rales. Abdomen:   Distended with underlying ascites  Extremities: Third spacing with peripheral edema. Neurologic:    Completely obtunded and encephalopathic. Psych:   Agitated. Musculoskeletal:   Spine ROM normal. Muscular strength intact. Gait not assessed. Integumentary:  Jaundiced  Genitalia/Breast:        DISPOSITION:  The patient's condition is serious.   At this time the patient is without objective evidence of an acute process requiring continuing hospitalization or inpatient management. They are stable for discharge with outpatient follow-up. I have spoken with the patient and discussed the results of the current hospitalization, in addition to providing specific details for the plan of care and counseling regarding the diagnosis and prognosis. The plan has been discussed in detail and they are aware of the specific conditions for emergent return, as well as the importance of follow-up. Their questions are answered at this time and they are agreeable with the plan for discharge to home with hospice. DISCHARGE MEDICATIONS:   Current Discharge Medication List           Details   morphine 20 MG/5ML solution Take 1.25 mLs by mouth every 4 hours as needed for Pain (air hunger, dyspnea) for up to 5 days. Qty: 37.5 mL, Refills: 0    Comments: Reduce doses taken as pain becomes manageable  Associated Diagnoses: Hodgkin lymphoma, unspecified Hodgkin lymphoma type, unspecified body region [facility]); Hospice program care      LORazepam (ATIVAN) 0.5 MG tablet Take 1 tablet by mouth every 4 hours as needed for Anxiety for up to 5 days. Qty: 30 tablet, Refills: 0    Associated Diagnoses: Hodgkin lymphoma, unspecified Hodgkin lymphoma type, unspecified body region [facility]); Hospice program care      glycopyrrolate (ROBINUL) 1 MG tablet Take 2 tablets by mouth every 6 hours as needed (increasedc secretions)  Qty: 40 tablet, Refills: 0    Associated Diagnoses: Hodgkin lymphoma, unspecified Hodgkin lymphoma type, unspecified body region [facility]); Hospice program care              Details   ondansetron (ZOFRAN-ODT) 4 MG disintegrating tablet Take 1 tablet by mouth 3 times daily as needed for Nausea or Vomiting  Qty: 90 tablet, Refills: 0    Associated Diagnoses: Nausea              Details   blood glucose monitor kit and supplies Dispense sufficient amount for indicated testing frequency plus additional to accommodate PRN testing needs.  Dispense all needed supplies to include: monitor, strips, lancing device, lancets, control solutions, alcohol swabs. Qty: 1 kit, Refills: 0    Comments: Brand per patient preference. May round up to next available package size. Continuous Blood Gluc  (FREESTYLE PAOLA 2 READER) FLORESITA Check sygar FAsting and ac (4 X daiy). Qty: 1 each, Refills: 0    Associated Diagnoses: Type 2 diabetes mellitus without complication, with long-term current use of insulin ([address] Uncontrolled type 2 diabetes mellitus with hyperglycemia (HCC)      Continuous Blood Gluc Sensor (FREESTYLE PAOLA 2 SENSOR) MISC Check sugar 5 X daily. AM and AC  Qty: 6 each, Refills: 1    Associated Diagnoses: Type 2 diabetes mellitus without complication, with long-term current use of insulin ([address] Uncontrolled type 2 diabetes mellitus with hyperglycemia (HCC)      INS SYRINGE/NEEDLE .5CC/28G ([employer] INS SYR MX-COM .5CC/28G) 28G X 1/2\" 0.5 ML MISC Use three times daily  Qty: 100 each, Refills: 3    Associated Diagnoses: Type 2 diabetes mellitus without complication, with long-term current use of insulin (HCC)      Insulin Pen Needle (BD PEN NEEDLE JENNIFER U/F) 32G X 4 MM MISC USE AS DIRECTED  Qty: 100 each, Refills: 3             Preparing for this patient's discharge, including paperwork, orders, instructions, and meeting with patient did require > 40 minutes.     Jasper Toney DO   3/23/2022  1:17 PM

## 2022-03-23 NOTE — PROGRESS NOTES
Pt evaluated this morning, no family at bedside. Patient was able to respond to basic commands and was able to shake is head yes and no to basic questions. Will reach out to family to discuss discharge.

## 2022-03-23 NOTE — CARE COORDINATION
SOCIAL WORK / DISCHARGE PLANNING:  COVID neg 3/21. Sw discussed case with Myranda ZARCO RN today. She has spoken with pt's son last evening and the plan was for delivery of dme today to home between 10-2. She then will arrange transport via ambulance for home with [facility]. Addendum: 124pm Transport arranged via Myranda ZARCO RN via [facility] Regena Goltz RN aware. Wife was notified by [facility], [facility] RN.                Electronically signed by WILMAR Collins on 3/23/2022 at 10:00 AM

## 2022-03-24 ENCOUNTER — CARE COORDINATION (OUTPATIENT)
Dept: CASE MANAGEMENT | Age: 72
End: 2022-03-24

## 2022-03-24 DIAGNOSIS — C81.90 HODGKIN LYMPHOMA, UNSPECIFIED HODGKIN LYMPHOMA TYPE, UNSPECIFIED BODY REGION (HCC): ICD-10-CM

## 2022-03-24 DIAGNOSIS — Z51.5 HOSPICE PROGRAM CARE: ICD-10-CM

## 2022-03-24 RX ORDER — LORAZEPAM 0.5 MG/1
0.5 TABLET ORAL EVERY 4 HOURS PRN
Qty: 60 TABLET | Refills: 1 | Status: SHIPPED | OUTPATIENT
Start: 2022-03-24 | End: 2022-04-23

## 2022-03-24 NOTE — CARE COORDINATION
CTN called and spoke with Shane Acevedo from [address] [place] [address] and confirmed that the pt is active since 3/23/22 with hospice at home.

## 2022-03-25 DIAGNOSIS — Z51.5 HOSPICE PROGRAM CARE: ICD-10-CM

## 2022-03-25 DIAGNOSIS — R09.89 AIR HUNGER: ICD-10-CM

## 2022-03-25 DIAGNOSIS — C81.90 HODGKIN LYMPHOMA, UNSPECIFIED HODGKIN LYMPHOMA TYPE, UNSPECIFIED BODY REGION (HCC): ICD-10-CM

## 2022-03-25 DIAGNOSIS — R52 PAIN: ICD-10-CM

## 2022-03-25 DIAGNOSIS — Z51.5 HOSPICE CARE PATIENT: Primary | ICD-10-CM

## 2022-03-25 RX ORDER — MORPHINE SULFATE 100 MG/5ML
5 SOLUTION ORAL EVERY 4 HOURS PRN
Qty: 30 ML | Refills: 0 | Status: SHIPPED | OUTPATIENT
Start: 2022-03-25 | End: 2022-04-24

## 2022-03-25 NOTE — PROGRESS NOTES
Physician Progress Note      PATIENT:               Timothy Ramirez  [facility] #:                  [id number]  :                       1950  ADMIT DATE:       3/21/2022 1:59 PM  Jimi Benjamin [place] DATE:        3/23/2022 3:46 PM  RESPONDING  PROVIDER #:        Vern Villaseñor DO          QUERY TEXT:    Patient admitted with Acute hepatic failure/ AMS. If possible, please document   in progress notes and discharge summary if you are evaluating and /or   treating any of the following: The medical record reflects the following:  Risk Factors: AMS, Cirrhosis, Hodgkin lymphoma w/ bone mets , DM,  Clinical Indicators: Per Clinical Dietician note 3/22: Severe malnutrition, In   context of chronic illness related to catabolic illness (Cancer) as evidenced   by intake 0-25%,poor intake prior to admission, weight loss 7.5% in 3 months,   severe loss of subcutaneous fat, severe muscle loss, lab values  Treatment: No interventions initiated due to AMS and discharged home with   Hospice. ASPEN Criteria:    https://[email or web address] onlinelibrary. zaman. com/doi/full/10.1177/952422993397901  5  Options provided:  -- Protein calorie malnutrition severe  -- Other - I will add my own diagnosis  -- Disagree - Not applicable / Not valid  -- Disagree - Clinically unable to determine / Unknown  -- Refer to Clinical Documentation Reviewer    PROVIDER RESPONSE TEXT:    This patient has severe protein calorie malnutrition. Query created by: John Clement on 3/24/2022 9:40 AM      QUERY TEXT:    Pt admitted with Acute hepatic failure, AMS. Noted documentation of \"   COVID-19 infection without overt respiratory or GI symptomatology\" in H&P and   D/C summary. If possible, please document in progress notes and discharge   summary the clinical indicators to support this diagnosis on current admission   or clarify current status of COVID-19.     The medical record reflects the following:  Risk Factors: Hepatic failure/ Cirrhosis, Hodgkin's Lymphoma, History   COVID-19 positive per EMR  Clinical Indicators: Per EMR 3/21 SARS-CoV-2, NAAT: Not Detected. ( previous   results 3/13 SARS-CoV-2, NAAT: DETECTED )  Treatment: No direct COVID treatment noted . Thank you, Nitza Ingram RN [facility] [phone]  Options provided:  -- COVID-19 currently being treated/evaluated as evidenced by, Please document   supportive evidence.   -- No clinical evidence of COVID-19 infection currently  -- COVID-19 infection is PMH only  -- Other - I will add my own diagnosis  -- Disagree - Not applicable / Not valid  -- Disagree - Clinically unable to determine / Unknown  -- Refer to Clinical Documentation Reviewer    PROVIDER RESPONSE TEXT:    COVID-19 infection is currently being treated/evaluated as evidenced by    Query created by: Ana Cristina Mendoza on 3/24/2022 9:41 AM      Electronically signed by:  Isidro Soto DO 3/25/2022 6:18 PM

## 2022-03-26 LAB
BLOOD CULTURE, ROUTINE: NORMAL
CULTURE, BLOOD 2: NORMAL

## 2022-04-01 ENCOUNTER — TELEPHONE (OUTPATIENT)
Dept: FAMILY MEDICINE CLINIC | Age: 72
End: 2022-04-01

## 2022-04-01 NOTE — TELEPHONE ENCOUNTER
Pt called and spoke with mikhail, was advised to speak with the provider that is signing off on the death certificate.

## 2022-04-01 NOTE — TELEPHONE ENCOUNTER
----- Message from Dianna Rolon sent at 4/1/2022  3:01 PM EDT -----  Subject: Message to Provider    QUESTIONS  Information for Provider? patient's wife Rod Linda called and demanded that   provider DOES NOT put reason for death as COVID on the death CERTIFICATE,   provider should put down reason for death as CANCER  ---------------------------------------------------------------------------  --------------  CALL BACK INFO  What is the best way for the office to contact you? OK to leave message on   voicemail  Preferred Call Back Phone Number? [phone]  ---------------------------------------------------------------------------  --------------  SCRIPT ANSWERS  Relationship to Patient? Other  Representative Name? Cierra Morillo  Is the Representative on the appropriate HIPAA document in Epic?  Yes

## 2022-05-06 DIAGNOSIS — E61.1 IRON DEFICIENCY: ICD-10-CM

## 2022-05-06 RX ORDER — FERROUS SULFATE 325(65) MG
TABLET ORAL
Qty: 180 TABLET | Refills: 0 | OUTPATIENT
Start: 2022-05-06

## 2022-12-19 NOTE — PROGRESS NOTES
Medications administered as ordered and documented in MAR. Pt identified by name and . Allergies reviewed.    Message left with Dr. Dior Cornea to return call regarding pts Hg of 6.7

## (undated) DEVICE — INTENDED FOR TISSUE SEPARATION, AND OTHER PROCEDURES THAT REQUIRE A SHARP SURGICAL BLADE TO PUNCTURE OR CUT.: Brand: BARD-PARKER ® STAINLESS STEEL BLADES

## (undated) DEVICE — APPLICATOR MEDICATED 26 CC SOLUTION HI LT ORNG CHLORAPREP

## (undated) DEVICE — COVER PRB W14XL147CM TELESCOPICALLY FLD EXT LEN CIV-FLEX

## (undated) DEVICE — PENCIL ES L3M BTTN SWCH HOLSTER W/ BLDE ELECTRD EDGE

## (undated) DEVICE — GAUZE,SPONGE,4"X4",16PLY,XRAY,STRL,LF: Brand: MEDLINE

## (undated) DEVICE — MARKER,SKIN,WI/RULER AND LABELS: Brand: MEDLINE

## (undated) DEVICE — ELECTRODE PT RET AD L9FT HI MOIST COND ADH HYDRGEL CORDED

## (undated) DEVICE — NDL CNTR 40CT FM MAG: Brand: MEDLINE INDUSTRIES, INC.

## (undated) DEVICE — 1010 S-DRAPE TOWEL DRAPE 10/BX: Brand: STERI-DRAPE™

## (undated) DEVICE — DOUBLE BASIN SET: Brand: MEDLINE INDUSTRIES, INC.

## (undated) DEVICE — GLOVE ORANGE PI 7 1/2   MSG9075

## (undated) DEVICE — CANNULA IV 18GA L15IN BLNT FILL LUERLOCK HUB MJCT

## (undated) DEVICE — GOWN,SIRUS,NONRNF,SETINSLV,XL,20/CS: Brand: MEDLINE

## (undated) DEVICE — GAUZE,SPONGE,2"X2",8PLY,STERILE,LF,2'S: Brand: MEDLINE

## (undated) DEVICE — GOWN,SIRUS,FABRNF,XL,20/CS: Brand: MEDLINE

## (undated) DEVICE — COVER,LIGHT HANDLE,FLX,1/PK: Brand: MEDLINE INDUSTRIES, INC.

## (undated) DEVICE — PACK,LAPAROTOMY,NO GOWNS: Brand: MEDLINE

## (undated) DEVICE — SET EXTN L14IN 1ML IV L BOR NO FLTR NO STPCOCK

## (undated) DEVICE — NEEDLE HYPO 25GA L1.5IN BLU POLYPR HUB S STL REG BVL STR

## (undated) DEVICE — SYRINGE MED 10ML TRNSLUC BRL PLUNG BLK MRK POLYPR CTRL

## (undated) DEVICE — TOWEL,OR,DSP,ST,BLUE,STD,6/PK,12PK/CS: Brand: MEDLINE

## (undated) DEVICE — SHEET,DRAPE,40X58,STERILE: Brand: MEDLINE

## (undated) DEVICE — SET SURG INSTR DISSECT